# Patient Record
Sex: FEMALE | Race: BLACK OR AFRICAN AMERICAN | HISPANIC OR LATINO | Employment: FULL TIME | URBAN - NONMETROPOLITAN AREA
[De-identification: names, ages, dates, MRNs, and addresses within clinical notes are randomized per-mention and may not be internally consistent; named-entity substitution may affect disease eponyms.]

---

## 2017-03-27 ENCOUNTER — OFFICE VISIT (OUTPATIENT)
Dept: OBSTETRICS AND GYNECOLOGY | Facility: CLINIC | Age: 22
End: 2017-03-27

## 2017-03-27 VITALS
SYSTOLIC BLOOD PRESSURE: 130 MMHG | WEIGHT: 293 LBS | BODY MASS INDEX: 44.41 KG/M2 | DIASTOLIC BLOOD PRESSURE: 82 MMHG | HEIGHT: 68 IN

## 2017-03-27 DIAGNOSIS — N76.0 BV (BACTERIAL VAGINOSIS): ICD-10-CM

## 2017-03-27 DIAGNOSIS — Z01.419 VISIT FOR GYNECOLOGIC EXAMINATION: Primary | ICD-10-CM

## 2017-03-27 DIAGNOSIS — N92.0 MENORRHAGIA WITH REGULAR CYCLE: ICD-10-CM

## 2017-03-27 DIAGNOSIS — Z11.3 SCREEN FOR STD (SEXUALLY TRANSMITTED DISEASE): ICD-10-CM

## 2017-03-27 DIAGNOSIS — B96.89 BV (BACTERIAL VAGINOSIS): ICD-10-CM

## 2017-03-27 DIAGNOSIS — Z30.011 ENCOUNTER FOR INITIAL PRESCRIPTION OF CONTRACEPTIVE PILLS: ICD-10-CM

## 2017-03-27 LAB — WET PREP GENITAL: ABNORMAL

## 2017-03-27 PROCEDURE — 87491 CHLMYD TRACH DNA AMP PROBE: CPT | Performed by: NURSE PRACTITIONER

## 2017-03-27 PROCEDURE — 99202 OFFICE O/P NEW SF 15 MIN: CPT | Performed by: NURSE PRACTITIONER

## 2017-03-27 PROCEDURE — 99395 PREV VISIT EST AGE 18-39: CPT | Performed by: NURSE PRACTITIONER

## 2017-03-27 PROCEDURE — 87210 SMEAR WET MOUNT SALINE/INK: CPT | Performed by: NURSE PRACTITIONER

## 2017-03-27 PROCEDURE — 87798 DETECT AGENT NOS DNA AMP: CPT | Performed by: NURSE PRACTITIONER

## 2017-03-27 PROCEDURE — 87591 N.GONORRHOEAE DNA AMP PROB: CPT | Performed by: NURSE PRACTITIONER

## 2017-03-27 PROCEDURE — 87481 CANDIDA DNA AMP PROBE: CPT | Performed by: NURSE PRACTITIONER

## 2017-03-27 PROCEDURE — G0123 SCREEN CERV/VAG THIN LAYER: HCPCS | Performed by: NURSE PRACTITIONER

## 2017-03-27 PROCEDURE — 87512 GARDNER VAG DNA QUANT: CPT | Performed by: NURSE PRACTITIONER

## 2017-03-27 PROCEDURE — 87661 TRICHOMONAS VAGINALIS AMPLIF: CPT | Performed by: NURSE PRACTITIONER

## 2017-03-27 RX ORDER — LEVONORGESTREL AND ETHINYL ESTRADIOL 0.1-0.02MG
1 KIT ORAL DAILY
Qty: 28 TABLET | Refills: 11 | Status: SHIPPED | OUTPATIENT
Start: 2017-03-27 | End: 2018-03-27

## 2017-03-27 RX ORDER — LEVOTHYROXINE SODIUM 0.07 MG/1
75 TABLET ORAL DAILY
COMMUNITY

## 2017-03-27 RX ORDER — PANTOPRAZOLE SODIUM 40 MG/1
40 TABLET, DELAYED RELEASE ORAL DAILY
COMMUNITY

## 2017-03-27 RX ORDER — NORGESTIMATE AND ETHINYL ESTRADIOL 0.25-0.035
1 KIT ORAL DAILY
COMMUNITY

## 2017-03-27 RX ORDER — LEVOCETIRIZINE DIHYDROCHLORIDE 5 MG/1
5 TABLET, FILM COATED ORAL EVERY EVENING
COMMUNITY

## 2017-03-27 RX ORDER — METRONIDAZOLE 500 MG/1
500 TABLET ORAL 2 TIMES DAILY
Qty: 14 TABLET | Refills: 0 | Status: SHIPPED | OUTPATIENT
Start: 2017-03-27 | End: 2017-04-03

## 2017-03-27 NOTE — PATIENT INSTRUCTIONS
Insulin Resistance  Insulin is a hormone made by the pancreas that controls blood sugar (glucose) levels. It is needed for your body to function normally. Insulin resistance occurs when your body does not respond well to the insulin made by your pancreas. Insulin resistance results in high blood glucose levels and can lead to problems, including:  · Prediabetes.  · Type 2 diabetes.  · Heart disease.  · High blood pressure (hypertension).  · Stroke.  · Polycystic ovary syndrome.  · Fatty liver.  CAUSES   The exact causes is unknown.  RISK FACTORS  · Being overweight or obese, especially if your weight is centered in the waist area.  · Physical inactivity.  SIGNS AND SYMPTOMS   There are usually no symptoms.  DIAGNOSIS   There is no test to diagnose insulin resistance. However, your health care provider may suspect that you have insulin resistance if you have:  · High blood glucose levels.  · Abnormal cholesterol levels.  · A waist circumference of more than 35 inches for women and more than 40 inches for men.  · The risk factors for insulin resistance.  To make a diagnosis, your health care provider will perform a physical exam. During the exam, he or she will ask you questions about your health and medical history.  TREATMENT   The most important treatment is lifestyle changes. Your health care provider can help you make the changes that are appropriate. These may include:  · Eating less.  · Being physically active.  · Stopping the use of any tobacco products.  · Taking medicine to help improve your insulin sensitivity.  HOME CARE INSTRUCTIONS   · Eat a healthy diet.    Choose healthy portion sizes.    Eat more vegetables.    Drink more water.    Cut back on high-fat toppings.    Eat foods that are low in fat, such as fruits, vegetables, and lean meats.  · Be physically active to reach and maintain a healthy weight.    Exercise 4 days a week for 20-40 minutes at a time, or as directed by your health care provider.     Take the stairs instead of the elevator.    Walk around when you talk on the phone.    Walk your dog if you have one.  · Take medicines only as directed by your health care provider.  · Do not use any tobacco products, including cigarettes, chewing tobacco, or electronic cigarettes. If you need help quitting, ask your health care provider.  · Keep all follow-up visits as directed by your health care provider. This is important.  SEEK MEDICAL CARE IF:   · You have trouble losing weight or maintaining your goal weight.  · You gain weight.  · You have trouble following your prescribed meal plan.  · You cannot exercise or do your normal exercises.     This information is not intended to replace advice given to you by your health care provider. Make sure you discuss any questions you have with your health care provider.     Document Released: 02/06/2007 Document Revised: 01/08/2016 Document Reviewed: 05/29/2014  [employer] Interactive Patient Education ©2016 [employer]

## 2017-03-27 NOTE — PROGRESS NOTES
Subjective   Jimenez Eng is a 21 y.o. female is here today as a self referral.    HPI Comments: I'm here to have a physical exam and to see about my heavy painful periods.     Gynecologic Exam   The patient's pertinent negatives include no pelvic pain or vaginal discharge. Pertinent negatives include no abdominal pain, back pain, chills, constipation, diarrhea, flank pain, headaches, nausea, rash, sore throat or vomiting.       The following portions of the patient's history were reviewed and updated as appropriate: allergies, current medications, past family history, past social history, past surgical history and problem list.    Review of Systems   Constitutional: Negative for activity change, appetite change, chills and fatigue.   HENT: Negative for congestion, dental problem, ear pain, hearing loss, nosebleeds, rhinorrhea, sneezing, sore throat and voice change.    Eyes: Negative for discharge, redness, itching and visual disturbance.   Respiratory: Negative for apnea, cough, choking, shortness of breath and wheezing.    Cardiovascular: Negative for chest pain and palpitations.   Gastrointestinal: Negative for abdominal distention, abdominal pain, constipation, diarrhea, nausea and vomiting.   Endocrine: Negative for cold intolerance, heat intolerance and polyuria.   Genitourinary: Positive for menstrual problem (heavy painful periods for 6 months). Negative for difficulty urinating, dyspareunia, flank pain, genital sores, pelvic pain, vaginal bleeding and vaginal discharge.   Musculoskeletal: Negative for arthralgias, back pain, myalgias and neck pain.   Skin: Negative for pallor and rash.   Allergic/Immunologic: Negative for environmental allergies and food allergies.   Neurological: Negative for dizziness, tremors, seizures, numbness and headaches.   Hematological: Negative for adenopathy. Does not bruise/bleed easily.   Psychiatric/Behavioral: Negative for agitation, decreased concentration, sleep  disturbance and suicidal ideas. The patient is not nervous/anxious.        Objective   Physical Exam   Constitutional: She is oriented to person, place, and time. She appears well-developed and well-nourished. No distress.   HENT:   Head: Normocephalic and atraumatic.   Right Ear: External ear normal.   Left Ear: External ear normal.   Nose: Nose normal.   Mouth/Throat: Oropharynx is clear and moist.   Eyes: EOM are normal. Pupils are equal, round, and reactive to light.   Neck: Normal range of motion. No thyromegaly present.   Cardiovascular: Normal rate, regular rhythm and normal heart sounds.    No murmur heard.  Pulmonary/Chest: Effort normal and breath sounds normal. No respiratory distress. She has no wheezes. Right breast exhibits no inverted nipple and no mass. Left breast exhibits no inverted nipple and no mass.   Abdominal: Soft. Bowel sounds are normal. There is no tenderness.   Genitourinary: Vagina normal and uterus normal. No breast tenderness. Pelvic exam was performed with patient supine. There is no rash or tenderness on the right labia. There is no rash or tenderness on the left labia. Cervix exhibits no motion tenderness. Right adnexum displays no mass and no tenderness. Left adnexum displays no mass and no tenderness. No bleeding in the vagina. No vaginal discharge found.   Genitourinary Comments: Urethra and urethral meatus normal  Bladder normal no prolapse  Perineum and rectum examined and intact without lesions   Musculoskeletal: Normal range of motion.   Lymphadenopathy:        Right: No inguinal adenopathy present.        Left: No inguinal adenopathy present.   Neurological: She is alert and oriented to person, place, and time. She has normal reflexes.   Skin: Skin is warm and dry.   Psychiatric: She has a normal mood and affect. Her behavior is normal. Judgment and thought content normal.   Nursing note and vitals reviewed.        Assessment/Plan   Jimenez was seen today for gynecologic  exam.    Diagnoses and all orders for this visit:    Visit for gynecologic examination  Normal gyn exam. Pt has been diagnosed with PCOS/Insulin resistant several years ago. She has had a colonoscopy due to her father's cancer and she did  Have some atypical polyps. She is due to have another one in few years. Nestor Dutta is her PCP and does her labs.   -     Liquid-based Pap Smear, Screening    Encounter for initial prescription of contraceptive pills    Will change her oc's due to ? Ovarian cyst formation, and also for heavy periods.   -     levonorgestrel-ethinyl estradiol (AVIANE,ALESSE,LESSINA) 0.1-20 MG-MCG per tablet; Take 1 tablet by mouth Daily.    Menorrhagia with regular cycle    Pt states she has been having this for 6 months. This LMP in particular was heavy and very crampy.    BMI 45.0-49.9, adult    Encouraged low fat diet and exercise.    Screen for STD (sexually transmitted disease)    Patient has been with her CSP for 2 years. She just wanted to have GC and Chlamydia cultures and Bacterial Smear done.   -     Liquid-based Pap Smear, Screening    BV (bacterial vaginosis)       Discussed this is an overgrowth of normal bacteria in the vagina due to change in vaginal ph.     -     POC Wet Prep + BV  Bacterial cultures taken.  -     metroNIDAZOLE (FLAGYL) 500 MG tablet; Take 1 tablet by mouth 2 (Two) Times a Day for 7 days.

## 2017-03-29 ENCOUNTER — PROCEDURE VISIT (OUTPATIENT)
Dept: OBSTETRICS AND GYNECOLOGY | Facility: CLINIC | Age: 22
End: 2017-03-29

## 2017-03-29 DIAGNOSIS — N92.0 MENORRHAGIA WITH REGULAR CYCLE: Primary | ICD-10-CM

## 2017-03-29 PROCEDURE — 76830 TRANSVAGINAL US NON-OB: CPT | Performed by: OBSTETRICS & GYNECOLOGY

## 2017-03-30 LAB
GEN CATEG CVX/VAG CYTO-IMP: NORMAL
LAB AP CASE REPORT: NORMAL
LAB AP GYN ADDITIONAL INFORMATION: NORMAL
LAB AP GYN OTHER FINDINGS: NORMAL
Lab: NORMAL
PATH INTERP SPEC-IMP: NORMAL
STAT OF ADQ CVX/VAG CYTO-IMP: NORMAL

## 2017-09-21 ENCOUNTER — HOSPITAL ENCOUNTER (OUTPATIENT)
Dept: CT IMAGING | Age: 22
Discharge: HOME | End: 2017-09-21
Payer: COMMERCIAL

## 2017-09-21 DIAGNOSIS — R10.31 RIGHT LOWER QUADRANT PAIN: ICD-10-CM

## 2017-09-21 PROCEDURE — 6360000004 HC RX CONTRAST MEDICATION: Performed by: NURSE PRACTITIONER

## 2017-09-21 PROCEDURE — 74177 CT ABD & PELVIS W/CONTRAST: CPT

## 2017-09-21 RX ADMIN — IOVERSOL 75 ML: 741 INJECTION INTRA-ARTERIAL; INTRAVENOUS at 13:34

## 2018-10-17 ENCOUNTER — OFFICE VISIT (OUTPATIENT)
Dept: URGENT CARE | Age: 23
End: 2018-10-17
Payer: COMMERCIAL

## 2018-10-17 VITALS
DIASTOLIC BLOOD PRESSURE: 84 MMHG | OXYGEN SATURATION: 98 % | RESPIRATION RATE: 20 BRPM | BODY MASS INDEX: 43.4 KG/M2 | HEIGHT: 69 IN | HEART RATE: 85 BPM | SYSTOLIC BLOOD PRESSURE: 132 MMHG | TEMPERATURE: 98.1 F | WEIGHT: 293 LBS

## 2018-10-17 DIAGNOSIS — H66.91 RIGHT OTITIS MEDIA, UNSPECIFIED OTITIS MEDIA TYPE: Primary | ICD-10-CM

## 2018-10-17 DIAGNOSIS — R42 DIZZINESS: ICD-10-CM

## 2018-10-17 DIAGNOSIS — E03.9 HYPOTHYROIDISM, UNSPECIFIED TYPE: ICD-10-CM

## 2018-10-17 LAB — TSH SERPL DL<=0.05 MIU/L-ACNC: 1.58 UIU/ML (ref 0.27–4.2)

## 2018-10-17 PROCEDURE — 99203 OFFICE O/P NEW LOW 30 MIN: CPT | Performed by: SPECIALIST

## 2018-10-17 RX ORDER — MECLIZINE HYDROCHLORIDE 25 MG/1
25 TABLET ORAL 3 TIMES DAILY PRN
Qty: 30 TABLET | Refills: 0 | Status: SHIPPED | OUTPATIENT
Start: 2018-10-17 | End: 2018-10-27

## 2018-10-17 RX ORDER — FLUTICASONE PROPIONATE 50 MCG
1 SPRAY, SUSPENSION (ML) NASAL DAILY
Qty: 1 BOTTLE | Refills: 0 | Status: SHIPPED | OUTPATIENT
Start: 2018-10-17 | End: 2019-08-08

## 2018-10-17 RX ORDER — AMOXICILLIN 875 MG/1
875 TABLET, COATED ORAL 2 TIMES DAILY
Qty: 20 TABLET | Refills: 0 | Status: SHIPPED | OUTPATIENT
Start: 2018-10-17 | End: 2018-10-27

## 2018-10-17 ASSESSMENT — ENCOUNTER SYMPTOMS
SINUS PRESSURE: 1
RESPIRATORY NEGATIVE: 1

## 2018-10-17 NOTE — PATIENT INSTRUCTIONS
example, call if:    · You passed out (lost consciousness).     · You have dizziness along with symptoms of a heart attack. These may include:  ¨ Chest pain or pressure, or a strange feeling in the chest.  ¨ Sweating. ¨ Shortness of breath. ¨ Nausea or vomiting. ¨ Pain, pressure, or a strange feeling in the back, neck, jaw, or upper belly or in one or both shoulders or arms. ¨ Lightheadedness or sudden weakness. ¨ A fast or irregular heartbeat.     · You have symptoms of a stroke. These may include:  ¨ Sudden numbness, tingling, weakness, or loss of movement in your face, arm, or leg, especially on only one side of your body. ¨ Sudden vision changes. ¨ Sudden trouble speaking. ¨ Sudden confusion or trouble understanding simple statements. ¨ Sudden problems with walking or balance. ¨ A sudden, severe headache that is different from past headaches.    Call your doctor now or seek immediate medical care if:    · You feel dizzy and have a fever, headache, or ringing in your ears.     · You have new or increased nausea and vomiting.     · Your dizziness does not go away or comes back.    Watch closely for changes in your health, and be sure to contact your doctor if:    · You do not get better as expected. Where can you learn more? Go to https://[employer]. org and sign in to your [employer] account. Enter M863 in the [facility] box to learn more about \"Dizziness: Care Instructions. \"     If you do not have an account, please click on the \"Sign Up Now\" link. Current as of: November 20, 2017  Content Version: 11.7  © 6581-2110 [employer]. Care instructions adapted under license by [facility]"[employer] [facility] ([facility]). If you have questions about a medical condition or this instruction, always ask your healthcare professional. [place] [address] any warranty or liability for your use of this information.        Patient Education        Ear Infection (Otitis Media): Care Instructions  Your Care Instructions    An ear infection may start with a cold and affect the middle ear (otitis media). It can hurt a lot. Most ear infections clear up on their own in a couple of days. Most often you will not need antibiotics. This is because many ear infections are caused by a virus. Antibiotics don't work against a virus. Regular doses of pain medicines are the best way to reduce your fever and help you feel better. Follow-up care is a key part of your treatment and safety. Be sure to make and go to all appointments, and call your doctor if you are having problems. It's also a good idea to know your test results and keep a list of the medicines you take. How can you care for yourself at home? · Take pain medicines exactly as directed. ¨ If the doctor gave you a prescription medicine for pain, take it as prescribed. ¨ If you are not taking a prescription pain medicine, take an over-the-counter medicine, such as acetaminophen (Tylenol), ibuprofen (Advil, Motrin), or naproxen (Aleve). Read and follow all instructions on the label. ¨ Do not take two or more pain medicines at the same time unless the doctor told you to. Many pain medicines have acetaminophen, which is Tylenol. Too much acetaminophen (Tylenol) can be harmful. · Plan to take a full dose of pain reliever before bedtime. Getting enough sleep will help you get better. · Try a warm, moist washcloth on the ear. It may help relieve pain. · If your doctor prescribed antibiotics, take them as directed. Do not stop taking them just because you feel better. You need to take the full course of antibiotics. When should you call for help?   Call your doctor now or seek immediate medical care if:    · You have new or increasing ear pain.     · You have new or increasing pus or blood draining from your ear.     · You have a fever with a stiff neck or a severe headache.    Watch closely for changes in your health, and be sure to contact your are doing well. Follow-up care is a key part of your treatment and safety. Be sure to make and go to all appointments, and call your doctor if you are having problems. It's also a good idea to know your test results and keep a list of the medicines you take. How can you care for yourself at home? · Take your thyroid hormone medicine exactly as prescribed. Call your doctor if you think you are having a problem with your medicine. Most people do not have side effects if they take the right amount of medicine regularly. ¨ Take the medicine 30 minutes before breakfast, and do not take it with calcium, vitamins, or iron. ¨ Do not take extra doses of your thyroid medicine. It will not help you get better any faster, and it may cause side effects. ¨ If you forget to take a dose, do NOT take a double dose of medicine. Take your usual dose the next day. · Tell your doctor about all prescription, herbal, or over-the-counter products you take. · Take care of yourself. Eat a healthy diet, get enough sleep, and get regular exercise. When should you call for help? Call 911 anytime you think you may need emergency care. For example, call if:    · You passed out (lost consciousness).     · You have severe trouble breathing.     · You have a very slow heartbeat (less than 60 beats a minute).     · You have a low body temperature (95°F or below).    Call your doctor now or seek immediate medical care if:    · You feel tired, sluggish, or weak.     · You have trouble remembering things or concentrating.     · You do not begin to feel better 2 weeks after starting your medicine.    Watch closely for changes in your health, and be sure to contact your doctor if you have any problems. Where can you learn more? Go to https://abby.[employer]. org and sign in to your [employer] account. Enter Z124 in the [employer] box to learn more about \"Hypothyroidism: Care Instructions. \"     If you do not have an

## 2018-10-17 NOTE — PROGRESS NOTES
\"Sign Up Now\" link. Current as of: May 12, 2017  Content Version: 11.7  © 3427-3224 [employer]. Care instructions adapted under license by [facility] ([facility]). If you have questions about a medical condition or this instruction, always ask your healthcare professional. [address] any warranty or liability for your use of this information. Patient Education        Hypothyroidism: Care Instructions  Your Care Instructions    You have hypothyroidism, which means that your body is not making enough thyroid hormone. This hormone helps your body use energy. If your thyroid level is low, you may feel tired, be constipated, have an increase in your blood pressure, or have dry skin or memory problems. You may also get cold easily, even when it is warm. Women with low thyroid levels may have heavy menstrual periods. A blood test to find your thyroid-stimulating hormone (TSH) level is used to check for hypothyroidism. A high TSH level may mean that you have low thyroid. When your body is not making enough thyroid hormone, TSH levels rise in an effort to make the body produce more. The treatment for hypothyroidism is to take thyroid hormone pills. You should start to feel better in 1 to 2 weeks. But it can take several months to see changes in the TSH level. You will need regular visits with your doctor to make sure you have the right dose of medicine. Most people need treatment for the rest of their lives. You will need to see your doctor regularly to have blood tests and to make sure you are doing well. Follow-up care is a key part of your treatment and safety. Be sure to make and go to all appointments, and call your doctor if you are having problems. It's also a good idea to know your test results and keep a list of the medicines you take. How can you care for yourself at home? · Take your thyroid hormone medicine exactly as prescribed.  Call your doctor if you think you are having

## 2018-10-29 ENCOUNTER — HOSPITAL ENCOUNTER (OUTPATIENT)
Dept: MRI IMAGING | Age: 23
Discharge: HOME | End: 2018-10-29
Payer: COMMERCIAL

## 2018-10-29 DIAGNOSIS — R42 DIZZINESS AND GIDDINESS: ICD-10-CM

## 2018-10-29 DIAGNOSIS — H53.9 UNSPECIFIED VISUAL DISTURBANCE: ICD-10-CM

## 2018-10-29 DIAGNOSIS — R51.9 INTRACTABLE HEADACHE, UNSPECIFIED CHRONICITY PATTERN, UNSPECIFIED HEADACHE TYPE: ICD-10-CM

## 2018-10-29 PROCEDURE — 70551 MRI BRAIN STEM W/O DYE: CPT

## 2018-11-14 ENCOUNTER — PROCEDURE VISIT (OUTPATIENT)
Dept: OTOLARYNGOLOGY | Age: 23
End: 2018-11-14
Payer: COMMERCIAL

## 2018-11-14 ENCOUNTER — OFFICE VISIT (OUTPATIENT)
Dept: OTOLARYNGOLOGY | Age: 23
End: 2018-11-14
Payer: COMMERCIAL

## 2018-11-14 VITALS
WEIGHT: 293 LBS | OXYGEN SATURATION: 99 % | HEIGHT: 69 IN | RESPIRATION RATE: 18 BRPM | DIASTOLIC BLOOD PRESSURE: 84 MMHG | TEMPERATURE: 98 F | HEART RATE: 89 BPM | SYSTOLIC BLOOD PRESSURE: 118 MMHG | BODY MASS INDEX: 43.4 KG/M2

## 2018-11-14 DIAGNOSIS — R42 VERTIGO: ICD-10-CM

## 2018-11-14 DIAGNOSIS — H91.91 UNILATERAL HEARING LOSS, RIGHT: Primary | ICD-10-CM

## 2018-11-14 DIAGNOSIS — H90.3 HEARING LOSS, SENSORINEURAL, ASYMMETRICAL: Primary | ICD-10-CM

## 2018-11-14 DIAGNOSIS — H93.11 TINNITUS OF RIGHT EAR: ICD-10-CM

## 2018-11-14 PROCEDURE — 92567 TYMPANOMETRY: CPT | Performed by: AUDIOLOGIST

## 2018-11-14 PROCEDURE — 92557 COMPREHENSIVE HEARING TEST: CPT | Performed by: AUDIOLOGIST

## 2018-11-14 PROCEDURE — 99203 OFFICE O/P NEW LOW 30 MIN: CPT | Performed by: OTOLARYNGOLOGY

## 2018-11-14 NOTE — PROGRESS NOTES
Subjective:      Jeronimo Villela is a 21 y.o. female who presented to the clinic with complaints of dizziness, tinnitus and decreased hearing in the right ear. She reported onset approximately one month ago. She reported symptoms have improved but are not resolved. Objective:     External Ears:   normal pinnae shape and position   Ext. Aud. Canal:  Right:patent    Left: patent    Tympanic Mem:  Right: normal landmarks and mobility   Left: normal landmarks and mobility     Audiometry: Moderately severe rising SNHL right, hearing WNL left         Tympanometry:  Right: Type A                                 Left: Type A     Audiogram and Immitance:       SRT: 55 dBHL right, 10 dBHL left  WRS: 74% right @ 75 dBHL, 100% left @65 dBHL      Plan:   Results of today's testing was discussed with Ms. Cata Hou and the following recommendations were made:    1. Follow up with ENT as scheduled. 2. Recheck hearing following medical management.

## 2018-12-03 ENCOUNTER — PROCEDURE VISIT (OUTPATIENT)
Dept: OTOLARYNGOLOGY | Age: 23
End: 2018-12-03
Payer: COMMERCIAL

## 2018-12-03 VITALS
WEIGHT: 293 LBS | TEMPERATURE: 98.8 F | BODY MASS INDEX: 43.4 KG/M2 | RESPIRATION RATE: 18 BRPM | HEIGHT: 69 IN | SYSTOLIC BLOOD PRESSURE: 120 MMHG | OXYGEN SATURATION: 98 % | HEART RATE: 89 BPM | DIASTOLIC BLOOD PRESSURE: 88 MMHG

## 2018-12-03 DIAGNOSIS — H91.91 UNILATERAL HEARING LOSS, RIGHT: Primary | ICD-10-CM

## 2018-12-03 DIAGNOSIS — H90.3 HEARING LOSS, SENSORINEURAL, ASYMMETRICAL: ICD-10-CM

## 2018-12-03 PROCEDURE — 69801 INCISE INNER EAR: CPT | Performed by: OTOLARYNGOLOGY

## 2018-12-03 PROCEDURE — 92552 PURE TONE AUDIOMETRY AIR: CPT | Performed by: AUDIOLOGIST

## 2019-02-22 ENCOUNTER — OFFICE VISIT (OUTPATIENT)
Dept: URGENT CARE | Age: 24
End: 2019-02-22
Payer: COMMERCIAL

## 2019-02-22 VITALS
BODY MASS INDEX: 46.07 KG/M2 | DIASTOLIC BLOOD PRESSURE: 80 MMHG | OXYGEN SATURATION: 98 % | TEMPERATURE: 97.6 F | SYSTOLIC BLOOD PRESSURE: 122 MMHG | WEIGHT: 293 LBS | RESPIRATION RATE: 18 BRPM | HEART RATE: 86 BPM

## 2019-02-22 DIAGNOSIS — R05.9 COUGH: ICD-10-CM

## 2019-02-22 DIAGNOSIS — J40 BRONCHITIS: Primary | ICD-10-CM

## 2019-02-22 LAB
INFLUENZA A ANTIBODY: NEGATIVE
INFLUENZA B ANTIBODY: NEGATIVE
S PYO AG THROAT QL: NORMAL

## 2019-02-22 PROCEDURE — 87804 INFLUENZA ASSAY W/OPTIC: CPT | Performed by: NURSE PRACTITIONER

## 2019-02-22 PROCEDURE — 99213 OFFICE O/P EST LOW 20 MIN: CPT | Performed by: NURSE PRACTITIONER

## 2019-02-22 PROCEDURE — 87880 STREP A ASSAY W/OPTIC: CPT | Performed by: NURSE PRACTITIONER

## 2019-02-22 RX ORDER — BENZONATATE 200 MG/1
200 CAPSULE ORAL 3 TIMES DAILY PRN
Qty: 30 CAPSULE | Refills: 0 | Status: SHIPPED | OUTPATIENT
Start: 2019-02-22 | End: 2019-04-11 | Stop reason: ALTCHOICE

## 2019-02-22 RX ORDER — AZITHROMYCIN 250 MG/1
250 TABLET, FILM COATED ORAL SEE ADMIN INSTRUCTIONS
Qty: 6 TABLET | Refills: 0 | Status: SHIPPED | OUTPATIENT
Start: 2019-02-22 | End: 2019-02-27

## 2019-02-22 RX ORDER — METHYLPREDNISOLONE 4 MG/1
TABLET ORAL
Qty: 1 KIT | Refills: 0 | Status: SHIPPED | OUTPATIENT
Start: 2019-02-22 | End: 2019-02-28

## 2019-02-22 ASSESSMENT — ENCOUNTER SYMPTOMS
SHORTNESS OF BREATH: 0
RHINORRHEA: 0
NAUSEA: 0
COLOR CHANGE: 0
BACK PAIN: 0
VOICE CHANGE: 0
COUGH: 1
VOMITING: 0
SORE THROAT: 1
PHOTOPHOBIA: 0

## 2019-03-20 ENCOUNTER — APPOINTMENT (OUTPATIENT)
Dept: CT IMAGING | Age: 24
End: 2019-03-20
Payer: COMMERCIAL

## 2019-03-20 ENCOUNTER — HOSPITAL ENCOUNTER (EMERGENCY)
Age: 24
Discharge: HOME | End: 2019-03-20
Payer: COMMERCIAL

## 2019-03-20 VITALS
WEIGHT: 293 LBS | OXYGEN SATURATION: 98 % | HEART RATE: 82 BPM | RESPIRATION RATE: 17 BRPM | HEIGHT: 68 IN | TEMPERATURE: 97.2 F | BODY MASS INDEX: 44.41 KG/M2 | SYSTOLIC BLOOD PRESSURE: 120 MMHG | DIASTOLIC BLOOD PRESSURE: 80 MMHG

## 2019-03-20 DIAGNOSIS — N83.291 OTHER OVARIAN CYST, RIGHT SIDE: Primary | ICD-10-CM

## 2019-03-20 LAB
ALBUMIN SERPL-MCNC: 4.2 G/DL (ref 3.5–5.2)
ALP BLD-CCNC: 71 U/L (ref 35–104)
ALT SERPL-CCNC: 13 U/L (ref 5–33)
ANION GAP SERPL CALCULATED.3IONS-SCNC: 13 MMOL/L (ref 7–19)
AST SERPL-CCNC: 14 U/L (ref 5–32)
BASOPHILS ABSOLUTE: 0.1 K/UL (ref 0–0.2)
BASOPHILS RELATIVE PERCENT: 0.4 % (ref 0–1)
BILIRUB SERPL-MCNC: <0.2 MG/DL (ref 0.2–1.2)
BILIRUBIN URINE: NEGATIVE
BLOOD, URINE: NEGATIVE
BUN BLDV-MCNC: 11 MG/DL (ref 6–20)
C-REACTIVE PROTEIN: 1.67 MG/DL (ref 0–0.5)
CALCIUM SERPL-MCNC: 9.3 MG/DL (ref 8.6–10)
CHLORIDE BLD-SCNC: 103 MMOL/L (ref 98–111)
CLARITY: CLEAR
CO2: 23 MMOL/L (ref 22–29)
COLOR: YELLOW
CREAT SERPL-MCNC: 0.7 MG/DL (ref 0.5–0.9)
EOSINOPHILS ABSOLUTE: 0.3 K/UL (ref 0–0.6)
EOSINOPHILS RELATIVE PERCENT: 2.5 % (ref 0–5)
GFR NON-AFRICAN AMERICAN: >60
GLUCOSE BLD-MCNC: 104 MG/DL (ref 74–109)
GLUCOSE URINE: NEGATIVE MG/DL
HCG(URINE) PREGNANCY TEST: NEGATIVE
HCT VFR BLD CALC: 43.3 % (ref 37–47)
HEMOGLOBIN: 13.5 G/DL (ref 12–16)
KETONES, URINE: NEGATIVE MG/DL
LEUKOCYTE ESTERASE, URINE: NEGATIVE
LIPASE: 29 U/L (ref 13–60)
LYMPHOCYTES ABSOLUTE: 4.2 K/UL (ref 1.1–4.5)
LYMPHOCYTES RELATIVE PERCENT: 33 % (ref 20–40)
MCH RBC QN AUTO: 27.4 PG (ref 27–31)
MCHC RBC AUTO-ENTMCNC: 31.2 G/DL (ref 33–37)
MCV RBC AUTO: 88 FL (ref 81–99)
MONOCYTES ABSOLUTE: 1.1 K/UL (ref 0–0.9)
MONOCYTES RELATIVE PERCENT: 8.4 % (ref 0–10)
NEUTROPHILS ABSOLUTE: 7 K/UL (ref 1.5–7.5)
NEUTROPHILS RELATIVE PERCENT: 55.2 % (ref 50–65)
NITRITE, URINE: NEGATIVE
PDW BLD-RTO: 13.4 % (ref 11.5–14.5)
PH UA: 6 (ref 5–8)
PLATELET # BLD: 298 K/UL (ref 130–400)
PMV BLD AUTO: 10.6 FL (ref 9.4–12.3)
POTASSIUM REFLEX MAGNESIUM: 3.8 MMOL/L (ref 3.5–5)
PROTEIN UA: NEGATIVE MG/DL
RBC # BLD: 4.92 M/UL (ref 4.2–5.4)
SEDIMENTATION RATE, ERYTHROCYTE: 17 MM/HR (ref 0–20)
SODIUM BLD-SCNC: 139 MMOL/L (ref 136–145)
SPECIFIC GRAVITY UA: 1.01 (ref 1–1.03)
TOTAL PROTEIN: 7.7 G/DL (ref 6.6–8.7)
URINE REFLEX TO CULTURE: NORMAL
UROBILINOGEN, URINE: 0.2 E.U./DL
WBC # BLD: 12.8 K/UL (ref 4.8–10.8)

## 2019-03-20 PROCEDURE — 85025 COMPLETE CBC W/AUTO DIFF WBC: CPT

## 2019-03-20 PROCEDURE — 86140 C-REACTIVE PROTEIN: CPT

## 2019-03-20 PROCEDURE — 81003 URINALYSIS AUTO W/O SCOPE: CPT

## 2019-03-20 PROCEDURE — 85652 RBC SED RATE AUTOMATED: CPT

## 2019-03-20 PROCEDURE — 83690 ASSAY OF LIPASE: CPT

## 2019-03-20 PROCEDURE — 99284 EMERGENCY DEPT VISIT MOD MDM: CPT | Performed by: NURSE PRACTITIONER

## 2019-03-20 PROCEDURE — 99284 EMERGENCY DEPT VISIT MOD MDM: CPT

## 2019-03-20 PROCEDURE — 6360000002 HC RX W HCPCS: Performed by: NURSE PRACTITIONER

## 2019-03-20 PROCEDURE — 96374 THER/PROPH/DIAG INJ IV PUSH: CPT

## 2019-03-20 PROCEDURE — 36415 COLL VENOUS BLD VENIPUNCTURE: CPT

## 2019-03-20 PROCEDURE — 6360000004 HC RX CONTRAST MEDICATION: Performed by: NURSE PRACTITIONER

## 2019-03-20 PROCEDURE — 74177 CT ABD & PELVIS W/CONTRAST: CPT

## 2019-03-20 PROCEDURE — 2580000003 HC RX 258: Performed by: NURSE PRACTITIONER

## 2019-03-20 PROCEDURE — 80053 COMPREHEN METABOLIC PANEL: CPT

## 2019-03-20 PROCEDURE — 84703 CHORIONIC GONADOTROPIN ASSAY: CPT

## 2019-03-20 RX ORDER — 0.9 % SODIUM CHLORIDE 0.9 %
1000 INTRAVENOUS SOLUTION INTRAVENOUS ONCE
Status: COMPLETED | OUTPATIENT
Start: 2019-03-20 | End: 2019-03-20

## 2019-03-20 RX ORDER — ONDANSETRON 2 MG/ML
4 INJECTION INTRAMUSCULAR; INTRAVENOUS ONCE
Status: COMPLETED | OUTPATIENT
Start: 2019-03-20 | End: 2019-03-20

## 2019-03-20 RX ORDER — HYDROCODONE BITARTRATE AND ACETAMINOPHEN 5; 325 MG/1; MG/1
1 TABLET ORAL EVERY 6 HOURS PRN
Qty: 10 TABLET | Refills: 0 | Status: SHIPPED | OUTPATIENT
Start: 2019-03-20 | End: 2019-03-23

## 2019-03-20 RX ORDER — ONDANSETRON 4 MG/1
4 TABLET, FILM COATED ORAL EVERY 8 HOURS PRN
Qty: 15 TABLET | Refills: 0 | Status: SHIPPED | OUTPATIENT
Start: 2019-03-20 | End: 2019-09-18

## 2019-03-20 RX ADMIN — ONDANSETRON 4 MG: 2 INJECTION INTRAMUSCULAR; INTRAVENOUS at 19:00

## 2019-03-20 RX ADMIN — SODIUM CHLORIDE 1000 ML: 9 INJECTION, SOLUTION INTRAVENOUS at 18:25

## 2019-03-20 RX ADMIN — IOPAMIDOL 90 ML: 755 INJECTION, SOLUTION INTRAVENOUS at 18:34

## 2019-03-20 ASSESSMENT — PAIN SCALES - GENERAL: PAINLEVEL_OUTOF10: 5

## 2019-03-20 ASSESSMENT — ENCOUNTER SYMPTOMS
COLOR CHANGE: 0
VOMITING: 0
COUGH: 0
WHEEZING: 0
SORE THROAT: 0
ABDOMINAL PAIN: 1
DIARRHEA: 0
SHORTNESS OF BREATH: 0
NAUSEA: 1
EYE DISCHARGE: 0
BACK PAIN: 0

## 2019-03-27 ENCOUNTER — OFFICE VISIT (OUTPATIENT)
Dept: PRIMARY CARE CLINIC | Age: 24
End: 2019-03-27
Payer: COMMERCIAL

## 2019-03-27 VITALS
OXYGEN SATURATION: 99 % | DIASTOLIC BLOOD PRESSURE: 72 MMHG | HEIGHT: 68 IN | HEART RATE: 94 BPM | BODY MASS INDEX: 44.41 KG/M2 | WEIGHT: 293 LBS | SYSTOLIC BLOOD PRESSURE: 124 MMHG | TEMPERATURE: 98.3 F

## 2019-03-27 DIAGNOSIS — N83.201 CYST OF RIGHT OVARY: ICD-10-CM

## 2019-03-27 DIAGNOSIS — Z76.89 ENCOUNTER TO ESTABLISH CARE: Primary | ICD-10-CM

## 2019-03-27 DIAGNOSIS — E03.9 HYPOTHYROIDISM, UNSPECIFIED TYPE: ICD-10-CM

## 2019-03-27 DIAGNOSIS — E55.9 VITAMIN D DEFICIENCY: ICD-10-CM

## 2019-03-27 LAB
T4 FREE: 1.2 NG/DL (ref 0.9–1.7)
TSH SERPL DL<=0.05 MIU/L-ACNC: 1.8 UIU/ML (ref 0.27–4.2)
VITAMIN D 25-HYDROXY: 15.2 NG/ML

## 2019-03-27 PROCEDURE — 99214 OFFICE O/P EST MOD 30 MIN: CPT | Performed by: NURSE PRACTITIONER

## 2019-03-27 RX ORDER — ERGOCALCIFEROL (VITAMIN D2) 1250 MCG
50000 CAPSULE ORAL WEEKLY
Qty: 4 CAPSULE | Refills: 3 | Status: SHIPPED | OUTPATIENT
Start: 2019-03-27 | End: 2019-05-21 | Stop reason: SDUPTHER

## 2019-03-27 RX ORDER — IBUPROFEN 800 MG/1
800 TABLET ORAL EVERY 8 HOURS PRN
Qty: 60 TABLET | Refills: 1 | Status: SHIPPED | OUTPATIENT
Start: 2019-03-27 | End: 2020-01-03 | Stop reason: SDUPTHER

## 2019-03-27 ASSESSMENT — PATIENT HEALTH QUESTIONNAIRE - PHQ9
SUM OF ALL RESPONSES TO PHQ QUESTIONS 1-9: 2
2. FEELING DOWN, DEPRESSED OR HOPELESS: 1
SUM OF ALL RESPONSES TO PHQ QUESTIONS 1-9: 2
1. LITTLE INTEREST OR PLEASURE IN DOING THINGS: 1
SUM OF ALL RESPONSES TO PHQ9 QUESTIONS 1 & 2: 2

## 2019-03-27 ASSESSMENT — ENCOUNTER SYMPTOMS
RESPIRATORY NEGATIVE: 1
ABDOMINAL PAIN: 1
EYES NEGATIVE: 1

## 2019-04-03 ENCOUNTER — HOSPITAL ENCOUNTER (OUTPATIENT)
Dept: ULTRASOUND IMAGING | Age: 24
Discharge: HOME | End: 2019-04-03
Payer: COMMERCIAL

## 2019-04-03 DIAGNOSIS — N83.201 CYST OF RIGHT OVARY: ICD-10-CM

## 2019-04-03 PROCEDURE — 76830 TRANSVAGINAL US NON-OB: CPT

## 2019-04-05 ENCOUNTER — TELEPHONE (OUTPATIENT)
Dept: PRIMARY CARE CLINIC | Age: 24
End: 2019-04-05

## 2019-04-05 NOTE — TELEPHONE ENCOUNTER
Patient called for results of u/s-informed preliminary but Law[employer] will have to review  Also reports has small lump \"under arm pit\" Offered appointment,declined and if gets bigger will call to make appt

## 2019-04-10 ENCOUNTER — TELEPHONE (OUTPATIENT)
Dept: PRIMARY CARE CLINIC | Age: 24
End: 2019-04-10

## 2019-04-11 ENCOUNTER — HOSPITAL ENCOUNTER (OUTPATIENT)
Dept: GENERAL RADIOLOGY | Age: 24
Discharge: HOME | End: 2019-04-11
Payer: COMMERCIAL

## 2019-04-11 ENCOUNTER — OFFICE VISIT (OUTPATIENT)
Dept: PRIMARY CARE CLINIC | Age: 24
End: 2019-04-11
Payer: COMMERCIAL

## 2019-04-11 VITALS
TEMPERATURE: 98.2 F | HEART RATE: 95 BPM | DIASTOLIC BLOOD PRESSURE: 80 MMHG | WEIGHT: 293 LBS | BODY MASS INDEX: 44.41 KG/M2 | SYSTOLIC BLOOD PRESSURE: 130 MMHG | HEIGHT: 68 IN | OXYGEN SATURATION: 98 %

## 2019-04-11 DIAGNOSIS — L02.91 ABSCESS: ICD-10-CM

## 2019-04-11 DIAGNOSIS — R73.09 ABNORMAL GLUCOSE: ICD-10-CM

## 2019-04-11 DIAGNOSIS — Z13.220 LIPID SCREENING: ICD-10-CM

## 2019-04-11 DIAGNOSIS — M25.551 PELVIC JOINT PAIN, RIGHT: ICD-10-CM

## 2019-04-11 DIAGNOSIS — M25.551 PELVIC JOINT PAIN, RIGHT: Primary | ICD-10-CM

## 2019-04-11 PROCEDURE — 73502 X-RAY EXAM HIP UNI 2-3 VIEWS: CPT

## 2019-04-11 PROCEDURE — 99214 OFFICE O/P EST MOD 30 MIN: CPT | Performed by: NURSE PRACTITIONER

## 2019-04-11 RX ORDER — SULFAMETHOXAZOLE AND TRIMETHOPRIM 400; 80 MG/1; MG/1
1 TABLET ORAL 2 TIMES DAILY
Qty: 20 TABLET | Refills: 0 | Status: SHIPPED | OUTPATIENT
Start: 2019-04-11 | End: 2019-04-21

## 2019-04-11 ASSESSMENT — ENCOUNTER SYMPTOMS
GASTROINTESTINAL NEGATIVE: 1
RESPIRATORY NEGATIVE: 1
EYES NEGATIVE: 1

## 2019-04-11 NOTE — PROGRESS NOTES
[facility] PRIMARY CARE  [facility] [facility]  [address] [facility] [place] [address]  Dept: [phone]  Dept Fax: [phone]  Loc: [phone]    Trung Monreal is a 21 y.o. female who presents today for her medical conditions/complaints as noted below. Trung Monreal is c/o of Hip Pain (right-radiates-pain front of leg) and Arm Swelling (left axilla)      Chief Complaint   Patient presents with    Hip Pain     right-radiates-pain front of leg    Arm Swelling     left axilla       HPI:     HPI  Patient here with continues pain in right hip now. She was diagnosed with right ovarian cyst, but US showed that it was resolved. She reports that the pain has been radiating through her bone in her right leg. She reports that the pain is the worst on the iliac crest of her right side. Patient is also complaining of left lymph node under her left axillary. She has had this lymph node swollen before, but it was when she had mono. She reports that the lymph node has been swollen for at least 2 weeks.      Past Medical History:   Diagnosis Date    Concussion     approximately 10 years ago    Food allergy     Hypothyroidism     Metabolic disorder     PCOS (polycystic ovarian syndrome)         Past Surgical History:   Procedure Laterality Date    CHOLECYSTECTOMY      KNEE SURGERY      POLYPECTOMY      TONSILLECTOMY AND ADENOIDECTOMY         Social History     Tobacco Use    Smoking status: Never Smoker    Smokeless tobacco: Never Used   Substance Use Topics    Alcohol use: Yes        Current Outpatient Medications   Medication Sig Dispense Refill    sulfamethoxazole-trimethoprim (BACTRIM) 400-80 MG per tablet Take 1 tablet by mouth 2 times daily for 10 days 20 tablet 0    ibuprofen (IBU) 800 MG tablet Take 1 tablet by mouth every 8 hours as needed for Pain 60 tablet 1    ergocalciferol (ERGOCALCIFEROL) 48901 units capsule Take 1 capsule by mouth once a week 4 capsule 3    ondansetron (ZOFRAN) 4 MG tablet Take 1 tablet by mouth every 8 hours as needed for Nausea or Vomiting 15 tablet 0    fluticasone (FLONASE) 50 MCG/ACT nasal spray 1 spray by Nasal route daily 1 Bottle 0     No current facility-administered medications for this visit. Allergies   Allergen Reactions    Corn-Containing Products     Shrimp (Diagnostic)     Soybean-Containing Drug Products     Wheat Extract        Family History   Problem Relation Age of Onset    Polycystic Ovary Syndrome Mother     Colon Cancer Paternal Grandmother          Subjective:      Review of Systems   Constitutional: Negative. HENT: Negative. Eyes: Negative. Respiratory: Negative. Cardiovascular: Negative. Gastrointestinal: Negative. Endocrine: Negative. Genitourinary: Negative. Musculoskeletal: Positive for arthralgias (right hip/pelvis). Skin:        Left arm knot   Neurological: Negative. Hematological: Negative. Psychiatric/Behavioral: Negative. Objective:     Physical Exam   Constitutional: She is oriented to person, place, and time. Vital signs are normal. She appears well-developed and well-nourished. HENT:   Head: Normocephalic and atraumatic. Right Ear: Hearing, tympanic membrane, external ear and ear canal normal.   Left Ear: Hearing, tympanic membrane, external ear and ear canal normal.   Nose: Nose normal.   Mouth/Throat: Uvula is midline, oropharynx is clear and moist and mucous membranes are normal.   Eyes: Pupils are equal, round, and reactive to light. Conjunctivae, EOM and lids are normal.   Neck: Trachea normal and normal range of motion. Neck supple. No thyroid mass and no thyromegaly present. Cardiovascular: Normal rate, regular rhythm, normal heart sounds and intact distal pulses. Pulmonary/Chest: Effort normal and breath sounds normal. Right breast exhibits no mass and no nipple discharge. Left breast exhibits no mass and no nipple discharge.        Abdominal: Soft. Normal appearance and bowel sounds are normal.   Musculoskeletal:        Right hip: She exhibits decreased range of motion and decreased strength. Cervical back: Normal. She exhibits normal range of motion and no tenderness. Thoracic back: Normal. She exhibits normal range of motion and no tenderness. Lumbar back: Normal. She exhibits decreased range of motion. She exhibits no tenderness. Neurological: She is alert and oriented to person, place, and time. She has normal strength. Skin: Skin is warm, dry and intact. Psychiatric: She has a normal mood and affect. Her speech is normal and behavior is normal. Judgment and thought content normal. Cognition and memory are normal.   Nursing note and vitals reviewed. /80   Pulse 95   Temp 98.2 °F (36.8 °C) (Temporal)   Ht 5' 8\" (1.727 m)   Wt (!) 314 lb (142.4 kg)   SpO2 98%   BMI 47.74 kg/m²     Assessment:      Diagnosis Orders   1. Pelvic joint pain, right  XR HIP 2-3 VW W PELVIS RIGHT   2. Abnormal glucose  Hemoglobin A1C   3. BMI 45.0-49.9, adult (HCC)  Hemoglobin A1C   4. Lipid screening  Lipid Panel   5. Abscess  sulfamethoxazole-trimethoprim (BACTRIM) 400-80 MG per tablet       No results found for this visit on 04/11/19. Plan:     I am checking labs - we will call with these results. Xray of right hip due to continued pain. abx for likely abscess under left axillary. Return in about 1 month (around 5/9/2019).     Orders Placed This Encounter   Procedures    XR HIP 2-3 VW W PELVIS RIGHT     Standing Status:   Future     Number of Occurrences:   1     Standing Expiration Date:   4/11/2020    Hemoglobin A1C     Standing Status:   Future     Number of Occurrences:   1     Standing Expiration Date:   4/11/2020    Lipid Panel     Standing Status:   Future     Number of Occurrences:   1     Standing Expiration Date:   4/11/2020     Order Specific Question:   Is Patient Fasting?/# of Hours     Answer:   6 hours Orders Placed This Encounter   Medications    sulfamethoxazole-trimethoprim (BACTRIM) 400-80 MG per tablet     Sig: Take 1 tablet by mouth 2 times daily for 10 days     Dispense:  20 tablet     Refill:  0        Patient offered educational handouts and has had all questions answered. Patient voices understanding and agrees to plans along with risks and benefits of plan. Patient is instructed to continue prior meds, diet, and exercise plans as instructed. Patient agrees to follow up as instructed and sooner if needed. Patient agrees to go to ER if condition becomes emergent. EMR Dragon/transcription disclaimer: Some of this encounter note is an electronic transcription/translation of spoken language to printed text. The electronic translation of spoken language may permit erroneous, or at times, nonsensical words or phrases to be inadvertently transcribed.  Although I have reviewed the note for such errors, some may still exist.    Electronically signed by SARAY Jade on 4/15/2019 at 9:48 AM

## 2019-04-12 DIAGNOSIS — R73.09 ABNORMAL GLUCOSE: ICD-10-CM

## 2019-04-12 DIAGNOSIS — Z13.220 LIPID SCREENING: ICD-10-CM

## 2019-04-12 LAB
CHOLESTEROL, TOTAL: 196 MG/DL (ref 160–199)
HBA1C MFR BLD: 6.2 % (ref 4–6)
HDLC SERPL-MCNC: 66 MG/DL (ref 65–121)
LDL CHOLESTEROL CALCULATED: 109 MG/DL
TRIGL SERPL-MCNC: 105 MG/DL (ref 0–149)

## 2019-04-17 ENCOUNTER — TELEPHONE (OUTPATIENT)
Dept: PRIMARY CARE CLINIC | Age: 24
End: 2019-04-17

## 2019-04-17 NOTE — TELEPHONE ENCOUNTER
----- Message from SARAY Ramírez sent at 4/16/2019  4:35 PM CDT -----  Please let patient knowt a1c was a touch high. This does put her in prediabetic range. I recommend us starting Metformin 500 mg BID if she is williong to help improve that number and this will help with weight loss as well. If we can improve her total weight loss then her a1c might improve enough to no longer be in prediabetic range.

## 2019-05-06 ENCOUNTER — OFFICE VISIT (OUTPATIENT)
Dept: OBGYN | Age: 24
End: 2019-05-06
Payer: COMMERCIAL

## 2019-05-06 VITALS
HEART RATE: 87 BPM | DIASTOLIC BLOOD PRESSURE: 93 MMHG | BODY MASS INDEX: 48.05 KG/M2 | WEIGHT: 293 LBS | SYSTOLIC BLOOD PRESSURE: 132 MMHG

## 2019-05-06 DIAGNOSIS — R10.2 PELVIC PAIN: ICD-10-CM

## 2019-05-06 DIAGNOSIS — N92.6 IRREGULAR MENSES: ICD-10-CM

## 2019-05-06 DIAGNOSIS — I88.9 LYMPHADENITIS: ICD-10-CM

## 2019-05-06 DIAGNOSIS — E28.2 PCOS (POLYCYSTIC OVARIAN SYNDROME): ICD-10-CM

## 2019-05-06 DIAGNOSIS — E34.9 HORMONE DISTURBANCE: Primary | ICD-10-CM

## 2019-05-06 DIAGNOSIS — R30.0 DYSURIA: ICD-10-CM

## 2019-05-06 DIAGNOSIS — E34.9 HORMONE DISTURBANCE: ICD-10-CM

## 2019-05-06 DIAGNOSIS — Z80.0 FAMILY HISTORY OF COLON CANCER IN FATHER: ICD-10-CM

## 2019-05-06 LAB
BACTERIA URINE, POC: NORMAL
BASOPHILS ABSOLUTE: 0.1 K/UL (ref 0–0.2)
BASOPHILS RELATIVE PERCENT: 0.6 % (ref 0–1)
BILIRUBIN URINE: 0.2 MG/DL
BLOOD, URINE: POSITIVE
C-REACTIVE PROTEIN: 1.15 MG/DL (ref 0–0.5)
CASTS URINE, POC: NORMAL
CLARITY: CLEAR
COLOR: YELLOW
CORTISOL TOTAL: 4.2 UG/DL
CRYSTALS URINE, POC: NORMAL
EOSINOPHILS ABSOLUTE: 0.4 K/UL (ref 0–0.6)
EOSINOPHILS RELATIVE PERCENT: 3.6 % (ref 0–5)
EPI CELLS URINE, POC: NORMAL
FOLLICLE STIMULATING HORMONE: 4.7 MIU/ML
GLUCOSE URINE: NORMAL
HCT VFR BLD CALC: 39.3 % (ref 37–47)
HEMOGLOBIN: 12.4 G/DL (ref 12–16)
KETONES, URINE: NEGATIVE
LEUKOCYTE EST, POC: NORMAL
LUTEINIZING HORMONE: 4.3 MIU/ML
LYMPHOCYTES ABSOLUTE: 3.4 K/UL (ref 1.1–4.5)
LYMPHOCYTES RELATIVE PERCENT: 31.7 % (ref 20–40)
MCH RBC QN AUTO: 27 PG (ref 27–31)
MCHC RBC AUTO-ENTMCNC: 31.6 G/DL (ref 33–37)
MCV RBC AUTO: 85.6 FL (ref 81–99)
MONOCYTES ABSOLUTE: 0.8 K/UL (ref 0–0.9)
MONOCYTES RELATIVE PERCENT: 7.5 % (ref 0–10)
NEUTROPHILS ABSOLUTE: 6 K/UL (ref 1.5–7.5)
NEUTROPHILS RELATIVE PERCENT: 56.2 % (ref 50–65)
NITRITE, URINE: NEGATIVE
PDW BLD-RTO: 13.7 % (ref 11.5–14.5)
PH UA: 6 (ref 4.5–8)
PLATELET # BLD: 296 K/UL (ref 130–400)
PMV BLD AUTO: 10.5 FL (ref 9.4–12.3)
PROTEIN UA: NEGATIVE
RBC # BLD: 4.59 M/UL (ref 4.2–5.4)
RBC URINE, POC: NORMAL
SPECIFIC GRAVITY UA: 1.03 (ref 1–1.03)
TESTOSTERONE TOTAL: 36.6 NG/DL (ref 8.4–48.1)
UROBILINOGEN, URINE: NORMAL
WBC # BLD: 10.6 K/UL (ref 4.8–10.8)
WBC URINE, POC: NORMAL
YEAST URINE, POC: NORMAL

## 2019-05-06 PROCEDURE — 99203 OFFICE O/P NEW LOW 30 MIN: CPT | Performed by: NURSE PRACTITIONER

## 2019-05-06 PROCEDURE — 81000 URINALYSIS NONAUTO W/SCOPE: CPT | Performed by: NURSE PRACTITIONER

## 2019-05-06 ASSESSMENT — ENCOUNTER SYMPTOMS
RESPIRATORY NEGATIVE: 1
ALLERGIC/IMMUNOLOGIC NEGATIVE: 1
CONSTIPATION: 0
EYES NEGATIVE: 1
DIARRHEA: 0
ABDOMINAL PAIN: 1

## 2019-05-06 NOTE — PROGRESS NOTES
Yamel Cuevas is a 21 y.o. female who presents today for her medical conditions/ complaints as noted below. Yamel Cuevas is c/o of Establish Care and Menstrual Problem (very painful cycles)        HPI   New pt presents c/o long history of PCOS, irregular periods, intermittent cysts and feeling like something is \"off. \" Has struglled with PCOS, irregular cycles and wt gain basically since puberty. Recent labs show Hgba1c of 6.2, and pt states she has been prediabetic for years. Recently restarted Metformin. Had 6cm ovarian cyst on CT in March, had recheck u/s in April and showed resolution. Still has RLQ pain and pelvic pain through out her cycle. Feels like she may have endometriosis. Has taken birth control in the past and did not like how it made her maxwell. Declines OCP at this time. Wanting to try just Metformin for 3-6 months. Has small, tender lymph node in left axilla. Was treated with Bactrim and it is smaller but pt states she can still feel it. Nervous since her father had lymphoma. Had precancerous colon polyp removed 5 years ago when she lived in [place]. Pt states she is due for another scope this year. Has not established with a GI yet. Pt states her father had colon cancer       Mammo:NA  Pap smear:  Contraception:None    P:0  Ab:0  Bone density:NA  Colonoscopy:  Patient's last menstrual period was 2019.       Past Medical History:   Diagnosis Date    Concussion     approximately 10 years ago    Food allergy     Hypothyroidism     Metabolic disorder     PCOS (polycystic ovarian syndrome)      Past Surgical History:   Procedure Laterality Date    CHOLECYSTECTOMY      KNEE SURGERY      POLYPECTOMY      TONSILLECTOMY AND ADENOIDECTOMY       Family History   Problem Relation Age of Onset    Polycystic Ovary Syndrome Mother     Colon Cancer Paternal Grandmother      Social History     Tobacco Use    Smoking status: Never Smoker    Smokeless tobacco: Never Used   Substance Use Topics    Alcohol use: Yes       Current Outpatient Medications   Medication Sig Dispense Refill    metFORMIN (GLUCOPHAGE) 500 MG tablet Take 1 tablet by mouth 2 times daily (with meals) 180 tablet 1    ibuprofen (IBU) 800 MG tablet Take 1 tablet by mouth every 8 hours as needed for Pain 60 tablet 1    ergocalciferol (ERGOCALCIFEROL) 46406 units capsule Take 1 capsule by mouth once a week 4 capsule 3    ondansetron (ZOFRAN) 4 MG tablet Take 1 tablet by mouth every 8 hours as needed for Nausea or Vomiting 15 tablet 0    fluticasone (FLONASE) 50 MCG/ACT nasal spray 1 spray by Nasal route daily 1 Bottle 0     No current facility-administered medications for this visit. Allergies   Allergen Reactions    Corn-Containing Products     Shrimp (Diagnostic)     Soybean-Containing Drug Products     Wheat Extract      Vitals:    05/06/19 1051   BP: (!) 132/93   Pulse: 87     Body mass index is 48.05 kg/m². Review of Systems   Constitutional: Positive for diaphoresis (night sweats) and unexpected weight change. HENT: Negative. Eyes: Negative. Respiratory: Negative. Cardiovascular: Negative. Gastrointestinal: Positive for abdominal pain (RLQ). Negative for constipation and diarrhea. Endocrine: Negative. Genitourinary: Positive for menstrual problem and pelvic pain. Negative for frequency and urgency. Musculoskeletal: Negative. Skin: Negative. Allergic/Immunologic: Negative. Neurological: Negative. Hematological: Negative. Psychiatric/Behavioral: Negative. All other systems reviewed and are negative. Physical Exam   Constitutional: She is oriented to person, place, and time. She appears well-developed and well-nourished. Pulmonary/Chest:   Small, palpable, tender freely movable lymph node in left axilla  Approx 1cm as documented       Musculoskeletal: Normal range of motion. Neurological: She is alert and oriented to person, place, and time. Skin: Skin is warm and dry. Psychiatric: She has a normal mood and affect. Nursing note and vitals reviewed. Diagnosis Orders   1. Hormone disturbance  Follicle Stimulating Hormone    Luteinizing Hormone    Testosterone    C-Reactive Protein    CBC Auto Differential    DHEA    Cortisol Total   2. PCOS (polycystic ovarian syndrome)     3. Dysuria  POC Urine with Microscopic   4. Pelvic pain     5. Irregular menses     6. Family history of colon cancer in father  Wilber Cortes, [address], Gastroenterology, [place]   7. Lymphadenitis  US BREAST LIMITED LEFT       MEDICATIONS:  No orders of the defined types were placed in this encounter. ORDERS:  Orders Placed This Encounter   Procedures    US BREAST LIMITED LEFT    Follicle Stimulating Hormone    Luteinizing Hormone    Testosterone    C-Reactive Protein    CBC Auto Differential    DHEA    Cortisol Total    [facility], Dorthea Beard, APRN, Gastroenterology, [place]    POC Urine with Microscopic       PLAN:  Checking hormone labs today  Left axillary u/s to be scheduled  Discussed possible laparoscopy for pelvic pain and possible endometriosis  Wanting to hold on OCP for now and slowly increase Metfromin  Referral to GI for possible scope  Due for pap, but on period, will reschedule     Patient Instructions     We are committed to providing you with the best care possible. In order to help us achieve these goals please remember to bring all medications, herbal products, and over the counter supplements with you to each visit. If your provider has ordered testing for you, please be sure to follow up with our office if you have not received results within 7 days after testing took place. *If you receive a survey after visiting one of our offices, please take the time to share your experience concerning your physician office visit. These surveys are confidential and no health information about you is shared.  We are eager to improve for you the week. Walking is a good choice. Or you can run, swim, cycle, or play tennis or team sports. · For hair growth you don't want, try bleaching, plucking, electrolysis, or laser therapy. · Acne can be treated with over-the-counter medicines. Look for ones that have benzoyl peroxide or salicylic acid in them. When should you call for help? Call your doctor now or seek immediate medical care if:    · You have severe vaginal bleeding.     · You have new or worse belly or pelvic pain.    Watch closely for changes in your health, and be sure to contact your doctor if:    · You do not get better as expected.     · You have unusual vaginal bleeding. Where can you learn more? Go to https://[email or web address] [email or web address] Universityeb.[employer]. org and sign in to your Descomplica account. Enter G227 in the InforSense box to learn more about \"Polycystic Ovary Syndrome: Care Instructions. \"     If you do not have an account, please click on the \"Sign Up Now\" link. Current as of: May 14, 2018  Content Version: 11.9  © 6351-6830 [employer]. Care instructions adapted under license by [facility] ([facility]). If you have questions about a medical condition or this instruction, always ask your healthcare professional. [address] any warranty or liability for your use of this information.

## 2019-05-06 NOTE — PROGRESS NOTES
Pt presents today for pap smear and breast exam.  Pt complains of having PCOS, in March had 6 1/2 cm on right ovary but when had u/s this had resolved but still has discomfort in right low abdomen. Pt states her period is very painful this time and hurts to even walk. Pt states she used to be on b/c then stopped in  and has missed several periods. Pt states had swollen lymph node under left arm and had bactrim for this. Pt states she has had some hearing loss. Pt states she has been drinking more water and when she urinates it hurts some.      Mammo:NA  Pap [id number]  Contraception:None    P:0  Ab:0  Bone density:NA  Colonoscopy:

## 2019-05-06 NOTE — PATIENT INSTRUCTIONS
We are committed to providing you with the best care possible. In order to help us achieve these goals please remember to bring all medications, herbal products, and over the counter supplements with you to each visit. If your provider has ordered testing for you, please be sure to follow up with our office if you have not received results within 7 days after testing took place. *If you receive a survey after visiting one of our offices, please take the time to share your experience concerning your physician office visit. These surveys are confidential and no health information about you is shared. We are eager to improve for you and we are counting on your feedback to help make that happen. Patient Education        Polycystic Ovary Syndrome: Care Instructions  Your Care Instructions  Polycystic ovary syndrome, or PCOS, means a woman's hormones are out of balance. It can cause problems with your periods and make it hard to get pregnant. Doctors don't know for sure what causes PCOS, but it seems to run in families. It also seems to be linked to obesity and a risk for diabetes. If you have PCOS, your sisters and daughters have a higher chance of getting it too. You may have other symptoms. These include weight gain, acne, too much hair growth on the face or body, high blood pressure, and high blood sugar. Your ovaries may have cysts on them. These cysts are growths filled with fluid. Keep in mind that although you may not have regular periods, you can still get pregnant. Talk to your doctor about birth control if you do not want to get pregnant. Sometimes the hormone changes with PCOS can also make it hard for some women to get pregnant. If this is a concern, talk to your doctor about treatment for this problem. Women who have PCOS can go for months or longer with no period.  Your doctor may recommend medicines that can help get your cycles back to normal.  Follow-up care is a key part of your treatment and safety. Be sure to make and go to all appointments, and call your doctor if you are having problems. It's also a good idea to know your test results and keep a list of the medicines you take. How can you care for yourself at home? · Take your medicines exactly as prescribed. Call your doctor if you think you are having a problem with your medicine. · Eat a healthy diet. Include fruits, vegetables, beans, and whole grains in your diet each day. · If you are overweight, losing weight can help with many of the symptoms of PCOS. Talk to your doctor about safe ways to lose weight. · Get at least 30 minutes of exercise on most days of the week. Walking is a good choice. Or you can run, swim, cycle, or play tennis or team sports. · For hair growth you don't want, try bleaching, plucking, electrolysis, or laser therapy. · Acne can be treated with over-the-counter medicines. Look for ones that have benzoyl peroxide or salicylic acid in them. When should you call for help? Call your doctor now or seek immediate medical care if:    · You have severe vaginal bleeding.     · You have new or worse belly or pelvic pain.    Watch closely for changes in your health, and be sure to contact your doctor if:    · You do not get better as expected.     · You have unusual vaginal bleeding. Where can you learn more? Go to https://[email or web address].[employer]. org and sign in to your Beanup account. Enter H411 in the State [facility] box to learn more about \"Polycystic Ovary Syndrome: Care Instructions. \"     If you do not have an account, please click on the \"Sign Up Now\" link. Current as of: May 14, 2018  Content Version: 11.9  © 9123-1551 [employer] Incorporated. Care instructions adapted under license by [facility] ([facility]).  If you have questions about a medical condition or this instruction, always ask your healthcare professional. [address] any warranty or liability for your use of this information.

## 2019-05-09 LAB — DHEA: 3.13 NG/ML (ref 1.33–7.78)

## 2019-05-16 ENCOUNTER — OFFICE VISIT (OUTPATIENT)
Dept: PRIMARY CARE CLINIC | Age: 24
End: 2019-05-16
Payer: COMMERCIAL

## 2019-05-16 VITALS
HEIGHT: 68 IN | DIASTOLIC BLOOD PRESSURE: 88 MMHG | BODY MASS INDEX: 44.41 KG/M2 | WEIGHT: 293 LBS | HEART RATE: 77 BPM | SYSTOLIC BLOOD PRESSURE: 120 MMHG | OXYGEN SATURATION: 98 % | TEMPERATURE: 97.8 F

## 2019-05-16 DIAGNOSIS — M25.551 PELVIC JOINT PAIN, RIGHT: Primary | ICD-10-CM

## 2019-05-16 PROCEDURE — 99213 OFFICE O/P EST LOW 20 MIN: CPT | Performed by: NURSE PRACTITIONER

## 2019-05-16 NOTE — PROGRESS NOTES
[facility]  [address] [facility]  [address] [place] [id number]  Dept: [phone]  Dept Fax: [phone]  Loc: [phone]    Devon Corley is a 21 y.o. female who presents today for her medical conditions/complaints as noted below. Devon Corley is c/o of 1 Month Follow-Up (right lower abd pain ovarian- cyst resolved) and Chest Pain      Chief Complaint   Patient presents with    1 Month Follow-Up     right lower abd pain ovarian- cyst resolved    Chest Pain       HPI:     HPI  Patient here for follow up on right lower abd pain. Pain has remained the same. She has seen OB/GYN and they have been checking labs. Patient has not had PAP and is to have this scheduled later this month. She has been taking Metformin without any issues. She also has complaints of chest pain that is intermittent with palpation.      Past Medical History:   Diagnosis Date    Concussion     approximately 10 years ago    Food allergy     Hypothyroidism     Metabolic disorder     PCOS (polycystic ovarian syndrome)         Past Surgical History:   Procedure Laterality Date    CHOLECYSTECTOMY      KNEE SURGERY      POLYPECTOMY      TONSILLECTOMY AND ADENOIDECTOMY         Social History     Tobacco Use    Smoking status: Never Smoker    Smokeless tobacco: Never Used   Substance Use Topics    Alcohol use: Yes        Current Outpatient Medications   Medication Sig Dispense Refill    metFORMIN (GLUCOPHAGE) 500 MG tablet Take 1 tablet by mouth 2 times daily (with meals) 180 tablet 1    ibuprofen (IBU) 800 MG tablet Take 1 tablet by mouth every 8 hours as needed for Pain 60 tablet 1    ergocalciferol (ERGOCALCIFEROL) 40165 units capsule Take 1 capsule by mouth once a week 4 capsule 3    ondansetron (ZOFRAN) 4 MG tablet Take 1 tablet by mouth every 8 hours as needed for Nausea or Vomiting 15 tablet 0    fluticasone (FLONASE) 50 MCG/ACT nasal spray 1 spray by Nasal route daily 1 Bottle 0     No current facility-administered medications for this visit. Allergies   Allergen Reactions    Corn-Containing Products     Shrimp (Diagnostic)     Soybean-Containing Drug Products     Wheat Extract        Family History   Problem Relation Age of Onset    Polycystic Ovary Syndrome Mother     Colon Cancer Paternal Grandmother            Subjective:      Review of Systems   Constitutional: Negative. HENT: Negative. Eyes: Negative. Respiratory: Positive for chest tightness (intermittent). Cardiovascular: Negative. Gastrointestinal: Negative. Endocrine: Negative. Genitourinary: Positive for pelvic pain and vaginal pain. Musculoskeletal: Negative. Skin: Negative. Neurological: Negative. Hematological: Negative. Psychiatric/Behavioral: Negative. Objective:     Physical Exam   Constitutional: She is oriented to person, place, and time. Vital signs are normal. She appears well-developed and well-nourished. obese   HENT:   Head: Normocephalic and atraumatic. Right Ear: Hearing, tympanic membrane, external ear and ear canal normal.   Left Ear: Hearing, tympanic membrane, external ear and ear canal normal.   Nose: Nose normal.   Mouth/Throat: Uvula is midline, oropharynx is clear and moist and mucous membranes are normal.   Eyes: Pupils are equal, round, and reactive to light. Conjunctivae, EOM and lids are normal.   Neck: Trachea normal and normal range of motion. Neck supple. No thyroid mass and no thyromegaly present. Cardiovascular: Normal rate, regular rhythm, normal heart sounds and intact distal pulses. Pulmonary/Chest: Effort normal and breath sounds normal.   Abdominal: Soft. Normal appearance and bowel sounds are normal.   Musculoskeletal: Normal range of motion. Cervical back: Normal. She exhibits normal range of motion and no tenderness. Thoracic back: Normal. She exhibits normal range of motion and no tenderness.

## 2019-05-17 ASSESSMENT — ENCOUNTER SYMPTOMS
CHEST TIGHTNESS: 1
GASTROINTESTINAL NEGATIVE: 1
EYES NEGATIVE: 1

## 2019-05-21 RX ORDER — ERGOCALCIFEROL 1.25 MG/1
CAPSULE ORAL
Qty: 4 CAPSULE | Refills: 1 | Status: SHIPPED | OUTPATIENT
Start: 2019-05-21 | End: 2019-07-28 | Stop reason: SDUPTHER

## 2019-06-17 ENCOUNTER — HOSPITAL ENCOUNTER (OUTPATIENT)
Dept: NON INVASIVE DIAGNOSTICS | Age: 24
Discharge: HOME | End: 2019-06-17
Payer: COMMERCIAL

## 2019-06-17 ENCOUNTER — OFFICE VISIT (OUTPATIENT)
Dept: URGENT CARE | Age: 24
End: 2019-06-17
Payer: COMMERCIAL

## 2019-06-17 VITALS
RESPIRATION RATE: 18 BRPM | SYSTOLIC BLOOD PRESSURE: 121 MMHG | OXYGEN SATURATION: 98 % | BODY MASS INDEX: 44.41 KG/M2 | DIASTOLIC BLOOD PRESSURE: 80 MMHG | TEMPERATURE: 97.9 F | HEIGHT: 68 IN | HEART RATE: 98 BPM | WEIGHT: 293 LBS

## 2019-06-17 DIAGNOSIS — M79.605 LEG PAIN, POSTERIOR, LEFT: Primary | ICD-10-CM

## 2019-06-17 LAB
BASOPHILS ABSOLUTE: 0.1 K/UL (ref 0–0.2)
BASOPHILS RELATIVE PERCENT: 0.7 % (ref 0–1)
EOSINOPHILS ABSOLUTE: 0.3 K/UL (ref 0–0.6)
EOSINOPHILS RELATIVE PERCENT: 3.7 % (ref 0–5)
HCT VFR BLD CALC: 37.1 % (ref 37–47)
HEMOGLOBIN: 12.2 G/DL (ref 12–16)
LYMPHOCYTES ABSOLUTE: 3.1 K/UL (ref 1.1–4.5)
LYMPHOCYTES RELATIVE PERCENT: 34 % (ref 20–40)
MCH RBC QN AUTO: 27.5 PG (ref 27–31)
MCHC RBC AUTO-ENTMCNC: 32.9 G/DL (ref 33–37)
MCV RBC AUTO: 83.7 FL (ref 81–99)
MONOCYTES ABSOLUTE: 0.7 K/UL (ref 0–0.9)
MONOCYTES RELATIVE PERCENT: 8.2 % (ref 0–10)
NEUTROPHILS ABSOLUTE: 4.8 K/UL (ref 1.5–7.5)
NEUTROPHILS RELATIVE PERCENT: 53.1 % (ref 50–65)
PDW BLD-RTO: 13.2 % (ref 11.5–14.5)
PLATELET # BLD: 315 K/UL (ref 130–400)
PMV BLD AUTO: 10.6 FL (ref 9.4–12.3)
RBC # BLD: 4.43 M/UL (ref 4.2–5.4)
WBC # BLD: 9 K/UL (ref 4.8–10.8)

## 2019-06-17 PROCEDURE — 99213 OFFICE O/P EST LOW 20 MIN: CPT | Performed by: NURSE PRACTITIONER

## 2019-06-17 PROCEDURE — 93971 EXTREMITY STUDY: CPT

## 2019-06-17 ASSESSMENT — ENCOUNTER SYMPTOMS
SHORTNESS OF BREATH: 0
SINUS PRESSURE: 0
ABDOMINAL PAIN: 0
EYES NEGATIVE: 1
SORE THROAT: 0
COUGH: 0
BACK PAIN: 0
ALLERGIC/IMMUNOLOGIC NEGATIVE: 1

## 2019-06-17 NOTE — PROGRESS NOTES
[address]  [address] [place] [id number]  Dept: [phone]  Loc: [phone]    Zeenat Morel is a 21 y.o. female who presents today for her medical conditions/complaintsas noted below. Zeenat Morel is c/o of Leg Pain (left; calf pain )        HPI:     Leg Pain    The incident occurred 3 to 5 days ago (Pain in left calf for 2-3 days). There was no injury mechanism. The pain is present in the left leg (Left calf). The pain is at a severity of 4/10. The pain is mild. The pain has been constant since onset. Pertinent negatives include no inability to bear weight, loss of motion, loss of sensation, muscle weakness, numbness or tingling. It is unknown if a foreign body is present. The symptoms are aggravated by weight bearing. She has tried nothing for the symptoms. The treatment provided no relief. Pt today noted in mid left posterior calf area red warm area near top of calf that was not there yesterday. Yesterday she noted pain in her lower left abdomen that she relates to her PCOS and is due to see her ob-gyn today. She has no abd pain today.   Past Medical History:   Diagnosis Date    Concussion     approximately 10 years ago    Food allergy     Hypothyroidism     Metabolic disorder     PCOS (polycystic ovarian syndrome)      Past Surgical History:   Procedure Laterality Date    CHOLECYSTECTOMY      KNEE SURGERY      POLYPECTOMY      TONSILLECTOMY AND ADENOIDECTOMY         Family History   Problem Relation Age of Onset    Polycystic Ovary Syndrome Mother     Colon Cancer Paternal Grandmother        Social History     Tobacco Use    Smoking status: Never Smoker    Smokeless tobacco: Never Used   Substance Use Topics    Alcohol use: Yes      Current Outpatient Medications   Medication Sig Dispense Refill    vitamin D (ERGOCALCIFEROL) 66925 units CAPS capsule TAKE ONE CAPSULE BY MOUTH ONE TIME PER WEEK 4 capsule 1    note and vitals reviewed. /80   Pulse 98   Temp 97.9 °F (36.6 °C)   Resp 18   Ht 5' 8\" (1.727 m)   Wt (!) 315 lb (142.9 kg)   SpO2 98%   BMI 47.90 kg/m²     :Assessment       Diagnosis Orders   1. Leg pain, posterior, left  US DUP LOWER EXTREMITY LEFT PHILLIP    CBC Auto Differential       :Plan      Orders Placed This Encounter   Procedures    US DUP LOWER EXTREMITY LEFT PHILLIP     Order Specific Question:   Reason for exam:     Answer:   left leg pain, edema    CBC Auto Differential       No follow-ups on file. No orders of the defined types were placed in this encounter. There are no Patient Instructions on file for this visit. Patient given educational materials- see patient instructions. Discussed use, benefit, and side effects of prescribedmedications. All patient questions answered. Pt voiced understanding.        Electronically signed by SARAY Gonzalez CNP on 6/17/2019 at 9:55 AM

## 2019-06-17 NOTE — PATIENT INSTRUCTIONS
Will call with lab work and  [address] results and plan of care after results are obtained. Her US is at 1:30 today.   Keep right leg elevated as much as possible  Follow-up with PCP as needed

## 2019-06-27 ENCOUNTER — OFFICE VISIT (OUTPATIENT)
Dept: OBGYN | Age: 24
End: 2019-06-27
Payer: COMMERCIAL

## 2019-06-27 VITALS
WEIGHT: 293 LBS | DIASTOLIC BLOOD PRESSURE: 88 MMHG | HEART RATE: 91 BPM | SYSTOLIC BLOOD PRESSURE: 138 MMHG | BODY MASS INDEX: 44.41 KG/M2 | TEMPERATURE: 98 F | HEIGHT: 68 IN

## 2019-06-27 DIAGNOSIS — E28.2 PCOS (POLYCYSTIC OVARIAN SYNDROME): ICD-10-CM

## 2019-06-27 DIAGNOSIS — R30.9 PAINFUL URINATION: ICD-10-CM

## 2019-06-27 DIAGNOSIS — Z01.419 WOMEN'S ANNUAL ROUTINE GYNECOLOGICAL EXAMINATION: Primary | ICD-10-CM

## 2019-06-27 DIAGNOSIS — Z12.4 SCREENING FOR CERVICAL CANCER: ICD-10-CM

## 2019-06-27 DIAGNOSIS — R10.2 PELVIC PAIN: ICD-10-CM

## 2019-06-27 LAB
BACTERIA URINE, POC: NORMAL
BILIRUBIN URINE: 0.2 MG/DL
BLOOD, URINE: NEGATIVE
CASTS URINE, POC: NORMAL
CLARITY: CLEAR
COLOR: YELLOW
CRYSTALS URINE, POC: NORMAL
EPI CELLS URINE, POC: NORMAL
GLUCOSE URINE: NORMAL
KETONES, URINE: NEGATIVE
LEUKOCYTE EST, POC: NORMAL
NITRITE, URINE: NEGATIVE
PH UA: 5.5 (ref 4.5–8)
PROTEIN UA: NEGATIVE
RBC URINE, POC: NORMAL
SPECIFIC GRAVITY UA: 1.03 (ref 1–1.03)
UROBILINOGEN, URINE: NORMAL
WBC URINE, POC: NORMAL
YEAST URINE, POC: NORMAL

## 2019-06-27 PROCEDURE — 99395 PREV VISIT EST AGE 18-39: CPT | Performed by: NURSE PRACTITIONER

## 2019-06-27 PROCEDURE — 81000 URINALYSIS NONAUTO W/SCOPE: CPT | Performed by: NURSE PRACTITIONER

## 2019-06-27 ASSESSMENT — ENCOUNTER SYMPTOMS
ALLERGIC/IMMUNOLOGIC NEGATIVE: 1
RESPIRATORY NEGATIVE: 1
DIARRHEA: 0
EYES NEGATIVE: 1
CONSTIPATION: 1

## 2019-06-27 NOTE — PROGRESS NOTES
Paz Thomas is a 21 y.o. female who presents today for her medical conditions/ complaints as noted below. Paz Thomas is c/o of Gynecologic Exam        HPI   Pt presents for annual exam and pap smear. Long history of PCOS and irregular periods. Feels like she may have another cyst. Also c/o pelvic pain, more so with BM. Feels like she may have endometriosis. Taking Metformin with some relief of symptoms. C/o night sweats. Had recent labs- normal hormones, elevated CRP. Seeing PCP. Pt is also due for colonoscopy, has not done yet. Mammo:NA  Pap smear:  Contraception:none    P:0  Ab:0  Bone density:NA  Colonoscopy:NA  No LMP recorded.     Past Medical History:   Diagnosis Date    Concussion     approximately 10 years ago    Food allergy     Hypothyroidism     Metabolic disorder     PCOS (polycystic ovarian syndrome)      Past Surgical History:   Procedure Laterality Date    CHOLECYSTECTOMY      KNEE SURGERY      POLYPECTOMY      TONSILLECTOMY AND ADENOIDECTOMY       Family History   Problem Relation Age of Onset    Polycystic Ovary Syndrome Mother     Colon Cancer Paternal Grandmother      Social History     Tobacco Use    Smoking status: Never Smoker    Smokeless tobacco: Never Used   Substance Use Topics    Alcohol use: Yes       Current Outpatient Medications   Medication Sig Dispense Refill    metFORMIN (GLUCOPHAGE) 500 MG tablet Take 1 tablet by mouth 2 times daily (with meals) 180 tablet 1    vitamin D (ERGOCALCIFEROL) 07045 units CAPS capsule TAKE ONE CAPSULE BY MOUTH ONE TIME PER WEEK 4 capsule 1    ibuprofen (IBU) 800 MG tablet Take 1 tablet by mouth every 8 hours as needed for Pain 60 tablet 1    ondansetron (ZOFRAN) 4 MG tablet Take 1 tablet by mouth every 8 hours as needed for Nausea or Vomiting 15 tablet 0    fluticasone (FLONASE) 50 MCG/ACT nasal spray 1 spray by Nasal route daily 1 Bottle 0     No current facility-administered medications for this visit. Allergies   Allergen Reactions    Corn-Containing Products     Shrimp (Diagnostic)     Soybean-Containing Drug Products     Wheat Extract      Vitals:    06/27/19 1337   BP: 138/88   Pulse: 91   Temp: 98 °F (36.7 °C)     Body mass index is 47.59 kg/m². Review of Systems   Constitutional: Positive for fever (night sweats) and unexpected weight change (gains weight easily). HENT: Negative. Eyes: Negative. Respiratory: Negative. Cardiovascular: Negative. Gastrointestinal: Positive for constipation (no BM x7 days). Negative for diarrhea. Endocrine: Negative. Genitourinary: Positive for menstrual problem (irregular) and pelvic pain. Negative for frequency and urgency. Musculoskeletal: Negative. Skin: Negative. Allergic/Immunologic: Negative. Neurological: Negative. Hematological: Negative. Psychiatric/Behavioral: Negative. All other systems reviewed and are negative. Physical Exam   Constitutional: She is oriented to person, place, and time. She appears well-developed and well-nourished. Neck: Normal range of motion. Neck supple. No thyroid mass and no thyromegaly present. Cardiovascular: Normal rate and regular rhythm. Pulmonary/Chest: Effort normal and breath sounds normal. Right breast exhibits no inverted nipple, no mass, no nipple discharge and no skin change. Left breast exhibits no inverted nipple, no mass, no nipple discharge and no skin change. Abdominal: Soft. She exhibits no mass. There is no tenderness. Genitourinary: Vagina normal and uterus normal. Cervix exhibits no motion tenderness. Right adnexum displays tenderness. Right adnexum displays no mass. Left adnexum displays tenderness. Left adnexum displays no mass. Genitourinary Comments: Pap collected   Musculoskeletal: Normal range of motion. Neurological: She is alert and oriented to person, place, and time. Skin: Skin is warm and dry.    Psychiatric: She has a normal mood and affect. Nursing note and vitals reviewed. Diagnosis Orders   1. Women's annual routine gynecological examination     2. Screening for cervical cancer  PAP SMEAR   3. Painful urination  POC Urine with Microscopic   4. Pelvic pain  US Non OB Transvaginal   5. PCOS (polycystic ovarian syndrome)         MEDICATIONS:  Orders Placed This Encounter   Medications    metFORMIN (GLUCOPHAGE) 500 MG tablet     Sig: Take 1 tablet by mouth 2 times daily (with meals)     Dispense:  180 tablet     Refill:  1       ORDERS:  Orders Placed This Encounter   Procedures    US Non OB Transvaginal    PAP SMEAR    POC Urine with Microscopic       PLAN:  Pap collected  Neg UA  Ordered pelvic u/s  Will forward chart to MD for possible surgical eval    Patient Instructions   Patient Education        Breast Self-Exam: Care Instructions  Your Care Instructions    A breast self-exam is when you check your breasts for lumps or changes. This regular exam helps you learn how your breasts normally look and feel. Most breast problems or changes are not because of cancer. Breast self-exam is not a substitute for a mammogram. Having regular breast exams by your doctor and regular mammograms improve your chances of finding any problems with your breasts. Some women set a time each month to do a step-by-step breast self-exam. Other women like a less formal system. They might look at their breasts as they brush their teeth, or feel their breasts once in a while in the shower. If you notice a change in your breast, tell your doctor. Follow-up care is a key part of your treatment and safety. Be sure to make and go to all appointments, and call your doctor if you are having problems. It's also a good idea to know your test results and keep a list of the medicines you take. How do you do a breast self-exam?  · The best time to examine your breasts is usually one week after your menstrual period begins. Your breasts should not be tender then. If you do not have periods, you might do your exam on a day of the month that is easy to remember. · To examine your breasts:  ? Remove all your clothes above the waist and lie down. When you are lying down, your breast tissue spreads evenly over your chest wall, which makes it easier to feel all your breast tissue. ? Use the pads--not the fingertips--of the 3 middle fingers of your left hand to check your right breast. Move your fingers slowly in small coin-sized circles that overlap. ? Use three levels of pressure to feel of all your breast tissue. Use light pressure to feel the tissue close to the skin surface. Use medium pressure to feel a little deeper. Use firm pressure to feel your tissue close to your breastbone and ribs. Use each pressure level to feel your breast tissue before moving on to the next spot. ? Check your entire breast, moving up and down as if following a strip from the collarbone to the bra line, and from the armpit to the ribs. Repeat until you have covered the entire breast.  ? Repeat this procedure for your left breast, using the pads of the 3 middle fingers of your right hand. · To examine your breasts while in the shower:  ? Place one arm over your head and lightly soap your breast on that side. ? Using the pads of your fingers, gently move your hand over your breast (in the strip pattern described above), feeling carefully for any lumps or changes. ? Repeat for the other breast.  · Have your doctor inspect anything you notice to see if you need further testing. Where can you learn more? Go to https://[employer]. org and sign in to your Inquisitive Systems account. Enter P148 in the Mobstats box to learn more about \"Breast Self-Exam: Care Instructions. \"     If you do not have an account, please click on the \"Sign Up Now\" link. Current as of: December 19, 2018  Content Version: 12.0  © 0148-9380 Healthwise, Incorporated.  Care instructions adapted under

## 2019-06-27 NOTE — PATIENT INSTRUCTIONS
Patient Education        Breast Self-Exam: Care Instructions  Your Care Instructions    A breast self-exam is when you check your breasts for lumps or changes. This regular exam helps you learn how your breasts normally look and feel. Most breast problems or changes are not because of cancer. Breast self-exam is not a substitute for a mammogram. Having regular breast exams by your doctor and regular mammograms improve your chances of finding any problems with your breasts. Some women set a time each month to do a step-by-step breast self-exam. Other women like a less formal system. They might look at their breasts as they brush their teeth, or feel their breasts once in a while in the shower. If you notice a change in your breast, tell your doctor. Follow-up care is a key part of your treatment and safety. Be sure to make and go to all appointments, and call your doctor if you are having problems. It's also a good idea to know your test results and keep a list of the medicines you take. How do you do a breast self-exam?  · The best time to examine your breasts is usually one week after your menstrual period begins. Your breasts should not be tender then. If you do not have periods, you might do your exam on a day of the month that is easy to remember. · To examine your breasts:  ? Remove all your clothes above the waist and lie down. When you are lying down, your breast tissue spreads evenly over your chest wall, which makes it easier to feel all your breast tissue. ? Use the pads--not the fingertips--of the 3 middle fingers of your left hand to check your right breast. Move your fingers slowly in small coin-sized circles that overlap. ? Use three levels of pressure to feel of all your breast tissue. Use light pressure to feel the tissue close to the skin surface. Use medium pressure to feel a little deeper. Use firm pressure to feel your tissue close to your breastbone and ribs.  Use each pressure level to feel your breast tissue before moving on to the next spot. ? Check your entire breast, moving up and down as if following a strip from the collarbone to the bra line, and from the armpit to the ribs. Repeat until you have covered the entire breast.  ? Repeat this procedure for your left breast, using the pads of the 3 middle fingers of your right hand. · To examine your breasts while in the shower:  ? Place one arm over your head and lightly soap your breast on that side. ? Using the pads of your fingers, gently move your hand over your breast (in the strip pattern described above), feeling carefully for any lumps or changes. ? Repeat for the other breast.  · Have your doctor inspect anything you notice to see if you need further testing. Where can you learn more? Go to https://Barcheyachtpegideoneb.RedKLEVER. org and sign in to your Dashbell account. Enter P148 in the Blink.com box to learn more about \"Breast Self-Exam: Care Instructions. \"     If you do not have an account, please click on the \"Sign Up Now\" link. Current as of: December 19, 2018  Content Version: 12.0  © 6816-9889 Healthwise, Incorporated. Care instructions adapted under license by [facility] ([facility]). If you have questions about a medical condition or this instruction, always ask your healthcare professional. [address] any warranty or liability for your use of this information.

## 2019-06-27 NOTE — PROGRESS NOTES
Pt presents today for pap smear and breast exam. Pt is needing a refill on her glucophage. Pt states has right side pain and into her uterus and hurts when walks and when has a bowel movement. Pt states she has had cysts before and has had a fever. Pt states she has been having night sweats.     Mammo:NA  Pap smear:2016  Contraception:none    P:0  Ab:0  Bone density:NA  Colonoscopy:NA

## 2019-06-28 ENCOUNTER — TELEPHONE (OUTPATIENT)
Dept: OBGYN | Age: 24
End: 2019-06-28

## 2019-06-28 NOTE — TELEPHONE ENCOUNTER
Called to [facility] appt with Dr. Artis Interiano to discuss surgical options. appt made.  Burton LLANOS

## 2019-07-03 ENCOUNTER — HOSPITAL ENCOUNTER (OUTPATIENT)
Dept: ULTRASOUND IMAGING | Age: 24
Discharge: HOME | End: 2019-07-03
Payer: COMMERCIAL

## 2019-07-03 DIAGNOSIS — R10.2 PELVIC PAIN: ICD-10-CM

## 2019-07-03 PROCEDURE — 76830 TRANSVAGINAL US NON-OB: CPT

## 2019-08-08 ENCOUNTER — OFFICE VISIT (OUTPATIENT)
Dept: URGENT CARE | Age: 24
End: 2019-08-08
Payer: COMMERCIAL

## 2019-08-08 VITALS
BODY MASS INDEX: 44.41 KG/M2 | WEIGHT: 293 LBS | SYSTOLIC BLOOD PRESSURE: 144 MMHG | RESPIRATION RATE: 18 BRPM | TEMPERATURE: 97.6 F | OXYGEN SATURATION: 97 % | HEIGHT: 68 IN | HEART RATE: 98 BPM | DIASTOLIC BLOOD PRESSURE: 98 MMHG

## 2019-08-08 DIAGNOSIS — H65.193 ACUTE MEE (MIDDLE EAR EFFUSION), BILATERAL: Primary | ICD-10-CM

## 2019-08-08 PROCEDURE — 96372 THER/PROPH/DIAG INJ SC/IM: CPT | Performed by: SPECIALIST

## 2019-08-08 PROCEDURE — 99213 OFFICE O/P EST LOW 20 MIN: CPT | Performed by: SPECIALIST

## 2019-08-08 RX ORDER — DEXAMETHASONE SODIUM PHOSPHATE 10 MG/ML
5 INJECTION INTRAMUSCULAR; INTRAVENOUS ONCE
Status: COMPLETED | OUTPATIENT
Start: 2019-08-08 | End: 2019-08-08

## 2019-08-08 RX ORDER — FLUTICASONE PROPIONATE 50 MCG
2 SPRAY, SUSPENSION (ML) NASAL DAILY
Qty: 1 BOTTLE | Refills: 0 | Status: SHIPPED | OUTPATIENT
Start: 2019-08-08 | End: 2020-03-20 | Stop reason: SDUPTHER

## 2019-08-08 RX ORDER — AMOXICILLIN 875 MG/1
875 TABLET, COATED ORAL 2 TIMES DAILY
Qty: 20 TABLET | Refills: 0 | Status: SHIPPED | OUTPATIENT
Start: 2019-08-08 | End: 2019-08-18

## 2019-08-08 RX ADMIN — DEXAMETHASONE SODIUM PHOSPHATE 5 MG: 10 INJECTION INTRAMUSCULAR; INTRAVENOUS at 13:11

## 2019-08-08 ASSESSMENT — ENCOUNTER SYMPTOMS
SINUS PRESSURE: 1
SINUS COMPLAINT: 1
COUGH: 1

## 2019-08-08 NOTE — PATIENT INSTRUCTIONS
closely for changes in your health, and be sure to contact your doctor if:    · You have new or worse symptoms.     · You are not getting better after taking an antibiotic for 2 days. Where can you learn more? Go to https://RadPadpegideoneb.Asteel. org and sign in to your AIFOTEC account. Enter K715 in the [facility] box to learn more about \"Ear Infection (Otitis Media): Care Instructions. \"     If you do not have an account, please click on the \"Sign Up Now\" link. Current as of: October 21, 2018  Content Version: 12.1  © 0474-3827 Healthwise, Incorporated. Care instructions adapted under license by [facility] ([facility]). If you have questions about a medical condition or this instruction, always ask your healthcare professional. [address] any warranty or liability for your use of this information.        Follow up with ENT for evaluation/treatment of ears

## 2019-08-13 ENCOUNTER — OFFICE VISIT (OUTPATIENT)
Dept: OBGYN | Age: 24
End: 2019-08-13
Payer: COMMERCIAL

## 2019-08-13 VITALS
TEMPERATURE: 99 F | SYSTOLIC BLOOD PRESSURE: 146 MMHG | BODY MASS INDEX: 44.41 KG/M2 | DIASTOLIC BLOOD PRESSURE: 88 MMHG | HEIGHT: 68 IN | HEART RATE: 96 BPM | WEIGHT: 293 LBS

## 2019-08-13 DIAGNOSIS — R10.30 LOWER ABDOMINAL PAIN: ICD-10-CM

## 2019-08-13 DIAGNOSIS — E28.2 PCOS (POLYCYSTIC OVARIAN SYNDROME): ICD-10-CM

## 2019-08-13 DIAGNOSIS — R10.2 PELVIC PAIN: Primary | ICD-10-CM

## 2019-08-13 PROCEDURE — 99213 OFFICE O/P EST LOW 20 MIN: CPT | Performed by: OBSTETRICS & GYNECOLOGY

## 2019-08-13 ASSESSMENT — ENCOUNTER SYMPTOMS
EYES NEGATIVE: 1
RESPIRATORY NEGATIVE: 1
GASTROINTESTINAL NEGATIVE: 1

## 2019-08-13 NOTE — PATIENT INSTRUCTIONS
Watch closely for changes in your health, and be sure to contact your doctor if:    · You do not get better as expected. Where can you learn more? Go to https://chpepiceweb.[email or web address]. org and sign in to your Imagineer Systems account. Enter [phone] in the [facility] box to learn more about \"Pelvic Pain: Care Instructions. \"     If you do not have an account, please click on the \"Sign Up Now\" link. Current as of: February 19, 2019  Content Version: 12.1  © 9162-8838 Healthwise, Incorporated. Care instructions adapted under license by [facility] ([facility]). If you have questions about a medical condition or this instruction, always ask your healthcare professional. [address] any warranty or liability for your use of this information.

## 2019-08-13 NOTE — PROGRESS NOTES
Plan:      MEDICATIONS:  No orders of the defined types were placed in this encounter. ORDERS:  Orders Placed This Encounter   Procedures   Tiffani Graf MD, Gastroenterology, [facility] rigoberto     Will refer to GI for colonoscopy  If colonoscopy negative and pain persist then can consider Dx Lap  All questions answered. Talib Ram MD, personally performed services described in this document as scribed by Kaity Shore RN in my presence, and it is both accurate and complete.

## 2019-08-21 ENCOUNTER — OFFICE VISIT (OUTPATIENT)
Dept: PRIMARY CARE CLINIC | Age: 24
End: 2019-08-21
Payer: COMMERCIAL

## 2019-08-21 VITALS
BODY MASS INDEX: 44.41 KG/M2 | OXYGEN SATURATION: 99 % | TEMPERATURE: 98.6 F | HEART RATE: 95 BPM | DIASTOLIC BLOOD PRESSURE: 86 MMHG | RESPIRATION RATE: 20 BRPM | WEIGHT: 293 LBS | HEIGHT: 68 IN | SYSTOLIC BLOOD PRESSURE: 128 MMHG

## 2019-08-21 DIAGNOSIS — E28.2 PCOS (POLYCYSTIC OVARIAN SYNDROME): ICD-10-CM

## 2019-08-21 DIAGNOSIS — F41.9 ANXIETY AND DEPRESSION: Primary | ICD-10-CM

## 2019-08-21 DIAGNOSIS — H65.112 ACUTE MUCOID OTITIS MEDIA OF LEFT EAR: ICD-10-CM

## 2019-08-21 DIAGNOSIS — F32.A ANXIETY AND DEPRESSION: Primary | ICD-10-CM

## 2019-08-21 PROCEDURE — 99214 OFFICE O/P EST MOD 30 MIN: CPT | Performed by: NURSE PRACTITIONER

## 2019-08-21 PROCEDURE — G0444 DEPRESSION SCREEN ANNUAL: HCPCS | Performed by: NURSE PRACTITIONER

## 2019-08-21 RX ORDER — DESOGESTREL AND ETHINYL ESTRADIOL 0.15-0.03
1 KIT ORAL DAILY
Qty: 1 PACKET | Refills: 11 | Status: SHIPPED | OUTPATIENT
Start: 2019-08-21 | End: 2020-02-12 | Stop reason: ALTCHOICE

## 2019-08-21 RX ORDER — SERTRALINE HYDROCHLORIDE 25 MG/1
25 TABLET, FILM COATED ORAL DAILY
Qty: 30 TABLET | Refills: 3 | Status: SHIPPED | OUTPATIENT
Start: 2019-08-21 | End: 2019-09-18

## 2019-08-21 RX ORDER — CEPHALEXIN 500 MG/1
500 CAPSULE ORAL 2 TIMES DAILY
Qty: 20 CAPSULE | Refills: 0 | Status: SHIPPED | OUTPATIENT
Start: 2019-08-21 | End: 2019-08-31

## 2019-08-21 RX ORDER — BUSPIRONE HYDROCHLORIDE 5 MG/1
5 TABLET ORAL 2 TIMES DAILY PRN
Qty: 60 TABLET | Refills: 1 | Status: SHIPPED | OUTPATIENT
Start: 2019-08-21 | End: 2019-09-18

## 2019-08-21 ASSESSMENT — PATIENT HEALTH QUESTIONNAIRE - PHQ9
8. MOVING OR SPEAKING SO SLOWLY THAT OTHER PEOPLE COULD HAVE NOTICED. OR THE OPPOSITE, BEING SO FIGETY OR RESTLESS THAT YOU HAVE BEEN MOVING AROUND A LOT MORE THAN USUAL: 0
9. THOUGHTS THAT YOU WOULD BE BETTER OFF DEAD, OR OF HURTING YOURSELF: 1
4. FEELING TIRED OR HAVING LITTLE ENERGY: 3
1. LITTLE INTEREST OR PLEASURE IN DOING THINGS: 1
SUM OF ALL RESPONSES TO PHQ QUESTIONS 1-9: 15
10. IF YOU CHECKED OFF ANY PROBLEMS, HOW DIFFICULT HAVE THESE PROBLEMS MADE IT FOR YOU TO DO YOUR WORK, TAKE CARE OF THINGS AT HOME, OR GET ALONG WITH OTHER PEOPLE: 1
5. POOR APPETITE OR OVEREATING: 2
6. FEELING BAD ABOUT YOURSELF - OR THAT YOU ARE A FAILURE OR HAVE LET YOURSELF OR YOUR FAMILY DOWN: 2
DEPRESSION UNABLE TO ASSESS: FUNCTIONAL CAPACITY MOTIVATION LIMITS ACCURACY
SUM OF ALL RESPONSES TO PHQ QUESTIONS 1-9: 15
SUM OF ALL RESPONSES TO PHQ9 QUESTIONS 1 & 2: 4
7. TROUBLE CONCENTRATING ON THINGS, SUCH AS READING THE NEWSPAPER OR WATCHING TELEVISION: 2
3. TROUBLE FALLING OR STAYING ASLEEP: 1
2. FEELING DOWN, DEPRESSED OR HOPELESS: 3

## 2019-08-21 ASSESSMENT — ENCOUNTER SYMPTOMS
EYES NEGATIVE: 1
GASTROINTESTINAL NEGATIVE: 1
COUGH: 1

## 2019-08-22 ENCOUNTER — APPOINTMENT (OUTPATIENT)
Dept: CT IMAGING | Age: 24
End: 2019-08-22
Payer: OTHER MISCELLANEOUS

## 2019-08-22 ENCOUNTER — APPOINTMENT (OUTPATIENT)
Dept: GENERAL RADIOLOGY | Age: 24
End: 2019-08-22
Payer: OTHER MISCELLANEOUS

## 2019-08-22 ENCOUNTER — HOSPITAL ENCOUNTER (EMERGENCY)
Age: 24
Discharge: HOME | End: 2019-08-22
Payer: OTHER MISCELLANEOUS

## 2019-08-22 VITALS
HEIGHT: 68 IN | RESPIRATION RATE: 18 BRPM | DIASTOLIC BLOOD PRESSURE: 98 MMHG | SYSTOLIC BLOOD PRESSURE: 135 MMHG | TEMPERATURE: 97.5 F | OXYGEN SATURATION: 99 % | HEART RATE: 80 BPM | BODY MASS INDEX: 44.41 KG/M2 | WEIGHT: 293 LBS

## 2019-08-22 DIAGNOSIS — S16.1XXA ACUTE STRAIN OF NECK MUSCLE, INITIAL ENCOUNTER: ICD-10-CM
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
## 2019-08-22 PROCEDURE — 72125 CT NECK SPINE W/O DYE: CPT

## 2019-08-22 PROCEDURE — 70450 CT HEAD/BRAIN W/O DYE: CPT

## 2019-08-22 PROCEDURE — 96372 THER/PROPH/DIAG INJ SC/IM: CPT

## 2019-08-22 PROCEDURE — 99284 EMERGENCY DEPT VISIT MOD MDM: CPT

## 2019-08-22 PROCEDURE — 6360000002 HC RX W HCPCS: Performed by: PHYSICIAN ASSISTANT

## 2019-08-22 PROCEDURE — 71046 X-RAY EXAM CHEST 2 VIEWS: CPT

## 2019-08-22 RX ORDER — ORPHENADRINE CITRATE 30 MG/ML
60 INJECTION INTRAMUSCULAR; INTRAVENOUS ONCE
Status: COMPLETED | OUTPATIENT
Start: 2019-08-22 | End: 2019-08-22

## 2019-08-22 RX ORDER — CYCLOBENZAPRINE HCL 5 MG
5 TABLET ORAL 3 TIMES DAILY PRN
Qty: 30 TABLET | Refills: 0 | Status: SHIPPED | OUTPATIENT
Start: 2019-08-22 | End: 2019-09-01

## 2019-08-22 RX ORDER — METHYLPREDNISOLONE 4 MG/1
TABLET ORAL
Qty: 1 KIT | Refills: 0 | Status: SHIPPED | OUTPATIENT
Start: 2019-08-22 | End: 2019-08-28

## 2019-08-22 RX ADMIN — ORPHENADRINE CITRATE 60 MG: 30 INJECTION INTRAMUSCULAR; INTRAVENOUS at 10:21

## 2019-08-22 ASSESSMENT — ENCOUNTER SYMPTOMS
ABDOMINAL PAIN: 0
SHORTNESS OF BREATH: 0
COLOR CHANGE: 0
PHOTOPHOBIA: 0
BACK PAIN: 0
ABDOMINAL DISTENTION: 0
COUGH: 0
SORE THROAT: 0
NAUSEA: 0
RHINORRHEA: 0
EYE PAIN: 0
APNEA: 0
EYE DISCHARGE: 0

## 2019-08-22 ASSESSMENT — PAIN SCALES - GENERAL
PAINLEVEL_OUTOF10: 4
PAINLEVEL_OUTOF10: 4

## 2019-08-22 ASSESSMENT — PAIN DESCRIPTION - LOCATION: LOCATION: NECK;CHEST;LEG

## 2019-08-22 ASSESSMENT — PAIN DESCRIPTION - PAIN TYPE
TYPE: ACUTE PAIN
TYPE: ACUTE PAIN
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
## 2019-09-18 ENCOUNTER — OFFICE VISIT (OUTPATIENT)
Dept: PRIMARY CARE CLINIC | Age: 24
End: 2019-09-18
Payer: COMMERCIAL

## 2019-09-18 VITALS
SYSTOLIC BLOOD PRESSURE: 136 MMHG | HEART RATE: 107 BPM | BODY MASS INDEX: 44.41 KG/M2 | WEIGHT: 293 LBS | OXYGEN SATURATION: 98 % | DIASTOLIC BLOOD PRESSURE: 86 MMHG | HEIGHT: 68 IN | TEMPERATURE: 97.8 F | RESPIRATION RATE: 17 BRPM

## 2019-09-18 DIAGNOSIS — F41.9 ANXIETY AND DEPRESSION: ICD-10-CM

## 2019-09-18 DIAGNOSIS — F41.9 ANXIETY AND DEPRESSION: Primary | ICD-10-CM

## 2019-09-18 DIAGNOSIS — T43.225S: ICD-10-CM

## 2019-09-18 DIAGNOSIS — F32.A ANXIETY AND DEPRESSION: ICD-10-CM

## 2019-09-18 DIAGNOSIS — E55.9 VITAMIN D DEFICIENCY: ICD-10-CM

## 2019-09-18 DIAGNOSIS — F32.A ANXIETY AND DEPRESSION: Primary | ICD-10-CM

## 2019-09-18 LAB
ALBUMIN SERPL-MCNC: 4.3 G/DL (ref 3.5–5.2)
ALP BLD-CCNC: 53 U/L (ref 35–104)
ALT SERPL-CCNC: 11 U/L (ref 5–33)
ANION GAP SERPL CALCULATED.3IONS-SCNC: 13 MMOL/L (ref 7–19)
AST SERPL-CCNC: 12 U/L (ref 5–32)
BASOPHILS ABSOLUTE: 0 K/UL (ref 0–0.2)
BASOPHILS RELATIVE PERCENT: 0.4 % (ref 0–1)
BILIRUB SERPL-MCNC: <0.2 MG/DL (ref 0.2–1.2)
BUN BLDV-MCNC: 10 MG/DL (ref 6–20)
CALCIUM SERPL-MCNC: 9.5 MG/DL (ref 8.6–10)
CHLORIDE BLD-SCNC: 105 MMOL/L (ref 98–111)
CO2: 22 MMOL/L (ref 22–29)
CREAT SERPL-MCNC: 0.8 MG/DL (ref 0.5–0.9)
EOSINOPHILS ABSOLUTE: 0.3 K/UL (ref 0–0.6)
EOSINOPHILS RELATIVE PERCENT: 2.8 % (ref 0–5)
GFR NON-AFRICAN AMERICAN: >60
GLUCOSE BLD-MCNC: 117 MG/DL (ref 74–109)
HCT VFR BLD CALC: 37.4 % (ref 37–47)
HEMOGLOBIN: 12 G/DL (ref 12–16)
IMMATURE GRANULOCYTES #: 0 K/UL
LYMPHOCYTES ABSOLUTE: 3.2 K/UL (ref 1.1–4.5)
LYMPHOCYTES RELATIVE PERCENT: 32 % (ref 20–40)
MCH RBC QN AUTO: 27.4 PG (ref 27–31)
MCHC RBC AUTO-ENTMCNC: 32.1 G/DL (ref 33–37)
MCV RBC AUTO: 85.4 FL (ref 81–99)
MONOCYTES ABSOLUTE: 0.7 K/UL (ref 0–0.9)
MONOCYTES RELATIVE PERCENT: 7.3 % (ref 0–10)
NEUTROPHILS ABSOLUTE: 5.8 K/UL (ref 1.5–7.5)
NEUTROPHILS RELATIVE PERCENT: 57.1 % (ref 50–65)
PDW BLD-RTO: 13.2 % (ref 11.5–14.5)
PLATELET # BLD: 343 K/UL (ref 130–400)
PMV BLD AUTO: 10.7 FL (ref 9.4–12.3)
POTASSIUM SERPL-SCNC: 4.2 MMOL/L (ref 3.5–5)
RBC # BLD: 4.38 M/UL (ref 4.2–5.4)
SODIUM BLD-SCNC: 140 MMOL/L (ref 136–145)
TOTAL PROTEIN: 7.4 G/DL (ref 6.6–8.7)
VITAMIN D 25-HYDROXY: 28.7 NG/ML
WBC # BLD: 10.1 K/UL (ref 4.8–10.8)

## 2019-09-18 PROCEDURE — 99214 OFFICE O/P EST MOD 30 MIN: CPT | Performed by: NURSE PRACTITIONER

## 2019-09-18 RX ORDER — FLUOXETINE HYDROCHLORIDE 20 MG/1
20 CAPSULE ORAL DAILY
COMMUNITY
End: 2019-09-23 | Stop reason: SDUPTHER
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
## 2019-09-19 ASSESSMENT — ENCOUNTER SYMPTOMS
GASTROINTESTINAL NEGATIVE: 1
EYES NEGATIVE: 1
RESPIRATORY NEGATIVE: 1

## 2019-09-23 ENCOUNTER — PATIENT MESSAGE (OUTPATIENT)
Dept: PRIMARY CARE CLINIC | Age: 24
End: 2019-09-23

## 2019-09-23 DIAGNOSIS — F41.9 ANXIETY AND DEPRESSION: Primary | ICD-10-CM

## 2019-09-23 DIAGNOSIS — F32.A ANXIETY AND DEPRESSION: Primary | ICD-10-CM

## 2019-09-23 RX ORDER — FLUOXETINE HYDROCHLORIDE 20 MG/1
20 CAPSULE ORAL DAILY
Qty: 30 CAPSULE | Refills: 0 | Status: SHIPPED | OUTPATIENT
Start: 2019-09-23 | End: 2019-11-04 | Stop reason: SDUPTHER

## 2019-10-03 ENCOUNTER — OFFICE VISIT (OUTPATIENT)
Dept: GASTROENTEROLOGY | Age: 24
End: 2019-10-03
Payer: COMMERCIAL

## 2019-10-03 VITALS
WEIGHT: 293 LBS | HEART RATE: 106 BPM | DIASTOLIC BLOOD PRESSURE: 102 MMHG | SYSTOLIC BLOOD PRESSURE: 136 MMHG | BODY MASS INDEX: 44.41 KG/M2 | OXYGEN SATURATION: 98 % | HEIGHT: 68 IN

## 2019-10-03 DIAGNOSIS — K21.9 GASTROESOPHAGEAL REFLUX DISEASE, ESOPHAGITIS PRESENCE NOT SPECIFIED: Primary | ICD-10-CM

## 2019-10-03 DIAGNOSIS — R11.2 NON-INTRACTABLE VOMITING WITH NAUSEA, UNSPECIFIED VOMITING TYPE: ICD-10-CM

## 2019-10-03 DIAGNOSIS — R10.31 RIGHT LOWER QUADRANT ABDOMINAL PAIN: ICD-10-CM

## 2019-10-03 PROCEDURE — 99204 OFFICE O/P NEW MOD 45 MIN: CPT | Performed by: NURSE PRACTITIONER

## 2019-10-03 RX ORDER — PANTOPRAZOLE SODIUM 40 MG/1
40 TABLET, DELAYED RELEASE ORAL DAILY
Qty: 90 TABLET | Refills: 3 | Status: SHIPPED | OUTPATIENT
Start: 2019-10-03 | End: 2020-02-27 | Stop reason: SDUPTHER

## 2019-10-03 ASSESSMENT — ENCOUNTER SYMPTOMS
TROUBLE SWALLOWING: 0
SHORTNESS OF BREATH: 0
BLOOD IN STOOL: 0
RECTAL PAIN: 0
ABDOMINAL DISTENTION: 0
NAUSEA: 1
CHOKING: 0
CONSTIPATION: 1
DIARRHEA: 1
ABDOMINAL PAIN: 1
COUGH: 0
VOMITING: 1
ANAL BLEEDING: 0

## 2019-10-18 ENCOUNTER — OFFICE VISIT (OUTPATIENT)
Dept: PRIMARY CARE CLINIC | Age: 24
End: 2019-10-18
Payer: COMMERCIAL

## 2019-10-18 VITALS
OXYGEN SATURATION: 98 % | RESPIRATION RATE: 17 BRPM | HEART RATE: 102 BPM | HEIGHT: 68 IN | TEMPERATURE: 97.9 F | WEIGHT: 293 LBS | BODY MASS INDEX: 44.41 KG/M2 | SYSTOLIC BLOOD PRESSURE: 136 MMHG | DIASTOLIC BLOOD PRESSURE: 86 MMHG

## 2019-10-18 DIAGNOSIS — R59.1 LYMPHADENOPATHY: ICD-10-CM

## 2019-10-18 DIAGNOSIS — E53.8 VITAMIN B12 DEFICIENCY: ICD-10-CM

## 2019-10-18 DIAGNOSIS — R50.9 FEVER AND CHILLS: ICD-10-CM

## 2019-10-18 DIAGNOSIS — F32.A ANXIETY AND DEPRESSION: ICD-10-CM

## 2019-10-18 DIAGNOSIS — E55.9 VITAMIN D DEFICIENCY: Primary | ICD-10-CM

## 2019-10-18 DIAGNOSIS — F41.9 ANXIETY AND DEPRESSION: ICD-10-CM

## 2019-10-18 LAB
BASOPHILS ABSOLUTE: 0 K/UL (ref 0–0.2)
BASOPHILS RELATIVE PERCENT: 0.4 % (ref 0–1)
C-REACTIVE PROTEIN: 2.44 MG/DL (ref 0–0.5)
EOSINOPHILS ABSOLUTE: 0.3 K/UL (ref 0–0.6)
EOSINOPHILS RELATIVE PERCENT: 3.2 % (ref 0–5)
HCT VFR BLD CALC: 36.9 % (ref 37–47)
HEMOGLOBIN: 11.9 G/DL (ref 12–16)
IMMATURE GRANULOCYTES #: 0 K/UL
LYMPHOCYTES ABSOLUTE: 2.8 K/UL (ref 1.1–4.5)
LYMPHOCYTES RELATIVE PERCENT: 27.2 % (ref 20–40)
MCH RBC QN AUTO: 27.5 PG (ref 27–31)
MCHC RBC AUTO-ENTMCNC: 32.2 G/DL (ref 33–37)
MCV RBC AUTO: 85.2 FL (ref 81–99)
MONOCYTES ABSOLUTE: 0.7 K/UL (ref 0–0.9)
MONOCYTES RELATIVE PERCENT: 7.1 % (ref 0–10)
NEUTROPHILS ABSOLUTE: 6.4 K/UL (ref 1.5–7.5)
NEUTROPHILS RELATIVE PERCENT: 61.8 % (ref 50–65)
PDW BLD-RTO: 13.2 % (ref 11.5–14.5)
PLATELET # BLD: 299 K/UL (ref 130–400)
PMV BLD AUTO: 10.9 FL (ref 9.4–12.3)
RBC # BLD: 4.33 M/UL (ref 4.2–5.4)
TSH REFLEX FT4: 1.76 UIU/ML (ref 0.35–5.5)
VITAMIN B-12: 252 PG/ML (ref 211–946)
WBC # BLD: 10.3 K/UL (ref 4.8–10.8)

## 2019-10-18 PROCEDURE — 99214 OFFICE O/P EST MOD 30 MIN: CPT | Performed by: NURSE PRACTITIONER

## 2019-10-18 RX ORDER — ERGOCALCIFEROL 1.25 MG/1
CAPSULE ORAL
Qty: 4 CAPSULE | Refills: 5 | Status: SHIPPED | OUTPATIENT
Start: 2019-10-18 | End: 2020-01-20

## 2019-10-20 LAB — ANA IGG, ELISA: DETECTED

## 2019-10-20 ASSESSMENT — ENCOUNTER SYMPTOMS
RESPIRATORY NEGATIVE: 1
EYES NEGATIVE: 1
GASTROINTESTINAL NEGATIVE: 1

## 2019-10-21 ENCOUNTER — HOSPITAL ENCOUNTER (OUTPATIENT)
Age: 24
Setting detail: OUTPATIENT SURGERY
Discharge: HOME | End: 2019-10-21
Attending: INTERNAL MEDICINE | Admitting: INTERNAL MEDICINE
Payer: COMMERCIAL

## 2019-10-21 ENCOUNTER — ANESTHESIA EVENT (OUTPATIENT)
Dept: ENDOSCOPY | Age: 24
End: 2019-10-21
Payer: COMMERCIAL

## 2019-10-21 ENCOUNTER — ANESTHESIA (OUTPATIENT)
Dept: ENDOSCOPY | Age: 24
End: 2019-10-21
Payer: COMMERCIAL

## 2019-10-21 VITALS
DIASTOLIC BLOOD PRESSURE: 88 MMHG | HEART RATE: 82 BPM | BODY MASS INDEX: 44.41 KG/M2 | OXYGEN SATURATION: 100 % | RESPIRATION RATE: 18 BRPM | SYSTOLIC BLOOD PRESSURE: 131 MMHG | TEMPERATURE: 98.1 F | HEIGHT: 68 IN | WEIGHT: 293 LBS

## 2019-10-21 VITALS
DIASTOLIC BLOOD PRESSURE: 72 MMHG | OXYGEN SATURATION: 97 % | RESPIRATION RATE: 15 BRPM | SYSTOLIC BLOOD PRESSURE: 107 MMHG

## 2019-10-21 LAB — HCG(URINE) PREGNANCY TEST: NEGATIVE

## 2019-10-21 PROCEDURE — 84703 CHORIONIC GONADOTROPIN ASSAY: CPT

## 2019-10-21 PROCEDURE — 6360000002 HC RX W HCPCS

## 2019-10-21 PROCEDURE — 7100000011 HC PHASE II RECOVERY - ADDTL 15 MIN: Performed by: INTERNAL MEDICINE

## 2019-10-21 PROCEDURE — 2709999900 HC NON-CHARGEABLE SUPPLY: Performed by: INTERNAL MEDICINE

## 2019-10-21 PROCEDURE — 3609027000 HC COLONOSCOPY: Performed by: INTERNAL MEDICINE

## 2019-10-21 PROCEDURE — 43239 EGD BIOPSY SINGLE/MULTIPLE: CPT | Performed by: INTERNAL MEDICINE

## 2019-10-21 PROCEDURE — 45378 DIAGNOSTIC COLONOSCOPY: CPT | Performed by: INTERNAL MEDICINE

## 2019-10-21 PROCEDURE — 88305 TISSUE EXAM BY PATHOLOGIST: CPT

## 2019-10-21 PROCEDURE — 3700000000 HC ANESTHESIA ATTENDED CARE: Performed by: INTERNAL MEDICINE

## 2019-10-21 PROCEDURE — 2580000003 HC RX 258: Performed by: INTERNAL MEDICINE

## 2019-10-21 PROCEDURE — 3700000001 HC ADD 15 MINUTES (ANESTHESIA): Performed by: INTERNAL MEDICINE

## 2019-10-21 PROCEDURE — 7100000010 HC PHASE II RECOVERY - FIRST 15 MIN: Performed by: INTERNAL MEDICINE

## 2019-10-21 PROCEDURE — 2500000003 HC RX 250 WO HCPCS

## 2019-10-21 PROCEDURE — 3609012400 HC EGD TRANSORAL BIOPSY SINGLE/MULTIPLE: Performed by: INTERNAL MEDICINE

## 2019-10-21 RX ORDER — DIPHENHYDRAMINE HYDROCHLORIDE 50 MG/ML
12.5 INJECTION INTRAMUSCULAR; INTRAVENOUS
Status: DISCONTINUED | OUTPATIENT
Start: 2019-10-21 | End: 2019-10-21 | Stop reason: HOSPADM

## 2019-10-21 RX ORDER — LIDOCAINE HYDROCHLORIDE 10 MG/ML
INJECTION, SOLUTION INFILTRATION; PERINEURAL PRN
Status: DISCONTINUED | OUTPATIENT
Start: 2019-10-21 | End: 2019-10-21 | Stop reason: SDUPTHER

## 2019-10-21 RX ORDER — SODIUM CHLORIDE, SODIUM LACTATE, POTASSIUM CHLORIDE, CALCIUM CHLORIDE 600; 310; 30; 20 MG/100ML; MG/100ML; MG/100ML; MG/100ML
INJECTION, SOLUTION INTRAVENOUS CONTINUOUS
Status: DISCONTINUED | OUTPATIENT
Start: 2019-10-21 | End: 2019-10-21 | Stop reason: HOSPADM

## 2019-10-21 RX ORDER — PROPOFOL 10 MG/ML
INJECTION, EMULSION INTRAVENOUS PRN
Status: DISCONTINUED | OUTPATIENT
Start: 2019-10-21 | End: 2019-10-21 | Stop reason: SDUPTHER

## 2019-10-21 RX ORDER — ONDANSETRON 2 MG/ML
4 INJECTION INTRAMUSCULAR; INTRAVENOUS
Status: DISCONTINUED | OUTPATIENT
Start: 2019-10-21 | End: 2019-10-21 | Stop reason: HOSPADM

## 2019-10-21 RX ORDER — PROMETHAZINE HYDROCHLORIDE 25 MG/ML
6.25 INJECTION, SOLUTION INTRAMUSCULAR; INTRAVENOUS
Status: DISCONTINUED | OUTPATIENT
Start: 2019-10-21 | End: 2019-10-21 | Stop reason: HOSPADM

## 2019-10-21 RX ADMIN — PROPOFOL 50 MG: 10 INJECTION, EMULSION INTRAVENOUS at 14:53

## 2019-10-21 RX ADMIN — PROPOFOL 50 MG: 10 INJECTION, EMULSION INTRAVENOUS at 14:34

## 2019-10-21 RX ADMIN — LIDOCAINE HYDROCHLORIDE 50 MG: 10 INJECTION, SOLUTION INFILTRATION; PERINEURAL at 14:34

## 2019-10-21 RX ADMIN — PROPOFOL 50 MG: 10 INJECTION, EMULSION INTRAVENOUS at 14:44

## 2019-10-21 RX ADMIN — PROPOFOL 50 MG: 10 INJECTION, EMULSION INTRAVENOUS at 14:41

## 2019-10-21 RX ADMIN — PROPOFOL 150 MG: 10 INJECTION, EMULSION INTRAVENOUS at 14:36

## 2019-10-21 RX ADMIN — PROPOFOL 50 MG: 10 INJECTION, EMULSION INTRAVENOUS at 14:47

## 2019-10-21 RX ADMIN — PROPOFOL 50 MG: 10 INJECTION, EMULSION INTRAVENOUS at 14:50

## 2019-10-21 RX ADMIN — SODIUM CHLORIDE, POTASSIUM CHLORIDE, SODIUM LACTATE AND CALCIUM CHLORIDE: 600; 310; 30; 20 INJECTION, SOLUTION INTRAVENOUS at 12:56

## 2019-10-21 RX ADMIN — PROPOFOL 100 MG: 10 INJECTION, EMULSION INTRAVENOUS at 14:35

## 2019-10-21 RX ADMIN — PROPOFOL 100 MG: 10 INJECTION, EMULSION INTRAVENOUS at 14:38

## 2019-10-21 RX ADMIN — PROPOFOL 50 MG: 10 INJECTION, EMULSION INTRAVENOUS at 14:40

## 2019-10-21 ASSESSMENT — PAIN SCALES - GENERAL
PAINLEVEL_OUTOF10: 0
PAINLEVEL_OUTOF10: 0

## 2019-10-23 LAB — DOUBLE STRANDED DNA AB, IGG: NORMAL

## 2019-10-24 LAB
ENA TO RNP ANTIBODY: 2 AU/ML (ref 0–40)
ENA TO SMITH (SM) ANTIBODY: 0 AU/ML (ref 0–40)
ENA TO SSB (LA) ANTIBODY: 13 AU/ML (ref 0–40)
SCLERODERMA (SCL-70) AB: 1 AU/ML (ref 0–40)
SSA 52 (RO) (ENA) AB, IGG: 1 AU/ML (ref 0–40)
SSA 60 (RO) (ENA) AB, IGG: 27 AU/ML (ref 0–40)

## 2019-10-28 ENCOUNTER — TELEPHONE (OUTPATIENT)
Dept: PRIMARY CARE CLINIC | Age: 24
End: 2019-10-28

## 2019-10-28 DIAGNOSIS — R76.8 ELEVATED ANTINUCLEAR ANTIBODY (ANA) LEVEL: Primary | ICD-10-CM

## 2019-11-04 DIAGNOSIS — F41.9 ANXIETY AND DEPRESSION: ICD-10-CM

## 2019-11-04 DIAGNOSIS — F32.A ANXIETY AND DEPRESSION: ICD-10-CM

## 2019-11-04 RX ORDER — FLUOXETINE HYDROCHLORIDE 20 MG/1
20 CAPSULE ORAL DAILY
Qty: 30 CAPSULE | Refills: 0 | Status: SHIPPED | OUTPATIENT
Start: 2019-11-04 | End: 2019-12-02 | Stop reason: SDUPTHER

## 2019-12-02 DIAGNOSIS — F32.A ANXIETY AND DEPRESSION: ICD-10-CM

## 2019-12-02 DIAGNOSIS — F41.9 ANXIETY AND DEPRESSION: ICD-10-CM

## 2019-12-02 RX ORDER — FLUOXETINE HYDROCHLORIDE 20 MG/1
CAPSULE ORAL
Qty: 30 CAPSULE | Refills: 0 | Status: SHIPPED | OUTPATIENT
Start: 2019-12-02 | End: 2019-12-30

## 2019-12-03 DIAGNOSIS — F41.9 ANXIETY AND DEPRESSION: ICD-10-CM

## 2019-12-03 DIAGNOSIS — F32.A ANXIETY AND DEPRESSION: ICD-10-CM

## 2019-12-03 RX ORDER — FLUOXETINE HYDROCHLORIDE 20 MG/1
CAPSULE ORAL
Qty: 30 CAPSULE | Refills: 0 | OUTPATIENT
Start: 2019-12-03

## 2019-12-19 ENCOUNTER — OFFICE VISIT (OUTPATIENT)
Dept: URGENT CARE | Age: 24
End: 2019-12-19
Payer: COMMERCIAL

## 2019-12-19 VITALS
BODY MASS INDEX: 44.41 KG/M2 | TEMPERATURE: 97.9 F | DIASTOLIC BLOOD PRESSURE: 85 MMHG | RESPIRATION RATE: 18 BRPM | SYSTOLIC BLOOD PRESSURE: 137 MMHG | HEART RATE: 116 BPM | HEIGHT: 68 IN | OXYGEN SATURATION: 99 % | WEIGHT: 293 LBS

## 2019-12-19 DIAGNOSIS — R05.9 COUGH: Primary | ICD-10-CM

## 2019-12-19 DIAGNOSIS — J01.00 ACUTE NON-RECURRENT MAXILLARY SINUSITIS: ICD-10-CM

## 2019-12-19 DIAGNOSIS — J02.9 SORE THROAT: ICD-10-CM

## 2019-12-19 LAB
INFLUENZA A ANTIBODY: NEGATIVE
INFLUENZA B ANTIBODY: NEGATIVE
S PYO AG THROAT QL: NORMAL

## 2019-12-19 PROCEDURE — 87804 INFLUENZA ASSAY W/OPTIC: CPT | Performed by: NURSE PRACTITIONER

## 2019-12-19 PROCEDURE — 87880 STREP A ASSAY W/OPTIC: CPT | Performed by: NURSE PRACTITIONER

## 2019-12-19 PROCEDURE — 99213 OFFICE O/P EST LOW 20 MIN: CPT | Performed by: NURSE PRACTITIONER

## 2019-12-19 RX ORDER — AMOXICILLIN 500 MG/1
1 TABLET, FILM COATED ORAL 2 TIMES DAILY
Qty: 20 TABLET | Refills: 0 | Status: SHIPPED | OUTPATIENT
Start: 2019-12-19 | End: 2020-01-15

## 2019-12-19 RX ORDER — BENZONATATE 200 MG/1
200 CAPSULE ORAL 3 TIMES DAILY PRN
Qty: 21 CAPSULE | Refills: 0 | Status: SHIPPED | OUTPATIENT
Start: 2019-12-19 | End: 2019-12-26

## 2019-12-19 ASSESSMENT — ENCOUNTER SYMPTOMS
EYE REDNESS: 0
WHEEZING: 0
COUGH: 1
VOMITING: 0
SINUS PRESSURE: 1
SORE THROAT: 1
DIARRHEA: 0
SHORTNESS OF BREATH: 0
EYE DISCHARGE: 0

## 2019-12-29 DIAGNOSIS — F32.A ANXIETY AND DEPRESSION: ICD-10-CM

## 2019-12-29 DIAGNOSIS — F41.9 ANXIETY AND DEPRESSION: ICD-10-CM

## 2019-12-30 RX ORDER — FLUOXETINE HYDROCHLORIDE 20 MG/1
CAPSULE ORAL
Qty: 30 CAPSULE | Refills: 0 | Status: SHIPPED | OUTPATIENT
Start: 2019-12-30 | End: 2020-01-03 | Stop reason: SDUPTHER

## 2020-01-03 RX ORDER — FLUOXETINE HYDROCHLORIDE 20 MG/1
20 CAPSULE ORAL DAILY
Qty: 30 CAPSULE | Refills: 0 | Status: SHIPPED | OUTPATIENT
Start: 2020-01-03 | End: 2020-02-05 | Stop reason: SDUPTHER

## 2020-01-03 RX ORDER — IBUPROFEN 800 MG/1
800 TABLET ORAL EVERY 8 HOURS PRN
Qty: 60 TABLET | Refills: 1 | Status: SHIPPED | OUTPATIENT
Start: 2020-01-03 | End: 2020-08-31 | Stop reason: SDUPTHER

## 2020-01-15 ENCOUNTER — OFFICE VISIT (OUTPATIENT)
Dept: OBGYN | Age: 25
End: 2020-01-15
Payer: COMMERCIAL

## 2020-01-15 VITALS
WEIGHT: 293 LBS | TEMPERATURE: 98.6 F | HEART RATE: 78 BPM | HEIGHT: 68 IN | SYSTOLIC BLOOD PRESSURE: 125 MMHG | DIASTOLIC BLOOD PRESSURE: 81 MMHG | BODY MASS INDEX: 44.41 KG/M2

## 2020-01-15 PROCEDURE — 99213 OFFICE O/P EST LOW 20 MIN: CPT | Performed by: OBSTETRICS & GYNECOLOGY

## 2020-01-15 ASSESSMENT — ENCOUNTER SYMPTOMS
RESPIRATORY NEGATIVE: 1
EYES NEGATIVE: 1
GASTROINTESTINAL NEGATIVE: 1

## 2020-01-15 NOTE — PROGRESS NOTES
Subjective:      Patient ID: Serg Ferris is a 25 y.o. female. HPI  I, Savannah León RN, am scribing for and in the presence of Dr. Isai Kulkarni 1/15/2020/11:58 AM/sign      Patient presents today to discuss scheduling Dx Lap for pelvic pain. She has hx of PCOS and has been having intermittent pelvic pain for the past year. We referred her to GI in 8/2019 for a work up and it was negative. Last TVUS was negative along with lab testing. Pt is wanting to get pregnant within this year. She is in a lesbian relationship so they are trying to decide fertility option. She is having irregular periods, taking OCP. Serg Ferris is a 25 y.o. female with the following history as recorded in [facility]:  Patient Active Problem List    Diagnosis Date Noted    PCOS (polycystic ovarian syndrome) 05/06/2019    Pelvic pain 05/06/2019     Current Outpatient Medications   Medication Sig Dispense Refill    ibuprofen (IBU) 800 MG tablet Take 1 tablet by mouth every 8 hours as needed for Pain 60 tablet 1    FLUoxetine (PROZAC) 20 MG capsule Take 1 capsule by mouth daily 30 capsule 0    vitamin D (ERGOCALCIFEROL) 82553 units CAPS capsule TAKE ONE CAPSULE BY MOUTH ONE TIME PER WEEK 4 capsule 5    pantoprazole (PROTONIX) 40 MG tablet Take 1 tablet by mouth daily Take daily first thing in the morning on an empty stomach. 90 tablet 3    metFORMIN (GLUCOPHAGE) 1000 MG tablet Take 1 tablet by mouth 2 times daily (with meals) 30 tablet 3    desogestrel-ethinyl estradiol (APRI) 0.15-30 MG-MCG per tablet Take 1 tablet by mouth daily 1 packet 11    fluticasone (FLONASE) 50 MCG/ACT nasal spray 2 sprays by Nasal route daily 1 Bottle 0     No current facility-administered medications for this visit. Allergies: Corn-containing products; Sertraline; Shrimp (diagnostic);  Soybean-containing drug products; and Wheat extract  Past Medical History:   Diagnosis Date    Concussion     approximately 10 years ago    Depression     Major equal, round, and reactive to light. Neck:      Musculoskeletal: Normal range of motion. Pulmonary:      Effort: Pulmonary effort is normal.   Abdominal:      Tenderness: There is no guarding. Musculoskeletal: Normal range of motion. Comments: Normal ROM in all 4 extremities; normal gait   Skin:     General: Skin is warm and dry. Neurological:      Mental Status: She is alert and oriented to person, place, and time. Motor: No abnormal muscle tone. Coordination: Coordination normal.   Psychiatric:         Behavior: Behavior normal.         Assessment:       Diagnosis Orders   1. Pelvic pain     2. PCOS (polycystic ovarian syndrome)     3. Irregular periods             Plan:      Discussed Dx Lap along with chromotubation with pt and surgery sosa'd on 1/31. Pt will RTO for preop education visit. Will continue OCP until ready to conceive. Continue Metformin. All questions answered. Montell Fabry, MD, personally performed services described in this document as scribed by Stephanie Colin RN in my presence, and it is both accurate and complete.

## 2020-01-20 ENCOUNTER — HOSPITAL ENCOUNTER (OUTPATIENT)
Dept: PREADMISSION TESTING | Age: 25
Discharge: HOME | End: 2020-01-24
Payer: COMMERCIAL

## 2020-01-20 ENCOUNTER — OFFICE VISIT (OUTPATIENT)
Dept: OBGYN | Age: 25
End: 2020-01-20
Payer: COMMERCIAL

## 2020-01-20 VITALS
HEIGHT: 68 IN | DIASTOLIC BLOOD PRESSURE: 90 MMHG | SYSTOLIC BLOOD PRESSURE: 137 MMHG | WEIGHT: 293 LBS | HEART RATE: 99 BPM | BODY MASS INDEX: 44.41 KG/M2

## 2020-01-20 VITALS — BODY MASS INDEX: 44.41 KG/M2 | WEIGHT: 293 LBS | HEIGHT: 68 IN

## 2020-01-20 LAB
ANION GAP SERPL CALCULATED.3IONS-SCNC: 15 MMOL/L (ref 7–19)
BASOPHILS ABSOLUTE: 0.1 K/UL (ref 0–0.2)
BASOPHILS RELATIVE PERCENT: 0.7 % (ref 0–1)
BUN BLDV-MCNC: 10 MG/DL (ref 6–20)
CALCIUM SERPL-MCNC: 9.4 MG/DL (ref 8.6–10)
CHLORIDE BLD-SCNC: 103 MMOL/L (ref 98–111)
CO2: 21 MMOL/L (ref 22–29)
CREAT SERPL-MCNC: 0.7 MG/DL (ref 0.5–0.9)
EOSINOPHILS ABSOLUTE: 0.5 K/UL (ref 0–0.6)
EOSINOPHILS RELATIVE PERCENT: 5 % (ref 0–5)
GFR NON-AFRICAN AMERICAN: >60
GLUCOSE BLD-MCNC: 112 MG/DL (ref 74–109)
HCT VFR BLD CALC: 38.4 % (ref 37–47)
HEMOGLOBIN: 11.9 G/DL (ref 12–16)
IMMATURE GRANULOCYTES #: 0 K/UL
LYMPHOCYTES ABSOLUTE: 2.5 K/UL (ref 1.1–4.5)
LYMPHOCYTES RELATIVE PERCENT: 25.8 % (ref 20–40)
MCH RBC QN AUTO: 26.9 PG (ref 27–31)
MCHC RBC AUTO-ENTMCNC: 31 G/DL (ref 33–37)
MCV RBC AUTO: 86.9 FL (ref 81–99)
MONOCYTES ABSOLUTE: 0.7 K/UL (ref 0–0.9)
MONOCYTES RELATIVE PERCENT: 6.9 % (ref 0–10)
NEUTROPHILS ABSOLUTE: 6 K/UL (ref 1.5–7.5)
NEUTROPHILS RELATIVE PERCENT: 61.3 % (ref 50–65)
PDW BLD-RTO: 13.3 % (ref 11.5–14.5)
PLATELET # BLD: 292 K/UL (ref 130–400)
PMV BLD AUTO: 11.3 FL (ref 9.4–12.3)
POTASSIUM SERPL-SCNC: 4 MMOL/L (ref 3.5–5)
RBC # BLD: 4.42 M/UL (ref 4.2–5.4)
SODIUM BLD-SCNC: 139 MMOL/L (ref 136–145)
WBC # BLD: 9.8 K/UL (ref 4.8–10.8)

## 2020-01-20 PROCEDURE — 80048 BASIC METABOLIC PNL TOTAL CA: CPT

## 2020-01-20 PROCEDURE — 85025 COMPLETE CBC W/AUTO DIFF WBC: CPT

## 2020-01-20 PROCEDURE — 99211 OFF/OP EST MAY X REQ PHY/QHP: CPT | Performed by: NURSE PRACTITIONER

## 2020-01-20 RX ORDER — CHOLECALCIFEROL (VITAMIN D3) 1250 MCG
1 CAPSULE ORAL WEEKLY
COMMUNITY
End: 2020-03-20 | Stop reason: SDUPTHER

## 2020-01-20 NOTE — PROGRESS NOTES
depressive disorder     Food allergy     Hypothyroidism     Metabolic disorder     PCOS (polycystic ovarian syndrome)      Past Surgical History:   Procedure Laterality Date    CHOLECYSTECTOMY      COLONOSCOPY  11/03/2014    Dr Nikole Jansen,  Small non-bleeding internal hemorrhoids w/skin tags, serrated polyp     COLONOSCOPY N/A 10/21/2019    Dr Austin Villafuerte, 5 yr recall    KNEE SURGERY      POLYPECTOMY      TONSILLECTOMY AND ADENOIDECTOMY      UPPER GASTROINTESTINAL ENDOSCOPY  11/03/2014    Dr Nikole Jansen, Gastritis, h pylori neg    UPPER GASTROINTESTINAL ENDOSCOPY N/A 10/21/2019    Dr Austin Villafuerte     Family History   Problem Relation Age of Onset    Polycystic Ovary Syndrome Mother     Colon Cancer Paternal Grandmother      Social History     Tobacco Use    Smoking status: Never Smoker    Smokeless tobacco: Never Used   Substance Use Topics    Alcohol use: Yes       BP (!) 137/90 (Site: Right Upper Arm, Position: Sitting, Cuff Size: Medium Adult)   Pulse 99   Ht 5' 8\" (1.727 m)   Wt (!) 306 lb (138.8 kg)   LMP 12/29/2019   Breastfeeding No   BMI 46.53 kg/m²        Diagnosis Orders   1. Pelvic pain     2. PCOS (polycystic ovarian syndrome)     3. Irregular periods           Counseling was offered and accepted by patient. Dr. Keila Hensley discussed at length the risks, benefits and alternatives to surgery including medical management and no intervention at patient's last office visit. Discussed the surgical procedure in detail with patient. Some patients will need a full medical clearance. Preop testing ordered and will have obtained today. Consents for surgery signed and all questions proceeding. All questions answered.

## 2020-01-31 ENCOUNTER — ANESTHESIA (OUTPATIENT)
Dept: OPERATING ROOM | Age: 25
End: 2020-01-31
Payer: COMMERCIAL

## 2020-01-31 ENCOUNTER — HOSPITAL ENCOUNTER (OUTPATIENT)
Age: 25
Setting detail: OUTPATIENT SURGERY
Discharge: HOME | End: 2020-01-31
Attending: OBSTETRICS & GYNECOLOGY | Admitting: OBSTETRICS & GYNECOLOGY
Payer: COMMERCIAL

## 2020-01-31 ENCOUNTER — ANESTHESIA EVENT (OUTPATIENT)
Dept: OPERATING ROOM | Age: 25
End: 2020-01-31
Payer: COMMERCIAL

## 2020-01-31 VITALS
SYSTOLIC BLOOD PRESSURE: 119 MMHG | TEMPERATURE: 97.2 F | HEIGHT: 68 IN | DIASTOLIC BLOOD PRESSURE: 77 MMHG | OXYGEN SATURATION: 99 % | HEART RATE: 83 BPM | WEIGHT: 293 LBS | RESPIRATION RATE: 18 BRPM | BODY MASS INDEX: 44.41 KG/M2

## 2020-01-31 VITALS
TEMPERATURE: 98 F | DIASTOLIC BLOOD PRESSURE: 97 MMHG | SYSTOLIC BLOOD PRESSURE: 134 MMHG | RESPIRATION RATE: 3 BRPM | OXYGEN SATURATION: 100 %

## 2020-01-31 LAB
GLUCOSE BLD-MCNC: 104 MG/DL (ref 70–99)
HCG(URINE) PREGNANCY TEST: NEGATIVE
PERFORMED ON: ABNORMAL

## 2020-01-31 PROCEDURE — 6370000000 HC RX 637 (ALT 250 FOR IP): Performed by: ANESTHESIOLOGY

## 2020-01-31 PROCEDURE — 6360000002 HC RX W HCPCS: Performed by: OBSTETRICS & GYNECOLOGY

## 2020-01-31 PROCEDURE — 2580000003 HC RX 258: Performed by: ANESTHESIOLOGY

## 2020-01-31 PROCEDURE — 7100000000 HC PACU RECOVERY - FIRST 15 MIN: Performed by: OBSTETRICS & GYNECOLOGY

## 2020-01-31 PROCEDURE — 7100000001 HC PACU RECOVERY - ADDTL 15 MIN: Performed by: OBSTETRICS & GYNECOLOGY

## 2020-01-31 PROCEDURE — 3600000014 HC SURGERY LEVEL 4 ADDTL 15MIN: Performed by: OBSTETRICS & GYNECOLOGY

## 2020-01-31 PROCEDURE — 49320 DIAG LAPARO SEPARATE PROC: CPT | Performed by: OBSTETRICS & GYNECOLOGY

## 2020-01-31 PROCEDURE — 6370000000 HC RX 637 (ALT 250 FOR IP)

## 2020-01-31 PROCEDURE — 3600000004 HC SURGERY LEVEL 4 BASE: Performed by: OBSTETRICS & GYNECOLOGY

## 2020-01-31 PROCEDURE — 7100000011 HC PHASE II RECOVERY - ADDTL 15 MIN: Performed by: OBSTETRICS & GYNECOLOGY

## 2020-01-31 PROCEDURE — 2500000003 HC RX 250 WO HCPCS: Performed by: NURSE ANESTHETIST, CERTIFIED REGISTERED

## 2020-01-31 PROCEDURE — 6360000002 HC RX W HCPCS: Performed by: NURSE ANESTHETIST, CERTIFIED REGISTERED

## 2020-01-31 PROCEDURE — 2709999900 HC NON-CHARGEABLE SUPPLY: Performed by: OBSTETRICS & GYNECOLOGY

## 2020-01-31 PROCEDURE — 2580000003 HC RX 258: Performed by: OBSTETRICS & GYNECOLOGY

## 2020-01-31 PROCEDURE — 82948 REAGENT STRIP/BLOOD GLUCOSE: CPT

## 2020-01-31 PROCEDURE — 3700000001 HC ADD 15 MINUTES (ANESTHESIA): Performed by: OBSTETRICS & GYNECOLOGY

## 2020-01-31 PROCEDURE — 6360000002 HC RX W HCPCS: Performed by: ANESTHESIOLOGY

## 2020-01-31 PROCEDURE — 84703 CHORIONIC GONADOTROPIN ASSAY: CPT

## 2020-01-31 PROCEDURE — 7100000010 HC PHASE II RECOVERY - FIRST 15 MIN: Performed by: OBSTETRICS & GYNECOLOGY

## 2020-01-31 PROCEDURE — 3700000000 HC ANESTHESIA ATTENDED CARE: Performed by: OBSTETRICS & GYNECOLOGY

## 2020-01-31 RX ORDER — ONDANSETRON 2 MG/ML
INJECTION INTRAMUSCULAR; INTRAVENOUS PRN
Status: DISCONTINUED | OUTPATIENT
Start: 2020-01-31 | End: 2020-01-31 | Stop reason: SDUPTHER

## 2020-01-31 RX ORDER — MORPHINE SULFATE 4 MG/ML
2 INJECTION, SOLUTION INTRAMUSCULAR; INTRAVENOUS EVERY 5 MIN PRN
Status: DISCONTINUED | OUTPATIENT
Start: 2020-01-31 | End: 2020-01-31 | Stop reason: HOSPADM

## 2020-01-31 RX ORDER — APREPITANT 40 MG/1
40 CAPSULE ORAL ONCE
Status: COMPLETED | OUTPATIENT
Start: 2020-01-31 | End: 2020-01-31

## 2020-01-31 RX ORDER — DEXAMETHASONE SODIUM PHOSPHATE 10 MG/ML
INJECTION, SOLUTION INTRAMUSCULAR; INTRAVENOUS PRN
Status: DISCONTINUED | OUTPATIENT
Start: 2020-01-31 | End: 2020-01-31 | Stop reason: SDUPTHER

## 2020-01-31 RX ORDER — MEPERIDINE HYDROCHLORIDE 50 MG/ML
12.5 INJECTION INTRAMUSCULAR; INTRAVENOUS; SUBCUTANEOUS EVERY 5 MIN PRN
Status: DISCONTINUED | OUTPATIENT
Start: 2020-01-31 | End: 2020-01-31 | Stop reason: HOSPADM

## 2020-01-31 RX ORDER — MIDAZOLAM HYDROCHLORIDE 1 MG/ML
INJECTION INTRAMUSCULAR; INTRAVENOUS PRN
Status: DISCONTINUED | OUTPATIENT
Start: 2020-01-31 | End: 2020-01-31 | Stop reason: SDUPTHER

## 2020-01-31 RX ORDER — SODIUM CHLORIDE, SODIUM LACTATE, POTASSIUM CHLORIDE, CALCIUM CHLORIDE 600; 310; 30; 20 MG/100ML; MG/100ML; MG/100ML; MG/100ML
INJECTION, SOLUTION INTRAVENOUS CONTINUOUS
Status: DISCONTINUED | OUTPATIENT
Start: 2020-01-31 | End: 2020-01-31 | Stop reason: HOSPADM

## 2020-01-31 RX ORDER — MIDAZOLAM HYDROCHLORIDE 1 MG/ML
2 INJECTION INTRAMUSCULAR; INTRAVENOUS
Status: DISCONTINUED | OUTPATIENT
Start: 2020-01-31 | End: 2020-01-31 | Stop reason: HOSPADM

## 2020-01-31 RX ORDER — FAMOTIDINE 20 MG/1
20 TABLET, FILM COATED ORAL
Status: COMPLETED | OUTPATIENT
Start: 2020-01-31 | End: 2020-01-31

## 2020-01-31 RX ORDER — HYDRALAZINE HYDROCHLORIDE 20 MG/ML
5 INJECTION INTRAMUSCULAR; INTRAVENOUS EVERY 10 MIN PRN
Status: DISCONTINUED | OUTPATIENT
Start: 2020-01-31 | End: 2020-01-31 | Stop reason: HOSPADM

## 2020-01-31 RX ORDER — HYDROCODONE BITARTRATE AND ACETAMINOPHEN 7.5; 325 MG/1; MG/1
TABLET ORAL
Status: COMPLETED
Start: 2020-01-31 | End: 2020-01-31

## 2020-01-31 RX ORDER — MORPHINE SULFATE 4 MG/ML
4 INJECTION, SOLUTION INTRAMUSCULAR; INTRAVENOUS
Status: DISCONTINUED | OUTPATIENT
Start: 2020-01-31 | End: 2020-01-31 | Stop reason: HOSPADM

## 2020-01-31 RX ORDER — PROPOFOL 10 MG/ML
INJECTION, EMULSION INTRAVENOUS PRN
Status: DISCONTINUED | OUTPATIENT
Start: 2020-01-31 | End: 2020-01-31 | Stop reason: SDUPTHER

## 2020-01-31 RX ORDER — LIDOCAINE HYDROCHLORIDE 10 MG/ML
INJECTION, SOLUTION EPIDURAL; INFILTRATION; INTRACAUDAL; PERINEURAL PRN
Status: DISCONTINUED | OUTPATIENT
Start: 2020-01-31 | End: 2020-01-31 | Stop reason: SDUPTHER

## 2020-01-31 RX ORDER — SCOLOPAMINE TRANSDERMAL SYSTEM 1 MG/1
1 PATCH, EXTENDED RELEASE TRANSDERMAL ONCE
Status: DISCONTINUED | OUTPATIENT
Start: 2020-01-31 | End: 2020-01-31 | Stop reason: HOSPADM

## 2020-01-31 RX ORDER — MORPHINE SULFATE 4 MG/ML
4 INJECTION, SOLUTION INTRAMUSCULAR; INTRAVENOUS EVERY 5 MIN PRN
Status: DISCONTINUED | OUTPATIENT
Start: 2020-01-31 | End: 2020-01-31 | Stop reason: HOSPADM

## 2020-01-31 RX ORDER — ROCURONIUM BROMIDE 10 MG/ML
INJECTION, SOLUTION INTRAVENOUS PRN
Status: DISCONTINUED | OUTPATIENT
Start: 2020-01-31 | End: 2020-01-31 | Stop reason: SDUPTHER

## 2020-01-31 RX ORDER — LIDOCAINE HYDROCHLORIDE 10 MG/ML
1 INJECTION, SOLUTION EPIDURAL; INFILTRATION; INTRACAUDAL; PERINEURAL
Status: DISCONTINUED | OUTPATIENT
Start: 2020-01-31 | End: 2020-01-31 | Stop reason: HOSPADM

## 2020-01-31 RX ORDER — LABETALOL 20 MG/4 ML (5 MG/ML) INTRAVENOUS SYRINGE
5 EVERY 10 MIN PRN
Status: DISCONTINUED | OUTPATIENT
Start: 2020-01-31 | End: 2020-01-31 | Stop reason: HOSPADM

## 2020-01-31 RX ORDER — SODIUM CHLORIDE 0.9 % (FLUSH) 0.9 %
10 SYRINGE (ML) INJECTION PRN
Status: DISCONTINUED | OUTPATIENT
Start: 2020-01-31 | End: 2020-01-31 | Stop reason: HOSPADM

## 2020-01-31 RX ORDER — SODIUM CHLORIDE 0.9 % (FLUSH) 0.9 %
10 SYRINGE (ML) INJECTION EVERY 12 HOURS SCHEDULED
Status: DISCONTINUED | OUTPATIENT
Start: 2020-01-31 | End: 2020-01-31 | Stop reason: HOSPADM

## 2020-01-31 RX ORDER — METOCLOPRAMIDE HYDROCHLORIDE 5 MG/ML
10 INJECTION INTRAMUSCULAR; INTRAVENOUS
Status: DISCONTINUED | OUTPATIENT
Start: 2020-01-31 | End: 2020-01-31 | Stop reason: HOSPADM

## 2020-01-31 RX ORDER — METOCLOPRAMIDE 10 MG/1
10 TABLET ORAL ONCE
Status: COMPLETED | OUTPATIENT
Start: 2020-01-31 | End: 2020-01-31

## 2020-01-31 RX ORDER — HYDROCODONE BITARTRATE AND ACETAMINOPHEN 7.5; 325 MG/1; MG/1
1 TABLET ORAL EVERY 6 HOURS PRN
Qty: 20 TABLET | Refills: 0 | Status: SHIPPED | OUTPATIENT
Start: 2020-01-31 | End: 2020-02-05

## 2020-01-31 RX ORDER — DIPHENHYDRAMINE HYDROCHLORIDE 50 MG/ML
12.5 INJECTION INTRAMUSCULAR; INTRAVENOUS
Status: DISCONTINUED | OUTPATIENT
Start: 2020-01-31 | End: 2020-01-31 | Stop reason: HOSPADM

## 2020-01-31 RX ORDER — FENTANYL CITRATE 50 UG/ML
INJECTION, SOLUTION INTRAMUSCULAR; INTRAVENOUS PRN
Status: DISCONTINUED | OUTPATIENT
Start: 2020-01-31 | End: 2020-01-31 | Stop reason: SDUPTHER

## 2020-01-31 RX ORDER — FENTANYL CITRATE 50 UG/ML
50 INJECTION, SOLUTION INTRAMUSCULAR; INTRAVENOUS
Status: DISCONTINUED | OUTPATIENT
Start: 2020-01-31 | End: 2020-01-31 | Stop reason: HOSPADM

## 2020-01-31 RX ORDER — PROMETHAZINE HYDROCHLORIDE 25 MG/ML
6.25 INJECTION, SOLUTION INTRAMUSCULAR; INTRAVENOUS
Status: DISCONTINUED | OUTPATIENT
Start: 2020-01-31 | End: 2020-01-31 | Stop reason: HOSPADM

## 2020-01-31 RX ADMIN — WATER 2 G: 1 INJECTION INTRAMUSCULAR; INTRAVENOUS; SUBCUTANEOUS at 11:33

## 2020-01-31 RX ADMIN — ROCURONIUM BROMIDE 20 MG: 10 SOLUTION INTRAVENOUS at 12:09

## 2020-01-31 RX ADMIN — LIDOCAINE HYDROCHLORIDE 50 MG: 10 INJECTION, SOLUTION EPIDURAL; INFILTRATION; INTRACAUDAL; PERINEURAL at 11:21

## 2020-01-31 RX ADMIN — HYDROCODONE BITARTRATE AND ACETAMINOPHEN 1 TABLET: 7.5; 325 TABLET ORAL at 13:23

## 2020-01-31 RX ADMIN — ROCURONIUM BROMIDE 40 MG: 10 SOLUTION INTRAVENOUS at 11:21

## 2020-01-31 RX ADMIN — ROCURONIUM BROMIDE 10 MG: 10 SOLUTION INTRAVENOUS at 11:52

## 2020-01-31 RX ADMIN — DEXAMETHASONE SODIUM PHOSPHATE 10 MG: 10 INJECTION, SOLUTION INTRAMUSCULAR; INTRAVENOUS at 11:43

## 2020-01-31 RX ADMIN — SODIUM CHLORIDE, SODIUM LACTATE, POTASSIUM CHLORIDE, AND CALCIUM CHLORIDE: 600; 310; 30; 20 INJECTION, SOLUTION INTRAVENOUS at 10:05

## 2020-01-31 RX ADMIN — FAMOTIDINE 20 MG: 20 TABLET, FILM COATED ORAL at 10:05

## 2020-01-31 RX ADMIN — MIDAZOLAM 2 MG: 1 INJECTION INTRAMUSCULAR; INTRAVENOUS at 11:15

## 2020-01-31 RX ADMIN — SUGAMMADEX 276 MG: 100 INJECTION, SOLUTION INTRAVENOUS at 12:29

## 2020-01-31 RX ADMIN — ONDANSETRON HYDROCHLORIDE 4 MG: 2 INJECTION, SOLUTION INTRAMUSCULAR; INTRAVENOUS at 12:29

## 2020-01-31 RX ADMIN — METOCLOPRAMIDE 10 MG: 10 TABLET ORAL at 10:04

## 2020-01-31 RX ADMIN — FENTANYL CITRATE 100 MCG: 50 INJECTION INTRAMUSCULAR; INTRAVENOUS at 11:21

## 2020-01-31 RX ADMIN — PROPOFOL 150 MG: 10 INJECTION, EMULSION INTRAVENOUS at 11:21

## 2020-01-31 RX ADMIN — APREPITANT 40 MG: 40 CAPSULE ORAL at 10:04

## 2020-01-31 RX ADMIN — FENTANYL CITRATE 50 MCG: 50 INJECTION INTRAMUSCULAR; INTRAVENOUS at 11:37

## 2020-01-31 RX ADMIN — SODIUM CHLORIDE, SODIUM LACTATE, POTASSIUM CHLORIDE, AND CALCIUM CHLORIDE: 600; 310; 30; 20 INJECTION, SOLUTION INTRAVENOUS at 11:43

## 2020-01-31 ASSESSMENT — PAIN DESCRIPTION - LOCATION
LOCATION: ABDOMEN
LOCATION: ABDOMEN

## 2020-01-31 ASSESSMENT — PAIN SCALES - GENERAL
PAINLEVEL_OUTOF10: 2
PAINLEVEL_OUTOF10: 8
PAINLEVEL_OUTOF10: 8

## 2020-01-31 ASSESSMENT — PAIN DESCRIPTION - PAIN TYPE: TYPE: SURGICAL PAIN

## 2020-01-31 ASSESSMENT — LIFESTYLE VARIABLES: SMOKING_STATUS: 0

## 2020-01-31 ASSESSMENT — ENCOUNTER SYMPTOMS: SHORTNESS OF BREATH: 0

## 2020-01-31 ASSESSMENT — PAIN - FUNCTIONAL ASSESSMENT: PAIN_FUNCTIONAL_ASSESSMENT: 0-10

## 2020-01-31 NOTE — H&P
disorder      PCOS (polycystic ovarian syndrome)           Past Surgical History         Past Surgical History:   Procedure Laterality Date    CHOLECYSTECTOMY        COLONOSCOPY   11/03/2014     Dr Siri Mckeon,  Small non-bleeding internal hemorrhoids w/skin tags, serrated polyp     COLONOSCOPY N/A 10/21/2019     Dr Jerald Head, 5 yr recall    KNEE SURGERY        POLYPECTOMY        TONSILLECTOMY AND ADENOIDECTOMY        UPPER GASTROINTESTINAL ENDOSCOPY   11/03/2014     Dr Siri Mckeon, Gastritis, h pylori neg    UPPER GASTROINTESTINAL ENDOSCOPY N/A 10/21/2019     Dr Jerald Head         Family History         Family History   Problem Relation Age of Onset    Polycystic Ovary Syndrome Mother      Colon Cancer Paternal Grandmother           Social History           Tobacco Use    Smoking status: Never Smoker    Smokeless tobacco: Never Used   Substance Use Topics    Alcohol use: Yes         Review of Systems   Constitutional: Negative. HENT: Negative. Eyes: Negative. Respiratory: Negative. Cardiovascular: Negative. Gastrointestinal: Negative. Genitourinary: Positive for menstrual problem and pelvic pain. Negative for difficulty urinating, dyspareunia, dysuria, enuresis, frequency, hematuria, urgency and vaginal discharge. Musculoskeletal: Negative. Skin: Negative. Neurological: Negative. Psychiatric/Behavioral: Negative.          Objective:  /82   Pulse 102   Temp 97.1 °F (36.2 °C) (Tympanic)   Resp 16   Ht 5' 8\" (1.727 m)   Wt (!) 304 lb (137.9 kg)   LMP 12/29/2019   SpO2 98%   BMI 46.22 kg/m²      Physical Exam  Vitals signs and nursing note reviewed. Constitutional:       General: She is not in acute distress. Appearance: She is well-developed. She is not diaphoretic. HENT:      Head: Normocephalic and atraumatic.    Eyes:      Conjunctiva/sclera: Conjunctivae normal.      Pupils: Pupils are equal, round, and reactive to light. Neck:      Musculoskeletal: Normal range of motion. Pulmonary:      Effort: Pulmonary effort is normal.   Abdominal:      Tenderness: There is no guarding. Musculoskeletal: Normal range of motion. Comments: Normal ROM in all 4 extremities; normal gait   Skin:     General: Skin is warm and dry. Neurological:      Mental Status: She is alert and oriented to person, place, and time. Motor: No abnormal muscle tone. Coordination: Coordination normal.   Psychiatric:         Behavior: Behavior normal.            Assessment:     Diagnosis Orders   1. Pelvic pain      2. PCOS (polycystic ovarian syndrome)      3. Irregular periods                          Plan:   Dx Lap along with chromotubation   Counseling was offered and accepted by patient. Discussed at length the risks, benefits and alternatives to surgery including medical management and no intervention. Pt is in agreement with surgery. Consents for surgery signed. All questions answered and patient voiced understanding of above.

## 2020-01-31 NOTE — OP NOTE
OPERATIVE NOTE        Date of Service: 01/31/20     Pre-operative Diagnosis: PELVIC PAIN, PCOS, IRREGULAR PERIODS    Post-operative Diagnosis:  Same    Procedure: Procedure(s):  DIAGNOSTIC LAPAROSCOPY AND CHROMOPERTUBATION    Anesthesia: General    Surgeon: Cj Etienne MD    Assistants: Scrub Person First: Bridget Staton Word    Procedure Note:  After informed consents obtained, US findings reviewed with patient, she was taken to the operating room and general anesthesia administered. Patient placed in dorsal lithotomy position and prepped and draped in usual sterile fashion. Car catheter placed aseptically and manipulator placed in the uterus. An incision made above the umbulicus with the knife. Skin elevated up with penetrating towel clip and veress needle placed through the fascia. Placement confirmed with syringe and sterile water. Abdomen insuflated with CO2 and 8mm port placed using optivue obturator and 5mm 0 degree storz scope. Port placed, CO2 insufflated and patient placed in trendelenburg. 5mm ports placed in right lower quadrant. Pelvis surveyed and revealed uterus, tubes and ovaries wnl. Some pelvic congestion right adnexa. Blue dye pushed through manipulator and both tubes drained fast and and patent. Pelvis irrigated. Procedure terminated and all ports removed. Fascial tagged secured in midline. Skin closed with 4-0 vicryl and dermabond.         Estimated Blood Loss: 31VW    Complications: None    Specimens: None    Cj Etienne MD  01/31/20  12:53 PM

## 2020-01-31 NOTE — ADDENDUM NOTE
Addendum  created 01/31/20 1256 by Fam Cadena MD    Attestation recorded in [facility] filed, Intraprocedure Staff edited

## 2020-01-31 NOTE — ANESTHESIA PRE PROCEDURE
Date    Concussion     approximately 10 years ago    Depression     Major depressive disorder     Food allergy     Hypothyroidism     Metabolic disorder     PCOS (polycystic ovarian syndrome)        Past Surgical History:        Procedure Laterality Date    CHOLECYSTECTOMY      COLONOSCOPY  11/03/2014    Dr Paul Nino,  Small non-bleeding internal hemorrhoids w/skin tags, serrated polyp     COLONOSCOPY N/A 10/21/2019    Dr Dang Emery, 5 yr recall    KNEE SURGERY      POLYPECTOMY      TONSILLECTOMY AND ADENOIDECTOMY      UPPER GASTROINTESTINAL ENDOSCOPY  11/03/2014    Dr Paul Nino, Gastritis, h pylori neg    UPPER GASTROINTESTINAL ENDOSCOPY N/A 10/21/2019    Dr Short Saliva History:    Social History     Tobacco Use    Smoking status: Never Smoker    Smokeless tobacco: Never Used   Substance Use Topics    Alcohol use: Yes     Comment: occ                                Counseling given: Not Answered      Vital Signs (Current): There were no vitals filed for this visit.                                            BP Readings from Last 3 Encounters:   01/20/20 (!) 137/90   01/15/20 125/81   12/19/19 137/85       NPO Status:                                                                                 BMI:   Wt Readings from Last 3 Encounters:   01/20/20 (!) 306 lb (138.8 kg)   01/20/20 (!) 306 lb (138.8 kg)   01/15/20 (!) 309 lb (140.2 kg)     There is no height or weight on file to calculate BMI.    CBC:   Lab Results   Component Value Date    WBC 9.8 01/20/2020    RBC 4.42 01/20/2020    HGB 11.9 01/20/2020    HCT 38.4 01/20/2020    MCV 86.9 01/20/2020    RDW 13.3 01/20/2020     01/20/2020       CMP:   Lab Results   Component Value Date     01/20/2020    K 4.0 01/20/2020    K 3.8 03/20/2019     01/20/2020    CO2 21 01/20/2020    BUN 10 01/20/2020    CREATININE 0.7 01/20/2020    LABGLOM >60 01/20/2020    GLUCOSE 112 CRNA.    Attending anesthesiologist reviewed and agrees with Pre Eval content              Rachelle Cooper MD   1/31/2020

## 2020-01-31 NOTE — ANESTHESIA POSTPROCEDURE EVALUATION
Department of Anesthesiology  Postprocedure Note    Patient: Elsie Gomez  MRN: [id number]  [place]: 1995  Date of evaluation: 1/31/2020  Time:  12:51 PM     Procedure Summary     Date:  01/31/20 Room / Location:  [facility] OR [address] [place] [address]    Anesthesia Start:  1115 Anesthesia Stop:  4413    Procedure:  DIAGNOSTIC LAPAROSCOPY AND CHROMOPERTUBATION (N/A ) Diagnosis:  (PELVIC PAIN, PCOS, IRREGULAR PERIODS)    Surgeon:  Kallie Moran MD Responsible Provider:  Ilona Cooks, APRN - CRNA    Anesthesia Type:  general ASA Status:  2          Anesthesia Type: general    Leona Phase I: Leona Score: 10    Leona Phase II:      Last vitals: Reviewed and per EMR flowsheets. Anesthesia Post Evaluation    Patient location during evaluation: bedside  Level of consciousness: awake and alert  Airway patency: patent  Nausea & Vomiting: no nausea and no vomiting  Complications: no  Cardiovascular status: blood pressure returned to baseline  Respiratory status: acceptable and nasal cannula  Hydration status: stable  Comments: Patient transferred to Pacu, spontaneous respirations, patent airway, report given to nurse.

## 2020-02-04 ENCOUNTER — OFFICE VISIT (OUTPATIENT)
Dept: OBGYN | Age: 25
End: 2020-02-04

## 2020-02-04 ENCOUNTER — OFFICE VISIT (OUTPATIENT)
Dept: URGENT CARE | Age: 25
End: 2020-02-04
Payer: COMMERCIAL

## 2020-02-04 VITALS
DIASTOLIC BLOOD PRESSURE: 88 MMHG | SYSTOLIC BLOOD PRESSURE: 138 MMHG | BODY MASS INDEX: 44.41 KG/M2 | HEIGHT: 68 IN | TEMPERATURE: 98.7 F | RESPIRATION RATE: 18 BRPM | HEART RATE: 99 BPM | WEIGHT: 293 LBS | OXYGEN SATURATION: 98 %

## 2020-02-04 VITALS
HEIGHT: 68 IN | SYSTOLIC BLOOD PRESSURE: 134 MMHG | HEART RATE: 84 BPM | BODY MASS INDEX: 44.41 KG/M2 | TEMPERATURE: 96.9 F | WEIGHT: 293 LBS | DIASTOLIC BLOOD PRESSURE: 95 MMHG

## 2020-02-04 LAB
INFLUENZA A ANTIBODY: NEGATIVE
INFLUENZA B ANTIBODY: NEGATIVE
S PYO AG THROAT QL: NORMAL

## 2020-02-04 PROCEDURE — 87804 INFLUENZA ASSAY W/OPTIC: CPT | Performed by: SPECIALIST

## 2020-02-04 PROCEDURE — 99213 OFFICE O/P EST LOW 20 MIN: CPT | Performed by: SPECIALIST

## 2020-02-04 PROCEDURE — 87880 STREP A ASSAY W/OPTIC: CPT | Performed by: SPECIALIST

## 2020-02-04 PROCEDURE — 99024 POSTOP FOLLOW-UP VISIT: CPT | Performed by: NURSE PRACTITIONER

## 2020-02-04 RX ORDER — ONDANSETRON 4 MG/1
4 TABLET, ORALLY DISINTEGRATING ORAL EVERY 8 HOURS PRN
Qty: 30 TABLET | Refills: 0 | Status: SHIPPED | OUTPATIENT
Start: 2020-02-04 | End: 2020-09-30 | Stop reason: SDUPTHER

## 2020-02-04 RX ORDER — CEPHALEXIN 250 MG/1
250 CAPSULE ORAL 4 TIMES DAILY
Qty: 28 CAPSULE | Refills: 0 | Status: SHIPPED | OUTPATIENT
Start: 2020-02-04 | End: 2020-03-20

## 2020-02-04 ASSESSMENT — ENCOUNTER SYMPTOMS
EYES NEGATIVE: 1
NAUSEA: 1
COUGH: 1
RESPIRATORY NEGATIVE: 1
ABDOMINAL PAIN: 1

## 2020-02-04 NOTE — PROGRESS NOTES
[address] [facility]  [address]  [address] [place] [id number]  Dept: [phone]  Loc: [phone]    Quincy Osullivan is a 25 y.o. female who presents today for her medical conditions/complaintsas noted below. Quincy Osullivan is c/o of Head Congestion; Chest Congestion; Cough; and Fever        HPI:     Cough   This is a new problem. The current episode started in the past 7 days. The problem has been unchanged. The cough is non-productive. Associated symptoms include a fever and nasal congestion. Nothing aggravates the symptoms. She has tried nothing for the symptoms. Fever    This is a new problem. The current episode started yesterday. The problem has been waxing and waning. Maximum temperature: Low grade. Associated symptoms include coughing. She has tried acetaminophen for the symptoms. The treatment provided moderate relief. Risk factors comment:  Patient 5 days s/p lap. She has drainage noted from umbilicus.  She has not contacted Surgeon      Past Medical History:   Diagnosis Date    Concussion     approximately 10 years ago    Depression     Major depressive disorder     Food allergy     Hypothyroidism     Metabolic disorder     PCOS (polycystic ovarian syndrome)      Past Surgical History:   Procedure Laterality Date    CHOLECYSTECTOMY      COLONOSCOPY  11/03/2014    Dr Remedios Smart,  Small non-bleeding internal hemorrhoids w/skin tags, serrated polyp     COLONOSCOPY N/A 10/21/2019    Dr Zahra Tracey, 5 yr recall    KNEE SURGERY      LAPAROSCOPY N/A 1/31/2020    DIAGNOSTIC LAPAROSCOPY AND CHROMOPERTUBATION performed by Elizabeth Rachel MD at [address] [place] [address] ENDOSCOPY  11/03/2014    Dr Remedios Smart, Gastritis, h pylori neg    UPPER GASTROINTESTINAL ENDOSCOPY N/A 10/21/2019    Dr Zahra Tracey       Family History   Problem Relation Age of Onset    Polycystic Ovary Syndrome Mother     Colon Cancer Paternal Grandmother        Social History     Tobacco Use    Smoking status: Never Smoker    Smokeless tobacco: Never Used   Substance Use Topics    Alcohol use: Yes     Comment: occ      Current Outpatient Medications   Medication Sig Dispense Refill    HYDROcodone-acetaminophen (NORCO) 7.5-325 MG per tablet Take 1 tablet by mouth every 6 hours as needed for Pain for up to 5 days. Intended supply: 5 days. Take lowest dose possible to manage pain 20 tablet 0    Cholecalciferol (VITAMIN D3) 1.25 MG (58045 UT) CAPS Take 1 capsule by mouth once a week      ibuprofen (IBU) 800 MG tablet Take 1 tablet by mouth every 8 hours as needed for Pain 60 tablet 1    FLUoxetine (PROZAC) 20 MG capsule Take 1 capsule by mouth daily 30 capsule 0    pantoprazole (PROTONIX) 40 MG tablet Take 1 tablet by mouth daily Take daily first thing in the morning on an empty stomach. 90 tablet 3    metFORMIN (GLUCOPHAGE) 1000 MG tablet Take 1 tablet by mouth 2 times daily (with meals) 30 tablet 3    desogestrel-ethinyl estradiol (APRI) 0.15-30 MG-MCG per tablet Take 1 tablet by mouth daily 1 packet 11    fluticasone (FLONASE) 50 MCG/ACT nasal spray 2 sprays by Nasal route daily 1 Bottle 0     No current facility-administered medications for this visit. Allergies   Allergen Reactions    Corn-Containing Products     Sertraline      Pt. Developed seratonin syndrome and had to go impatient.      Shrimp (Diagnostic)     Soybean-Containing Drug Products     Wheat Extract        Health Maintenance   Topic Date Due    Varicella Vaccine (1 of 2 - 2-dose childhood series) 11/14/1996    HPV vaccine (1 - Female 2-dose series) 11/14/2006    DTaP/Tdap/Td vaccine (1 - Tdap) 11/14/2006    HIV screen  11/14/2010    Chlamydia screen  11/14/2011    Flu vaccine (1) 10/18/2020 (Originally 9/1/2019)    A1C test (Diabetic or Prediabetic)  04/12/2020    Cervical cancer screen  06/27/2022    Colon cancer screen colonoscopy  10/21/2024    Pneumococcal 0-64 years Vaccine  Aged Out       Subjective:     Review of Systems   Constitutional: Positive for fever. Respiratory: Positive for cough. OBJECTIVE:     Physical Exam  Vitals signs and nursing note reviewed. Constitutional:       Appearance: Normal appearance. She is well-developed and well-groomed. HENT:      Head: Normocephalic and atraumatic. Right Ear: Hearing, tympanic membrane, ear canal and external ear normal.      Left Ear: External ear normal. A middle ear effusion is present. Mouth/Throat:      Mouth: Mucous membranes are moist.      Pharynx: Oropharynx is clear. Uvula midline. Eyes:      Conjunctiva/sclera: Conjunctivae normal.   Neck:      Musculoskeletal: Normal range of motion. Cardiovascular:      Rate and Rhythm: Normal rate and regular rhythm. Heart sounds: Normal heart sounds, S1 normal and S2 normal.   Pulmonary:      Effort: Pulmonary effort is normal.      Breath sounds: Normal breath sounds and air entry. Lymphadenopathy:      Cervical: Cervical adenopathy present. Skin:     General: Skin is warm and dry. Neurological:      Mental Status: She is alert and oriented to person, place, and time. Psychiatric:         Attention and Perception: Attention and perception normal.         Mood and Affect: Mood and affect normal.         Speech: Speech normal.         Behavior: Behavior normal. Behavior is cooperative. /88   Pulse 99   Temp 98.7 °F (37.1 °C) (Oral)   Resp 18   Ht 5' 8\" (1.727 m)   Wt (!) 303 lb 12.8 oz (137.8 kg)   LMP 01/28/2020   SpO2 98%   BMI 46.19 kg/m²     ASSESSMENT:       Diagnosis Orders   1. Acute ALLISON (middle ear effusion), left     2.  Cough  POCT rapid strep A    POCT Influenza A/B     Results for orders placed or performed in visit on 02/04/20   POCT rapid strep A   Result Value Ref Range    Strep A Ag None Detected None Detected POCT Influenza A/B   Result Value Ref Range    Influenza A Ab negative     Influenza B Ab negative        PLAN:      Kellen Payne LPN contacted [phone] for me. They are able to see patient at 10:15 today for her drainage from her umbilicus and low grade fever. Patient voices understanding. Orders Placed This Encounter   Procedures    POCT rapid strep A    POCT Influenza A/B       Return if symptoms worsen or fail to improve. Orders Placed This Encounter   Procedures    POCT rapid strep A    POCT Influenza A/B     No orders of the defined types were placed in this encounter. Patient given educationalmaterials - see patient instructions. Discussed use, benefit, and side effects of prescribed medications. All patient questions answered. Pt voiced understanding. Patient agreed with treatment plan. Follow up as directed. Patient Instructions       Patient Education        Cough: Care Instructions  Your Care Instructions    A cough is your body's response to something that bothers your throat or airways. Many things can cause a cough. You might cough because of a cold or the flu, bronchitis, or asthma. Smoking, postnasal drip, allergies, and stomach acid that backs up into your throat also can cause coughs. A cough is a symptom, not a disease. Most coughs stop when the cause, such as a cold, goes away. You can take a few steps at home to cough less and feel better. Follow-up care is a key part of your treatment and safety. Be sure to make and go to all appointments, and call your doctor if you are having problems. It's also a good idea to know your test results and keep a list of the medicines you take. How can you care for yourself at home? · Drink lots of water and other fluids. This helps thin the mucus and soothes a dry or sore throat. Honey or lemon juice in hot water or tea may ease a dry cough. · Take cough medicine as directed by your doctor.   · Prop up your head on

## 2020-02-04 NOTE — PROGRESS NOTES
Kierra Stein is a 25 y.o. female who presents today for her medical conditions/ complaints as noted below. Kierra Stein is c/o of Post-Op Check        HPI  Patient presents today with complaints of fever and drainage from incision after diagnostic laparoscopy and chromopertubation done on 2020  She states temp has gotten up to 99.5 but her normal body temp is 97.9   Had bm yesterday and pain little better. Her SO cleaned umbilical incision yesterday and had noted yellowish drainage. Patient's last menstrual period was 2020.       Past Medical History:   Diagnosis Date    Concussion     approximately 10 years ago    Depression     Major depressive disorder     Food allergy     Hypothyroidism     Metabolic disorder     PCOS (polycystic ovarian syndrome)      Past Surgical History:   Procedure Laterality Date    CHOLECYSTECTOMY      COLONOSCOPY  2014    Dr Nikole Jansen,  Small non-bleeding internal hemorrhoids w/skin tags, serrated polyp     COLONOSCOPY N/A 10/21/2019    Dr Austin Villafuerte, 5 yr recall    KNEE SURGERY      LAPAROSCOPY N/A 2020    DIAGNOSTIC LAPAROSCOPY AND CHROMOPERTUBATION performed by Debbi Payne MD at [address] [place] [address] AND ADENOIDECTOMY      UPPER GASTROINTESTINAL ENDOSCOPY  2014    Dr Nikole Jansen, Gastritis, h pylori neg    UPPER GASTROINTESTINAL ENDOSCOPY N/A 10/21/2019    Dr Austin Villafuerte     Family History   Problem Relation Age of Onset    Polycystic Ovary Syndrome Mother     Colon Cancer Paternal Grandmother      Social History     Tobacco Use    Smoking status: Never Smoker    Smokeless tobacco: Never Used   Substance Use Topics    Alcohol use: Yes     Comment: occ       Current Outpatient Medications   Medication Sig Dispense Refill    cephALEXin (KEFLEX) 250 MG capsule Take 1 capsule by mouth 4 times daily 28 capsule 0    ondansetron (ZOFRAN ODT) 4 MG something for nausea. Discussed signs of abscess. Keep follow up 2/18. Patient Instructions   We are committed to providing you with the best care possible. In order to help us achieve these goals please remember to bring all medications, herbal products, and over the counter supplements with you to each visit. If your provider has ordered testing for you, please be sure to follow up with our office if you have not received results within 7 days after testing took place. *If you receive a survey after visiting one of our offices, please take the time to share your experience concerning your physician office visit. These surveys are confidential and no health information about you is shared. We are eager to improve for you and we are counting on your feedback to help make that happen.

## 2020-02-12 ENCOUNTER — OFFICE VISIT (OUTPATIENT)
Dept: OBGYN | Age: 25
End: 2020-02-12
Payer: COMMERCIAL

## 2020-02-12 ENCOUNTER — TELEPHONE (OUTPATIENT)
Dept: OBGYN | Age: 25
End: 2020-02-12

## 2020-02-12 VITALS
SYSTOLIC BLOOD PRESSURE: 145 MMHG | BODY MASS INDEX: 44.41 KG/M2 | DIASTOLIC BLOOD PRESSURE: 94 MMHG | HEIGHT: 68 IN | TEMPERATURE: 97.7 F | HEART RATE: 92 BPM | WEIGHT: 293 LBS

## 2020-02-12 PROCEDURE — 99214 OFFICE O/P EST MOD 30 MIN: CPT | Performed by: NURSE PRACTITIONER

## 2020-02-12 ASSESSMENT — ENCOUNTER SYMPTOMS
RESPIRATORY NEGATIVE: 1
EYES NEGATIVE: 1
ABDOMINAL PAIN: 1

## 2020-02-12 NOTE — TELEPHONE ENCOUNTER
Pt has abdominal pain, her abdomen is hard, and having fever off and on. Pt states bleeding for 2 months and since procedure the pain is worse. The pain feels deep. Woke up this morning sweating with fever. She has finished antibiotics. Bowel movements are either constipation or diarrhea.

## 2020-02-12 NOTE — PROGRESS NOTES
Jimbo Larios is a 25 y.o. female who presents today for her medical conditions/ complaints as noted below. Jimbo Larios is c/o of Postpartum Care; Vaginal Bleeding; and Pelvic Pain        HPI  Patient presents today with complaints of firm abdomen and pain with palpation,  Fever, heavy bleeding and vaginal pain since diagnostic laparoscopy and chromopertubation on 2020  Afebrile in office today. Is on ocp still and has been having bleeding for about last 2 months. Normal bm's now. Patient's last menstrual period was 2020 (exact date).       Past Medical History:   Diagnosis Date    Concussion     approximately 10 years ago    Depression     Major depressive disorder     Food allergy     Hypothyroidism     Metabolic disorder     PCOS (polycystic ovarian syndrome)      Past Surgical History:   Procedure Laterality Date    CHOLECYSTECTOMY      COLONOSCOPY  2014    Dr Tiny Lamb,  Small non-bleeding internal hemorrhoids w/skin tags, serrated polyp     COLONOSCOPY N/A 10/21/2019    Dr Alejandra Padilla, 5 yr recall    KNEE SURGERY      LAPAROSCOPY N/A 2020    DIAGNOSTIC LAPAROSCOPY AND CHROMOPERTUBATION performed by Lissette Dover MD at [address] [place] [address] AND ADENOIDECTOMY      UPPER GASTROINTESTINAL ENDOSCOPY  2014    Dr Tiny Lamb, Gastritis, h pylori neg    UPPER GASTROINTESTINAL ENDOSCOPY N/A 10/21/2019    Dr Alejandra Padilla     Family History   Problem Relation Age of Onset    Polycystic Ovary Syndrome Mother     Colon Cancer Paternal Grandmother      Social History     Tobacco Use    Smoking status: Never Smoker    Smokeless tobacco: Never Used   Substance Use Topics    Alcohol use: Yes     Comment: occ       Current Outpatient Medications   Medication Sig Dispense Refill    progesterone (PROMETRIUM) 100 MG capsule Take 1 capsule by mouth nightly Cycle day 16-25 30 capsule 3    FLUoxetine (PROZAC) 20 MG capsule Take 1 capsule by mouth daily 30 capsule 0    cephALEXin (KEFLEX) 250 MG capsule Take 1 capsule by mouth 4 times daily 28 capsule 0    ondansetron (ZOFRAN ODT) 4 MG disintegrating tablet Take 1 tablet by mouth every 8 hours as needed for Nausea or Vomiting 30 tablet 0    Cholecalciferol (VITAMIN D3) 1.25 MG (26610 UT) CAPS Take 1 capsule by mouth once a week      ibuprofen (IBU) 800 MG tablet Take 1 tablet by mouth every 8 hours as needed for Pain 60 tablet 1    pantoprazole (PROTONIX) 40 MG tablet Take 1 tablet by mouth daily Take daily first thing in the morning on an empty stomach. 90 tablet 3    metFORMIN (GLUCOPHAGE) 1000 MG tablet Take 1 tablet by mouth 2 times daily (with meals) 30 tablet 3    fluticasone (FLONASE) 50 MCG/ACT nasal spray 2 sprays by Nasal route daily 1 Bottle 0     No current facility-administered medications for this visit. Allergies   Allergen Reactions    Corn-Containing Products     Sertraline      Pt. Developed seratonin syndrome and had to go impatient.  Shrimp (Diagnostic)     Soybean-Containing Drug Products     Wheat Extract      Vitals:    02/12/20 1412   BP: (!) 145/94   Pulse: 92   Temp: 97.7 °F (36.5 °C)     Body mass index is 46.38 kg/m². Review of Systems   Constitutional: Positive for fever. HENT: Negative. Eyes: Negative. Respiratory: Negative. Cardiovascular: Negative. Gastrointestinal: Positive for abdominal pain. Genitourinary: Positive for menstrual problem and vaginal bleeding. Negative for difficulty urinating, dyspareunia, dysuria, enuresis, frequency, hematuria, pelvic pain, urgency and vaginal discharge. Musculoskeletal: Negative. Skin: Negative. Neurological: Negative. Psychiatric/Behavioral: Negative. Physical Exam  Vitals signs and nursing note reviewed. Constitutional:       General: She is not in acute distress. Appearance: She is well-developed. She is not diaphoretic. HENT:      Head: Normocephalic and atraumatic. Eyes:      Conjunctiva/sclera: Conjunctivae normal.      Pupils: Pupils are equal, round, and reactive to light. Neck:      Musculoskeletal: Normal range of motion. Pulmonary:      Effort: Pulmonary effort is normal.   Abdominal:      Tenderness: There is no guarding. Comments: Abdominal incisions healed, no firmness to abdomen or signs of infection   Genitourinary:     Comments: Speculum exam normal with blood tinged mucus discharge  Musculoskeletal: Normal range of motion. Comments: Normal ROM in all 4 extremities; normal gait   Skin:     General: Skin is warm and dry. Neurological:      Mental Status: She is alert and oriented to person, place, and time. Motor: No abnormal muscle tone. Coordination: Coordination normal.   Psychiatric:         Behavior: Behavior normal.          Diagnosis Orders   1. Irregular bleeding     2. Incisional pain     3. Postop check         MEDICATIONS:  Orders Placed This Encounter   Medications    progesterone (PROMETRIUM) 100 MG capsule     Sig: Take 1 capsule by mouth nightly Cycle day 16-25     Dispense:  30 capsule     Refill:  3       ORDERS:  No orders of the defined types were placed in this encounter. PLAN:  Reassurance given exam is normal. After this pack ocp, will start cyclic progesterone. To be arranging sperm donor asap. Patient Instructions   We are committed to providing you with the best care possible. In order to help us achieve these goals please remember to bring all medications, herbal products, and over the counter supplements with you to each visit. If your provider has ordered testing for you, please be sure to follow up with our office if you have not received results within 7 days after testing took place. *If you receive a survey after visiting one of our offices, please take the time to share your experience concerning your physician office visit.  These surveys are confidential and no health information about you is shared. We are eager to improve for you and we are counting on your feedback to help make that happen.

## 2020-02-20 ENCOUNTER — TELEPHONE (OUTPATIENT)
Dept: PRIMARY CARE CLINIC | Age: 25
End: 2020-02-20

## 2020-02-20 ENCOUNTER — OFFICE VISIT (OUTPATIENT)
Dept: PRIMARY CARE CLINIC | Age: 25
End: 2020-02-20
Payer: COMMERCIAL

## 2020-02-20 VITALS
HEART RATE: 110 BPM | HEIGHT: 68 IN | DIASTOLIC BLOOD PRESSURE: 86 MMHG | WEIGHT: 293 LBS | BODY MASS INDEX: 44.41 KG/M2 | TEMPERATURE: 98 F | SYSTOLIC BLOOD PRESSURE: 138 MMHG | RESPIRATION RATE: 18 BRPM | OXYGEN SATURATION: 97 %

## 2020-02-20 DIAGNOSIS — E28.2 PCOS (POLYCYSTIC OVARIAN SYNDROME): ICD-10-CM

## 2020-02-20 DIAGNOSIS — F41.9 ANXIETY AND DEPRESSION: ICD-10-CM

## 2020-02-20 DIAGNOSIS — D64.9 ANEMIA, UNSPECIFIED TYPE: ICD-10-CM

## 2020-02-20 DIAGNOSIS — F32.A ANXIETY AND DEPRESSION: ICD-10-CM

## 2020-02-20 LAB
ALBUMIN SERPL-MCNC: 4.4 G/DL (ref 3.5–5.2)
ALP BLD-CCNC: 49 U/L (ref 35–104)
ALT SERPL-CCNC: 11 U/L (ref 5–33)
ANION GAP SERPL CALCULATED.3IONS-SCNC: 15 MMOL/L (ref 7–19)
APPEARANCE FLUID: ABNORMAL
AST SERPL-CCNC: 14 U/L (ref 5–32)
BASOPHILS ABSOLUTE: 0 K/UL (ref 0–0.2)
BASOPHILS RELATIVE PERCENT: 0.4 % (ref 0–1)
BILIRUB SERPL-MCNC: <0.2 MG/DL (ref 0.2–1.2)
BILIRUBIN, POC: ABNORMAL
BLOOD URINE, POC: ABNORMAL
BUN BLDV-MCNC: 14 MG/DL (ref 6–20)
CALCIUM SERPL-MCNC: 9.8 MG/DL (ref 8.6–10)
CHLORIDE BLD-SCNC: 103 MMOL/L (ref 98–111)
CLARITY, POC: ABNORMAL
CO2: 25 MMOL/L (ref 22–29)
COLOR, POC: ABNORMAL
CREAT SERPL-MCNC: 0.7 MG/DL (ref 0.5–0.9)
EOSINOPHILS ABSOLUTE: 0.5 K/UL (ref 0–0.6)
EOSINOPHILS RELATIVE PERCENT: 4.6 % (ref 0–5)
GFR NON-AFRICAN AMERICAN: >60
GLUCOSE BLD-MCNC: 85 MG/DL (ref 74–109)
GLUCOSE URINE, POC: ABNORMAL
HCT VFR BLD CALC: 39.5 % (ref 37–47)
HEMOGLOBIN: 12.2 G/DL (ref 12–16)
IMMATURE GRANULOCYTES #: 0 K/UL
IRON SATURATION: 9 % (ref 14–50)
IRON: 37 UG/DL (ref 37–145)
KETONES, POC: 15
LEUKOCYTE EST, POC: ABNORMAL
LYMPHOCYTES ABSOLUTE: 3.7 K/UL (ref 1.1–4.5)
LYMPHOCYTES RELATIVE PERCENT: 37.9 % (ref 20–40)
MCH RBC QN AUTO: 26.6 PG (ref 27–31)
MCHC RBC AUTO-ENTMCNC: 30.9 G/DL (ref 33–37)
MCV RBC AUTO: 86.2 FL (ref 81–99)
MONOCYTES ABSOLUTE: 0.8 K/UL (ref 0–0.9)
MONOCYTES RELATIVE PERCENT: 7.9 % (ref 0–10)
NEUTROPHILS ABSOLUTE: 4.8 K/UL (ref 1.5–7.5)
NEUTROPHILS RELATIVE PERCENT: 48.9 % (ref 50–65)
NITRITE, POC: ABNORMAL
PDW BLD-RTO: 13.2 % (ref 11.5–14.5)
PH, POC: 6.5
PLATELET # BLD: 355 K/UL (ref 130–400)
PMV BLD AUTO: 10.8 FL (ref 9.4–12.3)
POTASSIUM SERPL-SCNC: 4.3 MMOL/L (ref 3.5–5)
PROTEIN, POC: 30
RBC # BLD: 4.58 M/UL (ref 4.2–5.4)
SODIUM BLD-SCNC: 143 MMOL/L (ref 136–145)
SPECIFIC GRAVITY, POC: 1.03
TOTAL IRON BINDING CAPACITY: 412 UG/DL (ref 250–400)
TOTAL PROTEIN: 7.6 G/DL (ref 6.6–8.7)
TSH REFLEX FT4: 1.35 UIU/ML (ref 0.35–5.5)
UROBILINOGEN, POC: 0.2
WBC # BLD: 9.9 K/UL (ref 4.8–10.8)

## 2020-02-20 PROCEDURE — 99214 OFFICE O/P EST MOD 30 MIN: CPT | Performed by: NURSE PRACTITIONER

## 2020-02-20 PROCEDURE — 81002 URINALYSIS NONAUTO W/O SCOPE: CPT | Performed by: NURSE PRACTITIONER

## 2020-02-20 RX ORDER — PROPRANOLOL HYDROCHLORIDE 20 MG/1
20 TABLET ORAL 2 TIMES DAILY
Qty: 60 TABLET | Refills: 1 | Status: SHIPPED | OUTPATIENT
Start: 2020-02-20 | End: 2020-04-21 | Stop reason: SDUPTHER

## 2020-02-20 RX ORDER — FLUOXETINE 10 MG/1
10 CAPSULE ORAL DAILY
Qty: 15 CAPSULE | Refills: 0 | Status: SHIPPED | OUTPATIENT
Start: 2020-02-20 | End: 2020-03-20

## 2020-02-20 ASSESSMENT — PATIENT HEALTH QUESTIONNAIRE - PHQ9
2. FEELING DOWN, DEPRESSED OR HOPELESS: 0
SUM OF ALL RESPONSES TO PHQ QUESTIONS 1-9: 0
SUM OF ALL RESPONSES TO PHQ9 QUESTIONS 1 & 2: 0
1. LITTLE INTEREST OR PLEASURE IN DOING THINGS: 0
SUM OF ALL RESPONSES TO PHQ QUESTIONS 1-9: 0

## 2020-02-20 ASSESSMENT — ENCOUNTER SYMPTOMS
EYES NEGATIVE: 1
GASTROINTESTINAL NEGATIVE: 1
RESPIRATORY NEGATIVE: 1

## 2020-02-20 NOTE — TELEPHONE ENCOUNTER
Pt states she never been seen by [facility]. We recevd form in nov. From [facility] asking for insurance card we sent that and got a confirmation so called their office and left vm telling them this and let them know the pt states she has never been called or seen if they could just give us a call and let us know what is going on . Jodie Rodriguez

## 2020-02-20 NOTE — PROGRESS NOTES
[facility]  [address] [id number] [address] [address]  Dept: [phone]  Dept Fax: [phone]  Loc: [phone]    Camelia Lebron is a 25 y.o. female who presents today for her medical conditions/complaints as noted below. Camelia Lebron is c/o of Anxiety (pt is here today to discuss weaning off prozac - wants to get her health in order so that she can get pregnant and have a baby. ); Depression; Fever (Pt states she has been running fever highest 99 but states she runs lower, overall not feeling well, states she feels like this is OBGYN issue but they just overlooked her concerns. She has had a constant period for 3 months states CRP has been high for the last year. She would like to have some lab work. She has also been clotting quite a bit. Using pads and changing them couple of hours. ); and Hypertension (BP has been elevated 150's/ low 100's states it has been that way off and on since 9/22/2019 Elevated in office today. Denies HA or chest pains when BP is elevated. )      Chief Complaint   Patient presents with    Anxiety     pt is here today to discuss weaning off prozac - wants to get her health in order so that she can get pregnant and have a baby.  Depression    Fever     Pt states she has been running fever highest 99 but states she runs lower, overall not feeling well, states she feels like this is OBGYN issue but they just overlooked her concerns. She has had a constant period for 3 months states CRP has been high for the last year. She would like to have some lab work. She has also been clotting quite a bit. Using pads and changing them couple of hours.  Hypertension     BP has been elevated 150's/ low 100's states it has been that way off and on since 9/22/2019 Elevated in office today. Denies HA or chest pains when BP is elevated. HPI:     HPI   Patient here for follow up on anxiety and depression.   She would like to stop cephALEXin (KEFLEX) 250 MG capsule Take 1 capsule by mouth 4 times daily 28 capsule 0    ondansetron (ZOFRAN ODT) 4 MG disintegrating tablet Take 1 tablet by mouth every 8 hours as needed for Nausea or Vomiting 30 tablet 0    Cholecalciferol (VITAMIN D3) 1.25 MG (12327 UT) CAPS Take 1 capsule by mouth once a week      ibuprofen (IBU) 800 MG tablet Take 1 tablet by mouth every 8 hours as needed for Pain 60 tablet 1    pantoprazole (PROTONIX) 40 MG tablet Take 1 tablet by mouth daily Take daily first thing in the morning on an empty stomach. 90 tablet 3    metFORMIN (GLUCOPHAGE) 1000 MG tablet Take 1 tablet by mouth 2 times daily (with meals) 30 tablet 3    fluticasone (FLONASE) 50 MCG/ACT nasal spray 2 sprays by Nasal route daily 1 Bottle 0     No current facility-administered medications for this visit. Allergies   Allergen Reactions    Corn-Containing Products     Sertraline      Pt. Developed seratonin syndrome and had to go impatient.  Shrimp (Diagnostic)     Soybean-Containing Drug Products     Wheat Extract        Family History   Problem Relation Age of Onset    Polycystic Ovary Syndrome Mother     Colon Cancer Paternal Grandmother          Subjective:      Review of Systems   Constitutional: Positive for fatigue and fever. HENT: Negative. Eyes: Negative. Respiratory: Negative. Cardiovascular: Negative. Gastrointestinal: Negative. Endocrine: Negative. Genitourinary: Positive for dysuria, pelvic pain, vaginal bleeding and vaginal pain. Musculoskeletal: Negative. Skin: Negative. Neurological: Positive for headaches. Hematological: Negative. Psychiatric/Behavioral: Negative. Objective:     Physical Exam  Vitals signs and nursing note reviewed. Constitutional:       Appearance: Normal appearance. She is well-developed. Comments: obese   HENT:      Head: Normocephalic and atraumatic.       Right Ear: Hearing, tympanic membrane, ear canal and external ear normal.      Left Ear: Hearing, tympanic membrane, ear canal and external ear normal.      Nose: Nose normal.      Mouth/Throat:      Pharynx: Uvula midline. Eyes:      General: Lids are normal.      Conjunctiva/sclera: Conjunctivae normal.      Pupils: Pupils are equal, round, and reactive to light. Neck:      Musculoskeletal: Normal range of motion and neck supple. Thyroid: No thyroid mass or thyromegaly. Trachea: Trachea normal.   Cardiovascular:      Rate and Rhythm: Normal rate and regular rhythm. Heart sounds: Normal heart sounds. Pulmonary:      Effort: Pulmonary effort is normal.      Breath sounds: Normal breath sounds. Abdominal:      General: Bowel sounds are normal.      Palpations: Abdomen is soft. Tenderness: There is generalized abdominal tenderness. Musculoskeletal: Normal range of motion. Cervical back: Normal. She exhibits normal range of motion and no tenderness. Thoracic back: Normal. She exhibits normal range of motion and no tenderness. Lumbar back: Normal. She exhibits normal range of motion and no tenderness. Skin:     General: Skin is warm and dry. Neurological:      Mental Status: She is alert and oriented to person, place, and time. Psychiatric:         Speech: Speech normal.         Behavior: Behavior normal.         Thought Content: Thought content normal.         Judgment: Judgment normal.         /86   Pulse 110   Temp 98 °F (36.7 °C) (Temporal)   Resp 18   Ht 5' 8\" (1.727 m)   Wt (!) 304 lb (137.9 kg)   LMP 02/03/2020 (Exact Date)   SpO2 97%   BMI 46.22 kg/m²     Assessment:      Diagnosis Orders   1. Anxiety and depression  TSH WITH REFLEX TO FT4   2. Dysuria  POCT Urinalysis no Micro   3. Elevated antinuclear antibody (SHELLEY) level     4. PCOS (polycystic ovarian syndrome)  CBC Auto Differential    Comprehensive Metabolic Panel    TSH WITH REFLEX TO FT4   5.  Anemia, unspecified type  CBC Auto Differential and has had all questions answered. Patient voices understanding and agrees to plans along with risks and benefits of plan. Patient is instructed to continue prior meds, diet, and exercise plans as instructed. Patient agrees to follow up as instructed and sooner if needed. Patient agrees to go to ER if condition becomes emergent. EMR Dragon/transcription disclaimer: Some of this encounter note is an electronic transcription/translation of spoken language to printed text. The electronic translation of spoken language may permit erroneous, or at times, nonsensical words or phrases to be inadvertently transcribed.  Although I have reviewed the note for such errors, some may still exist.    Electronically signed by SARAY Hensley on 2/20/2020 at 4:44 PM

## 2020-02-28 RX ORDER — PANTOPRAZOLE SODIUM 40 MG/1
40 TABLET, DELAYED RELEASE ORAL DAILY
Qty: 90 TABLET | Refills: 3 | Status: SHIPPED | OUTPATIENT
Start: 2020-02-28 | End: 2020-04-21 | Stop reason: SDUPTHER

## 2020-03-02 ENCOUNTER — HOSPITAL ENCOUNTER (OUTPATIENT)
Dept: ULTRASOUND IMAGING | Age: 25
Discharge: HOME | End: 2020-03-02
Payer: COMMERCIAL

## 2020-03-02 PROCEDURE — 76536 US EXAM OF HEAD AND NECK: CPT

## 2020-03-20 ENCOUNTER — OFFICE VISIT (OUTPATIENT)
Dept: PRIMARY CARE CLINIC | Age: 25
End: 2020-03-20
Payer: COMMERCIAL

## 2020-03-20 VITALS
TEMPERATURE: 97.4 F | OXYGEN SATURATION: 98 % | SYSTOLIC BLOOD PRESSURE: 134 MMHG | BODY MASS INDEX: 44.41 KG/M2 | HEART RATE: 90 BPM | HEIGHT: 68 IN | WEIGHT: 293 LBS | RESPIRATION RATE: 18 BRPM | DIASTOLIC BLOOD PRESSURE: 86 MMHG

## 2020-03-20 PROCEDURE — 99214 OFFICE O/P EST MOD 30 MIN: CPT | Performed by: NURSE PRACTITIONER

## 2020-03-20 RX ORDER — AZITHROMYCIN 250 MG/1
250 TABLET, FILM COATED ORAL SEE ADMIN INSTRUCTIONS
Qty: 6 TABLET | Refills: 0 | Status: SHIPPED | OUTPATIENT
Start: 2020-03-20 | End: 2020-03-25

## 2020-03-20 RX ORDER — CHOLECALCIFEROL (VITAMIN D3) 1250 MCG
1 CAPSULE ORAL WEEKLY
Qty: 4 CAPSULE | Refills: 2 | Status: SHIPPED | OUTPATIENT
Start: 2020-03-20 | End: 2020-04-21 | Stop reason: SDUPTHER

## 2020-03-20 RX ORDER — PREDNISONE 10 MG/1
10 TABLET ORAL DAILY
Qty: 7 TABLET | Refills: 0 | Status: SHIPPED | OUTPATIENT
Start: 2020-03-20 | End: 2020-03-27

## 2020-03-20 RX ORDER — FLUTICASONE PROPIONATE 50 MCG
2 SPRAY, SUSPENSION (ML) NASAL DAILY
Qty: 1 BOTTLE | Refills: 0 | Status: SHIPPED | OUTPATIENT
Start: 2020-03-20

## 2020-03-20 ASSESSMENT — ENCOUNTER SYMPTOMS
SINUS PRESSURE: 1
EYES NEGATIVE: 1
GASTROINTESTINAL NEGATIVE: 1
WHEEZING: 1

## 2020-03-20 NOTE — PROGRESS NOTES
this medication. Patient just started Progesterone as well. She is suppose to only be taking this during ovulation. Past Medical History:   Diagnosis Date    Concussion     approximately 10 years ago    Depression     Major depressive disorder     Food allergy     Hypothyroidism     Metabolic disorder     PCOS (polycystic ovarian syndrome)         Past Surgical History:   Procedure Laterality Date    CHOLECYSTECTOMY      COLONOSCOPY  11/03/2014    Dr Paul Nino,  Small non-bleeding internal hemorrhoids w/skin tags, serrated polyp     COLONOSCOPY N/A 10/21/2019    Dr Dang Emery, 5 yr recall    KNEE SURGERY      LAPAROSCOPY N/A 1/31/2020    DIAGNOSTIC LAPAROSCOPY AND CHROMOPERTUBATION performed by Kaitlin Goldsmith MD at [address] [place] [address] POLYPECTOMY      TONSILLECTOMY AND ADENOIDECTOMY      UPPER GASTROINTESTINAL ENDOSCOPY  11/03/2014    Dr Paul Nino, Gastritis, h pylori neg    UPPER GASTROINTESTINAL ENDOSCOPY N/A 10/21/2019    Dr Short Saliva History     Tobacco Use    Smoking status: Never Smoker    Smokeless tobacco: Never Used   Substance Use Topics    Alcohol use: Yes     Comment: occ        Current Outpatient Medications   Medication Sig Dispense Refill    fluticasone (FLONASE) 50 MCG/ACT nasal spray 2 sprays by Nasal route daily 1 Bottle 0    Cholecalciferol (VITAMIN D3) 1.25 MG (40522 UT) CAPS Take 1 capsule by mouth once a week 4 capsule 2    predniSONE (DELTASONE) 10 MG tablet Take 1 tablet by mouth daily for 7 days 7 tablet 0    azithromycin (ZITHROMAX) 250 MG tablet Take 1 tablet by mouth See Admin Instructions for 5 days 500mg on day 1 followed by 250mg on days 2 - 5 6 tablet 0    pantoprazole (PROTONIX) 40 MG tablet Take 1 tablet by mouth daily Take daily first thing in the morning on an empty stomach.  90 tablet 3    propranolol (INDERAL) 20 MG tablet Take 1 tablet by mouth 2 times daily (Patient taking differently: Take 20 mg by mouth once ) 60 tablet 1    progesterone (PROMETRIUM) 100 MG capsule Take 1 capsule by mouth nightly Cycle day 16-25 30 capsule 3    ondansetron (ZOFRAN ODT) 4 MG disintegrating tablet Take 1 tablet by mouth every 8 hours as needed for Nausea or Vomiting 30 tablet 0    ibuprofen (IBU) 800 MG tablet Take 1 tablet by mouth every 8 hours as needed for Pain 60 tablet 1    metFORMIN (GLUCOPHAGE) 1000 MG tablet Take 1 tablet by mouth 2 times daily (with meals) 30 tablet 3     No current facility-administered medications for this visit. Allergies   Allergen Reactions    Corn-Containing Products     Sertraline      Pt. Developed seratonin syndrome and had to go impatient.  Shrimp (Diagnostic)     Soybean-Containing Drug Products     Wheat Extract        Family History   Problem Relation Age of Onset    Polycystic Ovary Syndrome Mother     Colon Cancer Paternal Grandmother                Subjective:      Review of Systems   Constitutional: Positive for fatigue. HENT: Positive for congestion and sinus pressure. Eyes: Negative. Respiratory: Positive for wheezing. Cardiovascular: Negative. Gastrointestinal: Negative. Endocrine: Negative. Genitourinary: Negative. Musculoskeletal: Negative. Skin: Negative. Neurological: Negative. Hematological: Negative. Psychiatric/Behavioral: Negative. Objective:     Physical Exam  Vitals signs and nursing note reviewed. Constitutional:       Appearance: Normal appearance. She is well-developed. HENT:      Head: Normocephalic and atraumatic. Right Ear: Hearing, ear canal and external ear normal. A middle ear effusion is present. Left Ear: Hearing, ear canal and external ear normal. A middle ear effusion is present. Nose: Mucosal edema and rhinorrhea present. Right Sinus: Maxillary sinus tenderness and frontal sinus tenderness present.       Left Sinus: Maxillary sinus tenderness and frontal sinus tenderness present. Mouth/Throat:      Pharynx: Uvula midline. Posterior oropharyngeal erythema present. No oropharyngeal exudate. Eyes:      General: Lids are normal.      Conjunctiva/sclera: Conjunctivae normal.      Pupils: Pupils are equal, round, and reactive to light. Neck:      Musculoskeletal: Normal range of motion and neck supple. Thyroid: No thyroid mass or thyromegaly. Trachea: Trachea normal.   Cardiovascular:      Rate and Rhythm: Normal rate and regular rhythm. Heart sounds: Normal heart sounds. Pulmonary:      Effort: Pulmonary effort is normal.      Breath sounds: Normal breath sounds. Abdominal:      General: Bowel sounds are normal.      Palpations: Abdomen is soft. Musculoskeletal: Normal range of motion. Cervical back: Normal. She exhibits normal range of motion and no tenderness. Thoracic back: Normal. She exhibits normal range of motion and no tenderness. Lumbar back: Normal. She exhibits normal range of motion and no tenderness. Skin:     General: Skin is warm and dry. Neurological:      Mental Status: She is alert and oriented to person, place, and time. Psychiatric:         Speech: Speech normal.         Behavior: Behavior normal.         Thought Content: Thought content normal.         Judgment: Judgment normal.         /86   Pulse 90   Temp 97.4 °F (36.3 °C) (Temporal)   Resp 18   Ht 5' 8\" (1.727 m)   Wt (!) 310 lb (140.6 kg)   SpO2 98%   BMI 47.14 kg/m²     Assessment:      Diagnosis Orders   1. Essential hypertension     2. Acute bacterial sinusitis  fluticasone (FLONASE) 50 MCG/ACT nasal spray    predniSONE (DELTASONE) 10 MG tablet    azithromycin (ZITHROMAX) 250 MG tablet   3. Anxiety and depression         No results found for this visit on 03/20/20. Plan: We will continue current medication regimen in regards to hypertension and anxiety including propanolol. Dizziness is likely related to sinusitis.   There was fluid noted behind both TMs. I am treating with antibiotic along with oral steroids and topical steroids including Flonase. Patient was educated on use of these medications. Patient was also educated on use of progesterone in regards to her menses and cycles. She is to continue these medications as discussed. Return in about 3 months (around 2020) for Follow up chronic conditions. No orders of the defined types were placed in this encounter. Orders Placed This Encounter   Medications    fluticasone (FLONASE) 50 MCG/ACT nasal spray     Si sprays by Nasal route daily     Dispense:  1 Bottle     Refill:  0    Cholecalciferol (VITAMIN D3) 1.25 MG (02247 UT) CAPS     Sig: Take 1 capsule by mouth once a week     Dispense:  4 capsule     Refill:  2    predniSONE (DELTASONE) 10 MG tablet     Sig: Take 1 tablet by mouth daily for 7 days     Dispense:  7 tablet     Refill:  0    azithromycin (ZITHROMAX) 250 MG tablet     Sig: Take 1 tablet by mouth See Admin Instructions for 5 days 500mg on day 1 followed by 250mg on days 2 - 5     Dispense:  6 tablet     Refill:  0        Patient offered educational handouts and has had all questions answered. Patient voices understanding and agrees to plans along with risks and benefits of plan. Patient is instructed to continue prior meds, diet, and exercise plans as instructed. Patient agrees to follow up as instructed and sooner if needed. Patient agrees to go to ER if condition becomes emergent. EMR Dragon/transcription disclaimer: Some of this encounter note is an electronic transcription/translation of spoken language to printed text. The electronic translation of spoken language may permit erroneous, or at times, nonsensical words or phrases to be inadvertently transcribed.  Although I have reviewed the note for such errors, some may still exist.    Electronically signed by SARAY Copeland on 3/20/2020 at 10:16 AM

## 2020-05-12 ENCOUNTER — APPOINTMENT (OUTPATIENT)
Dept: GENERAL RADIOLOGY | Age: 25
End: 2020-05-12
Payer: COMMERCIAL

## 2020-05-12 ENCOUNTER — HOSPITAL ENCOUNTER (EMERGENCY)
Age: 25
Discharge: HOME | End: 2020-05-12
Payer: COMMERCIAL

## 2020-05-12 VITALS
OXYGEN SATURATION: 100 % | DIASTOLIC BLOOD PRESSURE: 71 MMHG | HEART RATE: 82 BPM | SYSTOLIC BLOOD PRESSURE: 112 MMHG | TEMPERATURE: 98.7 F | RESPIRATION RATE: 16 BRPM

## 2020-05-12 LAB
ALBUMIN SERPL-MCNC: 4.3 G/DL (ref 3.5–5.2)
ALP BLD-CCNC: 58 U/L (ref 35–104)
ALT SERPL-CCNC: 11 U/L (ref 5–33)
ANION GAP SERPL CALCULATED.3IONS-SCNC: 12 MMOL/L (ref 7–19)
AST SERPL-CCNC: 12 U/L (ref 5–32)
BASOPHILS ABSOLUTE: 0 K/UL (ref 0–0.2)
BASOPHILS RELATIVE PERCENT: 0.3 % (ref 0–1)
BILIRUB SERPL-MCNC: <0.2 MG/DL (ref 0.2–1.2)
BUN BLDV-MCNC: 8 MG/DL (ref 6–20)
CALCIUM SERPL-MCNC: 9.3 MG/DL (ref 8.6–10)
CHLORIDE BLD-SCNC: 101 MMOL/L (ref 98–111)
CO2: 25 MMOL/L (ref 22–29)
CREAT SERPL-MCNC: 0.6 MG/DL (ref 0.5–0.9)
D DIMER: 0.49 UG/ML FEU (ref 0–0.48)
EOSINOPHILS ABSOLUTE: 0.4 K/UL (ref 0–0.6)
EOSINOPHILS RELATIVE PERCENT: 3.8 % (ref 0–5)
GFR NON-AFRICAN AMERICAN: >60
GLUCOSE BLD-MCNC: 116 MG/DL (ref 74–109)
HCG QUALITATIVE: NEGATIVE
HCT VFR BLD CALC: 38 % (ref 37–47)
HEMOGLOBIN: 12.4 G/DL (ref 12–16)
IMMATURE GRANULOCYTES #: 0.1 K/UL
LYMPHOCYTES ABSOLUTE: 3.9 K/UL (ref 1.1–4.5)
LYMPHOCYTES RELATIVE PERCENT: 34.1 % (ref 20–40)
MCH RBC QN AUTO: 27.1 PG (ref 27–31)
MCHC RBC AUTO-ENTMCNC: 32.6 G/DL (ref 33–37)
MCV RBC AUTO: 83.2 FL (ref 81–99)
MONOCYTES ABSOLUTE: 1.1 K/UL (ref 0–0.9)
MONOCYTES RELATIVE PERCENT: 9.2 % (ref 0–10)
NEUTROPHILS ABSOLUTE: 6 K/UL (ref 1.5–7.5)
NEUTROPHILS RELATIVE PERCENT: 52.2 % (ref 50–65)
PDW BLD-RTO: 13.8 % (ref 11.5–14.5)
PLATELET # BLD: 290 K/UL (ref 130–400)
PMV BLD AUTO: 10.6 FL (ref 9.4–12.3)
POTASSIUM SERPL-SCNC: 4 MMOL/L (ref 3.5–5)
PRO-BNP: 14 PG/ML (ref 0–450)
RBC # BLD: 4.57 M/UL (ref 4.2–5.4)
SODIUM BLD-SCNC: 138 MMOL/L (ref 136–145)
TOTAL PROTEIN: 7.4 G/DL (ref 6.6–8.7)
TROPONIN: <0.01 NG/ML (ref 0–0.03)
TSH SERPL DL<=0.05 MIU/L-ACNC: 3.31 UIU/ML (ref 0.27–4.2)
WBC # BLD: 11.5 K/UL (ref 4.8–10.8)

## 2020-05-12 PROCEDURE — 36415 COLL VENOUS BLD VENIPUNCTURE: CPT

## 2020-05-12 PROCEDURE — 80053 COMPREHEN METABOLIC PANEL: CPT

## 2020-05-12 PROCEDURE — 93005 ELECTROCARDIOGRAM TRACING: CPT | Performed by: PHYSICIAN ASSISTANT

## 2020-05-12 PROCEDURE — 83880 ASSAY OF NATRIURETIC PEPTIDE: CPT

## 2020-05-12 PROCEDURE — 71045 X-RAY EXAM CHEST 1 VIEW: CPT

## 2020-05-12 PROCEDURE — 84443 ASSAY THYROID STIM HORMONE: CPT

## 2020-05-12 PROCEDURE — 99285 EMERGENCY DEPT VISIT HI MDM: CPT

## 2020-05-12 PROCEDURE — 84484 ASSAY OF TROPONIN QUANT: CPT

## 2020-05-12 PROCEDURE — 93229 REMOTE 30 DAY ECG TECH SUPP: CPT

## 2020-05-12 PROCEDURE — 84703 CHORIONIC GONADOTROPIN ASSAY: CPT

## 2020-05-12 PROCEDURE — 85025 COMPLETE CBC W/AUTO DIFF WBC: CPT

## 2020-05-12 PROCEDURE — 85379 FIBRIN DEGRADATION QUANT: CPT

## 2020-05-12 ASSESSMENT — ENCOUNTER SYMPTOMS
ABDOMINAL PAIN: 0
RHINORRHEA: 0
PHOTOPHOBIA: 0
EYE PAIN: 0
NAUSEA: 0
COUGH: 0
SHORTNESS OF BREATH: 0
EYE DISCHARGE: 0
APNEA: 0
BACK PAIN: 0
SORE THROAT: 0
COLOR CHANGE: 0
ABDOMINAL DISTENTION: 0

## 2020-05-12 ASSESSMENT — PAIN SCALES - GENERAL: PAINLEVEL_OUTOF10: 4

## 2020-05-12 ASSESSMENT — PAIN DESCRIPTION - PAIN TYPE: TYPE: ACUTE PAIN

## 2020-05-12 ASSESSMENT — PAIN DESCRIPTION - LOCATION: LOCATION: CHEST

## 2020-05-12 NOTE — ED PROVIDER NOTES
Hematological: Negative for adenopathy. Does not bruise/bleed easily. Psychiatric/Behavioral: Negative for agitation, behavioral problems and confusion. Except as noted above the remainder of the review of systems was reviewed and negative. PAST MEDICAL HISTORY     Past Medical History:   Diagnosis Date    Concussion     approximately 10 years ago    Depression     Major depressive disorder     Food allergy     Hypothyroidism     Metabolic disorder     PCOS (polycystic ovarian syndrome)          SURGICAL HISTORY       Past Surgical History:   Procedure Laterality Date    CHOLECYSTECTOMY      COLONOSCOPY  11/03/2014    Dr Blair Chester,  Small non-bleeding internal hemorrhoids w/skin tags, serrated polyp     COLONOSCOPY N/A 10/21/2019    Dr Nanette Mcfarland, 5 yr recall    KNEE SURGERY      LAPAROSCOPY N/A 1/31/2020    DIAGNOSTIC LAPAROSCOPY AND CHROMOPERTUBATION performed by Hector Dc MD at [address] [place] [address] ENDOSCOPY  11/03/2014    Dr Blair Chester, Gastritis, h pylori neg    UPPER GASTROINTESTINAL ENDOSCOPY N/A 10/21/2019    Dr Montserrat Morales       Previous Medications    CHOLECALCIFEROL (VITAMIN D3) 1.25 MG (18804 UT) CAPS    Take 1 capsule by mouth once a week    FLUTICASONE (FLONASE) 50 MCG/ACT NASAL SPRAY    2 sprays by Nasal route daily    IBUPROFEN (IBU) 800 MG TABLET    Take 1 tablet by mouth every 8 hours as needed for Pain    METFORMIN (GLUCOPHAGE) 1000 MG TABLET    Take 1 tablet by mouth 2 times daily (with meals)    ONDANSETRON (ZOFRAN ODT) 4 MG DISINTEGRATING TABLET    Take 1 tablet by mouth every 8 hours as needed for Nausea or Vomiting    PANTOPRAZOLE (PROTONIX) 40 MG TABLET    Take 1 tablet by mouth daily Take daily first thing in the morning on an empty stomach.     PROGESTERONE (PROMETRIUM) 100 MG CAPSULE    Take 1 capsule by mouth 87 18 99 % -- --       Physical Exam  Vitals signs and nursing note reviewed. Constitutional:       General: She is in acute distress. Appearance: Normal appearance. She is well-developed. She is obese. She is not ill-appearing or diaphoretic. HENT:      Head: Normocephalic and atraumatic. Right Ear: Tympanic membrane, ear canal and external ear normal.      Left Ear: Tympanic membrane, ear canal and external ear normal.      Nose: Nose normal.      Mouth/Throat:      Mouth: Mucous membranes are moist.      Pharynx: No oropharyngeal exudate. Eyes:      General:         Right eye: No discharge. Left eye: No discharge. Pupils: Pupils are equal, round, and reactive to light. Neck:      Musculoskeletal: Normal range of motion and neck supple. Thyroid: No thyromegaly. Cardiovascular:      Rate and Rhythm: Normal rate and regular rhythm. Pulses: Normal pulses. Heart sounds: Normal heart sounds. No murmur. No friction rub. Pulmonary:      Effort: Pulmonary effort is normal. No respiratory distress. Breath sounds: Normal breath sounds. No stridor. No wheezing. Abdominal:      General: Bowel sounds are normal. There is no distension. Palpations: Abdomen is soft. Tenderness: There is no abdominal tenderness. Musculoskeletal: Normal range of motion. Skin:     General: Skin is warm and dry. Capillary Refill: Capillary refill takes less than 2 seconds. Findings: No rash. Neurological:      General: No focal deficit present. Mental Status: She is alert and oriented to person, place, and time. Cranial Nerves: No cranial nerve deficit. Sensory: No sensory deficit. Coordination: Coordination normal.   Psychiatric:         Mood and Affect: Mood normal.         Behavior: Behavior normal.         Thought Content:  Thought content normal.         Judgment: Judgment normal.           DIAGNOSTIC RESULTS     RADIOLOGY:   Non-plain film

## 2020-05-13 LAB
EKG P AXIS: 18 DEGREES
EKG P-R INTERVAL: 158 MS
EKG Q-T INTERVAL: 358 MS
EKG QRS DURATION: 102 MS
EKG QTC CALCULATION (BAZETT): 410 MS
EKG T AXIS: 3 DEGREES

## 2020-05-13 PROCEDURE — 93010 ELECTROCARDIOGRAM REPORT: CPT | Performed by: INTERNAL MEDICINE

## 2020-06-30 ENCOUNTER — VIRTUAL VISIT (OUTPATIENT)
Dept: PRIMARY CARE CLINIC | Age: 25
End: 2020-06-30
Payer: COMMERCIAL

## 2020-06-30 ENCOUNTER — OFFICE VISIT (OUTPATIENT)
Dept: OBGYN | Age: 25
End: 2020-06-30
Payer: COMMERCIAL

## 2020-06-30 ENCOUNTER — TELEPHONE (OUTPATIENT)
Dept: OBGYN | Age: 25
End: 2020-06-30

## 2020-06-30 VITALS
DIASTOLIC BLOOD PRESSURE: 90 MMHG | HEART RATE: 101 BPM | BODY MASS INDEX: 44.41 KG/M2 | HEIGHT: 68 IN | WEIGHT: 293 LBS | SYSTOLIC BLOOD PRESSURE: 137 MMHG

## 2020-06-30 PROCEDURE — 99214 OFFICE O/P EST MOD 30 MIN: CPT | Performed by: NURSE PRACTITIONER

## 2020-06-30 PROCEDURE — 99395 PREV VISIT EST AGE 18-39: CPT | Performed by: NURSE PRACTITIONER

## 2020-06-30 ASSESSMENT — ENCOUNTER SYMPTOMS
DIARRHEA: 0
RESPIRATORY NEGATIVE: 1
GASTROINTESTINAL NEGATIVE: 1
CONSTIPATION: 0
ALLERGIC/IMMUNOLOGIC NEGATIVE: 1
EYES NEGATIVE: 1

## 2020-06-30 NOTE — PROGRESS NOTES
[facility] [place] [address]     Phone:  [phone]  Fax:  [phone]      2020    TELEHEALTH EVALUATION -- Audio/Visual (During QMYGS-92 public health emergency)    HPI:    Chief Complaint   Patient presents with    Hypertension    Anxiety    Gastroesophageal Reflux         Vergia Josef (:  1995) has requested an audio/video evaluation for the following concern(s): This is a video visit for follow up on chronic conditions including GERD, PCOS, HTN, and anxiety. She did go to ER in May and was told to have her BNP level monitored. She also had Zio patch and it was completely normal.  Has been off of Prozac for a few months now. She denies any issues with anxiety at this time. She denies any other episodes of chest pain since the ER. She has been monitoring her BP at home and it has been slightly elevated. She has noticed highest at 150/94. She has only been taking Propranolol once daily instead of the BID as ordered. Review of Systems   Constitutional: Negative. HENT: Negative. Eyes: Negative. Respiratory: Negative. Cardiovascular: Negative. Gastrointestinal: Negative. Endocrine: Negative. Genitourinary: Negative. Musculoskeletal: Negative. Skin: Negative. Neurological: Negative. Hematological: Negative. Psychiatric/Behavioral: The patient is nervous/anxious. Prior to Visit Medications    Medication Sig Taking? Authorizing Provider   progesterone (PROMETRIUM) 100 MG capsule Take 1 capsule by mouth nightly Cycle day 16-25  SARAY Washington CNP   metFORMIN (GLUCOPHAGE) 1000 MG tablet Take 1 tablet by mouth 2 times daily (with meals)  SARAY Washington CNP   pantoprazole (PROTONIX) 40 MG tablet Take 1 tablet by mouth daily Take daily first thing in the morning on an empty stomach.   SARAY Evans - NP   propranolol (INDERAL) 20 MG tablet Take 1 tablet by mouth 2 times daily  Regina Mireles INSTRUCTIONS:  \"[x]\" Indicates a positive item  \"[]\" Indicates a negative item  -- DELETE ALL ITEMS NOT EXAMINED]  Vital Signs: (As obtained by patient/caregiver at home)        Constitutional: [x] Appears well-developed and well-nourished [x] No apparent distress      [] Abnormal   Mental status  [x] Alert and awake  [x] Oriented to person/place/time [x]Able to follow commands        Eyes:  EOM    [x]  Normal  [] Abnormal-  Sclera  [x]  Normal  [] Abnormal -         Discharge []  None visible  [] Abnormal -    HENT:   [x] Normocephalic, atraumatic. [] Abnormal   [x] Mouth/Throat: Mucous membranes are moist.     External Ears [x] Normal  [] Abnormal-    Neck: [x] No visualized mass     Pulmonary/Chest: [x] Respiratory effort normal.  [x] No visualized signs of difficulty breathing or respiratory distress        [] Abnormal      Musculoskeletal:   [x] Normal gait with no signs of ataxia         [x] Normal range of motion of neck        [] Abnormal       Neurological:        [x] No Facial Asymmetry (Cranial nerve 7 motor function) (limited exam to video visit)          [] No gaze palsy        [] Abnormal         Skin:        [x] No significant exanthematous lesions or discoloration noted on facial skin         [] Abnormal            Psychiatric:       [x] Normal Affect [] Abnormal        [] No Hallucinations    Other pertinent observable physical exam findings:-    Due to this being a TeleHealth encounter, evaluation of the following organ systems is limited: Vitals/Constitutional/EENT/Resp/CV/GI//MS/Neuro/Skin/Heme-Lymph-Imm. ASSESSMENT/PLAN:  1. PCOS (polycystic ovarian syndrome)      2. Essential hypertension      3. Anxiety      4. Gastroesophageal reflux disease without esophagitis        Overall things have improved with patient. She is to continue to monitor for signs and symptoms of worsening chest discomfort or returning chest discomfort. She is also to monitor blood pressure at home.   If it remains elevated as discussed will need to increase propanolol up to twice daily as prescribed. I am having her return approximately 3 months to monitor her overall status. Return in about 3 months (around 9/30/2020) for Follow up chronic conditions, HTN, Office Visit. An  electronic signature was used to authenticate this note. --SARAY Luong on 7/5/2020 at 9:05 PM        Pursuant to the emergency declaration under the [facility], [facility] waiver authority and the [employer] Act, this Virtual  Visit was conducted, with patient's consent, to reduce the patient's risk of exposure to COVID-19 and provide continuity of care for an established patient. Services were provided through a video synchronous discussion virtually to substitute for in-person clinic visit.

## 2020-06-30 NOTE — PROGRESS NOTES
meals) 30 tablet 11    progesterone (PROMETRIUM) 100 MG capsule Take 1 capsule by mouth nightly Cycle day 16-25 30 capsule 11    pantoprazole (PROTONIX) 40 MG tablet Take 1 tablet by mouth daily Take daily first thing in the morning on an empty stomach. 90 tablet 3    propranolol (INDERAL) 20 MG tablet Take 1 tablet by mouth 2 times daily 60 tablet 3    Cholecalciferol (VITAMIN D3) 1.25 MG (58989 UT) CAPS Take 1 capsule by mouth once a week 4 capsule 3    fluticasone (FLONASE) 50 MCG/ACT nasal spray 2 sprays by Nasal route daily 1 Bottle 0    ondansetron (ZOFRAN ODT) 4 MG disintegrating tablet Take 1 tablet by mouth every 8 hours as needed for Nausea or Vomiting 30 tablet 0    ibuprofen (IBU) 800 MG tablet Take 1 tablet by mouth every 8 hours as needed for Pain 60 tablet 1     No current facility-administered medications for this visit. Allergies   Allergen Reactions    Corn-Containing Products     Sertraline      Pt. Developed seratonin syndrome and had to go impatient.  Shrimp (Diagnostic)     Soybean-Containing Drug Products     Wheat Extract      Vitals:    06/30/20 1311   BP: (!) 137/90   Pulse: 101     Body mass index is 48.81 kg/m². Review of Systems   Constitutional: Negative. HENT: Negative. Eyes: Negative. Respiratory: Negative. Cardiovascular: Negative. Gastrointestinal: Negative. Negative for constipation and diarrhea. Endocrine: Negative. Genitourinary: Positive for pelvic pain. Negative for frequency, menstrual problem and urgency. Musculoskeletal: Negative. Skin: Negative. Allergic/Immunologic: Negative. Neurological: Negative. Hematological: Negative. Psychiatric/Behavioral: Negative. All other systems reviewed and are negative. Physical Exam  Vitals signs and nursing note reviewed. Constitutional:       Appearance: She is well-developed. HENT:      Head: Normocephalic.    Neck:      Musculoskeletal: Normal range of motion and exercise, and other steps. Follow-up care is a key part of your treatment and safety. Be sure to make and go to all appointments, and call your doctor if you are having problems. It's also a good idea to know your test results and keep a list of the medicines you take. How can you care for yourself at home? · Reach and stay at a healthy weight. This will lower your risk for many problems, such as obesity, diabetes, heart disease, and high blood pressure. · Get at least 30 minutes of physical activity on most days of the week. Walking is a good choice. You also may want to do other activities, such as running, swimming, cycling, or playing tennis or team sports. Discuss any changes in your exercise program with your doctor. · Do not smoke or allow others to smoke around you. If you need help quitting, talk to your doctor about stop-smoking programs and medicines. These can increase your chances of quitting for good. · Talk to your doctor about whether you have any risk factors for sexually transmitted infections (STIs). Having one sex partner (who does not have STIs and does not have sex with anyone else) is a good way to avoid these infections. · Use birth control if you do not want to have children at this time. Talk with your doctor about the choices available and what might be best for you. · Protect your skin from too much sun. When you're outdoors from 10 a.m. to 4 p.m., stay in the shade or cover up with clothing and a hat with a wide brim. Wear sunglasses that block UV rays. Even when it's cloudy, put broad-spectrum sunscreen (SPF 30 or higher) on any exposed skin. · See a dentist one or two times a year for checkups and to have your teeth cleaned. · Wear a seat belt in the car. Follow your doctor's advice about when to have certain tests. These tests can spot problems early. For everyone  · Cholesterol. Have the fat (cholesterol) in your blood tested after age 21.  Your doctor will tell you how

## 2020-06-30 NOTE — PATIENT INSTRUCTIONS
-American or have a father or brother who got prostate cancer when he was younger than 72. When should you call for help? Watch closely for changes in your health, and be sure to contact your doctor if you have any problems or symptoms that concern you. Where can you learn more? Go to [email or web address]://[email or web address].[email or web address] org and sign in to your [employer] account. Enter P072 in the 8aweek box to learn more about \"Well Visit, Ages 25 to 48: Care Instructions. \"     If you do not have an account, please click on the \"Sign Up Now\" link. Current as of: August 22, 2019               Content Version: 12.5  © 5456-0784 [employer]. Care instructions adapted under license by [facility] ([facility]). If you have questions about a medical condition or this instruction, always ask your healthcare professional. [address] any warranty or liability for your use of this information.

## 2020-07-05 ASSESSMENT — ENCOUNTER SYMPTOMS
RESPIRATORY NEGATIVE: 1
EYES NEGATIVE: 1
GASTROINTESTINAL NEGATIVE: 1

## 2020-08-07 ENCOUNTER — TELEMEDICINE (OUTPATIENT)
Dept: PRIMARY CARE CLINIC | Age: 25
End: 2020-08-07
Payer: COMMERCIAL

## 2020-08-07 ENCOUNTER — OFFICE VISIT (OUTPATIENT)
Age: 25
End: 2020-08-07

## 2020-08-07 VITALS — TEMPERATURE: 97 F | OXYGEN SATURATION: 99 % | HEART RATE: 84 BPM

## 2020-08-07 PROCEDURE — 99214 OFFICE O/P EST MOD 30 MIN: CPT | Performed by: NURSE PRACTITIONER

## 2020-08-07 RX ORDER — AMOXICILLIN AND CLAVULANATE POTASSIUM 875; 125 MG/1; MG/1
1 TABLET, FILM COATED ORAL 2 TIMES DAILY
Qty: 28 TABLET | Refills: 0 | Status: SHIPPED | OUTPATIENT
Start: 2020-08-07 | End: 2020-08-21

## 2020-08-07 ASSESSMENT — ENCOUNTER SYMPTOMS
BACK PAIN: 1
RESPIRATORY NEGATIVE: 1
GASTROINTESTINAL NEGATIVE: 1
EYES NEGATIVE: 1

## 2020-08-07 NOTE — PROGRESS NOTES
[address] [place] [address]     Phone:  [phone]  Fax:  [phone]      2020    TELEHEALTH EVALUATION -- Audio/Visual (During YGKGX-44 public health emergency)    HPI:    Chief Complaint   Patient presents with    Otalgia    Back Pain         Darcie Loyola (:  1995) has requested an audio/video evaluation for the following concern(s): This is a video visit with patient having complaints of right ear pain. She has been at the beach recently. She was at [facility]. She is afraid she had swimmer's ear. She is also complaining of right sided chest pain with deep breathing. She did go to [facility] and had CT of the spine. She reports being told that her cervical spine was abnormal.  It was recommended that she have an MRI. Review of Systems   Constitutional: Negative. HENT: Positive for ear discharge and ear pain. Eyes: Negative. Respiratory: Negative. Cardiovascular: Negative. Gastrointestinal: Negative. Endocrine: Negative. Genitourinary: Negative. Musculoskeletal: Positive for arthralgias, back pain and neck pain. Skin: Negative. Neurological: Negative. Hematological: Negative. Psychiatric/Behavioral: Negative. Prior to Visit Medications    Medication Sig Taking? Authorizing Provider   ciprofloxacin-dexamethasone (CIPRODEX) 0.3-0.1 % otic suspension Place 4 drops into the right ear 2 times daily for 7 days Yes SARAY Luong   amoxicillin-clavulanate (AUGMENTIN) 875-125 MG per tablet Take 1 tablet by mouth 2 times daily for 14 days Yes SARAY Luong   progesterone (PROMETRIUM) 100 MG capsule Take 1 capsule by mouth nightly Cycle day 16-25  SARAY Carmona CNP   metFORMIN (GLUCOPHAGE) 1000 MG tablet Take 1 tablet by mouth 2 times daily (with meals)  SARAY Carmona CNP   pantoprazole (PROTONIX) 40 MG tablet Take 1 tablet by mouth daily Take daily first thing in the morning on an empty stomach. SARAY Lopez - NP   propranolol (INDERAL) 20 MG tablet Take 1 tablet by mouth 2 times daily  SARAY Olvera   Cholecalciferol (VITAMIN D3) 1.25 MG (24228 UT) CAPS Take 1 capsule by mouth once a week  SARAY Olvera   fluticasone (FLONASE) 50 MCG/ACT nasal spray 2 sprays by Nasal route daily  SARAY Olvera   ondansetron (ZOFRAN ODT) 4 MG disintegrating tablet Take 1 tablet by mouth every 8 hours as needed for Nausea or Vomiting  SARAY Link   ibuprofen (IBU) 800 MG tablet Take 1 tablet by mouth every 8 hours as needed for Pain  SARAY Olvera       Social History     Tobacco Use    Smoking status: Never Smoker    Smokeless tobacco: Never Used   Substance Use Topics    Alcohol use: Yes     Comment: occ    Drug use: No        Allergies   Allergen Reactions    Corn-Containing Products     Sertraline      Pt. Developed seratonin syndrome and had to go impatient.      Shrimp (Diagnostic)     Soybean-Containing Drug Products     Wheat Extract    ,   Past Medical History:   Diagnosis Date    Concussion     approximately 10 years ago    Depression     Major depressive disorder     Food allergy     Hypothyroidism     Metabolic disorder     PCOS (polycystic ovarian syndrome)    ,   Past Surgical History:   Procedure Laterality Date    CHOLECYSTECTOMY      COLONOSCOPY  11/03/2014    Dr Helio Jesus,  Small non-bleeding internal hemorrhoids w/skin tags, serrated polyp     COLONOSCOPY N/A 10/21/2019    Dr Kathy Krishnamurthy, 5 yr recall    KNEE SURGERY      LAPAROSCOPY N/A 1/31/2020    DIAGNOSTIC LAPAROSCOPY AND CHROMOPERTUBATION performed by Hector Gil MD at [address] [place] [address] AND ADENOIDECTOMY      UPPER GASTROINTESTINAL ENDOSCOPY  11/03/2014    Dr Helio Jesus, Gastritis, h pylori neg    UPPER GASTROINTESTINAL ENDOSCOPY N/A 10/21/2019    Dr Kathy Krishnamurthy   ,   Family History   Problem Relation Age of Onset times daily for 7 days  Dispense: 1 Bottle; Refill: 0  - amoxicillin-clavulanate (AUGMENTIN) 875-125 MG per tablet; Take 1 tablet by mouth 2 times daily for 14 days  Dispense: 28 tablet; Refill: 0    4. Cervical spine crepitus          I will obtain records from [facility] for CT of cervical spine. Checking MRI due to worsening pain and audible cracking sound per patient report. abx for otitis media. Patient has had recent travel and is required to have COVID testing for her job. Ok to have testing via drive thru testing today. Return for Keep follow up as scheduled. An  electronic signature was used to authenticate this note. --SARAY Rider on 8/12/2020 at 10:56 AM        Pursuant to the emergency declaration under the [facility], 1135 waiver authority and the [employer] Act, this Virtual  Visit was conducted, with patient's consent, to reduce the patient's risk of exposure to COVID-19 and provide continuity of care for an established patient. Services were provided through a video synchronous discussion virtually to substitute for in-person clinic visit.

## 2020-08-10 LAB — SARS-COV-2, NAA: NOT DETECTED

## 2020-08-20 ENCOUNTER — HOSPITAL ENCOUNTER (OUTPATIENT)
Dept: MRI IMAGING | Age: 25
Discharge: HOME | End: 2020-08-20
Payer: OTHER GOVERNMENT

## 2020-08-20 PROCEDURE — 72141 MRI NECK SPINE W/O DYE: CPT

## 2020-08-31 RX ORDER — IBUPROFEN 800 MG/1
800 TABLET ORAL EVERY 8 HOURS PRN
Qty: 60 TABLET | Refills: 1 | Status: SHIPPED | OUTPATIENT
Start: 2020-08-31 | End: 2020-12-15

## 2020-09-30 ENCOUNTER — OFFICE VISIT (OUTPATIENT)
Dept: PRIMARY CARE CLINIC | Age: 25
End: 2020-09-30
Payer: OTHER GOVERNMENT

## 2020-09-30 VITALS
OXYGEN SATURATION: 98 % | HEART RATE: 94 BPM | DIASTOLIC BLOOD PRESSURE: 84 MMHG | SYSTOLIC BLOOD PRESSURE: 130 MMHG | HEIGHT: 68 IN | TEMPERATURE: 98.9 F | BODY MASS INDEX: 44.41 KG/M2 | WEIGHT: 293 LBS | RESPIRATION RATE: 18 BRPM

## 2020-09-30 PROCEDURE — 99214 OFFICE O/P EST MOD 30 MIN: CPT | Performed by: NURSE PRACTITIONER

## 2020-09-30 RX ORDER — ONDANSETRON 4 MG/1
4 TABLET, ORALLY DISINTEGRATING ORAL EVERY 8 HOURS PRN
Qty: 30 TABLET | Refills: 0 | Status: SHIPPED | OUTPATIENT
Start: 2020-09-30 | End: 2020-12-15

## 2020-09-30 ASSESSMENT — ENCOUNTER SYMPTOMS
GASTROINTESTINAL NEGATIVE: 1
EYES NEGATIVE: 1
RESPIRATORY NEGATIVE: 1

## 2020-09-30 NOTE — PROGRESS NOTES
[address] [facility]  [address] [address] [address]  Dept: [phone]  Dept Fax: [phone]  Loc: [phone]    Cristian Patel is a 25 y.o. female who presents today for her medical conditions/complaints as noted below. Cristian Patel is c/o of Hypertension (Pt states BP has been doing well she has changed jobs and has seen improvement in readings ) and Menstrual Problem (Period was a little off this month however for the last 6 to 7 months they have been normal. )      Chief Complaint   Patient presents with    Hypertension     Pt states BP has been doing well she has changed jobs and has seen improvement in readings     Menstrual Problem     Period was a little off this month however for the last 6 to 7 months they have been normal.        HPI:     HPI   Patient here for follow up on HTN and menstrual issues. She reports over the last few months her menstrual cycle has been normal.  But this past month she was 1 week late. She is wanting to get pregnant with her wife using a surrogate. She did notice a rash to her right labia. She does shave and is concerned that she may have razor burn.       Past Medical History:   Diagnosis Date    Concussion     approximately 10 years ago    Depression     Major depressive disorder     Food allergy     Hypothyroidism     Metabolic disorder     PCOS (polycystic ovarian syndrome)         Past Surgical History:   Procedure Laterality Date    CHOLECYSTECTOMY      COLONOSCOPY  11/03/2014    Dr Logan Naqvi,  Small non-bleeding internal hemorrhoids w/skin tags, serrated polyp     COLONOSCOPY N/A 10/21/2019    Dr Serean Pablo, 5 yr recall    KNEE SURGERY      LAPAROSCOPY N/A 1/31/2020    DIAGNOSTIC LAPAROSCOPY AND CHROMOPERTUBATION performed by Yandy Little MD at [address] [place] [address] ENDOSCOPY  11/03/2014     Ruddy García, Gastritis, h pylori neg    UPPER GASTROINTESTINAL ENDOSCOPY N/A 10/21/2019    Dr Daisha Montez       Social History     Tobacco Use    Smoking status: Never Smoker    Smokeless tobacco: Never Used   Substance Use Topics    Alcohol use: Yes     Comment: occ        Current Outpatient Medications   Medication Sig Dispense Refill    metFORMIN (GLUCOPHAGE) 1000 MG tablet Take 1 tablet by mouth 2 times daily (with meals) 60 tablet 11    ondansetron (ZOFRAN ODT) 4 MG disintegrating tablet Take 1 tablet by mouth every 8 hours as needed for Nausea or Vomiting 30 tablet 0    ibuprofen (IBU) 800 MG tablet Take 1 tablet by mouth every 8 hours as needed for Pain 60 tablet 1    progesterone (PROMETRIUM) 100 MG capsule Take 1 capsule by mouth nightly Cycle day 16-25 30 capsule 11    pantoprazole (PROTONIX) 40 MG tablet Take 1 tablet by mouth daily Take daily first thing in the morning on an empty stomach. 90 tablet 3    propranolol (INDERAL) 20 MG tablet Take 1 tablet by mouth 2 times daily 60 tablet 3    Cholecalciferol (VITAMIN D3) 1.25 MG (35870 UT) CAPS Take 1 capsule by mouth once a week 4 capsule 3    fluticasone (FLONASE) 50 MCG/ACT nasal spray 2 sprays by Nasal route daily 1 Bottle 0     No current facility-administered medications for this visit. Allergies   Allergen Reactions    Corn-Containing Products     Sertraline      Pt. Developed seratonin syndrome and had to go impatient.  Shrimp (Diagnostic)     Soybean-Containing Drug Products     Wheat Extract        Family History   Problem Relation Age of Onset    Polycystic Ovary Syndrome Mother     Colon Cancer Paternal Grandmother          Subjective:      Review of Systems   Constitutional: Negative. HENT: Negative. Eyes: Negative. Respiratory: Negative. Cardiovascular: Negative. Gastrointestinal: Negative. Endocrine: Negative. Genitourinary: Positive for menstrual problem. 98%   BMI 48.96 kg/m²     Assessment:      Diagnosis Orders   1. Essential hypertension     2. Anxiety     3. PCOS (polycystic ovarian syndrome)  metFORMIN (GLUCOPHAGE) 1000 MG tablet   4. Abnormal menstruation     5. Nausea  ondansetron (ZOFRAN ODT) 4 MG disintegrating tablet       No results found for this visit on 09/30/20. Plan:     Overall she is doing well. We will continue current medications. Return in about 4 months (around 1/30/2021) for Office Visit, Follow up chronic conditions. No orders of the defined types were placed in this encounter. Orders Placed This Encounter   Medications    metFORMIN (GLUCOPHAGE) 1000 MG tablet     Sig: Take 1 tablet by mouth 2 times daily (with meals)     Dispense:  60 tablet     Refill:  11    ondansetron (ZOFRAN ODT) 4 MG disintegrating tablet     Sig: Take 1 tablet by mouth every 8 hours as needed for Nausea or Vomiting     Dispense:  30 tablet     Refill:  0        Patient offered educational handouts and has had all questions answered. Patient voices understanding and agrees to plans along with risks and benefits of plan. Patient is instructed to continue prior meds, diet, and exercise plans as instructed. Patient agrees to follow up as instructed and sooner if needed. Patient agrees to go to ER if condition becomes emergent. EMR Dragon/transcription disclaimer: Some of this encounter note is an electronic transcription/translation of spoken language to printed text. The electronic translation of spoken language may permit erroneous, or at times, nonsensical words or phrases to be inadvertently transcribed.  Although I have reviewed the note for such errors, some may still exist.    Electronically signed by SARAY Nava on 9/30/2020 at 12:38 PM

## 2020-10-06 RX ORDER — CHOLECALCIFEROL (VITAMIN D3) 1250 MCG
1 CAPSULE ORAL WEEKLY
Qty: 4 CAPSULE | Refills: 3 | Status: SHIPPED | OUTPATIENT
Start: 2020-10-06 | End: 2020-11-24 | Stop reason: SDUPTHER

## 2020-11-24 RX ORDER — CHOLECALCIFEROL (VITAMIN D3) 1250 MCG
1 CAPSULE ORAL WEEKLY
Qty: 4 CAPSULE | Refills: 3 | Status: SHIPPED | OUTPATIENT
Start: 2020-11-24 | End: 2021-01-07 | Stop reason: SDUPTHER

## 2020-12-15 ENCOUNTER — HOSPITAL ENCOUNTER (EMERGENCY)
Age: 25
Discharge: HOME | End: 2020-12-15
Attending: PEDIATRICS
Payer: OTHER GOVERNMENT

## 2020-12-15 VITALS
HEIGHT: 68 IN | HEART RATE: 90 BPM | WEIGHT: 293 LBS | RESPIRATION RATE: 20 BRPM | DIASTOLIC BLOOD PRESSURE: 66 MMHG | TEMPERATURE: 99.7 F | SYSTOLIC BLOOD PRESSURE: 123 MMHG | OXYGEN SATURATION: 95 % | BODY MASS INDEX: 44.41 KG/M2

## 2020-12-15 LAB
ALBUMIN SERPL-MCNC: 3.8 G/DL (ref 3.5–5.2)
ALP BLD-CCNC: 58 U/L (ref 35–104)
ALT SERPL-CCNC: 16 U/L (ref 5–33)
ANION GAP SERPL CALCULATED.3IONS-SCNC: 11 MMOL/L (ref 7–19)
AST SERPL-CCNC: 23 U/L (ref 5–32)
BACTERIA: ABNORMAL /HPF
BASOPHILS ABSOLUTE: 0 K/UL (ref 0–0.2)
BASOPHILS RELATIVE PERCENT: 0.4 % (ref 0–1)
BILIRUB SERPL-MCNC: <0.2 MG/DL (ref 0.2–1.2)
BILIRUBIN URINE: NEGATIVE
BLOOD, URINE: NEGATIVE
BUN BLDV-MCNC: 6 MG/DL (ref 6–20)
CALCIUM SERPL-MCNC: 8.8 MG/DL (ref 8.6–10)
CHLORIDE BLD-SCNC: 105 MMOL/L (ref 98–111)
CLARITY: ABNORMAL
CO2: 23 MMOL/L (ref 22–29)
COLOR: ABNORMAL
CREAT SERPL-MCNC: 0.7 MG/DL (ref 0.5–0.9)
EOSINOPHILS ABSOLUTE: 0 K/UL (ref 0–0.6)
EOSINOPHILS RELATIVE PERCENT: 0.2 % (ref 0–5)
EPITHELIAL CELLS, UA: 15 /HPF (ref 0–5)
GFR AFRICAN AMERICAN: >59
GFR NON-AFRICAN AMERICAN: >60
GLUCOSE BLD-MCNC: 95 MG/DL (ref 74–109)
GLUCOSE URINE: NEGATIVE MG/DL
HCG(URINE) PREGNANCY TEST: NEGATIVE
HCT VFR BLD CALC: 41.3 % (ref 37–47)
HEMOGLOBIN: 13 G/DL (ref 12–16)
HYALINE CASTS: 22 /HPF (ref 0–8)
IMMATURE GRANULOCYTES #: 0 K/UL
KETONES, URINE: 80 MG/DL
LEUKOCYTE ESTERASE, URINE: ABNORMAL
LIPASE: 15 U/L (ref 13–60)
LYMPHOCYTES ABSOLUTE: 1.6 K/UL (ref 1.1–4.5)
LYMPHOCYTES RELATIVE PERCENT: 30.4 % (ref 20–40)
MCH RBC QN AUTO: 26.7 PG (ref 27–31)
MCHC RBC AUTO-ENTMCNC: 31.5 G/DL (ref 33–37)
MCV RBC AUTO: 85 FL (ref 81–99)
MONOCYTES ABSOLUTE: 0.3 K/UL (ref 0–0.9)
MONOCYTES RELATIVE PERCENT: 5.1 % (ref 0–10)
NEUTROPHILS ABSOLUTE: 3.2 K/UL (ref 1.5–7.5)
NEUTROPHILS RELATIVE PERCENT: 63.3 % (ref 50–65)
NITRITE, URINE: NEGATIVE
PDW BLD-RTO: 13.7 % (ref 11.5–14.5)
PH UA: 6 (ref 5–8)
PLATELET # BLD: 208 K/UL (ref 130–400)
PMV BLD AUTO: 10.8 FL (ref 9.4–12.3)
POTASSIUM REFLEX MAGNESIUM: 3.9 MMOL/L (ref 3.5–5)
PROTEIN UA: 30 MG/DL
RBC # BLD: 4.86 M/UL (ref 4.2–5.4)
RBC UA: 14 /HPF (ref 0–4)
SARS-COV-2, NAAT: DETECTED
SODIUM BLD-SCNC: 139 MMOL/L (ref 136–145)
SPECIFIC GRAVITY UA: 1.03 (ref 1–1.03)
TOTAL PROTEIN: 7.2 G/DL (ref 6.6–8.7)
UROBILINOGEN, URINE: 1 E.U./DL
WBC # BLD: 5.1 K/UL (ref 4.8–10.8)
WBC UA: 12 /HPF (ref 0–5)

## 2020-12-15 PROCEDURE — U0002 COVID-19 LAB TEST NON-CDC: HCPCS

## 2020-12-15 PROCEDURE — 80053 COMPREHEN METABOLIC PANEL: CPT

## 2020-12-15 PROCEDURE — 99999 PR OFFICE/OUTPT VISIT,PROCEDURE ONLY: CPT | Performed by: PEDIATRICS

## 2020-12-15 PROCEDURE — 36415 COLL VENOUS BLD VENIPUNCTURE: CPT

## 2020-12-15 PROCEDURE — 99285 EMERGENCY DEPT VISIT HI MDM: CPT

## 2020-12-15 PROCEDURE — 85025 COMPLETE CBC W/AUTO DIFF WBC: CPT

## 2020-12-15 PROCEDURE — 83690 ASSAY OF LIPASE: CPT

## 2020-12-15 PROCEDURE — 2580000003 HC RX 258: Performed by: PEDIATRICS

## 2020-12-15 PROCEDURE — 81001 URINALYSIS AUTO W/SCOPE: CPT

## 2020-12-15 PROCEDURE — 6360000002 HC RX W HCPCS: Performed by: PEDIATRICS

## 2020-12-15 PROCEDURE — 96374 THER/PROPH/DIAG INJ IV PUSH: CPT

## 2020-12-15 PROCEDURE — 87086 URINE CULTURE/COLONY COUNT: CPT

## 2020-12-15 PROCEDURE — 84703 CHORIONIC GONADOTROPIN ASSAY: CPT

## 2020-12-15 RX ORDER — ONDANSETRON 4 MG/1
4 TABLET, ORALLY DISINTEGRATING ORAL EVERY 8 HOURS PRN
Qty: 20 TABLET | Refills: 0 | Status: SHIPPED | OUTPATIENT
Start: 2020-12-15

## 2020-12-15 RX ORDER — 0.9 % SODIUM CHLORIDE 0.9 %
1000 INTRAVENOUS SOLUTION INTRAVENOUS ONCE
Status: COMPLETED | OUTPATIENT
Start: 2020-12-15 | End: 2020-12-15

## 2020-12-15 RX ORDER — IBUPROFEN 600 MG/1
600 TABLET ORAL EVERY 6 HOURS PRN
Qty: 20 TABLET | Refills: 1 | Status: SHIPPED | OUTPATIENT
Start: 2020-12-15

## 2020-12-15 RX ORDER — ALBUTEROL SULFATE 90 UG/1
2 AEROSOL, METERED RESPIRATORY (INHALATION) 4 TIMES DAILY PRN
Qty: 1 INHALER | Refills: 0 | Status: SHIPPED | OUTPATIENT
Start: 2020-12-15 | End: 2021-04-12 | Stop reason: ALTCHOICE

## 2020-12-15 RX ORDER — BENZONATATE 100 MG/1
100 CAPSULE ORAL 3 TIMES DAILY PRN
Qty: 20 CAPSULE | Refills: 0 | Status: SHIPPED | OUTPATIENT
Start: 2020-12-15 | End: 2021-02-02

## 2020-12-15 RX ORDER — GUAIFENESIN AND CODEINE PHOSPHATE 100; 10 MG/5ML; MG/5ML
5 SOLUTION ORAL 3 TIMES DAILY PRN
Qty: 45 ML | Refills: 0 | Status: SHIPPED | OUTPATIENT
Start: 2020-12-15 | End: 2020-12-18

## 2020-12-15 RX ADMIN — CEFTRIAXONE 1 G: 1 INJECTION, POWDER, FOR SOLUTION INTRAMUSCULAR; INTRAVENOUS at 20:38

## 2020-12-15 RX ADMIN — SODIUM CHLORIDE 1000 ML: 9 INJECTION, SOLUTION INTRAVENOUS at 18:22

## 2020-12-15 ASSESSMENT — PAIN SCALES - GENERAL: PAINLEVEL_OUTOF10: 4

## 2020-12-16 ENCOUNTER — CARE COORDINATION (OUTPATIENT)
Dept: CARE COORDINATION | Age: 25
End: 2020-12-16

## 2020-12-17 ENCOUNTER — TELEMEDICINE (OUTPATIENT)
Dept: PRIMARY CARE CLINIC | Age: 25
End: 2020-12-17
Payer: OTHER GOVERNMENT

## 2020-12-17 ENCOUNTER — TELEPHONE (OUTPATIENT)
Dept: PRIMARY CARE CLINIC | Age: 25
End: 2020-12-17

## 2020-12-17 LAB — URINE CULTURE, ROUTINE: NORMAL

## 2020-12-17 PROCEDURE — 99213 OFFICE O/P EST LOW 20 MIN: CPT | Performed by: NURSE PRACTITIONER

## 2020-12-17 RX ORDER — NITROFURANTOIN 25; 75 MG/1; MG/1
100 CAPSULE ORAL 2 TIMES DAILY
Qty: 20 CAPSULE | Refills: 0 | Status: SHIPPED | OUTPATIENT
Start: 2020-12-17 | End: 2020-12-17

## 2020-12-17 RX ORDER — NITROFURANTOIN 25; 75 MG/1; MG/1
100 CAPSULE ORAL 2 TIMES DAILY
Qty: 20 CAPSULE | Refills: 0 | Status: SHIPPED | OUTPATIENT
Start: 2020-12-17 | End: 2020-12-27

## 2020-12-17 ASSESSMENT — ENCOUNTER SYMPTOMS
RESPIRATORY NEGATIVE: 1
GASTROINTESTINAL NEGATIVE: 1
EYES NEGATIVE: 1

## 2020-12-17 NOTE — TELEPHONE ENCOUNTER
Call received from patient. She was seen in the ER and is trying to schedule a VV f/u with Dick Caraballo. She was told by the discharge nurse that she needed antibiotics for a UTI but her culture was only positive for normal valeri. Please advise, can you see her today? .Linda Mcgee

## 2020-12-17 NOTE — PROGRESS NOTES
[address] [address]     Phone:  [phone]  Fax:  [phone]      2020    TELEHEALTH EVALUATION -- Audio/Visual (During JDIEX-36 public health emergency)    HPI:    Chief Complaint   Patient presents with    Positive For Covid-19    Urinary Tract Infection         Sophia Addison (:  1995) has requested an audio/video evaluation for the following concern(s): This is a VV for follow up on recent ER visit where she was diagnosed with COVID. She reports being sick for 11 days. She is starting to feel slightly better. She did receive a rocephin injection in the ER for UTI. She was not treated with oral abx. She denies any other urinary symptoms. She is still having some diarrhea as well. Fever has improved. She no longer has a taste or smell either. Review of Systems   Constitutional: Positive for chills and fatigue. HENT: Negative. Eyes: Negative. Respiratory: Negative. Cardiovascular: Negative. Gastrointestinal: Negative. Endocrine: Negative. Genitourinary: Negative. Musculoskeletal: Negative. Skin: Negative. Neurological: Positive for weakness. Hematological: Negative. Psychiatric/Behavioral: Negative. Prior to Visit Medications    Medication Sig Taking? Authorizing Provider   nitrofurantoin, macrocrystal-monohydrate, (MACROBID) 100 MG capsule Take 1 capsule by mouth 2 times daily for 10 days Yes Ole Castillo, APRN   benzonatate (TESSALON) 100 MG capsule Take 1 capsule by mouth 3 times daily as needed for Cough Do not chew. Linh Nuñez MD   guaiFENesin-codeine (TUSSI-ORGANIDIN NR) 100-10 MG/5ML syrup Take 5 mLs by mouth 3 times daily as needed for Cough for up to 3 days. May cause drowsiness. Do not take and drive.   Linh Nuñez MD   albuterol sulfate HFA (VENTOLIN HFA) 108 (90 Base) MCG/ACT inhaler Inhale 2 puffs into the lungs 4 times daily as needed for Wheezing or Shortness of Breath  Tony Khan MD   ibuprofen (ADVIL;MOTRIN) 600 MG tablet Take 1 tablet by mouth every 6 hours as needed for Pain or Fever Take with food. Tony Khan MD   ondansetron (ZOFRAN ODT) 4 MG disintegrating tablet Take 1 tablet by mouth every 8 hours as needed for Nausea or Vomiting  Tony Khan MD   metFORMIN (GLUCOPHAGE) 1000 MG tablet Take 1 tablet by mouth 2 times daily (with meals)  SARAY Bass   Cholecalciferol (VITAMIN D3) 1.25 MG (24100 UT) CAPS Take 1 capsule by mouth once a week  SARAY Bass   progesterone (PROMETRIUM) 100 MG capsule Take 1 capsule by mouth nightly Cycle day 16-25  SARAY Hernandez - CNP   pantoprazole (PROTONIX) 40 MG tablet Take 1 tablet by mouth daily Take daily first thing in the morning on an empty stomach. SARAY Calloway - NP   propranolol (INDERAL) 20 MG tablet Take 1 tablet by mouth 2 times daily  SARAY Bass   fluticasone (FLONASE) 50 MCG/ACT nasal spray 2 sprays by Nasal route daily  SARAY Bass       Social History     Tobacco Use    Smoking status: Never Smoker    Smokeless tobacco: Never Used   Substance Use Topics    Alcohol use: Yes     Comment: occ    Drug use: No        Allergies   Allergen Reactions    Corn-Containing Products     Sertraline      Pt. Developed seratonin syndrome and had to go impatient.      Shrimp (Diagnostic)     Soybean-Containing Drug Products     Wheat Extract    ,   Past Medical History:   Diagnosis Date    Concussion     approximately 10 years ago    Depression     Major depressive disorder     Food allergy     Hypothyroidism     Metabolic disorder     PCOS (polycystic ovarian syndrome)    ,   Past Surgical History:   Procedure Laterality Date    CHOLECYSTECTOMY      COLONOSCOPY  11/03/2014    Dr Zion Bañuelos,  Small non-bleeding internal hemorrhoids w/skin tags, serrated polyp     COLONOSCOPY N/A 10/21/2019    Dr Sulema Clement, 5 yr recall    KNEE SURGERY      LAPAROSCOPY N/A 1/31/2020    DIAGNOSTIC LAPAROSCOPY AND CHROMOPERTUBATION performed by Tyler Blanca MD at [address] [place] [address] AND ADENOIDECTOMY      UPPER GASTROINTESTINAL ENDOSCOPY  11/03/2014    Dr Leticia Syed, Gastritis, h pylori neg    UPPER GASTROINTESTINAL ENDOSCOPY N/A 10/21/2019    Dr Qing Medina   ,   Family History   Problem Relation Age of Onset    Polycystic Ovary Syndrome Mother     Colon Cancer Paternal Grandmother        PHYSICAL EXAMINATION:  [ INSTRUCTIONS:  \"[x]\" Indicates a positive item  \"[]\" Indicates a negative item  -- DELETE ALL ITEMS NOT EXAMINED]  Vital Signs: (As obtained by patient/caregiver at home)        Constitutional: [x] Appears well-developed and well-nourished [x] No apparent distress      [] Abnormal   Mental status  [x] Alert and awake  [x] Oriented to person/place/time [x]Able to follow commands        Eyes:  EOM    [x]  Normal  [] Abnormal-  Sclera  [x]  Normal  [] Abnormal -         Discharge []  None visible  [] Abnormal -    HENT:   [x] Normocephalic, atraumatic.   [] Abnormal   [x] Mouth/Throat: Mucous membranes are moist.     External Ears [x] Normal  [] Abnormal-    Neck: [x] No visualized mass     Pulmonary/Chest: [x] Respiratory effort normal.  [x] No visualized signs of difficulty breathing or respiratory distress        [] Abnormal      Musculoskeletal:   [x] Normal gait with no signs of ataxia         [x] Normal range of motion of neck        [] Abnormal       Neurological:        [x] No Facial Asymmetry (Cranial nerve 7 motor function) (limited exam to video visit)          [] No gaze palsy        [] Abnormal         Skin:        [x] No significant exanthematous lesions or discoloration noted on facial skin         [] Abnormal            Psychiatric:       [x] Normal Affect [] Abnormal        [] No Hallucinations    Other pertinent observable physical exam findings:-    Due to this being a TeleHealth encounter, evaluation of the following organ systems is limited: Vitals/Constitutional/EENT/Resp/CV/GI//MS/Neuro/Skin/Heme-Lymph-Imm. ASSESSMENT/PLAN:  1. COVID-19      2. Acute cystitis without hematuria    - nitrofurantoin, macrocrystal-monohydrate, (MACROBID) 100 MG capsule; Take 1 capsule by mouth 2 times daily for 10 days  Dispense: 20 capsule; Refill: 0      abx for UTI symptoms only if they return. Patient to increase fluids and stay hydrated. If nausea worsens ok to send in phenergan 25 mg daily PRN nausea. No follow-ups on file. An  electronic signature was used to authenticate this note. --SARAY Mccarthy on 12/17/2020 at 1:10 PM        Pursuant to the emergency declaration under the [facility], 1135 waiver authority and the [employer] Act, this Virtual  Visit was conducted, with patient's consent, to reduce the patient's risk of exposure to COVID-19 and provide continuity of care for an established patient. Services were provided through a video synchronous discussion virtually to substitute for in-person clinic visit.

## 2020-12-19 ASSESSMENT — ENCOUNTER SYMPTOMS
RHINORRHEA: 0
ABDOMINAL PAIN: 1
COUGH: 1
VOMITING: 0
SHORTNESS OF BREATH: 0
DIARRHEA: 0
NAUSEA: 1
EYE DISCHARGE: 0
BACK PAIN: 0

## 2020-12-23 ENCOUNTER — CARE COORDINATION (OUTPATIENT)
Dept: CARE COORDINATION | Age: 25
End: 2020-12-23

## 2021-01-07 DIAGNOSIS — E28.2 PCOS (POLYCYSTIC OVARIAN SYNDROME): ICD-10-CM

## 2021-01-07 RX ORDER — CHOLECALCIFEROL (VITAMIN D3) 1250 MCG
1 CAPSULE ORAL WEEKLY
Qty: 4 CAPSULE | Refills: 3 | Status: SHIPPED | OUTPATIENT
Start: 2021-01-07 | End: 2021-08-12 | Stop reason: SDUPTHER

## 2021-02-02 ENCOUNTER — OFFICE VISIT (OUTPATIENT)
Dept: PRIMARY CARE CLINIC | Age: 26
End: 2021-02-02
Payer: OTHER GOVERNMENT

## 2021-02-02 VITALS
BODY MASS INDEX: 44.41 KG/M2 | TEMPERATURE: 98 F | SYSTOLIC BLOOD PRESSURE: 132 MMHG | RESPIRATION RATE: 18 BRPM | WEIGHT: 293 LBS | DIASTOLIC BLOOD PRESSURE: 84 MMHG | HEART RATE: 102 BPM | OXYGEN SATURATION: 98 % | HEIGHT: 68 IN

## 2021-02-02 DIAGNOSIS — I10 ESSENTIAL HYPERTENSION: Primary | ICD-10-CM

## 2021-02-02 DIAGNOSIS — E55.9 VITAMIN D DEFICIENCY: ICD-10-CM

## 2021-02-02 DIAGNOSIS — I88.1 CHRONIC LYMPHADENITIS: ICD-10-CM

## 2021-02-02 DIAGNOSIS — R00.2 PALPITATION: ICD-10-CM

## 2021-02-02 LAB
ALBUMIN SERPL-MCNC: 4.5 G/DL (ref 3.5–5.2)
ALP BLD-CCNC: 61 U/L (ref 35–104)
ALT SERPL-CCNC: 20 U/L (ref 5–33)
ANION GAP SERPL CALCULATED.3IONS-SCNC: 14 MMOL/L (ref 7–19)
AST SERPL-CCNC: 18 U/L (ref 5–32)
BASOPHILS ABSOLUTE: 0.1 K/UL (ref 0–0.2)
BASOPHILS RELATIVE PERCENT: 0.6 % (ref 0–1)
BILIRUB SERPL-MCNC: 0.3 MG/DL (ref 0.2–1.2)
BUN BLDV-MCNC: 11 MG/DL (ref 6–20)
CALCIUM SERPL-MCNC: 9.6 MG/DL (ref 8.6–10)
CHLORIDE BLD-SCNC: 103 MMOL/L (ref 98–111)
CO2: 23 MMOL/L (ref 22–29)
CREAT SERPL-MCNC: 0.7 MG/DL (ref 0.5–0.9)
EOSINOPHILS ABSOLUTE: 0.4 K/UL (ref 0–0.6)
EOSINOPHILS RELATIVE PERCENT: 4.3 % (ref 0–5)
GFR AFRICAN AMERICAN: >59
GFR NON-AFRICAN AMERICAN: >60
GLUCOSE BLD-MCNC: 95 MG/DL (ref 74–109)
HBA1C MFR BLD: 5.9 % (ref 4–6)
HCT VFR BLD CALC: 38.5 % (ref 37–47)
HEMOGLOBIN: 12.6 G/DL (ref 12–16)
IMMATURE GRANULOCYTES #: 0 K/UL
LYMPHOCYTES ABSOLUTE: 3 K/UL (ref 1.1–4.5)
LYMPHOCYTES RELATIVE PERCENT: 30.9 % (ref 20–40)
MAGNESIUM: 1.5 MG/DL (ref 1.6–2.6)
MCH RBC QN AUTO: 27.5 PG (ref 27–31)
MCHC RBC AUTO-ENTMCNC: 32.7 G/DL (ref 33–37)
MCV RBC AUTO: 84.1 FL (ref 81–99)
MONOCYTES ABSOLUTE: 0.8 K/UL (ref 0–0.9)
MONOCYTES RELATIVE PERCENT: 7.9 % (ref 0–10)
NEUTROPHILS ABSOLUTE: 5.5 K/UL (ref 1.5–7.5)
NEUTROPHILS RELATIVE PERCENT: 55.9 % (ref 50–65)
PDW BLD-RTO: 14.2 % (ref 11.5–14.5)
PLATELET # BLD: 306 K/UL (ref 130–400)
PMV BLD AUTO: 10.7 FL (ref 9.4–12.3)
POTASSIUM SERPL-SCNC: 4.4 MMOL/L (ref 3.5–5)
RBC # BLD: 4.58 M/UL (ref 4.2–5.4)
SODIUM BLD-SCNC: 140 MMOL/L (ref 136–145)
TOTAL PROTEIN: 7.4 G/DL (ref 6.6–8.7)
TSH REFLEX FT4: 2.62 UIU/ML (ref 0.35–5.5)
VITAMIN B-12: 389 PG/ML (ref 211–946)
VITAMIN D 25-HYDROXY: 41.4 NG/ML
WBC # BLD: 9.8 K/UL (ref 4.8–10.8)

## 2021-02-02 PROCEDURE — 99214 OFFICE O/P EST MOD 30 MIN: CPT | Performed by: NURSE PRACTITIONER

## 2021-02-02 SDOH — ECONOMIC STABILITY: INCOME INSECURITY: HOW HARD IS IT FOR YOU TO PAY FOR THE VERY BASICS LIKE FOOD, HOUSING, MEDICAL CARE, AND HEATING?: NOT HARD AT ALL

## 2021-02-02 SDOH — ECONOMIC STABILITY: FOOD INSECURITY: WITHIN THE PAST 12 MONTHS, YOU WORRIED THAT YOUR FOOD WOULD RUN OUT BEFORE YOU GOT MONEY TO BUY MORE.: NEVER TRUE

## 2021-02-02 SDOH — ECONOMIC STABILITY: TRANSPORTATION INSECURITY
IN THE PAST 12 MONTHS, HAS THE LACK OF TRANSPORTATION KEPT YOU FROM MEDICAL APPOINTMENTS OR FROM GETTING MEDICATIONS?: NO

## 2021-02-02 SDOH — ECONOMIC STABILITY: TRANSPORTATION INSECURITY
IN THE PAST 12 MONTHS, HAS LACK OF TRANSPORTATION KEPT YOU FROM MEETINGS, WORK, OR FROM GETTING THINGS NEEDED FOR DAILY LIVING?: NO

## 2021-02-02 ASSESSMENT — PATIENT HEALTH QUESTIONNAIRE - PHQ9
SUM OF ALL RESPONSES TO PHQ QUESTIONS 1-9: 0
SUM OF ALL RESPONSES TO PHQ QUESTIONS 1-9: 0

## 2021-02-02 ASSESSMENT — ENCOUNTER SYMPTOMS
GASTROINTESTINAL NEGATIVE: 1
RESPIRATORY NEGATIVE: 1
EYES NEGATIVE: 1

## 2021-02-09 ENCOUNTER — HOSPITAL ENCOUNTER (OUTPATIENT)
Dept: CT IMAGING | Age: 26
Discharge: HOME | End: 2021-02-09
Payer: OTHER GOVERNMENT

## 2021-02-09 DIAGNOSIS — I88.1 CHRONIC LYMPHADENITIS: ICD-10-CM

## 2021-02-09 PROCEDURE — 6360000004 HC RX CONTRAST MEDICATION: Performed by: NURSE PRACTITIONER

## 2021-02-09 PROCEDURE — 70491 CT SOFT TISSUE NECK W/DYE: CPT

## 2021-02-09 RX ORDER — MAGNESIUM 200 MG
200 TABLET ORAL DAILY
Qty: 30 TABLET | Refills: 5 | Status: SHIPPED | OUTPATIENT
Start: 2021-02-09

## 2021-02-09 RX ADMIN — IOPAMIDOL 75 ML: 755 INJECTION, SOLUTION INTRAVENOUS at 11:39

## 2021-02-10 ENCOUNTER — TELEPHONE (OUTPATIENT)
Dept: PRIMARY CARE CLINIC | Age: 26
End: 2021-02-10

## 2021-02-10 DIAGNOSIS — R59.9 ENLARGED LYMPH NODE: Primary | ICD-10-CM

## 2021-02-10 DIAGNOSIS — R93.89 ABNORMAL CT SCAN, NECK: ICD-10-CM

## 2021-02-10 NOTE — TELEPHONE ENCOUNTER
Pt has been notified of results and VU. I have placed order for referral to [id number] [facility] pt aware they will call her to schedule appt.

## 2021-02-10 NOTE — TELEPHONE ENCOUNTER
----- Message from SARAY Quinteros sent at 2/9/2021  9:52 PM CST -----  Please let patient know that CT has returned. Everything appear benign, but the radiologist did report having some enlarged that are likely reactive. With the length of her having these lymphnodes lets refer to ENT to let them take a look at the CT and make sure they agree.

## 2021-03-02 ENCOUNTER — OFFICE VISIT (OUTPATIENT)
Dept: ENT CLINIC | Age: 26
End: 2021-03-02
Payer: OTHER GOVERNMENT

## 2021-03-02 VITALS
HEIGHT: 68 IN | DIASTOLIC BLOOD PRESSURE: 80 MMHG | WEIGHT: 293 LBS | SYSTOLIC BLOOD PRESSURE: 162 MMHG | BODY MASS INDEX: 44.41 KG/M2

## 2021-03-02 DIAGNOSIS — M54.2 NECK PAIN: Primary | ICD-10-CM

## 2021-03-02 PROCEDURE — 99214 OFFICE O/P EST MOD 30 MIN: CPT | Performed by: PHYSICIAN ASSISTANT

## 2021-03-02 ASSESSMENT — ENCOUNTER SYMPTOMS
RHINORRHEA: 0
VOICE CHANGE: 0
SINUS PAIN: 0
EYE PAIN: 0
EYE DISCHARGE: 0
TROUBLE SWALLOWING: 0
FACIAL SWELLING: 0
SORE THROAT: 0
SINUS PRESSURE: 0

## 2021-03-02 NOTE — ASSESSMENT & PLAN NOTE
Left anterior cervical neck pain with no evidence of lymphadenopathy  Plan: Due to the patient having a normal exam. I advised the patient to follow-up with us on an as needed basis. Due to the patient's extreme fatigue and a negative rheumatological work-up, I advised the patient that she may consider an endocrinology referral for further evaluation.

## 2021-03-02 NOTE — PROGRESS NOTES
[facility] OTOLARYNGOLOGY/ENT  Kemi Angelo is a pleasant 23 y/o, b/f that was referred by Love Magana due to problems with questionable lymphadenopathy to the left anterior cervical region. She underwent a CT of the soft tissue of the neck which demonstrated no remarkable lymphadenopathy. I reviewed the CT with the patient and agree with the report. Patient denies any issues with weight changes, night sweats, or any history of trauma to the upper extremity such as scratches. She reports that her father has a history of Hodgkin's lymphoma that was diagnosed in his 35s. Due to that history she is concerned. She reports that she has been seen by the rheumatologist due to elevations of C-reactive protein and SHELLEY but was noted to be nondiagnostic for anything specific. Allergies: Corn-containing products, Sertraline, Shrimp (diagnostic), Soybean-containing drug products, and Wheat extract      Current Outpatient Medications   Medication Sig Dispense Refill    metFORMIN (GLUCOPHAGE) 1000 MG tablet Take 1 tablet by mouth 2 times daily (with meals) 60 tablet 5    magnesium 200 MG TABS tablet Take 1 tablet by mouth daily 30 tablet 5    progesterone (PROMETRIUM) 100 MG capsule Take 1 capsule by mouth nightly Cycle day 16-25 30 capsule 5    Cholecalciferol (VITAMIN D3) 1.25 MG (09923 UT) CAPS Take 1 capsule by mouth once a week 4 capsule 3    ibuprofen (ADVIL;MOTRIN) 600 MG tablet Take 1 tablet by mouth every 6 hours as needed for Pain or Fever Take with food. 20 tablet 1    ondansetron (ZOFRAN ODT) 4 MG disintegrating tablet Take 1 tablet by mouth every 8 hours as needed for Nausea or Vomiting 20 tablet 0    pantoprazole (PROTONIX) 40 MG tablet Take 1 tablet by mouth daily Take daily first thing in the morning on an empty stomach.  90 tablet 3    fluticasone (FLONASE) 50 MCG/ACT nasal spray 2 sprays by Nasal route daily 1 Bottle 0    albuterol sulfate HFA (VENTOLIN HFA) 108 (90 Base) MCG/ACT inhaler Inhale 2 drip, rhinorrhea, sinus pressure, sinus pain, sneezing, sore throat, tinnitus, trouble swallowing and voice change. Eyes: Negative for pain and discharge. Comments:     PHYSICAL EXAM:    BP (!) 162/80   Ht 5' 8\" (1.727 m)   Wt (!) 313 lb (142 kg)   BMI 47.59 kg/m²   Body mass index is 47.59 kg/m². General Appearance: well developed  and well nourished  Head/ Face: normocephalic and atraumatic  Vocal Quality: good/ normal  Ears: Right Ear: External: external ears normal Otoscopy Ear Canal: canal clear Otoscopy TM: TM's normal and TM's mobile Left Ear: External: external ears normal Otoscopy Ear Canal: canal clear Otoscopy TM: TM's normal and TM's mobile  Hearing: grossly intact  Nose: nares normal and septum midline  Neck: supple, adenopathy none palpable and mass No  Thyroid: normal and nodules No   Cervical exam demonstrated no lymphadenopathy to the anterior and posterior aspects. She was noted to have evidence of a muscle spasm to the left lateral neck consistent with a spasm of the trapezius muscle. I feel this may be the source of her discomfort. Axillary exam was also performed and demonstrated no lymphadenopathy bilaterally. Patient requested evaluation of a palpable mass to the abdominal wall of her panniculus. This is noted be consistent with either fat necrosis or a lipoma but nothing suspicious. Assessment & Plan:    Problem List Items Addressed This Visit     Neck pain - Primary     Left anterior cervical neck pain with no evidence of lymphadenopathy  Plan: Due to the patient having a normal exam. I advised the patient to follow-up with us on an as needed basis. Due to the patient's extreme fatigue and a negative rheumatological work-up, I advised the patient that she may consider an endocrinology referral for further evaluation. No orders of the defined types were placed in this encounter.       No orders of the defined types were placed in this encounter. Electronically signed by Satnam Meek PA-C on 3/2/21 at 10:18 AM CST          Please note that this chart was generated using dragon dictation software. Although every effort was made to ensure the accuracy of this automated transcription, some errors in transcription may have occurred.

## 2021-03-19 ENCOUNTER — VIRTUAL VISIT (OUTPATIENT)
Dept: PRIMARY CARE CLINIC | Age: 26
End: 2021-03-19
Payer: OTHER GOVERNMENT

## 2021-03-19 ENCOUNTER — TELEPHONE (OUTPATIENT)
Dept: PRIMARY CARE CLINIC | Age: 26
End: 2021-03-19

## 2021-03-19 DIAGNOSIS — N92.6 MISSED MENSES: ICD-10-CM

## 2021-03-19 DIAGNOSIS — I88.1 CHRONIC LYMPHADENITIS: ICD-10-CM

## 2021-03-19 DIAGNOSIS — Z80.3 FAMILY HISTORY OF BREAST CANCER: ICD-10-CM

## 2021-03-19 DIAGNOSIS — Z80.0 FAMILY HISTORY OF COLON CANCER: ICD-10-CM

## 2021-03-19 DIAGNOSIS — Z80.49 FAMILY HISTORY OF UTERINE CANCER: ICD-10-CM

## 2021-03-19 DIAGNOSIS — Z80.7 FAMILY HISTORY OF HODGKIN'S DISEASE: ICD-10-CM

## 2021-03-19 DIAGNOSIS — R59.9 ENLARGED LYMPH NODE: Primary | ICD-10-CM

## 2021-03-19 LAB
BASOPHILS ABSOLUTE: 0 K/UL (ref 0–0.2)
BASOPHILS RELATIVE PERCENT: 0.4 % (ref 0–1)
EOSINOPHILS ABSOLUTE: 0.3 K/UL (ref 0–0.6)
EOSINOPHILS RELATIVE PERCENT: 2.7 % (ref 0–5)
GONADOTROPIN, CHORIONIC (HCG) QUANT: 0.1 MIU/ML (ref 0–5.3)
HCG QUALITATIVE: NEGATIVE
HCT VFR BLD CALC: 38.8 % (ref 37–47)
HEMOGLOBIN: 12.3 G/DL (ref 12–16)
IMMATURE GRANULOCYTES #: 0 K/UL
LYMPHOCYTES ABSOLUTE: 2.5 K/UL (ref 1.1–4.5)
LYMPHOCYTES RELATIVE PERCENT: 26.5 % (ref 20–40)
MCH RBC QN AUTO: 27.7 PG (ref 27–31)
MCHC RBC AUTO-ENTMCNC: 31.7 G/DL (ref 33–37)
MCV RBC AUTO: 87.4 FL (ref 81–99)
MONOCYTES ABSOLUTE: 0.7 K/UL (ref 0–0.9)
MONOCYTES RELATIVE PERCENT: 7.8 % (ref 0–10)
NEUTROPHILS ABSOLUTE: 5.9 K/UL (ref 1.5–7.5)
NEUTROPHILS RELATIVE PERCENT: 62.3 % (ref 50–65)
PDW BLD-RTO: 14.3 % (ref 11.5–14.5)
PLATELET # BLD: 272 K/UL (ref 130–400)
PMV BLD AUTO: 11.2 FL (ref 9.4–12.3)
RBC # BLD: 4.44 M/UL (ref 4.2–5.4)
WBC # BLD: 9.4 K/UL (ref 4.8–10.8)

## 2021-03-19 PROCEDURE — 99214 OFFICE O/P EST MOD 30 MIN: CPT | Performed by: NURSE PRACTITIONER

## 2021-03-19 ASSESSMENT — ENCOUNTER SYMPTOMS
GASTROINTESTINAL NEGATIVE: 1
RESPIRATORY NEGATIVE: 1
EYES NEGATIVE: 1

## 2021-03-19 NOTE — PROGRESS NOTES
[address] [place] [address]     Phone:  [phone]  Fax:  [phone]      3/19/2021    TELEHEALTH EVALUATION -- Audio/Visual ([facility])    HPI:    Chief Complaint   Patient presents with    Lymphadenopathy         Rupali Alvares (:  1995) has requested an audio/video evaluation for the following concern(s): This is a VV for follow up on enlarged lymph nodes. She did go to [facility]NT and she reports that she did not have a good experience. She would like to discuss getting a second opinion. She is also concerned that her hearing is decreasing as well. She has a family history of Hodgkin's and Non Hodgkin's lymphoma in her father. She also reports having a significant family history of breast cancer along with colon cancer. She did have precancerous polyps removed on her last colonoscopy. She also reports a family history of ovarian and uterine cancer. Review of Systems   Constitutional: Negative. HENT:        Lymph nodes enlarged   Eyes: Negative. Respiratory: Negative. Cardiovascular: Negative. Gastrointestinal: Negative. Endocrine: Negative. Genitourinary: Negative. Musculoskeletal: Negative. Skin: Negative. Neurological: Negative. Hematological: Negative. Psychiatric/Behavioral: Negative. Prior to Visit Medications    Medication Sig Taking?  Authorizing Provider   metFORMIN (GLUCOPHAGE) 1000 MG tablet Take 1 tablet by mouth 2 times daily (with meals)  SARAY Kaplan   magnesium 200 MG TABS tablet Take 1 tablet by mouth daily  SARAY Kaplan   progesterone (PROMETRIUM) 100 MG capsule Take 1 capsule by mouth nightly Cycle day 16-25  SARAY Patel - CNP   Cholecalciferol (VITAMIN D3) 1.25 MG (29804 UT) CAPS Take 1 capsule by mouth once a week  SARAY Kaplan   albuterol sulfate HFA (VENTOLIN HFA) 108 (90 Base) MCG/ACT inhaler Inhale 2 puffs into the lungs 4 times daily as needed for Wheezing or Shortness of Breath  Patient not taking: Reported on 3/2/2021  Yvette Pereira MD   ibuprofen (ADVIL;MOTRIN) 600 MG tablet Take 1 tablet by mouth every 6 hours as needed for Pain or Fever Take with food. Yvette Pereira MD   ondansetron (ZOFRAN ODT) 4 MG disintegrating tablet Take 1 tablet by mouth every 8 hours as needed for Nausea or Vomiting  Yvette Pereira MD   pantoprazole (PROTONIX) 40 MG tablet Take 1 tablet by mouth daily Take daily first thing in the morning on an empty stomach. SARAY Bain - NP   propranolol (INDERAL) 20 MG tablet Take 1 tablet by mouth 2 times daily  Patient not taking: Reported on 3/2/2021  SARAY Goodwin   fluticasone Mott Borne) 50 MCG/ACT nasal spray 2 sprays by Nasal route daily  SARAY Goodwin       Social History     Tobacco Use    Smoking status: Never Smoker    Smokeless tobacco: Never Used   Substance Use Topics    Alcohol use: Yes     Comment: occ    Drug use: No        Allergies   Allergen Reactions    Corn-Containing Products     Sertraline      Pt. Developed seratonin syndrome and had to go impatient.      Shrimp (Diagnostic)     Soybean-Containing Drug Products     Wheat Extract    ,   Past Medical History:   Diagnosis Date    Concussion     approximately 10 years ago    Depression     Major depressive disorder     Food allergy     Hypothyroidism     Metabolic disorder     PCOS (polycystic ovarian syndrome)    ,   Past Surgical History:   Procedure Laterality Date    CHOLECYSTECTOMY      COLONOSCOPY  11/03/2014    Dr Stanton Osborn,  Small non-bleeding internal hemorrhoids w/skin tags, serrated polyp     COLONOSCOPY N/A 10/21/2019    Dr Jordan Clarke, 5 yr recall    KNEE SURGERY      LAPAROSCOPY N/A 1/31/2020    DIAGNOSTIC LAPAROSCOPY AND CHROMOPERTUBATION performed by Horacio Martinez MD at [address] [place] [address] ENDOSCOPY  11/03/2014    Dr Deandre Goel, Gastritis, h pylori neg    UPPER GASTROINTESTINAL ENDOSCOPY N/A 10/21/2019    Dr Agustin Hernandez   ,   Family History   Problem Relation Age of Onset    Polycystic Ovary Syndrome Mother     Cancer Father     Colon Cancer Paternal Grandmother     Cancer Maternal Aunt     Thyroid Disease Maternal Grandmother     Cancer Maternal Grandmother     Cancer Paternal Grandfather        PHYSICAL EXAMINATION:  [ INSTRUCTIONS:  \"[x]\" Indicates a positive item  \"[]\" Indicates a negative item  -- DELETE ALL ITEMS NOT EXAMINED]  Vital Signs: (As obtained by patient/caregiver at home)        Constitutional: [x] Appears well-developed and well-nourished [x] No apparent distress      [] Abnormal   Mental status  [x] Alert and awake  [x] Oriented to person/place/time [x]Able to follow commands        Eyes:  EOM    [x]  Normal  [] Abnormal-  Sclera  [x]  Normal  [] Abnormal -         Discharge []  None visible  [] Abnormal -    HENT:   [x] Normocephalic, atraumatic.   [] Abnormal   [x] Mouth/Throat: Mucous membranes are moist.     External Ears [x] Normal  [] Abnormal-    Neck: [x] No visualized mass     Pulmonary/Chest: [x] Respiratory effort normal.  [x] No visualized signs of difficulty breathing or respiratory distress        [] Abnormal      Musculoskeletal:   [x] Normal gait with no signs of ataxia         [x] Normal range of motion of neck        [] Abnormal       Neurological:        [x] No Facial Asymmetry (Cranial nerve 7 motor function) (limited exam to video visit)          [] No gaze palsy        [] Abnormal         Skin:        [x] No significant exanthematous lesions or discoloration noted on facial skin         [] Abnormal            Psychiatric:       [x] Normal Affect [] Abnormal        [] No Hallucinations    Other pertinent observable physical exam findings:-    Due to this being a TeleHealth encounter, evaluation of the following organ systems is limited: Vitals/Constitutional/EENT/Resp/CV/GI//MS/Neuro/Skin/Heme-Lymph-Imm. ASSESSMENT/PLAN:  1. Enlarged lymph node      2. Chronic lymphadenitis    - External Referral To ENT  - CBC Auto Differential; Future    3. Missed menses    - HCG Qualitative, Serum; Future  - HCG, Quantitative, Pregnancy; Future    4. Family history of colon cancer      5. Family history of uterine cancer      6. Family history of breast cancer      7. Family history of Hodgkin's disease      Referral to genetics counseling for cancer markers. Referral to non-[facility] ENT per patient request.  I am checking labs including hCG due to patient missing her menses. Return in about 2 months (around 5/19/2021) for Office Visit, Follow up chronic conditions. An  electronic signature was used to authenticate this note. --SARAY Goodwin on 3/19/2021 at 8:36 AM        Pursuant to the emergency declaration under the [facility], [facility] waiver authority and the [employer] and [employer] Act, this Virtual  Visit was conducted, with patient's consent, to reduce the patient's risk of exposure to COVID-19 and provide continuity of care for an established patient. Services were provided through a video synchronous discussion virtually to substitute for in-person clinic visit.

## 2021-03-19 NOTE — TELEPHONE ENCOUNTER
----- Message from SARAY Lawson sent at 3/19/2021 11:41 AM CDT -----  Please let patient know that labs were negative. But she is still very early on.   If she continues to have amenorrhea in 2 weeks we can recheck levels then

## 2021-03-19 NOTE — TELEPHONE ENCOUNTER
Per SARAY Bocanegra she would like to get patient set up with genetics services. Please let me know if there is anything you need from our office to get her scheduled. Thank you!

## 2021-03-19 NOTE — Clinical Note
Kori Alberts!  I was hoping we could get this patient set up for genetics testing. She has a ton of family history of different cancers. Just let me know your thoughts!  Thanks!!

## 2021-03-24 ENCOUNTER — PATIENT MESSAGE (OUTPATIENT)
Dept: OBGYN CLINIC | Age: 26
End: 2021-03-24

## 2021-03-24 NOTE — TELEPHONE ENCOUNTER
From: Justin Hernandez  To: Almita Jaimes, APRN - CNP  Sent: 3/24/2021 8:06 AM CDT  Subject: Non-Urgent Medical Question    I have been trying to get pregnant, and tracking my cycles has been difficult. I have been tracking it for almost a year now, and have been taking monthly ovulation tests based on my period, rather than days since my cycle is irregular at times. I read that people with PCOS may have inaccurate readings on store bought ovulation tests, so I was wondering if there was a way that we could track my ovulation through testing for a more accurate timeframe of when we need to inseminate. Or if there is any specific things I should be doing to track it better. I just finished my cycle for this month (March 17-23).

## 2021-03-26 ENCOUNTER — TELEPHONE (OUTPATIENT)
Dept: PRIMARY CARE CLINIC | Age: 26
End: 2021-03-26

## 2021-03-26 NOTE — TELEPHONE ENCOUNTER
----- Message from SARAY Chacko sent at 3/19/2021 11:41 AM CDT -----  Please let patient know that labs were negative. But she is still very early on.   If she continues to have amenorrhea in 2 weeks we can recheck levels then

## 2021-04-12 ENCOUNTER — TELEMEDICINE (OUTPATIENT)
Dept: OBGYN CLINIC | Age: 26
End: 2021-04-12
Payer: OTHER GOVERNMENT

## 2021-04-12 DIAGNOSIS — N92.6 IRREGULAR MENSES: Primary | ICD-10-CM

## 2021-04-12 DIAGNOSIS — N92.6 IRREGULAR MENSES: ICD-10-CM

## 2021-04-12 LAB — PROGESTERONE LEVEL: 5.73 NG/ML

## 2021-04-12 PROCEDURE — 99214 OFFICE O/P EST MOD 30 MIN: CPT | Performed by: NURSE PRACTITIONER

## 2021-04-12 ASSESSMENT — ENCOUNTER SYMPTOMS
DIARRHEA: 0
GASTROINTESTINAL NEGATIVE: 1
RESPIRATORY NEGATIVE: 1
ALLERGIC/IMMUNOLOGIC NEGATIVE: 1
CONSTIPATION: 0
EYES NEGATIVE: 1

## 2021-04-12 NOTE — PATIENT INSTRUCTIONS
get pregnant if you have sex. Or you may prefer to use a home ovulation test.  For men  · Avoid hot tubs and saunas. · If you get sick and have a fever, try to control your fever. A high fever may reduce your sperm count for months. · If you exercise very hard most days of the week, reduce how much exercise you do. Hard, long exercise may lower your sperm count. · Eat a healthy diet and stay at a healthy weight. Limit alcohol to 2 drinks a day. For both men and women  · If the woman knows when she will ovulate, try to have sex once a day for the 4 days before ovulation and on the day of ovulation. If the man has a low sperm count, have sex every other day. · If the woman does not know when she will ovulate, have sex 2 or 3 times each week. · Don't use lubricants during sex. They may affect how well sperm can travel to meet the woman's egg. · Avoid smoking, marijuana, and illegal drugs. When should you call for help? Watch closely for changes in your health, and be sure to contact your doctor if you have any problems. Where can you learn more? Go to https://jaja.tvpeCellular Bioengineeringeb.healthCooking.compartners. org and sign in to your Intiza account. Enter Y105 in the PhoneFusion box to learn more about \"Infertility: Care Instructions. \"     If you do not have an account, please click on the \"Sign Up Now\" link. Current as of: October 8, 2020               Content Version: 12.8  © 2006-2021 [employer], [facility]. Care instructions adapted under license by [facility] ([facility]). If you have questions about a medical condition or this instruction, always ask your healthcare professional. [place] [address] any warranty or liability for your use of this information.

## 2021-04-12 NOTE — PROGRESS NOTES
2021    TELEHEALTH EVALUATION -- Audio/Visual (During [facility]-79 public health emergency)    HPI:    Brielle Villegas (:  1995) has requested an audio/video evaluation for the following concern(s):    Pt presents to discuss irregular cycles and fertility. Currently CD 25 and did AI this cycle with Bel Donor from [place]. Having some mild breast pains and pains in her uterus. Taking Prometrium at bedtime. This was 3rd attempt with AI, thinking they may want to go to [facility] next cycle if not pregnant this cycle. Was having regular periods prior to TTC, now are more irregular. Not always accurate OPK with PCOS, but has been taking Metformin. Questions if there is anything else they can be doing. Review of Systems   Constitutional: Negative. HENT: Negative. Eyes: Negative. Respiratory: Negative. Cardiovascular: Negative. Gastrointestinal: Negative. Negative for constipation and diarrhea. Endocrine: Negative. Genitourinary: Positive for menstrual problem. Negative for frequency and urgency. Musculoskeletal: Negative. Skin: Negative. Allergic/Immunologic: Negative. Neurological: Negative. Hematological: Negative. Psychiatric/Behavioral: Negative. All other systems reviewed and are negative. Prior to Visit Medications    Medication Sig Taking? Authorizing Provider   metFORMIN (GLUCOPHAGE) 1000 MG tablet Take 1 tablet by mouth 2 times daily (with meals)  SARAY De Leon   magnesium 200 MG TABS tablet Take 1 tablet by mouth daily  Micnino Eric APRNATALIE   progesterone (PROMETRIUM) 100 MG capsule Take 1 capsule by mouth nightly Cycle day 16-25  Victorina Shoulder, APRN - CNP   Cholecalciferol (VITAMIN D3) 1.25 MG (79228 UT) CAPS Take 1 capsule by mouth once a week  SARAY De Leon   ibuprofen (ADVIL;MOTRIN) 600 MG tablet Take 1 tablet by mouth every 6 hours as needed for Pain or Fever Take with food.   Gerald Sanabria MD   ondansetron (ZOFRAN ODT) 4 MG disintegrating tablet Take 1 tablet by mouth every 8 hours as needed for Nausea or Vomiting  Debbie Sams MD   pantoprazole (PROTONIX) 40 MG tablet Take 1 tablet by mouth daily Take daily first thing in the morning on an empty stomach. SARAY Maynard - NP   fluticasone Loral Aschoff) 50 MCG/ACT nasal spray 2 sprays by Nasal route daily  SARAY Hahn       Social History     Tobacco Use    Smoking status: Never Smoker    Smokeless tobacco: Never Used   Substance Use Topics    Alcohol use: Yes     Comment: occ    Drug use: No        Allergies   Allergen Reactions    Corn-Containing Products     Sertraline      Pt. Developed seratonin syndrome and had to go impatient.      Shrimp (Diagnostic)     Soybean-Containing Drug Products     Wheat Extract    ,   Past Medical History:   Diagnosis Date    Concussion     approximately 10 years ago    Depression     Major depressive disorder     Food allergy     Hypothyroidism     Metabolic disorder     PCOS (polycystic ovarian syndrome)    ,   Past Surgical History:   Procedure Laterality Date    CHOLECYSTECTOMY      COLONOSCOPY  11/03/2014    Dr Isaias Caldwell,  Small non-bleeding internal hemorrhoids w/skin tags, serrated polyp     COLONOSCOPY N/A 10/21/2019    Dr Cynthia Shipman, 5 yr recall    KNEE SURGERY      LAPAROSCOPY N/A 1/31/2020    DIAGNOSTIC LAPAROSCOPY AND CHROMOPERTUBATION performed by Paddy Singleton MD at [address] [place] [address] AND ADENOIDECTOMY      UPPER GASTROINTESTINAL ENDOSCOPY  11/03/2014    Dr Isaias Caldwell, Gastritis, h pylori neg    UPPER GASTROINTESTINAL ENDOSCOPY N/A 10/21/2019    Dr Cynthia Shipman   ,   Social History     Tobacco Use    Smoking status: Never Smoker    Smokeless tobacco: Never Used   Substance Use Topics    Alcohol use: Yes     Comment: occ    Drug use: No       PHYSICAL EXAMINATION:  [ INSTRUCTIONS:  \"[x]\" Indicates a positive item  \"[]\" Indicates a pursuant to the emergency declaration under the [facility], [facility] authority and the [employer] and [employer] General Act. Patient identification was verified, and a caregiver was present when appropriate. The patient was located in a state where the provider was credentialed to provide care. Total time spent on this encounter: Not billed by time    --SARAY Simeon CNP on 4/12/2021 at 11:58 AM    An electronic signature was used to authenticate this note.

## 2021-04-16 ENCOUNTER — TELEPHONE (OUTPATIENT)
Dept: PRIMARY CARE CLINIC | Age: 26
End: 2021-04-16

## 2021-04-23 ENCOUNTER — HOSPITAL ENCOUNTER (OUTPATIENT)
Dept: ULTRASOUND IMAGING | Age: 26
Discharge: HOME | End: 2021-04-23
Payer: OTHER GOVERNMENT

## 2021-04-23 DIAGNOSIS — N92.6 IRREGULAR MENSES: ICD-10-CM

## 2021-04-23 PROCEDURE — 76830 TRANSVAGINAL US NON-OB: CPT

## 2021-04-26 ENCOUNTER — PATIENT MESSAGE (OUTPATIENT)
Dept: OBGYN CLINIC | Age: 26
End: 2021-04-26

## 2021-04-26 NOTE — TELEPHONE ENCOUNTER
From: Earline Meza  To: SARAY Bob - CNP  Sent: 4/26/2021 2:09 PM CDT  Subject: Test Results Question    Should I prepare to take Clomid this month or next? If so, what is the timeframe for that?

## 2021-05-04 ENCOUNTER — OFFICE VISIT (OUTPATIENT)
Dept: OTOLARYNGOLOGY | Facility: CLINIC | Age: 26
End: 2021-05-04

## 2021-05-04 VITALS
HEIGHT: 68 IN | SYSTOLIC BLOOD PRESSURE: 144 MMHG | HEART RATE: 104 BPM | BODY MASS INDEX: 44.41 KG/M2 | DIASTOLIC BLOOD PRESSURE: 91 MMHG | WEIGHT: 293 LBS

## 2021-05-04 DIAGNOSIS — H91.21 SUDDEN RIGHT HEARING LOSS: ICD-10-CM

## 2021-05-04 DIAGNOSIS — H92.13 OTORRHEA OF BOTH EARS: ICD-10-CM

## 2021-05-04 DIAGNOSIS — H60.8X3 CHRONIC REACTIVE OTITIS EXTERNA OF BOTH EARS: ICD-10-CM

## 2021-05-04 DIAGNOSIS — R59.0 CERVICAL ADENOPATHY: Primary | ICD-10-CM

## 2021-05-04 PROBLEM — E84.8: Status: ACTIVE | Noted: 2021-05-04

## 2021-05-04 PROCEDURE — 99203 OFFICE O/P NEW LOW 30 MIN: CPT | Performed by: OTOLARYNGOLOGY

## 2021-05-04 NOTE — PATIENT INSTRUCTIONS
EAR CARE: :using oil  Use a hair dryer on low heat and blow into the ear canals 2 times daily to keep ears dry.  DO Not use Q tips or Wendie pins, ever!!  Do not use alcohol in the ear canal (this will cause dryness and itching)  NO peroxide or alcohol in ears  Oil: Use Sweet oil, Olive oil, or Mineral oil, purchased over the counter, 2 to 3 times a week, Do not use Q tips, You may use a hair dryer on low heat. Blow in ears for 10-15 seconds 2 times daily to dry ear canals or if ear canals are itching.    No Q tips        CONTACT INFORMATION:  The main office phone number is [phone]. For emergencies after hours and on weekends, this number will convert over to our answering service and the on call provider will answer. Please try to keep non emergent phone calls/ questions to office hours 9am-5pm Monday through Friday.     IceBreaker  As an alternative, you can sign up and use the Epic MyChart system for more direct and quicker access for non emergent questions/ problems.  [facility] IceBreaker allows you to send messages to your doctor, view your test results, renew your prescriptions, schedule appointments, and more. To sign up, go to SuperDimension and click on the Sign Up Now link in the New User? box. Enter your IceBreaker Activation Code exactly as it appears below along with the last four digits of your Social Security Number and your Date of Birth () to complete the sign-up process. If you do not sign up before the expiration date, you must request a new code.    IceBreaker Activation Code: [email or web address]  Expires: 6/3/2021  2:29 PM    If you have questions, you can email [email or web address] or call [phone] to talk to our IceBreaker staff. Remember, IceBreaker is NOT to be used for urgent needs. For medical emergencies, dial 911.        
no

## 2021-05-04 NOTE — PROGRESS NOTES
[facility] [place] [facility] [place]    Chief Complaint   Patient presents with   • Swollen Glands          HPI   New Patient  Accompanied by:  No one  Jimenez Alston is a  25 y.o. female with swollen lymph nodes.  Lymph nodes present for years, at least 2 years and points to L posterior neck. L submax nodes present, not sure how long.  She says tender at times.  She has seen ENT and he did not answer questions.  Father with Non- hodgkins and Hodgkins.  Smoke- none  Drink- rarely  Growing some. She is concerned.  She mentions hearing loss.  Hearing- new complaint.  Patient is a history of's right sudden sensorineural hearing loss approximately 1/2 to 2 years ago.  She has had transtympanic steroids.  She has made mild improvement in her right hearing.  She now wishes advice as to what she can do next.  She was seen by Dr. Kuhn for this.                 Vital Signs:   Heart Rate:  [104] 104  BP: (144)/(91) 144/91    Physical Exam  Vitals reviewed.   Constitutional:       Appearance: Normal appearance. She is well-developed. She is obese.   HENT:      Head: Normocephalic and atraumatic.      Right Ear: Hearing, tympanic membrane, ear canal and external ear normal.      Left Ear: Hearing, tympanic membrane, ear canal and external ear normal.      Nose: Septal deviation (L to R ant inf to posterior mid severe) present. No mucosal edema or rhinorrhea.      Right Turbinates: Not enlarged.      Left Turbinates: Not enlarged.      Mouth/Throat:      Lips: Pink.      Mouth: Mucous membranes are moist.      Dentition: Normal dentition. No dental tenderness.      Tongue: No lesions. Tongue does not deviate from midline.      Palate: Lesions present. No mass.      Pharynx: Oropharynx is clear. Uvula midline.      Tonsils: 0 on the right. 0 on the left.   Eyes:      General: Lids are normal. Gaze aligned appropriately.      Extraocular Movements: Extraocular movements intact.      Conjunctiva/sclera:  Conjunctivae normal.   Neck:      Thyroid: No thyroid mass or thyromegaly.   Pulmonary:      Effort: Pulmonary effort is normal. No respiratory distress.      Breath sounds: No stridor.   Musculoskeletal:         General: Normal range of motion.      Cervical back: Normal range of motion.   Lymphadenopathy:      Cervical: Cervical adenopathy present.      Right cervical: No superficial, deep or posterior cervical adenopathy.     Left cervical: Superficial cervical adenopathy (Shotty L posterior and inferior neck, Submax small node behind Submax gland) present.   Skin:     Findings: No rash.   Neurological:      General: No focal deficit present.      Mental Status: She is alert.      Cranial Nerves: No cranial nerve deficit.   Psychiatric:         Behavior: Behavior normal. Behavior is cooperative.         Thought Content: Thought content normal.         Judgment: Judgment normal.             SnapShot   Notes   Encounters  Labs   Imaging   [place]   Rslt Rev   :23}    Result Review    RESULTS REVIEW:    I have reviewed the patients old records in the chart.  The following results/records were reviewed:  I reviewed the patient's new imaging results and agree with the interpretation.    REFERRAL/PRE-AUTH MRN - SCAN - ENT Referral (03/23/2021)            Assessment:        Diagnosis Plan   1. Cervical adenopathy      Subcentimeter   2. Sudden right hearing loss  Comprehensive Hearing Test    By history, not acute for this visit   3. Chronic reactive otitis externa of both ears      From Q-tip use, traumatic   4. Otorrhea of both ears      From Q-tip use              Plan:        Conservative management.  Patient has subcentimeter nodes.  I do not feel that these are pathologic nor do they require biopsy at this point time I have discussed my thoughts with the patient.  I have offered her the option of following these serially or doing a selective neck dissection to remove these very small nodes.  She elects at  this time to follow the nodes.  She appears more interested in having her hearing assessed.  I have taken a brief history regarding her hearing.  She was not set up for this in the appointment.  I will get an audiogram and then discussed with her her hearing options at this point in time.  She then notes ear drainage.  She apparently uses Q-tips and notes yellow drainage periodically from her ears.  I will have her stop using Q-tips and begin using ear care with oil.  Audio  Ear care with oil  No Q tips        Orders Placed This Encounter   Procedures   • Comprehensive Hearing Test       MY CHART:  Patient is Encouraged to enroll in My Chart  Encouraged to review data and findings in My Chart    Patient understand(s) and agree(s) with the treatment plan as described.    Return After audio, for Recheck neck and hearing.            Skip Bolaños Jr, MD  05/04/21  15:40 CDT

## 2021-05-10 ENCOUNTER — OFFICE VISIT (OUTPATIENT)
Dept: GASTROENTEROLOGY | Age: 26
End: 2021-05-10
Payer: OTHER GOVERNMENT

## 2021-05-10 VITALS
WEIGHT: 293 LBS | HEART RATE: 86 BPM | BODY MASS INDEX: 44.41 KG/M2 | SYSTOLIC BLOOD PRESSURE: 120 MMHG | DIASTOLIC BLOOD PRESSURE: 80 MMHG | HEIGHT: 68 IN | OXYGEN SATURATION: 98 %

## 2021-05-10 DIAGNOSIS — R19.7 DIARRHEA, UNSPECIFIED TYPE: ICD-10-CM

## 2021-05-10 DIAGNOSIS — R10.10 UPPER ABDOMINAL PAIN: ICD-10-CM

## 2021-05-10 DIAGNOSIS — R19.5 STOOL COLOR ABNORMAL: ICD-10-CM

## 2021-05-10 DIAGNOSIS — R10.10 UPPER ABDOMINAL PAIN: Primary | ICD-10-CM

## 2021-05-10 LAB
ALBUMIN SERPL-MCNC: 4.4 G/DL (ref 3.5–5.2)
ALP BLD-CCNC: 66 U/L (ref 35–104)
ALT SERPL-CCNC: 21 U/L (ref 5–33)
ANION GAP SERPL CALCULATED.3IONS-SCNC: 11 MMOL/L (ref 7–19)
AST SERPL-CCNC: 21 U/L (ref 5–32)
BASOPHILS ABSOLUTE: 0.1 K/UL (ref 0–0.2)
BASOPHILS RELATIVE PERCENT: 0.4 % (ref 0–1)
BILIRUB SERPL-MCNC: 0.3 MG/DL (ref 0.2–1.2)
BUN BLDV-MCNC: 9 MG/DL (ref 6–20)
CALCIUM SERPL-MCNC: 9.4 MG/DL (ref 8.6–10)
CHLORIDE BLD-SCNC: 105 MMOL/L (ref 98–111)
CO2: 26 MMOL/L (ref 22–29)
CREAT SERPL-MCNC: 0.7 MG/DL (ref 0.5–0.9)
EOSINOPHILS ABSOLUTE: 1.1 K/UL (ref 0–0.6)
EOSINOPHILS RELATIVE PERCENT: 10.1 % (ref 0–5)
GFR AFRICAN AMERICAN: >59
GFR NON-AFRICAN AMERICAN: >60
GLUCOSE BLD-MCNC: 110 MG/DL (ref 74–109)
HCT VFR BLD CALC: 40.7 % (ref 37–47)
HEMOGLOBIN: 12.7 G/DL (ref 12–16)
IMMATURE GRANULOCYTES #: 0 K/UL
LIPASE: 20 U/L (ref 13–60)
LYMPHOCYTES ABSOLUTE: 3.5 K/UL (ref 1.1–4.5)
LYMPHOCYTES RELATIVE PERCENT: 31.1 % (ref 20–40)
MCH RBC QN AUTO: 27.2 PG (ref 27–31)
MCHC RBC AUTO-ENTMCNC: 31.2 G/DL (ref 33–37)
MCV RBC AUTO: 87.2 FL (ref 81–99)
MONOCYTES ABSOLUTE: 0.9 K/UL (ref 0–0.9)
MONOCYTES RELATIVE PERCENT: 7.7 % (ref 0–10)
NEUTROPHILS ABSOLUTE: 5.7 K/UL (ref 1.5–7.5)
NEUTROPHILS RELATIVE PERCENT: 50.3 % (ref 50–65)
PDW BLD-RTO: 13.5 % (ref 11.5–14.5)
PLATELET # BLD: 302 K/UL (ref 130–400)
PMV BLD AUTO: 11 FL (ref 9.4–12.3)
POTASSIUM SERPL-SCNC: 4.4 MMOL/L (ref 3.5–5)
RBC # BLD: 4.67 M/UL (ref 4.2–5.4)
SODIUM BLD-SCNC: 142 MMOL/L (ref 136–145)
TOTAL PROTEIN: 7.7 G/DL (ref 6.6–8.7)
WBC # BLD: 11.2 K/UL (ref 4.8–10.8)

## 2021-05-10 PROCEDURE — 99214 OFFICE O/P EST MOD 30 MIN: CPT | Performed by: NURSE PRACTITIONER

## 2021-05-10 RX ORDER — SUCRALFATE 1 G/1
1 TABLET ORAL 4 TIMES DAILY
Qty: 120 TABLET | Refills: 3 | Status: SHIPPED | OUTPATIENT
Start: 2021-05-10 | End: 2022-04-05 | Stop reason: ALTCHOICE

## 2021-05-10 RX ORDER — PANTOPRAZOLE SODIUM 40 MG/1
40 TABLET, DELAYED RELEASE ORAL DAILY
Qty: 90 TABLET | Refills: 3 | Status: SHIPPED | OUTPATIENT
Start: 2021-05-10

## 2021-05-10 ASSESSMENT — ENCOUNTER SYMPTOMS
RECTAL PAIN: 0
COLOR CHANGE: 1
BLOOD IN STOOL: 0
VOMITING: 0
SHORTNESS OF BREATH: 0
COUGH: 0
ABDOMINAL DISTENTION: 0
CONSTIPATION: 0
DIARRHEA: 1
TROUBLE SWALLOWING: 0
ANAL BLEEDING: 0
ABDOMINAL PAIN: 1
NAUSEA: 1
CHOKING: 0

## 2021-05-10 NOTE — PATIENT INSTRUCTIONS
Patient Education        sucralfate (oral)  Pronunciation:  Yue oneil  Brand:  Carafate  What is the most important information I should know about sucralfate? The liquid form of sucralfate should never be injected through a needle into the body, or death may occur. What is sucralfate? Sucralfate is used short-term (up to 8 weeks) to treat an active duodenal ulcer. Sucralfate works mainly in the lining of the stomach and is not highly absorbed into the body. This medicine adheres to ulcer sites and protects them from acids, enzymes, and bile salts. Sucralfate can heal an active ulcer, but it will not prevent future ulcers from occurring. Sucralfate may also be used for purposes not listed in this medication guide. What should I discuss with my healthcare provider before taking sucralfate? You should not use sucralfate if you are allergic to it. Tell your doctor if you have ever had:  · diabetes;  · kidney disease (or if you are on dialysis); or  · trouble swallowing tablets. Tell your doctor if you are pregnant or breastfeeding. Sucralfate is not approved for use by anyone younger than 25years old. How should I take sucralfate? Follow all directions on your prescription label and read all medication guides or instruction sheets. Use the medicine exactly as directed. Take sucralfate on an empty stomach. Shake the oral suspension (liquid) before you measure a dose. Use the dosing syringe provided, or use a medicine dose-measuring device (not a kitchen spoon). The liquid from of this medicine should never be injected through a needle into the body, or death may occur. Sucralfate oral suspension is to be taken only by mouth. If you are diabetic, check your blood sugar regularly. Your doctor may adjust your dose based on your blood sugar levels. It may take 2 to 8 weeks before you receive the full benefit of taking sucralfate.  Sucralfate should not be taken for longer than 8 weeks at a time.  Use this medicine for the full prescribed length of time, even if your symptoms quickly improve. Store at room temperature away from moisture and heat. Do not allow the liquid medicine to freeze. What happens if I miss a dose? Take the medicine as soon as you can, but skip the missed dose if it is almost time for your next dose. Do not take two doses at one time. What happens if I overdose? Seek emergency medical attention or call the Poison Help line at 1-935.945.8529. What should I avoid while taking sucralfate? Avoid taking any other medications within 2 hours before or after you take sucralfate. Sucralfate can make it harder for your body to absorb other medications you take by mouth. Ask your doctor before using an antacid, and use only the type your doctor recommends. Some antacids can make it harder for sucralfate to work in your stomach. Avoid taking an antacid within 30 minutes before or after taking sucralfate. What are the possible side effects of sucralfate? Get emergency medical help if you have signs of an allergic reaction: hives; difficult breathing; swelling of your face, lips, tongue, or throat. Common side effects may include:  · constipation, diarrhea;  · nausea, vomiting, upset stomach;  · itching, rash;  · dizziness, drowsiness;  · sleep problems (insomnia);  · headache; or  · back pain. This is not a complete list of side effects and others may occur. Call your doctor for medical advice about side effects. You may report side effects to FDA at 2-363-JCF-5963. What other drugs will affect sucralfate? Other drugs may affect sucralfate, including prescription and over-the-counter medicines, vitamins, and herbal products. Tell your doctor about all your current medicines and any medicine you start or stop using. Where can I get more information? Your pharmacist can provide more information about sucralfate.   Remember, keep this and all other medicines out of the reach of children, never share your medicines with others, and use this medication only for the indication prescribed. Every effort has been made to ensure that the information provided by [address]aila [address] is accurate, up-to-date, and complete, but no guarantee is made to that effect. Drug information contained herein may be time sensitive. [facility] information has been compiled for use by healthcare practitioners and consumers in the [place] and therefore [facility] does not warrant that uses outside of the [place] are appropriate, unless specifically indicated otherwise. [facility]'s drug information does not endorse drugs, diagnose patients or recommend therapy. [facility]'s drug information is an informational resource designed to assist licensed healthcare practitioners in caring for their patients and/or to serve consumers viewing this service as a supplement to, and not a substitute for, the expertise, skill, knowledge and judgment of healthcare practitioners. The absence of a warning for a given drug or drug combination in no way should be construed to indicate that the drug or drug combination is safe, effective or appropriate for any given patient. [facility] does not assume any responsibility for any aspect of healthcare administered with the aid of information [facility] provides. The information contained herein is not intended to cover all possible uses, directions, precautions, warnings, drug interactions, allergic reactions, or adverse effects. If you have questions about the drugs you are taking, check with your doctor, nurse or pharmacist.  Copyright 8102-4379 [address]. Version: 9.01. Revision date: 3/26/2019. Care instructions adapted under license by [facility] ([facility]). If you have questions about a medical condition or this instruction, always ask your healthcare professional. [place] [address] any warranty or liability for your use of this information.

## 2021-05-10 NOTE — PROGRESS NOTES
Subjective:     Patient ID: Otis Hernandes is a 22 y.o. female  PCP: SARAY Spain  Referring Provider: No ref. provider found    HPI  Patient presents to the office today with the following complaints: Abdominal Pain      Abdominal Pain  Patient complains of abdominal pain. The pain is located in the upper abdomen. The pain is described as tender to touch. Onset was one week ago. Aggravating factors include food. Alleviating factors include none. Associated symptoms include diarrhea and nausea. She reports 6-8 BM a day. The patient denies melena. Stools are described as light gray. Solid stool yesterday described as white. She has previously been on Protonix in the past.  She is requesting refill on this today. She will use NSAIDs \"maybe once a month. \"     She also reports toes will turn white and numb, noticed first February. This is painful, comes and goes, about once a month. She also mentions tingling in tips of fingers with change in color. Last EGD 10/2019 - normal   Last Colonoscopy 10/2019 - normal  Positive family history of colon cancer - Paternal Grandmother    Assessment:     1. Upper abdominal pain    2. Stool color abnormal    3.  Diarrhea, unspecified type            Plan:   - Labs today (CBC, CMP, Lipase)  - Begin Protonix  - Trial of Carafate  - Follow up in 4-6 weeks   - Call with any questions or concerns       Orders  Orders Placed This Encounter   Procedures    CBC Auto Differential     Standing Status:   Future     Number of Occurrences:   1     Standing Expiration Date:   5/10/2022    Comprehensive Metabolic Panel     Standing Status:   Future     Number of Occurrences:   1     Standing Expiration Date:   5/10/2022    Lipase     Standing Status:   Future     Number of Occurrences:   1     Standing Expiration Date:   5/10/2022     Medications  Orders Placed This Encounter   Medications    pantoprazole (PROTONIX) 40 MG tablet     Sig: Take 1 tablet by mouth daily Take daily first thing in the morning on an empty stomach.      Dispense:  90 tablet     Refill:  3    sucralfate (CARAFATE) 1 GM tablet     Sig: Take 1 tablet by mouth 4 times daily     Dispense:  120 tablet     Refill:  3         Patient History:     Past Medical History:   Diagnosis Date    Concussion     approximately 10 years ago    Depression     Major depressive disorder     Food allergy     Hypothyroidism     Metabolic disorder     PCOS (polycystic ovarian syndrome)        Past Surgical History:   Procedure Laterality Date    CHOLECYSTECTOMY      COLONOSCOPY  11/03/2014    Dr Logan Naqvi,  Small non-bleeding internal hemorrhoids w/skin tags, serrated polyp     COLONOSCOPY N/A 10/21/2019    Dr Serena Pablo, 5 yr recall    KNEE SURGERY      LAPAROSCOPY N/A 1/31/2020    DIAGNOSTIC LAPAROSCOPY AND CHROMOPERTUBATION performed by Yandy Little MD at [address] [place] [address] AND ADENOIDECTOMY      UPPER GASTROINTESTINAL ENDOSCOPY  11/03/2014    Dr Logan Naqvi, Gastritis, h pylori neg    UPPER GASTROINTESTINAL ENDOSCOPY N/A 10/21/2019    Dr Serena Pablo       Family History   Problem Relation Age of Onset    Polycystic Ovary Syndrome Mother     Cancer Father     Colon Cancer Paternal Grandmother     Cancer Maternal Aunt     Thyroid Disease Maternal Grandmother     Cancer Maternal Grandmother     Cancer Paternal Grandfather     Colon Polyps Neg Hx     Esophageal Cancer Neg Hx        Social History     Socioeconomic History    Marital status:      Spouse name: None    Number of children: None    Years of education: None    Highest education level: None   Occupational History    None   Social Needs    Financial resource strain: Not hard at all   [place] insecurity     Worry: Never true     Inability: None    Transportation needs     Medical: No     Non-medical: No   Tobacco Use    Smoking status: Never Smoker    Smokeless tobacco: Never Used   Substance and Sexual Activity    Alcohol use: Yes     Comment: occ    Drug use: No    Sexual activity: Yes     Partners: Female   Lifestyle    Physical activity     Days per week: None     Minutes per session: None    Stress: None   Relationships    Social connections     Talks on phone: None     Gets together: None     Attends Yarsani service: None     Active member of club or organization: None     Attends meetings of clubs or organizations: None     Relationship status: None    Intimate partner violence     Fear of current or ex partner: None     Emotionally abused: None     Physically abused: None     Forced sexual activity: None   Other Topics Concern    None   Social History Narrative    None       Current Outpatient Medications   Medication Sig Dispense Refill    pantoprazole (PROTONIX) 40 MG tablet Take 1 tablet by mouth daily Take daily first thing in the morning on an empty stomach. 90 tablet 3    sucralfate (CARAFATE) 1 GM tablet Take 1 tablet by mouth 4 times daily 120 tablet 3    clomiPHENE (CLOMID) 50 MG tablet Take 1 tablet by mouth daily 5 tablet 0    metFORMIN (GLUCOPHAGE) 1000 MG tablet Take 1 tablet by mouth 2 times daily (with meals) 60 tablet 5    magnesium 200 MG TABS tablet Take 1 tablet by mouth daily 30 tablet 5    progesterone (PROMETRIUM) 100 MG capsule Take 1 capsule by mouth nightly Cycle day 16-25 30 capsule 5    Cholecalciferol (VITAMIN D3) 1.25 MG (41085 UT) CAPS Take 1 capsule by mouth once a week 4 capsule 3    ibuprofen (ADVIL;MOTRIN) 600 MG tablet Take 1 tablet by mouth every 6 hours as needed for Pain or Fever Take with food. 20 tablet 1    ondansetron (ZOFRAN ODT) 4 MG disintegrating tablet Take 1 tablet by mouth every 8 hours as needed for Nausea or Vomiting 20 tablet 0    fluticasone (FLONASE) 50 MCG/ACT nasal spray 2 sprays by Nasal route daily 1 Bottle 0     No current facility-administered medications for this visit.         Allergies Allergen Reactions    Corn-Containing Products     Sertraline      Pt. Developed seratonin syndrome and had to go impatient.  Shrimp (Diagnostic)     Soybean-Containing Drug Products     Wheat Extract        Review of Systems   Constitutional: Negative for activity change, appetite change, fatigue, fever and unexpected weight change. HENT: Negative for trouble swallowing. Respiratory: Negative for cough, choking and shortness of breath. Cardiovascular: Negative for chest pain. Gastrointestinal: Positive for abdominal pain, diarrhea and nausea. Negative for abdominal distention, anal bleeding, blood in stool, constipation, rectal pain and vomiting. Skin: Positive for color change. Allergic/Immunologic: Negative for food allergies. Neurological: Positive for numbness. All other systems reviewed and are negative. Objective:     /80   Pulse 86   Ht 5' 8\" (1.727 m)   Wt (!) 315 lb (142.9 kg)   SpO2 98%   BMI 47.90 kg/m²     Physical Exam  Vitals signs reviewed. Constitutional:       General: She is not in acute distress. Appearance: She is well-developed. HENT:      Head: Normocephalic and atraumatic. Right Ear: External ear normal.      Left Ear: External ear normal.      Nose: Nose normal.      Comments: Mask on     Mouth/Throat:      Comments: Mask on  Eyes:      General: No scleral icterus. Right eye: No discharge. Left eye: No discharge. Conjunctiva/sclera: Conjunctivae normal.      Pupils: Pupils are equal, round, and reactive to light. Neck:      Musculoskeletal: Normal range of motion and neck supple. Cardiovascular:      Rate and Rhythm: Normal rate and regular rhythm. Heart sounds: Normal heart sounds. No murmur. Pulmonary:      Effort: Pulmonary effort is normal. No respiratory distress. Breath sounds: Normal breath sounds. No wheezing or rales.    Abdominal:      General: Bowel sounds are normal. There is no distension. Palpations: Abdomen is soft. There is no mass. Tenderness: There is no abdominal tenderness. There is no guarding or rebound. Musculoskeletal: Normal range of motion. Skin:     General: Skin is warm and dry. Coloration: Skin is not pale. Neurological:      Mental Status: She is alert and oriented to person, place, and time.    Psychiatric:         Behavior: Behavior normal.

## 2021-05-21 ENCOUNTER — VIRTUAL VISIT (OUTPATIENT)
Dept: GASTROENTEROLOGY | Age: 26
End: 2021-05-21
Payer: OTHER GOVERNMENT

## 2021-05-21 DIAGNOSIS — R19.7 DIARRHEA, UNSPECIFIED TYPE: ICD-10-CM

## 2021-05-21 DIAGNOSIS — K21.9 GASTROESOPHAGEAL REFLUX DISEASE, UNSPECIFIED WHETHER ESOPHAGITIS PRESENT: ICD-10-CM

## 2021-05-21 DIAGNOSIS — R11.0 NAUSEA: ICD-10-CM

## 2021-05-21 DIAGNOSIS — R10.10 UPPER ABDOMINAL PAIN: Primary | ICD-10-CM

## 2021-05-21 PROCEDURE — 99214 OFFICE O/P EST MOD 30 MIN: CPT | Performed by: NURSE PRACTITIONER

## 2021-05-21 ASSESSMENT — ENCOUNTER SYMPTOMS
RECTAL PAIN: 0
ABDOMINAL DISTENTION: 0
ABDOMINAL PAIN: 1
COUGH: 0
TROUBLE SWALLOWING: 0
CHOKING: 0
VOMITING: 0
BLOOD IN STOOL: 0
NAUSEA: 1
ANAL BLEEDING: 0
CONSTIPATION: 0
SHORTNESS OF BREATH: 0
DIARRHEA: 1

## 2021-05-21 NOTE — PATIENT INSTRUCTIONS
Schedule colonoscopy and endoscopy. Do not eat or drink after midnight the day of the procedure. Allowed medications can be taken with a small sip of water. Please review your prep instructions for allowed medications. You will not be able to drive for 24 hours after the procedure due to sedation. Must have a responsible adult, 18 years or older, accompany you to drive you home the day of your procedure. If you are on blood thinners, clearance from the prescribing physician will be obtained before your procedure is scheduled. If it is determined it is not safe to hold these medications for a short time an alternative procedure for evaluation may be recommended. No aspirin, ibuprofen, naproxen, fish oil or vitamin E for 5 days before procedure. Risks of colonoscopy and endoscopy include, but are not limited to, perforation, bleeding, and infection, Risk of perforation and bleeding are increased if there is a polyp removed or dilation completed. Anesthesia risks will be reviewed with you before the procedure by a member of the anesthesia department. Your physician may also schedule a follow up appointment with the nurse practitioner to discuss pathology, symptoms or to check if you have had any problems related to your procedure. If you prefer not to return to the office after your procedure please discuss this with your physician on the day of your colonoscopy. The physician will talk with you and/or your family after the procedure is completed. Final recommendations are based on the pathologist report if biopsies or specimens are taken. If polyps are removed during the procedure they will be sent to a pathologist for analysis. Unless you have a follow up appointment scheduled, you will be notified by mail of the pathology results within 4 weeks. If you have not received results after 4 weeks you may call the office to obtain this information. For Colonoscopy:   You will be given specific directions regarding restrictions to diet and bowel prep instructions including laxatives. Please read these instructions one week prior to your scheduled procedure to ensure that you are prepared. If you have any questions regarding these instructions please call our office Mon through Fri from 8:00 am to 4:00 pm.     Follow prep instructions provided for bowel prep. Take all of the bowel prep as directed. If you are having problems with nausea, stop your prep for 30-45 min to allow the nausea to subside before resuming your prep. It is important to drink plenty of fluids throughout the day before taking your laxatives. This will help to protect your kidneys, prevent dehydration and maximize the effect of the bowel prep. Your diet before a colonoscopy bowel preparation is very important to ensure a successful colon exam. It is recommended to consider certain changes to your diet three to four days prior to the procedure. Remember that your bowels need to be completely empty for the exam.    What foods are good to eat? Cut down on heavy solid foods three to four days before the procedure and start introducing lighter meals to your diet. The following food suggestions are a good part of your diet before a colonoscopy bowel preparation.  Light meat that is easily digestible such as chicken (without the skin)    Potatoes without skin    Cheese    Eggs    A light meal of steamed white fish    Light clear soups    Foods and drinks to avoid  Avoid foods that contain too much fiber. Stay clear of dark colored beverages. They can stick to the walls of the digestive tract and make it difficult to differentiate from blood.  Some of these foods are:   Red meat, rice, nuts and vegetables    Milk, other milk based fluids and cream    Most fruit and puddings    Whole grain pasta    Cereals, bran and seeds    Colored beverages, especially those that are red or purple in color    Red colored Jell-O   On

## 2021-05-21 NOTE — PROGRESS NOTES
2021    TELEHEALTH EVALUATION -- Audio/Visual (During WCTXV-72 public health emergency)    HPI:    Naun Calloway (:  1995) has requested an audio/video evaluation for the following concern(s):  Chief Complaint   Patient presents with    Follow-up       Pt seen today for follow up on upper abdominal pain. PPI and Carafate were prescribed at last OV. Today, she continues to have c/o abdominal pain with diarrhea. She will go up to 7 times after a meal.  Stools are watery, mucous laden. She denies any blood in stool. She reports nausea and continued bile reflux that will wake her in the night. Symptoms have been present for one month and worsening. Pt has  family history of colon cancer and personal history of colon polyps. LAST HPI 5/10/2021  Abdominal Pain  Patient complains of abdominal pain. The pain is located in the upper abdomen. The pain is described as tender to touch. Onset was one week ago. Aggravating factors include food. Alleviating factors include none. Associated symptoms include diarrhea and nausea. She reports 6-8 BM a day. The patient denies melena. Stools are described as light gray. Solid stool yesterday described as white. She has previously been on Protonix in the past.  She is requesting refill on this today. She will use NSAIDs \"maybe once a month. \"   She also reports toes will turn white and numb, noticed first February. This is painful, comes and goes, about once a month. She also mentions tingling in tips of fingers with change in color. Last EGD 10/2019 - normal   Last Colonoscopy 10/2019 - normal  Positive family history of colon cancer - Paternal Grandmother    Review of Systems   Constitutional: Negative for activity change, appetite change, fatigue, fever and unexpected weight change. HENT: Negative for trouble swallowing. Respiratory: Negative for cough, choking and shortness of breath. Cardiovascular: Negative for chest pain. Gastrointestinal: Positive for abdominal pain, diarrhea and nausea. Negative for abdominal distention, anal bleeding, blood in stool, constipation, rectal pain and vomiting. Allergic/Immunologic: Negative for food allergies. All other systems reviewed and are negative. Prior to Visit Medications    Medication Sig Taking? Authorizing Provider   pantoprazole (PROTONIX) 40 MG tablet Take 1 tablet by mouth daily Take daily first thing in the morning on an empty stomach. SARAY Yoo NP   sucralfate (CARAFATE) 1 GM tablet Take 1 tablet by mouth 4 times daily  SARAY Yoo NP   clomiPHENE (CLOMID) 50 MG tablet Take 1 tablet by mouth daily  SARAY Rosenbaum CNP   metFORMIN (GLUCOPHAGE) 1000 MG tablet Take 1 tablet by mouth 2 times daily (with meals)  SARAY Carranza   magnesium 200 MG TABS tablet Take 1 tablet by mouth daily  SARAY Carranza   progesterone (PROMETRIUM) 100 MG capsule Take 1 capsule by mouth nightly Cycle day 16-25  SARAY Rosenbaum CNP   Cholecalciferol (VITAMIN D3) 1.25 MG (40051 UT) CAPS Take 1 capsule by mouth once a week  SARAY Carranza   ibuprofen (ADVIL;MOTRIN) 600 MG tablet Take 1 tablet by mouth every 6 hours as needed for Pain or Fever Take with food. Re Smith MD   ondansetron (ZOFRAN ODT) 4 MG disintegrating tablet Take 1 tablet by mouth every 8 hours as needed for Nausea or Vomiting  Re Smith MD   fluticasone (FLONASE) 50 MCG/ACT nasal spray 2 sprays by Nasal route daily  SARAY Carranza       Social History     Tobacco Use    Smoking status: Never Smoker    Smokeless tobacco: Never Used   Vaping Use    Vaping Use: Never assessed   Substance Use Topics    Alcohol use: Yes     Comment: occ    Drug use: No        Allergies   Allergen Reactions    Corn-Containing Products     Sertraline      Pt. Developed seratonin syndrome and had to go impatient.      Shrimp (Diagnostic)     Soybean-Containing Drug Products     Wheat Extract    ,   Past Medical History:   Diagnosis Date    Concussion     approximately 10 years ago    Depression     Major depressive disorder     Food allergy     Hypothyroidism     Metabolic disorder     PCOS (polycystic ovarian syndrome)    ,   Past Surgical History:   Procedure Laterality Date    CHOLECYSTECTOMY      COLONOSCOPY  11/03/2014    Dr Izabella Kan,  Small non-bleeding internal hemorrhoids w/skin tags, serrated polyp     COLONOSCOPY N/A 10/21/2019    Dr Alia Tena, 5 yr recall    KNEE SURGERY      LAPAROSCOPY N/A 1/31/2020    DIAGNOSTIC LAPAROSCOPY AND CHROMOPERTUBATION performed by Vilma Miguel MD at [address] [place] [address] POLYPECTOMY      TONSILLECTOMY AND ADENOIDECTOMY      UPPER GASTROINTESTINAL ENDOSCOPY  11/03/2014    Dr Izabella Kan, Gastritis, h pylori neg    UPPER GASTROINTESTINAL ENDOSCOPY N/A 10/21/2019    Dr Alia Tena   ,   Social History     Tobacco Use    Smoking status: Never Smoker    Smokeless tobacco: Never Used   Vaping Use    Vaping Use: Never assessed   Substance Use Topics    Alcohol use: Yes     Comment: occ    Drug use: No   ,   Family History   Problem Relation Age of Onset    Polycystic Ovary Syndrome Mother     Cancer Father     Colon Cancer Paternal Grandmother     Cancer Maternal Aunt     Thyroid Disease Maternal Grandmother     Cancer Maternal Grandmother     Cancer Paternal Grandfather     Colon Polyps Neg Hx     Esophageal Cancer Neg Hx        PHYSICAL EXAMINATION:  [ INSTRUCTIONS:  \"[x]\" Indicates a positive item  \"[]\" Indicates a negative item  -- DELETE ALL ITEMS NOT EXAMINED]  Vital Signs: (As obtained by patient/caregiver or practitioner observation)    Blood pressure-  Heart rate-    Respiratory rate-    Temperature-  Pulse oximetry-     Constitutional: [x] Appears well-developed and well-nourished [x] No apparent distress      [] Abnormal-   Mental status  [x] Alert and awake  [x] Oriented to person/place/time [x]Able to follow commands      Eyes:  EOM    [x]  Normal  [] Abnormal-  Sclera  [x]  Normal  [] Abnormal -         Discharge [x]  None visible  [] Abnormal -    HENT:   [x] Normocephalic, atraumatic. [] Abnormal   [x] Mouth/Throat: Mucous membranes are moist.     External Ears [x] Normal  [] Abnormal-     Neck: [x] No visualized mass     Pulmonary/Chest: [x] Respiratory effort normal.  [x] No visualized signs of difficulty breathing or respiratory distress        [] Abnormal-      Musculoskeletal:   [x] Normal gait with no signs of ataxia         [x] Normal range of motion of neck        [] Abnormal-       Neurological:        [x] No Facial Asymmetry (Cranial nerve 7 motor function) (limited exam to video visit)          [x] No gaze palsy        [] Abnormal-         Skin:        [x] No significant exanthematous lesions or discoloration noted on facial skin         [] Abnormal-            Psychiatric:       [x] Normal Affect [x] No Hallucinations        [] Abnormal-     Other pertinent observable physical exam findings-     ASSESSMENT/PLAN:  1. Upper abdominal pain  2. Diarrhea, unspecified type  3. Gastroesophageal reflux disease, unspecified whether esophagitis present  4. Nausea    - Continue PPI and Carafate  - Anti-reflux precautions  - Schedule colonoscopy and endoscopy  Instruct on bowel prep. Nothing to eat or drink after midnight the day of the exam.  Unable to drive for 24 hours after the procedure. No aspirin or nonsteroidal anti-inflammatories for 5 days before procedure. I have discussed the benefits, alternatives, and risks (including bleeding, perforation and death)  for pursuing Endoscopy (EGD/Colonscopy/EUS/ERCP) with the patient and they are willing to continue. We also discussed the need for anesthesia, IV access, proper dietary changes, medication changes if necessary, and need for bowel prep (if ordered) prior to their Endoscopic procedure.   They are aware they must have someone accompany them to their scheduled procedure to drive them home - they agree to the above and are willing to continue. Return for procedure follow up or sooner if needed. Rodriguez Cordova, was evaluated through a synchronous (real-time) audio-video encounter. The patient (or guardian if applicable) is aware that this is a billable service. Verbal consent to proceed has been obtained within the past 12 months. The visit was conducted pursuant to the emergency declaration under the [address] [place] [address] authority and the [employer] General Act. Patient identification was verified, and a caregiver was present when appropriate. The patient was located in a state where the provider was credentialed to provide care. Total time spent on this encounter: Not billed by time    --SARAY Yoo NP on 5/21/2021 at 10:39 AM    An electronic signature was used to authenticate this note.

## 2021-05-31 ENCOUNTER — OFFICE VISIT (OUTPATIENT)
Age: 26
End: 2021-05-31

## 2021-05-31 VITALS — HEART RATE: 86 BPM | TEMPERATURE: 98.8 F | OXYGEN SATURATION: 97 %

## 2021-05-31 DIAGNOSIS — Z11.59 SCREENING FOR VIRAL DISEASE: Primary | ICD-10-CM

## 2021-05-31 LAB — SARS-COV-2, PCR: NOT DETECTED

## 2021-05-31 PROCEDURE — 99999 PR OFFICE/OUTPT VISIT,PROCEDURE ONLY: CPT | Performed by: NURSE PRACTITIONER

## 2021-06-01 ENCOUNTER — OFFICE VISIT (OUTPATIENT)
Dept: PRIMARY CARE CLINIC | Age: 26
End: 2021-06-01
Payer: OTHER GOVERNMENT

## 2021-06-01 VITALS
TEMPERATURE: 97.6 F | SYSTOLIC BLOOD PRESSURE: 130 MMHG | WEIGHT: 293 LBS | DIASTOLIC BLOOD PRESSURE: 84 MMHG | BODY MASS INDEX: 44.41 KG/M2 | OXYGEN SATURATION: 98 % | HEIGHT: 68 IN | HEART RATE: 89 BPM | RESPIRATION RATE: 18 BRPM

## 2021-06-01 DIAGNOSIS — I73.00 RAYNAUD'S PHENOMENON WITHOUT GANGRENE: ICD-10-CM

## 2021-06-01 DIAGNOSIS — E28.2 PCOS (POLYCYSTIC OVARIAN SYNDROME): ICD-10-CM

## 2021-06-01 DIAGNOSIS — R10.13 EPIGASTRIC PAIN: ICD-10-CM

## 2021-06-01 DIAGNOSIS — I10 ESSENTIAL HYPERTENSION: Primary | ICD-10-CM

## 2021-06-01 PROCEDURE — 99214 OFFICE O/P EST MOD 30 MIN: CPT | Performed by: NURSE PRACTITIONER

## 2021-06-01 ASSESSMENT — ENCOUNTER SYMPTOMS
ABDOMINAL PAIN: 1
DIARRHEA: 1
EYES NEGATIVE: 1
RESPIRATORY NEGATIVE: 1

## 2021-06-01 NOTE — PROGRESS NOTES
GASTROINTESTINAL ENDOSCOPY N/A 10/21/2019    Dr Vince Youngblood       Social History     Tobacco Use    Smoking status: Never Smoker    Smokeless tobacco: Never Used   Substance Use Topics    Alcohol use: Yes     Comment: occ        Current Outpatient Medications   Medication Sig Dispense Refill    pantoprazole (PROTONIX) 40 MG tablet Take 1 tablet by mouth daily Take daily first thing in the morning on an empty stomach. 90 tablet 3    sucralfate (CARAFATE) 1 GM tablet Take 1 tablet by mouth 4 times daily 120 tablet 3    clomiPHENE (CLOMID) 50 MG tablet Take 1 tablet by mouth daily 5 tablet 0    metFORMIN (GLUCOPHAGE) 1000 MG tablet Take 1 tablet by mouth 2 times daily (with meals) 60 tablet 5    magnesium 200 MG TABS tablet Take 1 tablet by mouth daily 30 tablet 5    progesterone (PROMETRIUM) 100 MG capsule Take 1 capsule by mouth nightly Cycle day 16-25 30 capsule 5    Cholecalciferol (VITAMIN D3) 1.25 MG (18521 UT) CAPS Take 1 capsule by mouth once a week 4 capsule 3    ibuprofen (ADVIL;MOTRIN) 600 MG tablet Take 1 tablet by mouth every 6 hours as needed for Pain or Fever Take with food. 20 tablet 1    ondansetron (ZOFRAN ODT) 4 MG disintegrating tablet Take 1 tablet by mouth every 8 hours as needed for Nausea or Vomiting 20 tablet 0    fluticasone (FLONASE) 50 MCG/ACT nasal spray 2 sprays by Nasal route daily 1 Bottle 0     No current facility-administered medications for this visit. Allergies   Allergen Reactions    Corn-Containing Products     Sertraline      Pt. Developed seratonin syndrome and had to go impatient.      Shrimp (Diagnostic)     Soybean-Containing Drug Products     Wheat Extract        Family History   Problem Relation Age of Onset    Polycystic Ovary Syndrome Mother     Cancer Father     Colon Cancer Paternal Grandmother     Cancer Maternal Aunt     Thyroid Disease Maternal Grandmother     Cancer Maternal Grandmother     Cancer Paternal Grandfather     Colon Polyps Neg Hx     Esophageal Cancer Neg Hx                Subjective:      Review of Systems   Constitutional: Negative. HENT: Negative. Eyes: Negative. Respiratory: Negative. Cardiovascular: Negative. Gastrointestinal: Positive for abdominal pain (epigastric) and diarrhea. Endocrine: Negative. Genitourinary: Negative. Musculoskeletal: Negative. Skin: Negative. Neurological:        Tips of toes and finger intermittently change colors to pale white   Hematological: Negative. Psychiatric/Behavioral: Negative. Objective:     Physical Exam  Vitals and nursing note reviewed. Constitutional:       Appearance: Normal appearance. She is well-developed. Comments: obese   HENT:      Head: Normocephalic and atraumatic. Right Ear: Hearing, tympanic membrane, ear canal and external ear normal.      Left Ear: Hearing, tympanic membrane, ear canal and external ear normal.      Nose: Nose normal.      Mouth/Throat:      Pharynx: Uvula midline. Eyes:      General: Lids are normal.      Conjunctiva/sclera: Conjunctivae normal.      Pupils: Pupils are equal, round, and reactive to light. Neck:      Thyroid: No thyroid mass or thyromegaly. Trachea: Trachea normal.   Cardiovascular:      Rate and Rhythm: Normal rate and regular rhythm. Heart sounds: Normal heart sounds. Pulmonary:      Effort: Pulmonary effort is normal.      Breath sounds: Normal breath sounds. Abdominal:      General: Bowel sounds are normal.      Palpations: Abdomen is soft. Musculoskeletal:         General: Normal range of motion. Cervical back: Normal range of motion and neck supple. No tenderness. Thoracic back: Normal. No tenderness. Normal range of motion. Lumbar back: Normal. No tenderness. Normal range of motion. Skin:     General: Skin is warm and dry. Neurological:      Mental Status: She is alert and oriented to person, place, and time.    Psychiatric:         Speech: Speech normal.         Behavior: Behavior normal.         Thought Content: Thought content normal.         Judgment: Judgment normal.         /84   Pulse 89   Temp 97.6 °F (36.4 °C) (Temporal)   Resp 18   Ht 5' 8\" (1.727 m)   Wt (!) 315 lb (142.9 kg)   SpO2 98%   BMI 47.90 kg/m²     Assessment:      Diagnosis Orders   1. Essential hypertension     2. PCOS (polycystic ovarian syndrome)     3. Epigastric pain     4. Raynaud's phenomenon without gangrene         No results found for this visit on 06/01/21. Plan:     Discussed Raynaud's phenomenon. Not changes or new orders for this at this time. If it worsens may discuss starting low dose medication. She is to follow up with OB/GYN for possible AI. Keep follow up as scheduled for colonoscopy and endoscopy later this week. More than 50% of the time was spent counseling and coordinating care for a total time of 34 mins face to face. Return in about 3 months (around 9/1/2021) for Office Visit, Follow up chronic conditions. No orders of the defined types were placed in this encounter. No orders of the defined types were placed in this encounter. Patient offered educational handouts and has had all questions answered. Patient voices understanding and agrees to plans along with risks and benefits of plan. Patient is instructed to continue prior meds, diet, and exercise plans as instructed. Patient agrees to follow up as instructed and sooner if needed. Patient agrees to go to ER if condition becomes emergent. EMR Dragon/transcription disclaimer: Some of this encounter note is an electronic transcription/translation of spoken language to printed text. The electronic translation of spoken language may permit erroneous, or at times, nonsensical words or phrases to be inadvertently transcribed.  Although I have reviewed the note for such errors, some may still exist.    Electronically signed by SARAY Mullins on 6/1/2021 at 12:00 PM

## 2021-06-02 ENCOUNTER — ANESTHESIA EVENT (OUTPATIENT)
Dept: ENDOSCOPY | Age: 26
End: 2021-06-02
Payer: OTHER GOVERNMENT

## 2021-06-03 ENCOUNTER — HOSPITAL ENCOUNTER (OUTPATIENT)
Age: 26
Setting detail: OUTPATIENT SURGERY
Discharge: HOME | End: 2021-06-03
Attending: INTERNAL MEDICINE | Admitting: INTERNAL MEDICINE
Payer: OTHER GOVERNMENT

## 2021-06-03 ENCOUNTER — ANESTHESIA (OUTPATIENT)
Dept: ENDOSCOPY | Age: 26
End: 2021-06-03
Payer: OTHER GOVERNMENT

## 2021-06-03 VITALS
TEMPERATURE: 98.2 F | BODY MASS INDEX: 44.41 KG/M2 | SYSTOLIC BLOOD PRESSURE: 135 MMHG | HEART RATE: 83 BPM | HEIGHT: 68 IN | WEIGHT: 293 LBS | DIASTOLIC BLOOD PRESSURE: 94 MMHG | RESPIRATION RATE: 16 BRPM | OXYGEN SATURATION: 96 %

## 2021-06-03 VITALS — OXYGEN SATURATION: 100 % | SYSTOLIC BLOOD PRESSURE: 149 MMHG | DIASTOLIC BLOOD PRESSURE: 91 MMHG

## 2021-06-03 LAB — HCG(URINE) PREGNANCY TEST: NEGATIVE

## 2021-06-03 PROCEDURE — 7100000011 HC PHASE II RECOVERY - ADDTL 15 MIN: Performed by: INTERNAL MEDICINE

## 2021-06-03 PROCEDURE — 43239 EGD BIOPSY SINGLE/MULTIPLE: CPT | Performed by: INTERNAL MEDICINE

## 2021-06-03 PROCEDURE — 3609027000 HC COLONOSCOPY: Performed by: INTERNAL MEDICINE

## 2021-06-03 PROCEDURE — 3700000001 HC ADD 15 MINUTES (ANESTHESIA): Performed by: INTERNAL MEDICINE

## 2021-06-03 PROCEDURE — 45380 COLONOSCOPY AND BIOPSY: CPT | Performed by: INTERNAL MEDICINE

## 2021-06-03 PROCEDURE — 3609012400 HC EGD TRANSORAL BIOPSY SINGLE/MULTIPLE: Performed by: INTERNAL MEDICINE

## 2021-06-03 PROCEDURE — 2500000003 HC RX 250 WO HCPCS: Performed by: NURSE ANESTHETIST, CERTIFIED REGISTERED

## 2021-06-03 PROCEDURE — 2709999900 HC NON-CHARGEABLE SUPPLY: Performed by: INTERNAL MEDICINE

## 2021-06-03 PROCEDURE — 7100000010 HC PHASE II RECOVERY - FIRST 15 MIN: Performed by: INTERNAL MEDICINE

## 2021-06-03 PROCEDURE — 88305 TISSUE EXAM BY PATHOLOGIST: CPT

## 2021-06-03 PROCEDURE — 6360000002 HC RX W HCPCS: Performed by: NURSE ANESTHETIST, CERTIFIED REGISTERED

## 2021-06-03 PROCEDURE — 2580000003 HC RX 258: Performed by: INTERNAL MEDICINE

## 2021-06-03 PROCEDURE — 84703 CHORIONIC GONADOTROPIN ASSAY: CPT

## 2021-06-03 PROCEDURE — 3700000000 HC ANESTHESIA ATTENDED CARE: Performed by: INTERNAL MEDICINE

## 2021-06-03 RX ORDER — PROPOFOL 10 MG/ML
INJECTION, EMULSION INTRAVENOUS PRN
Status: DISCONTINUED | OUTPATIENT
Start: 2021-06-03 | End: 2021-06-03 | Stop reason: SDUPTHER

## 2021-06-03 RX ORDER — SODIUM CHLORIDE, SODIUM LACTATE, POTASSIUM CHLORIDE, CALCIUM CHLORIDE 600; 310; 30; 20 MG/100ML; MG/100ML; MG/100ML; MG/100ML
INJECTION, SOLUTION INTRAVENOUS CONTINUOUS
Status: DISCONTINUED | OUTPATIENT
Start: 2021-06-03 | End: 2021-06-03 | Stop reason: HOSPADM

## 2021-06-03 RX ORDER — FENTANYL CITRATE 50 UG/ML
INJECTION, SOLUTION INTRAMUSCULAR; INTRAVENOUS PRN
Status: DISCONTINUED | OUTPATIENT
Start: 2021-06-03 | End: 2021-06-03 | Stop reason: SDUPTHER

## 2021-06-03 RX ORDER — LIDOCAINE HYDROCHLORIDE 10 MG/ML
INJECTION, SOLUTION INFILTRATION; PERINEURAL PRN
Status: DISCONTINUED | OUTPATIENT
Start: 2021-06-03 | End: 2021-06-03 | Stop reason: SDUPTHER

## 2021-06-03 RX ORDER — MIDAZOLAM HYDROCHLORIDE 1 MG/ML
INJECTION INTRAMUSCULAR; INTRAVENOUS PRN
Status: DISCONTINUED | OUTPATIENT
Start: 2021-06-03 | End: 2021-06-03 | Stop reason: SDUPTHER

## 2021-06-03 RX ADMIN — PROPOFOL 200 MCG/KG/MIN: 10 INJECTION, EMULSION INTRAVENOUS at 13:25

## 2021-06-03 RX ADMIN — LIDOCAINE HYDROCHLORIDE 50 MG: 10 INJECTION, SOLUTION INFILTRATION; PERINEURAL at 13:23

## 2021-06-03 RX ADMIN — FENTANYL CITRATE 100 MCG: 50 INJECTION INTRAMUSCULAR; INTRAVENOUS at 13:22

## 2021-06-03 RX ADMIN — SODIUM CHLORIDE, POTASSIUM CHLORIDE, SODIUM LACTATE AND CALCIUM CHLORIDE: 600; 310; 30; 20 INJECTION, SOLUTION INTRAVENOUS at 12:45

## 2021-06-03 RX ADMIN — PROPOFOL 50 MG: 10 INJECTION, EMULSION INTRAVENOUS at 13:23

## 2021-06-03 RX ADMIN — MIDAZOLAM 2 MG: 1 INJECTION INTRAMUSCULAR; INTRAVENOUS at 13:22

## 2021-06-03 ASSESSMENT — PAIN - FUNCTIONAL ASSESSMENT: PAIN_FUNCTIONAL_ASSESSMENT: 0-10

## 2021-06-03 NOTE — OP NOTE
Endoscopic Procedure Note    Patient: Reese Fishman : 1995  Med Rec#: [id number] Acc#: [id number]     Primary Care Provider SARAY Guzman    Endoscopist: Laquita Herndon MD, MD    Date of Procedure:  6/3/2021    Procedure:   1. EGD with cold biopsies    Indications: For both EGD and colonoscopy exams today:  1. Upper abdominal pain  2. Diarrhea, unspecified type  3. Gastroesophageal reflux disease, unspecified whether esophagitis present  4. Nausea    Anesthesia:  Sedation was administered by anesthesia who monitored the patient during the procedure. Estimated Blood Loss: minimal    Procedure:   After reviewing the patient's chart and obtaining informed consent, the patient was placed in the left lateral decubitus position. A forward-viewing Olympus endoscope was lubricated and inserted through the mouth into the oropharynx. Under direct visualization, the upper esophagus was intubated. The scope was advanced to the level of the third portion of duodenum. Scope was slowly withdrawn with careful inspection of the mucosal surfaces. The scope was retroflexed for inspection of the gastric fundus and incisura. Findings and maneuvers are listed in impression below. The patient tolerated the procedure well. The scope was removed. There were no immediate complications. Findings/IMPRESSION:  Esophagus: normal and EG junction at 38 cm also was normal.    There is no obvious hiatal hernia present. Stomach:  Normal.    NO ulcers or masses or gastric outlet obstruction or retained food or fluid. Rugae were normal and lumen distended well with insufflation. Retroflexed views otherwise revealed a normal GE junction, fundus and cardia as well. Duodenum: Normal rooting the major duodenal papilla. Random biopsies were taken to check for Celiac disease and other causes of villous atrophy. No obvious lesions to explain the patient's symptoms were discovered.   She may have nonulcer dyspepsia or nonerosive reflux disease or irritable bowel syndrome. Non-GI etiology of some of her symptoms also should be in the differential diagnosis    RECOMMENDATIONS:    1. Await path results, the patient will be contacted in 7-10 days with biopsy results. 2.  Continue antireflux measures and use of PPI    The results were discussed with the patient and family. A copy of the images obtained were given to the patient.      Radha Liang MD, MD  6/3/2021  12:44 PM

## 2021-06-03 NOTE — OP NOTE
Patient: Daniel Villa : 1995  Med Rec#: [id number] Acc#: [id number]   Primary Care Provider SARAY Sanders    Date of Procedure:  6/3/2021    Endoscopist: Destiny Villa MD, MD     Referring Provider: SARAY Sanders,     Operation Performed: Colonoscopy up to the terminal ileum with random colon biopsies. Indications: For both EGD and colonoscopy exams today:  1. Upper abdominal pain  2. Diarrhea, unspecified type  3. Gastroesophageal reflux disease, unspecified whether esophagitis present  4. Nausea    Anesthesia:  Sedation was administered by anesthesia who monitored the patient during the procedure. I met with Daniel Villa prior to procedure. We discussed the procedure itself, and I have discussed the risks of endoscopy (including-- but not limited to-- pain, discomfort, bleeding potentially requiring second endoscopic procedure and/or blood transfusion, organ perforation requiring operative repair, damage to organs near the colon, infection, aspiration, cardiopulmonary/allergic reaction), benefits, indications to endoscopy. Additionally, we discussed options other than colonoscopy. The patient expressed understanding. All questions answered. The patient decided to proceed with the procedure. Signed informed consent was placed on the chart. Blood Loss: minimal    Withdrawal time: More than 6 minutes  Bowel Prep: adequate     Complications: no immediate complications    DESCRIPTION OF PROCEDURE:     A time out was performed. After written informed consent was obtained, the patient was placed in the left lateral position. The perianal area was inspected, and a digital rectal exam was performed. A rectal exam was performed: normal tone, no palpable lesions. At this point, a forward viewing Olympus colonoscope was inserted into the anus and carefully advanced to the terminal ileum. The cecum was identified by the ileocecal valve and the appendiceal orifice.  The colonoscope was then slowly withdrawn with careful inspection of the mucosa in a linear and circumferential fashion. The scope was retroflexed in the rectum. Suction was utilized during the procedure to remove as much air as possible from the bowel. The colonoscope was removed from the patient, and the procedure was terminated. Findings are listed below. Findings: The mucosa appeared normal throughout the entire examined colon and terminal ileum; random colon biopsies were taken to check for microscopic colitis. NO large polyps or masses or strictures or colitis or overt IBD or detected. Internal hemorrhoids-Grade 1  Where it was clearly visible, the mucosa appeared normal throughout the entire examined colon  Retroflexion in the rectum was otherwise normal and revealed no further abnormalities     She 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. Recommendations:  1. Repeat colonoscopy: pending pathology -if biopsies from today are essentially normal, for colorectal cancer screening at age 39; sooner if her personal history or family history as pertaining to her colorectal cancers risk changes in the future requiring an earlier exam     2. Await biopsy results-you will receive a letter with your results within 7-10 days    - Resume previous meds and diet  - GI clinic f/u 4-6 weeks   - Keep scheduled f/u appts with other MDs     - NO ASA/NSAIDs x 2 weeks    Findings and recommendations were discussed w/ the patient. A copy of the images was provided.     Nimco Dash MD, MD  6/3/2021  12:44 PM

## 2021-06-03 NOTE — ANESTHESIA PRE PROCEDURE
syndrome and had to go impatient.      Shrimp (Diagnostic)     Soybean-Containing Drug Products     Wheat Extract        Problem List:    Patient Active Problem List   Diagnosis Code    PCOS (polycystic ovarian syndrome) E28.2    Pelvic pain R10.2    Neck pain M54.2       Past Medical History:        Diagnosis Date    Concussion     approximately 10 years ago    Depression     Major depressive disorder     Food allergy     Hypothyroidism     Metabolic disorder     PCOS (polycystic ovarian syndrome)        Past Surgical History:        Procedure Laterality Date    CHOLECYSTECTOMY      COLONOSCOPY  11/03/2014    Dr Carolin Chin,  Small non-bleeding internal hemorrhoids w/skin tags, serrated polyp     COLONOSCOPY N/A 10/21/2019    Dr Eliz Barroso, 5 yr recall    KNEE SURGERY      LAPAROSCOPY N/A 1/31/2020    DIAGNOSTIC LAPAROSCOPY AND CHROMOPERTUBATION performed by Neela Cuellar MD at [address] [place] [address] AND ADENOIDECTOMY      UPPER GASTROINTESTINAL ENDOSCOPY  11/03/2014    Dr Carolin Chin, Gastritis, h pylori neg    UPPER GASTROINTESTINAL ENDOSCOPY N/A 10/21/2019    Dr Dave eBrnstein History:    Social History     Tobacco Use    Smoking status: Never Smoker    Smokeless tobacco: Never Used   Substance Use Topics    Alcohol use: Yes     Comment: occ                                Counseling given: Not Answered      Vital Signs (Current):   Vitals:    06/03/21 1230   BP: 128/81   Pulse: 78   Resp: 18   Temp: 98.2 °F (36.8 °C)   TempSrc: Temporal   SpO2: 100%   Weight: (!) 310 lb (140.6 kg)   Height: 5' 8\" (1.727 m)                                              BP Readings from Last 3 Encounters:   06/03/21 128/81   06/01/21 130/84   05/10/21 120/80       NPO Status: Time of last liquid consumption: 0900                        Time of last solid consumption: 2000                        Date of last liquid consumption: 06/03/21                        Date of last solid food consumption: 06/01/21    BMI:   Wt Readings from Last 3 Encounters:   06/03/21 (!) 310 lb (140.6 kg)   06/01/21 (!) 315 lb (142.9 kg)   05/10/21 (!) 315 lb (142.9 kg)     Body mass index is 47.14 kg/m². CBC:   Lab Results   Component Value Date    WBC 11.2 05/10/2021    RBC 4.67 05/10/2021    HGB 12.7 05/10/2021    HCT 40.7 05/10/2021    MCV 87.2 05/10/2021    RDW 13.5 05/10/2021     05/10/2021       CMP:   Lab Results   Component Value Date     05/10/2021    K 4.4 05/10/2021    K 3.9 12/15/2020     05/10/2021    CO2 26 05/10/2021    BUN 9 05/10/2021    CREATININE 0.7 05/10/2021    GFRAA >59 05/10/2021    LABGLOM >60 05/10/2021    GLUCOSE 110 05/10/2021    PROT 7.7 05/10/2021    CALCIUM 9.4 05/10/2021    BILITOT 0.3 05/10/2021    ALKPHOS 66 05/10/2021    AST 21 05/10/2021    ALT 21 05/10/2021       POC Tests: No results for input(s): POCGLU, POCNA, POCK, POCCL, POCBUN, POCHEMO, POCHCT in the last 72 hours. Coags: No results found for: PROTIME, INR, APTT    HCG (If Applicable):   Lab Results   Component Value Date    PREGTESTUR Negative 06/03/2021        ABGs: No results found for: PHART, PO2ART, HNE2WOZ, NGH1TIV, BEART, C5HHRNWL     Type & Screen (If Applicable):  No results found for: LABABO, LABRH    Drug/Infectious Status (If Applicable):  No results found for: HIV, HEPCAB    COVID-19 Screening (If Applicable):   Lab Results   Component Value Date    COVID19 Not Detected 05/31/2021           Anesthesia Evaluation    Airway: Mallampati: III  TM distance: >3 FB   Neck ROM: full  Mouth opening: > = 3 FB Dental:          Pulmonary: breath sounds clear to auscultation                             Cardiovascular:            Rhythm: regular  Rate: normal                    Neuro/Psych:               GI/Hepatic/Renal:             Endo/Other:                     Abdominal:   (+) obese,     Abdomen: soft.     Vascular:

## 2021-06-03 NOTE — H&P
Patient Name: Rodriguez Cordova  : 1995  MRN: [id number]  DATE: 21    Allergies: Allergies   Allergen Reactions    Corn-Containing Products     Sertraline      Pt. Developed seratonin syndrome and had to go impatient.  Shrimp (Diagnostic)     Soybean-Containing Drug Products     Wheat Extract         ENDOSCOPY  History and Physical    Procedure:    [x] Diagnostic Colonoscopy       [] Screening Colonoscopy  [x] EGD      [] ERCP      [] EUS       [] Other    [x] Previous office notes/History and Physical reviewed from the patients chart. Please see EMR for further details of HPI. I have examined the patient's status immediately prior to the procedure and:      Indications/HPI:   For both EGD and colonoscopy exams today:  1. Upper abdominal pain  2. Diarrhea, unspecified type  3. Gastroesophageal reflux disease, unspecified whether esophagitis present  4. Nausea    []Abdominal Pain   []Cancer- GI/Lung     []Fhx of colon CA/polyps  []History of Polyps  []Barretts            []Melena  []Abnormal Imaging              []Dysphagia              []Persistent Pneumonia   []Anemia                            []Food Impaction        []History of Polyps  [] GI Bleed             []Pulmonary nodule/Mass   []Change in bowel habits []Heartburn/Reflux  []Rectal Bleed (BRBPR)  []Chest Pain - Non Cardiac []Heme (+) Stool []Ulcers  []Constipation  []Hemoptysis  []Varices  []Diarrhea  []Hypoxemia    []Nausea/Vomiting   []Screening   []Crohns/Colitis  []Other:     Anesthesia:   [x] MAC [] Moderate Sedation   [] General   [] None     ROS: 12 pt Review of Symptoms was negative unless mentioned above    Medications:   Prior to Admission medications    Medication Sig Start Date End Date Taking?  Authorizing Provider   ondansetron (ZOFRAN ODT) 4 MG disintegrating tablet Take 1 tablet by mouth every 8 hours as needed for Nausea or Vomiting 12/15/20  Yes Re Smith MD   fluticasone (FLONASE) 50 MCG/ACT nasal spray 2 sprays by Nasal route daily 3/20/20  Yes SARAY Guzman   pantoprazole (PROTONIX) 40 MG tablet Take 1 tablet by mouth daily Take daily first thing in the morning on an empty stomach. 5/10/21   SARAY Up - NP   sucralfate (CARAFATE) 1 GM tablet Take 1 tablet by mouth 4 times daily  Patient taking differently: Take 1 g by mouth 4 times daily Indications: uses prn  5/10/21   Monseypili Tucker APRN - NP   metFORMIN (GLUCOPHAGE) 1000 MG tablet Take 1 tablet by mouth 2 times daily (with meals) 2/12/21   SARAY Guzman   magnesium 200 MG TABS tablet Take 1 tablet by mouth daily 2/9/21   SARAY Guzman   Cholecalciferol (VITAMIN D3) 1.25 MG (50288 UT) CAPS Take 1 capsule by mouth once a week 1/7/21   SARAY Guzman   ibuprofen (ADVIL;MOTRIN) 600 MG tablet Take 1 tablet by mouth every 6 hours as needed for Pain or Fever Take with food.  12/15/20   Duc Plascencia MD       Past Medical History:  Past Medical History:   Diagnosis Date    Concussion     approximately 10 years ago    Depression     Major depressive disorder     Food allergy     Hypothyroidism     Metabolic disorder     PCOS (polycystic ovarian syndrome)        Past Surgical History:  Past Surgical History:   Procedure Laterality Date    CHOLECYSTECTOMY      COLONOSCOPY  11/03/2014    Dr Reuben Crawford,  Small non-bleeding internal hemorrhoids w/skin tags, serrated polyp     COLONOSCOPY N/A 10/21/2019    Dr Chad Flores, 5 yr recall    KNEE SURGERY      LAPAROSCOPY N/A 1/31/2020    DIAGNOSTIC LAPAROSCOPY AND CHROMOPERTUBATION performed by Renea Velasquez MD at [address] [place] [address] AND ADENOIDECTOMY      UPPER GASTROINTESTINAL ENDOSCOPY  11/03/2014    Dr Reuben Crawford, Gastritis, h pylori neg    UPPER GASTROINTESTINAL ENDOSCOPY N/A 10/21/2019    Dr Jorge Gtz History:  Social History     Tobacco Use    Smoking status: Never Smoker    Smokeless

## 2021-08-12 DIAGNOSIS — E28.2 PCOS (POLYCYSTIC OVARIAN SYNDROME): ICD-10-CM

## 2021-08-13 RX ORDER — CHOLECALCIFEROL (VITAMIN D3) 1250 MCG
1 CAPSULE ORAL WEEKLY
Qty: 4 CAPSULE | Refills: 3 | Status: SHIPPED | OUTPATIENT
Start: 2021-08-13 | End: 2021-11-12 | Stop reason: SDUPTHER

## 2021-08-24 ENCOUNTER — TELEMEDICINE (OUTPATIENT)
Dept: OBGYN CLINIC | Age: 26
End: 2021-08-24
Payer: OTHER GOVERNMENT

## 2021-08-24 DIAGNOSIS — E28.2 PCOS (POLYCYSTIC OVARIAN SYNDROME): ICD-10-CM

## 2021-08-24 DIAGNOSIS — N92.6 IRREGULAR MENSES: Primary | ICD-10-CM

## 2021-08-24 PROCEDURE — 99213 OFFICE O/P EST LOW 20 MIN: CPT | Performed by: NURSE PRACTITIONER

## 2021-08-24 RX ORDER — PROGESTERONE 200 MG/1
200 CAPSULE ORAL NIGHTLY
Qty: 30 CAPSULE | Refills: 2 | Status: SHIPPED | OUTPATIENT
Start: 2021-08-24 | End: 2021-11-12 | Stop reason: SDUPTHER

## 2021-08-24 ASSESSMENT — ENCOUNTER SYMPTOMS
ALLERGIC/IMMUNOLOGIC NEGATIVE: 1
DIARRHEA: 0
RESPIRATORY NEGATIVE: 1
EYES NEGATIVE: 1
CONSTIPATION: 0
GASTROINTESTINAL NEGATIVE: 1

## 2021-08-24 NOTE — PATIENT INSTRUCTIONS
Patient Education        clomiphene  Pronunciation:  Juliette Stapleton [address] [place]  What is the most important information I should know about clomiphene? Do not use clomiphene if you are already pregnant. You should not use clomiphene if you have: liver disease, abnormal vaginal bleeding, an uncontrolled adrenal gland or thyroid disorder, an ovarian cyst (unrelated to polycystic ovary syndrome), or if you are pregnant. What is clomiphene? Clomiphene is used to treat infertility in women who cannot ovulate. Clomiphene may also be used for purposes not listed in this medication guide. What should I discuss with my healthcare provider before taking clomiphene? Your doctor will perform medical tests to make sure you do not have conditions that would prevent you from safely using clomiphene. You should not use clomiphene if you are allergic to it, or if you have:  · abnormal vaginal bleeding;  · an ovarian cyst that is not related to polycystic ovary syndrome;  · past or present liver disease;  · a tumor of your pituitary gland;  · an untreated or uncontrolled problem with your thyroid or adrenal gland; or  · if you are pregnant. Do not use clomiphene if you are already pregnant. Talk to your doctor if you have concerns about the possible effects of clomiphene on a new pregnancy. Tell your doctor if you have ever had:  · endometriosis or uterine fibroids;  · high triglycerides; or  · a pancreas disorder. Fertility treatment may increase your chance of having multiple births (twins, triplets, etc). These are high-risk pregnancies both for the mother and the babies. Talk to your doctor if you have concerns about this risk. It may not be safe to breast-feed while using this medicine. Ask your doctor about any risk. How should I take clomiphene? Follow all directions on your prescription label and read all medication guides or instruction sheets. Use the medicine exactly as directed.   Clomiphene is usually taken for 5 days at a time, and your doctor may occasionally change your dose. You will most likely ovulate within 5 to 10 days after you take clomiphene. To improve your chance of becoming pregnant, you should have sexual intercourse while you are ovulating. Your doctor may have you take your temperature each morning and record your daily readings on a chart. This will help you determine when you can expect ovulation to occur. The timing of sex and ovulation is important for clomiphene to work. You will need frequent pelvic examinations. You must remain under the care of a doctor while you are using clomiphene. In most cases, clomiphene should not be used for more than 3 treatment cycles. Using clomiphene for longer than 3 treatment cycles may increase your risk of developing an ovarian tumor. Ask your doctor about your specific risk. If you do not get pregnant after 3 treatment cycles, your doctor may stop treatment and evaluate your infertility further. Store at room temperature away from moisture, heat, and light. What happens if I miss a dose? Call your doctor for instructions if you miss a dose of clomiphene. What happens if I overdose? Seek emergency medical attention or call the Poison Help line at 1-701.156.7176. What should I avoid while taking clomiphene? This medicine may cause blurred vision and may impair your reactions. Avoid driving or hazardous activity until you know how this medicine will affect you. What are the possible side effects of clomiphene? Get emergency medical help if you have signs of an allergic reaction: hives; difficult breathing; swelling of your face, lips, tongue, or throat. Some women using clomiphene develop ovarian hyperstimulation syndrome (OHSS), a potentially life-threatening condition.  Call your doctor right away if you have symptoms of OHSS:  · stomach pain, bloating, nausea, vomiting, diarrhea;  · rapid weight gain, especially in your face and midsection;  · little or no urination; or  · pain when you breathe, rapid heart rate, feeling short of breath (especially when lying down). Common side effects may include:  · flushing (warmth, redness, or tingly feeling);  · nausea, vomiting, bloating;  · breast pain or tenderness;  · headache; or  · breakthrough bleeding or spotting. This is not a complete list of side effects and others may occur. Call your doctor for medical advice about side effects. You may report side effects to FDA at 4-071-DWD-4275. What other drugs will affect clomiphene? Other drugs may affect clomiphene, including prescription and over-the-counter medicines, vitamins, and herbal products. Tell your doctor about all your current medicines and any medicine you start or stop using. Where can I get more information? Your doctor or pharmacist can provide more information about clomiphene. Remember, keep this and all other medicines out of the reach of children, never share your medicines with others, and use this medication only for the indication prescribed. Every effort has been made to ensure that the information provided by [address] is accurate, up-to-date, and complete, but no guarantee is made to that effect. Drug information contained herein may be time sensitive. [employer] information has been compiled for use by healthcare practitioners and consumers in the [place] and therefore [employer] does not warrant that uses outside of the [place] are appropriate, unless specifically indicated otherwise. [facility] drug information does not endorse drugs, diagnose patients or recommend therapy. [facility] drug information is an informational resource designed to assist licensed healthcare practitioners in caring for their patients and/or to serve consumers viewing this service as a supplement to, and not a substitute for, the expertise, skill, knowledge and judgment of healthcare practitioners.  The absence of a warning for a given drug or drug combination in no way should be construed to indicate that the drug or drug combination is safe, effective or appropriate for any given patient. [facility] does not assume any responsibility for any aspect of healthcare administered with the aid of information [facility] provides. The information contained herein is not intended to cover all possible uses, directions, precautions, warnings, drug interactions, allergic reactions, or adverse effects. If you have questions about the drugs you are taking, check with your doctor, nurse or pharmacist.  Copyright 1783-3356 [address]: 4.01. Revision date: 2/11/2019. Care instructions adapted under license by [facility] ([facility]). If you have questions about a medical condition or this instruction, always ask your healthcare professional. [place] [address] any warranty or liability for your use of this information.

## 2021-08-24 NOTE — PROGRESS NOTES
uses lauran   SARAY Jain - NP   magnesium 200 MG TABS tablet Take 1 tablet by mouth daily  SARAY Sim   ibuprofen (ADVIL;MOTRIN) 600 MG tablet Take 1 tablet by mouth every 6 hours as needed for Pain or Fever Take with food. Vilma Catalan MD   ondansetron (ZOFRAN ODT) 4 MG disintegrating tablet Take 1 tablet by mouth every 8 hours as needed for Nausea or Vomiting  Vilma Catalan MD   fluticasone (FLONASE) 50 MCG/ACT nasal spray 2 sprays by Nasal route daily  SARAY Sim       Social History     Tobacco Use    Smoking status: Never Smoker    Smokeless tobacco: Never Used   Vaping Use    Vaping Use: Never used   Substance Use Topics    Alcohol use: Yes     Comment: occ    Drug use: No        Allergies   Allergen Reactions    Corn-Containing Products     Sertraline      Pt. Developed seratonin syndrome and had to go impatient.      Shrimp (Diagnostic)     Soybean-Containing Drug Products     Wheat Extract    ,   Past Medical History:   Diagnosis Date    Concussion     approximately 10 years ago    Depression     Major depressive disorder     Food allergy     Hypothyroidism     Metabolic disorder     PCOS (polycystic ovarian syndrome)    ,   Past Surgical History:   Procedure Laterality Date    CHOLECYSTECTOMY      COLONOSCOPY  11/03/2014    Dr Anju Carrillo,  Small non-bleeding internal hemorrhoids w/skin tags, serrated polyp     COLONOSCOPY N/A 10/21/2019    Dr Connor Ivy, 5 yr recall    COLONOSCOPY N/A 06/03/2021    Dr Baldemar Hoover, [facility], (-)Micro Colitis,  Int hemorrhoids-Grade 1, 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, 20 year recall    KNEE SURGERY      LAPAROSCOPY N/A 01/31/2020    DIAGNOSTIC LAPAROSCOPY AND CHROMOPERTUBATION performed by Alexandra Jones MD at [address] [place] [address] ENDOSCOPY  11/03/2014    Dr Anju Carrillo, Gastritis, h pylori neg    UPPER GASTROINTESTINAL ENDOSCOPY N/A 10/21/2019    Dr Junie Jon N/A 06/03/2021    Dr Laly Burk, (-) [place]   ,   Social History     Tobacco Use    Smoking status: Never Smoker    Smokeless tobacco: Never Used   Vaping Use    Vaping Use: Never used   Substance Use Topics    Alcohol use: Yes     Comment: occ    Drug use: No       PHYSICAL EXAMINATION:  [ INSTRUCTIONS:  \"[x]\" Indicates a positive item  \"[]\" Indicates a negative item  -- DELETE ALL ITEMS NOT EXAMINED]  Vital Signs: (As obtained by patient/caregiver or practitioner observation)    Blood pressure-  Heart rate-    Respiratory rate-    Temperature-  Pulse oximetry-     Constitutional: [x] Appears well-developed and well-nourished [x] No apparent distress      [] Abnormal-   Mental status  [x] Alert and awake  [x] Oriented to person/place/time [x]Able to follow commands      Eyes:  EOM    [x]  Normal  [] Abnormal-  Sclera  [x]  Normal  [] Abnormal -         Discharge [x]  None visible  [] Abnormal -    HENT:   [x] Normocephalic, atraumatic. [] Abnormal   [x] Mouth/Throat: Mucous membranes are moist.     External Ears [x] Normal  [] Abnormal-     Neck: [x] No visualized mass     Pulmonary/Chest: [x] Respiratory effort normal.  [x] No visualized signs of difficulty breathing or respiratory distress        [] Abnormal-      Musculoskeletal:   [x] Normal gait with no signs of ataxia         [x] Normal range of motion of neck        [] Abnormal-       Neurological:        [x] No Facial Asymmetry (Cranial nerve 7 motor function) (limited exam to video visit)          [x] No gaze palsy        [] Abnormal-         Skin:        [x] No significant exanthematous lesions or discoloration noted on facial skin         [] Abnormal-            Psychiatric:       [x] Normal Affect [x] No Hallucinations        [] Abnormal-     Other pertinent observable physical exam findings-     ASSESSMENT/PLAN:  1. Irregular menses    2. PCOS (polycystic ovarian syndrome)    Continue Metformin. Resume cyclic Prometrium. Discussed restarting Clomid when donor available CD 12-14 for AI. Pt states understanding. Return in about 3 months (around 11/24/2021). Salbador Marcelino was evaluated through a synchronous (real-time) audio-video encounter. The patient (or guardian if applicable) is aware that this is a billable service. Verbal consent to proceed has been obtained within the past 12 months. The visit was conducted pursuant to the emergency declaration under the [address] [place] [address], [address] [place] [address] authority and the [employer] General Act. Patient identification was verified, and a caregiver was present when appropriate. The patient was located in a state where the provider was credentialed to provide care. Total time spent on this encounter: Not billed by time    --SARAY Phan CNP on 8/24/2021 at 10:07 AM    An electronic signature was used to authenticate this note.

## 2021-09-03 ENCOUNTER — OFFICE VISIT (OUTPATIENT)
Dept: PRIMARY CARE CLINIC | Age: 26
End: 2021-09-03
Payer: OTHER GOVERNMENT

## 2021-09-03 VITALS
HEIGHT: 68 IN | SYSTOLIC BLOOD PRESSURE: 124 MMHG | TEMPERATURE: 97.8 F | OXYGEN SATURATION: 99 % | DIASTOLIC BLOOD PRESSURE: 82 MMHG | BODY MASS INDEX: 44.41 KG/M2 | WEIGHT: 293 LBS | RESPIRATION RATE: 18 BRPM | HEART RATE: 81 BPM

## 2021-09-03 DIAGNOSIS — I10 ESSENTIAL HYPERTENSION: ICD-10-CM

## 2021-09-03 DIAGNOSIS — Z00.00 ANNUAL PHYSICAL EXAM: Primary | ICD-10-CM

## 2021-09-03 PROCEDURE — 99395 PREV VISIT EST AGE 18-39: CPT | Performed by: NURSE PRACTITIONER

## 2021-09-03 ASSESSMENT — ENCOUNTER SYMPTOMS
RESPIRATORY NEGATIVE: 1
EYES NEGATIVE: 1
GASTROINTESTINAL NEGATIVE: 1

## 2021-09-03 NOTE — PROGRESS NOTES
[address] [facility]  [address] [address] [address]  Dept: [phone]  Dept Fax: [phone]  Loc: [phone]    Gisel Alejo is a 22 y.o. female who presents today for her medical conditions/complaints as noted below. Gisel Alejo is c/o of Hypertension (follow up)      Chief Complaint   Patient presents with    Hypertension     follow up       HPI:     HPI  Pt is here for a follow up for annual physical exam and HTN. Pt reports she has not had time to check her BP regularly, but has noticed that her heart rate has gone down. Patient reports of no concerns to discuss today.        Past Medical History:   Diagnosis Date    Concussion     approximately 10 years ago    Depression     Major depressive disorder     Food allergy     Hypothyroidism     Metabolic disorder     PCOS (polycystic ovarian syndrome)         Past Surgical History:   Procedure Laterality Date    CHOLECYSTECTOMY      COLONOSCOPY  11/03/2014    Dr Solomon Irving,  Small non-bleeding internal hemorrhoids w/skin tags, serrated polyp     COLONOSCOPY N/A 10/21/2019    Dr Yola Conway, 5 yr recall    COLONOSCOPY N/A 06/03/2021    Dr Lizandro Hoffman, [facility], (-)Micro Colitis,  Int hemorrhoids-Grade 1, 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, 20 year recall    KNEE SURGERY      LAPAROSCOPY N/A 01/31/2020    DIAGNOSTIC LAPAROSCOPY AND CHROMOPERTUBATION performed by Vera Herrera MD at [address] [place] [address] AND ADENOIDECTOMY      UPPER GASTROINTESTINAL ENDOSCOPY  11/03/2014    Dr Solomon Irving, Gastritis, h pylori neg    UPPER GASTROINTESTINAL ENDOSCOPY N/A 10/21/2019    Dr Sabino Sousa 06/03/2021    Dr Lizandro Hoffman, (-) [place]       Social History     Tobacco Use    Smoking status: Never Smoker    Smokeless tobacco: Never Used   Substance Use Topics    Alcohol use: Yes     Comment: occ Current Outpatient Medications   Medication Sig Dispense Refill    progesterone (PROMETRIUM) 200 MG CAPS capsule Take 1 capsule by mouth nightly CD 16-25 30 capsule 2    clomiPHENE (CLOMID) 50 MG tablet Take 1 tablet by mouth daily CD 3-7 5 tablet 2    Cholecalciferol (VITAMIN D3) 1.25 MG (48731 UT) CAPS Take 1 capsule by mouth once a week 4 capsule 3    metFORMIN (GLUCOPHAGE) 1000 MG tablet Take 1 tablet by mouth 2 times daily (with meals) 60 tablet 5    pantoprazole (PROTONIX) 40 MG tablet Take 1 tablet by mouth daily Take daily first thing in the morning on an empty stomach. 90 tablet 3    sucralfate (CARAFATE) 1 GM tablet Take 1 tablet by mouth 4 times daily (Patient taking differently: Take 1 g by mouth 4 times daily Indications: uses prn ) 120 tablet 3    magnesium 200 MG TABS tablet Take 1 tablet by mouth daily 30 tablet 5    ibuprofen (ADVIL;MOTRIN) 600 MG tablet Take 1 tablet by mouth every 6 hours as needed for Pain or Fever Take with food. 20 tablet 1    ondansetron (ZOFRAN ODT) 4 MG disintegrating tablet Take 1 tablet by mouth every 8 hours as needed for Nausea or Vomiting 20 tablet 0    fluticasone (FLONASE) 50 MCG/ACT nasal spray 2 sprays by Nasal route daily 1 Bottle 0     No current facility-administered medications for this visit. Allergies   Allergen Reactions    Corn-Containing Products     Sertraline      Pt. Developed seratonin syndrome and had to go impatient.  Shrimp (Diagnostic)     Soybean-Containing Drug Products     Wheat Extract        Family History   Problem Relation Age of Onset    Polycystic Ovary Syndrome Mother     Cancer Father     Colon Cancer Paternal Grandmother     Cancer Maternal Aunt     Thyroid Disease Maternal Grandmother     Cancer Maternal Grandmother     Cancer Paternal Grandfather     Colon Polyps Neg Hx     Esophageal Cancer Neg Hx                Subjective:      Review of Systems   Constitutional: Negative. HENT: Negative. Eyes: Negative. Respiratory: Negative. Cardiovascular: Negative. Gastrointestinal: Negative. Endocrine: Negative. Genitourinary: Negative. Musculoskeletal: Negative. Skin: Negative. Neurological: Negative. Hematological: Negative. Psychiatric/Behavioral: Negative. Objective:     Physical Exam  Vitals and nursing note reviewed. Constitutional:       Appearance: Normal appearance. HENT:      Head: Normocephalic and atraumatic. Jaw: There is normal jaw occlusion. Right Ear: Hearing, tympanic membrane, ear canal and external ear normal.      Left Ear: Hearing, tympanic membrane, ear canal and external ear normal.      Nose: Nose normal.      Mouth/Throat:      Lips: Pink. Mouth: Mucous membranes are moist.      Pharynx: Oropharynx is clear. Eyes:      General: Lids are normal.      Extraocular Movements: Extraocular movements intact. Conjunctiva/sclera: Conjunctivae normal.      Pupils: Pupils are equal, round, and reactive to light. Neck:      Thyroid: No thyromegaly. Trachea: Trachea normal.   Cardiovascular:      Rate and Rhythm: Normal rate and regular rhythm. Pulses: Normal pulses. Dorsalis pedis pulses are 2+ on the right side and 2+ on the left side. Posterior tibial pulses are 2+ on the right side and 2+ on the left side. Heart sounds: Normal heart sounds. No murmur heard. Pulmonary:      Effort: Pulmonary effort is normal.      Breath sounds: Normal breath sounds and air entry. Abdominal:      General: Bowel sounds are normal.      Palpations: Abdomen is soft. Musculoskeletal:      Cervical back: Full passive range of motion without pain, normal range of motion and neck supple. Thoracic back: Normal range of motion. Lumbar back: Normal range of motion. Right lower leg: No edema. Left lower leg: No edema. Lymphadenopathy:      Cervical: No cervical adenopathy.    Skin:     General: Skin is warm and dry. Capillary Refill: Capillary refill takes less than 2 seconds. Neurological:      General: No focal deficit present. Mental Status: She is alert and oriented to person, place, and time. Mental status is at baseline. Psychiatric:         Attention and Perception: Attention normal.         Mood and Affect: Mood normal.         Speech: Speech normal.         Behavior: Behavior normal.         Thought Content: Thought content normal.         Cognition and Memory: Cognition normal.         Judgment: Judgment normal.         /82   Pulse 81   Temp 97.8 °F (36.6 °C) (Temporal)   Resp 18   Ht 5' 8\" (1.727 m)   Wt (!) 315 lb 12.8 oz (143.2 kg)   SpO2 99%   BMI 48.02 kg/m²     Assessment:      Diagnosis Orders   1. Annual physical exam     2. Essential hypertension         No results found for this visit on 09/03/21. Plan: We will continue current regimen. Form filled out for school. Return in about 4 months (around 1/3/2022) for Follow up chronic conditions. No orders of the defined types were placed in this encounter. No orders of the defined types were placed in this encounter. Patient offered educational handouts and has had all questions answered. Patient voices understanding and agrees to plans along with risks and benefits of plan. Patient is instructed to continue prior meds, diet, and exercise plans as instructed. Patient agrees to follow up as instructed and sooner if needed. Patient agrees to go to ER if condition becomes emergent. EMR Dragon/transcription disclaimer: Some of this encounter note is an electronic transcription/translation of spoken language to printed text. The electronic translation of spoken language may permit erroneous, or at times, nonsensical words or phrases to be inadvertently transcribed.  Although I have reviewed the note for such errors, some may still exist.    Electronically signed by SARAY Miller on 9/3/2021 at 1:19 PM

## 2021-09-27 ENCOUNTER — OFFICE VISIT (OUTPATIENT)
Dept: URGENT CARE | Age: 26
End: 2021-09-27
Payer: OTHER GOVERNMENT

## 2021-09-27 VITALS
RESPIRATION RATE: 22 BRPM | WEIGHT: 293 LBS | OXYGEN SATURATION: 100 % | BODY MASS INDEX: 44.41 KG/M2 | DIASTOLIC BLOOD PRESSURE: 86 MMHG | TEMPERATURE: 98 F | HEIGHT: 68 IN | HEART RATE: 102 BPM | SYSTOLIC BLOOD PRESSURE: 142 MMHG

## 2021-09-27 DIAGNOSIS — J02.9 PHARYNGITIS, UNSPECIFIED ETIOLOGY: Primary | ICD-10-CM

## 2021-09-27 DIAGNOSIS — J02.9 SORE THROAT: ICD-10-CM

## 2021-09-27 LAB — S PYO AG THROAT QL: NORMAL

## 2021-09-27 PROCEDURE — 99213 OFFICE O/P EST LOW 20 MIN: CPT | Performed by: NURSE PRACTITIONER

## 2021-09-27 PROCEDURE — 87880 STREP A ASSAY W/OPTIC: CPT | Performed by: NURSE PRACTITIONER

## 2021-09-27 RX ORDER — AMOXICILLIN 500 MG/1
500 CAPSULE ORAL 2 TIMES DAILY
Qty: 20 CAPSULE | Refills: 0 | Status: SHIPPED | OUTPATIENT
Start: 2021-09-27 | End: 2021-10-07

## 2021-09-27 ASSESSMENT — ENCOUNTER SYMPTOMS
SORE THROAT: 1
COUGH: 0

## 2021-09-27 NOTE — PATIENT INSTRUCTIONS
Patient Education        Sore Throat: Care Instructions  Your Care Instructions     Infection by bacteria or a virus causes most sore throats. Cigarette smoke, dry air, air pollution, allergies, and yelling can also cause a sore throat. Sore throats can be painful and annoying. Fortunately, most sore throats go away on their own. If you have a bacterial infection, your doctor may prescribe antibiotics. Follow-up care is a key part of your treatment and safety. Be sure to make and go to all appointments, and call your doctor if you are having problems. It's also a good idea to know your test results and keep a list of the medicines you take. How can you care for yourself at home? · If your doctor prescribed antibiotics, take them as directed. Do not stop taking them just because you feel better. You need to take the full course of antibiotics. · Gargle with warm salt water once an hour to help reduce swelling and relieve discomfort. Use 1 teaspoon of salt mixed in 1 cup of warm water. · Take an over-the-counter pain medicine, such as acetaminophen (Tylenol), ibuprofen (Advil, Motrin), or naproxen (Aleve). Read and follow all instructions on the label. · Be careful when taking over-the-counter cold or flu medicines and Tylenol at the same time. Many of these medicines have acetaminophen, which is Tylenol. Read the labels to make sure that you are not taking more than the recommended dose. Too much acetaminophen (Tylenol) can be harmful. · Drink plenty of fluids. Fluids may help soothe an irritated throat. Hot fluids, such as tea or soup, may help decrease throat pain. · Use over-the-counter throat lozenges to soothe pain. Regular cough drops or hard candy may also help. These should not be given to young children because of the risk of choking. · Do not smoke or allow others to smoke around you. If you need help quitting, talk to your doctor about stop-smoking programs and medicines.  These can increase your children 6 years and older of age and adolescents, suggest gargling with warm salt water. Most recipes call for ¼ to ½ teaspoon of salt per 8 ounces (approximately 240 mL) of warm water. Children <6 years generally cannot gargle properly.   6. Tylenol or Ibuprofen for pain

## 2021-09-27 NOTE — PROGRESS NOTES
[facility]  [address] [place] [address] [address] [id number]  Dept: [phone]  Dept Fax: [phone]  Loc: [phone]    Rufina Zurita is a 22 y.o. female who presents today for her medical conditions/complaintsas noted below. Rufina Zurita is c/o of Pharyngitis        HPI:     Pharyngitis  This is a new problem. The current episode started yesterday. The problem has been gradually worsening. Associated symptoms include a sore throat. Pertinent negatives include no congestion, coughing, fatigue or fever. Nothing aggravates the symptoms. She has tried nothing for the symptoms.        Past Medical History:   Diagnosis Date    Concussion     approximately 10 years ago    Depression     Major depressive disorder     Food allergy     Hypothyroidism     Metabolic disorder     PCOS (polycystic ovarian syndrome)      Past Surgical History:   Procedure Laterality Date    CHOLECYSTECTOMY      COLONOSCOPY  11/03/2014    Dr Elly Arana,  Small non-bleeding internal hemorrhoids w/skin tags, serrated polyp     COLONOSCOPY N/A 10/21/2019    Dr Marya Guerra, 5 yr recall    COLONOSCOPY N/A 06/03/2021    Dr Fito Manrique, [facility], (-)Micro Colitis,  Int hemorrhoids-Grade 1, 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, 20 year recall    KNEE SURGERY      LAPAROSCOPY N/A 01/31/2020    DIAGNOSTIC LAPAROSCOPY AND CHROMOPERTUBATION performed by Rita Gallardo MD at [address] [place] [address] AND ADENOIDECTOMY      UPPER GASTROINTESTINAL ENDOSCOPY  11/03/2014    Dr Elly Arana, Gastritis, h pylori neg    UPPER GASTROINTESTINAL ENDOSCOPY N/A 10/21/2019    Dr Renetta Cacerse 06/03/2021    Dr Fito Manrique, (-) [place]       Family History   Problem Relation Age of Onset    Polycystic Ovary Syndrome Mother     Cancer Father     Colon Cancer Paternal Grandmother     Cancer Maternal Aunt     Thyroid POCT rapid strep A   Result Value Ref Range    Strep A Ag None Detected None Detected         No follow-ups on file. Orders Placed This Encounter   Medications    amoxicillin (AMOXIL) 500 MG capsule     Sig: Take 1 capsule by mouth 2 times daily for 10 days     Dispense:  20 capsule     Refill:  0       Patient given educational materials- see patient instructions. Discussed use, benefit, and side effects of prescribedmedications. All patient questions answered. Pt voiced understanding. Patient Instructions       Patient Education        Sore Throat: Care Instructions  Your Care Instructions     Infection by bacteria or a virus causes most sore throats. Cigarette smoke, dry air, air pollution, allergies, and yelling can also cause a sore throat. Sore throats can be painful and annoying. Fortunately, most sore throats go away on their own. If you have a bacterial infection, your doctor may prescribe antibiotics. Follow-up care is a key part of your treatment and safety. Be sure to make and go to all appointments, and call your doctor if you are having problems. It's also a good idea to know your test results and keep a list of the medicines you take. How can you care for yourself at home? · If your doctor prescribed antibiotics, take them as directed. Do not stop taking them just because you feel better. You need to take the full course of antibiotics. · Gargle with warm salt water once an hour to help reduce swelling and relieve discomfort. Use 1 teaspoon of salt mixed in 1 cup of warm water. · Take an over-the-counter pain medicine, such as acetaminophen (Tylenol), ibuprofen (Advil, Motrin), or naproxen (Aleve). Read and follow all instructions on the label. · Be careful when taking over-the-counter cold or flu medicines and Tylenol at the same time. Many of these medicines have acetaminophen, which is Tylenol. Read the labels to make sure that you are not taking more than the recommended dose. with PCP     Symptomatic Treatments for Sore Throat   1. Sipping cold or warm beverages   2. Eating cold or frozen desserts (eg, ice cream, popsicles)  3. Sucking on ice  4. Sucking on hard candy  For children 5 years and older and adolescents, suggest sucking on hard candy rather than medicated throat lozenges (eg, cough drops, troches, or pastilles) or medicated sprays. Hard candy and lozenges should not be used in children  4 years and younger of age because they are a choking hazard. 5. Gargling with warm salt water  For children 6 years and older of age and adolescents, suggest gargling with warm salt water. Most recipes call for ¼ to ½ teaspoon of salt per 8 ounces (approximately 240 mL) of warm water. Children <6 years generally cannot gargle properly.   6. Tylenol or Ibuprofen for pain                     Electronically signed by SARAY Dickson on 9/27/2021 at 4:02 PM

## 2021-11-12 DIAGNOSIS — E28.2 PCOS (POLYCYSTIC OVARIAN SYNDROME): ICD-10-CM

## 2021-11-12 RX ORDER — PROGESTERONE 200 MG/1
200 CAPSULE ORAL NIGHTLY
Qty: 30 CAPSULE | Refills: 2 | Status: SHIPPED | OUTPATIENT
Start: 2021-11-12

## 2021-11-12 RX ORDER — CHOLECALCIFEROL (VITAMIN D3) 1250 MCG
1 CAPSULE ORAL WEEKLY
Qty: 4 CAPSULE | Refills: 3 | Status: SHIPPED | OUTPATIENT
Start: 2021-11-12

## 2021-11-22 ENCOUNTER — E-VISIT (OUTPATIENT)
Dept: INTERNAL MEDICINE CLINIC | Age: 26
End: 2021-11-22
Payer: OTHER GOVERNMENT

## 2021-11-22 DIAGNOSIS — J06.9 URI WITH COUGH AND CONGESTION: Primary | ICD-10-CM

## 2021-11-22 PROCEDURE — 99422 OL DIG E/M SVC 11-20 MIN: CPT | Performed by: INTERNAL MEDICINE

## 2021-11-22 RX ORDER — AZITHROMYCIN 250 MG/1
250 TABLET, FILM COATED ORAL SEE ADMIN INSTRUCTIONS
Qty: 6 TABLET | Refills: 0 | Status: SHIPPED | OUTPATIENT
Start: 2021-11-22 | End: 2021-11-27

## 2021-11-22 ASSESSMENT — LIFESTYLE VARIABLES: SMOKING_STATUS: NO, I'VE NEVER SMOKED

## 2021-11-22 NOTE — PROGRESS NOTES
11-20 minutes were spent on the digital evaluation and management of this patient. Diagnoses and associated orders for this visit:     URI with cough and congestion   azithromycin (ZITHROMAX) 250 MG tablet; Take 1 tablet by mouth See Admin Instructions for 5 days 500mg on day 1 followed by 250mg on days 2 - 5    I have sent your medication to the pharmacy on file. If you're still having congestion, buy some over the counter Mucinex 1200 mg or Mucinex DM 1200 mg (if coughing) 1 pill twice a day  to thin up your secretions so it can drain to relieve pressure in your face/ears. If not better, please schedule for an in person or a virtual video visit instead of an Evisit so you can be examined and interact with your provider to better diagnose and treat your symptoms.

## 2021-12-16 ENCOUNTER — E-VISIT (OUTPATIENT)
Dept: PRIMARY CARE CLINIC | Age: 26
End: 2021-12-16
Payer: OTHER GOVERNMENT

## 2021-12-16 DIAGNOSIS — J01.90 ACUTE NON-RECURRENT SINUSITIS, UNSPECIFIED LOCATION: Primary | ICD-10-CM

## 2021-12-16 DIAGNOSIS — H92.09 OTALGIA, UNSPECIFIED LATERALITY: ICD-10-CM

## 2021-12-16 PROCEDURE — 99422 OL DIG E/M SVC 11-20 MIN: CPT | Performed by: NURSE PRACTITIONER

## 2021-12-16 RX ORDER — AMOXICILLIN AND CLAVULANATE POTASSIUM 875; 125 MG/1; MG/1
1 TABLET, FILM COATED ORAL 2 TIMES DAILY
Qty: 20 TABLET | Refills: 0 | Status: SHIPPED | OUTPATIENT
Start: 2021-12-16 | End: 2021-12-26

## 2021-12-16 ASSESSMENT — LIFESTYLE VARIABLES: SMOKING_STATUS: NO, I'VE NEVER SMOKED

## 2021-12-16 NOTE — PROGRESS NOTES
Reviewed questionnaire  Reviewed previous encounters (last e visit on 11/22 for sinus rx for z pack), medications, allergies and history     Dx sinusitis  Otalgia     Plan   rx for augmentin  rx for capmist as needed for cough/congestion     1. Water: Drink 1/2 of body weight (lb) in ounces,  Up to 90 Ounces of water per day. This will loosen mucus in the head and chest & improve the weak feeling of dehydration, allow the body to get germ fighting resources to the infection. Half can be juice or sugar free powerade or garorate. Don't count drinks with caffeine or carbonation. Infants can have Pedialyte liquid or freezer pops. Avoid salt if you have high Blood Pressure, swelling in the feet or ankles or have heart problems. 2. Humidity: Humidify the air to 35-50% ( or until the windows fog over slightly). Summer use of an air conditioner turned down too far and can result in dry air. Can use a humidifier, vaporizer, boil water on the stove or put a coffee can full of water on the heater vents. This will loosen mucus from infections and allergies. 3. Sleep: Get 8-10 hours a night and rest during the evening after work or school. If you have trouble sleeping, adults can take Melatonin 5mg up to 2 tabs at bedtime ( not for children or pregnant women). If Mono is suspected then sleep during 9PM to 9AM time span (if possible.)   4. Cough: Take cough medicines with Guaifenesin ( to loosen chest or head congestion) and Dextromethorphan ( to decrease excess cough). Robitussin D.M. Syrup every 4-6 hrs or Mucinex D. M. pills twice a day. Use the pediatric formulations for children over 6 months making sure they are alcohol & sugar free for children, pregnant women, and diabetics. 5. Pain And Fevers: Take Acetaminophen ( Tylenol) for fevers, aches, and headaches. 2-500 mg every 8 hours for adults. Appropriate doses at bedtime for children may help them sleep better.  Ibuprofen may be used if not pregnant, but should be given with food to avoid nausea. Avoid Ibuprofen if you have high blood pressure, CHF, or kidney problems. 6.Gargle: (DAY ONE OF SYMPTOMS) Gargle in the back of the throat with the head tilted back and to the sides with a strong mouthwash  ( Listerine or Scope) after meals and at bedtime at least 4 -5 times a day. This helps kill bacteria and viruses in the back of the throat and will shorten the duration and decrease the severity of your symptoms: sore throat, cough, ear popping,/ear pain, and possibly dizziness. 7. Smoking: Avoid smoking or exposure to second hand smoke. 8. Zinc: (DAY ONE OF SYMPTOMS)  Zinc lozenges such as Cold [place] (available most stores), or Basic ([employer] brand) will help shorten the duration and lessen symptoms such as sore throat, cough, nasal congestion, runny nose, and post nasal drip. Use 1 lozenge every 2-4 hours ( after meals if stomach is sensitive). Children can use 10-15 mg or less 3-4 times a day or Zinc lollypops. In pregnancy limit to 50-60 mg a day for 7 days as prenatals have Zinc also. With diarrhea use zinc pills 50 mg 1/2 to 1 pill 2x/day. 9. Vitamins: Vitamin C 500 mg with breakfast and dinner. Children and pregnant women should drink citrus juices. This speeds healing and strengthens immune system. 10. Chest Symptoms: Vicks Vapor rub to the chest at bedtime. 11. Decongestants: Avoid all decongestants if you have high blood pressure. Safe to take if you do not have high blood pressure. Try all of the above starting with day 1 of symptoms. If Strep throat symptoms appear call to be seen in the office as soon as possible and don't gargle on that day. Newborns, infants, or anyone with earaches or influenza may need to be seen quickly. Adults with fevers over 103 degrees or shortness of breath should call the office immediately. 12. Nasal saline spray or rinse. There are many different ways to do this OTC. I hope that you feel better.  If you do not feel better after treatment, please f/u with pcp.       Time spent 11-20 minutes

## 2021-12-16 NOTE — PATIENT INSTRUCTIONS
Patient Education        Saline Nasal Washes: Care Instructions  Your Care Instructions     Saline nasal washes help keep the nasal passages open by washing out thick or dried mucus. This simple remedy can help relieve symptoms of allergies, sinusitis, and colds. It also can make the nose feel more comfortable by keeping the mucous membranes moist. You may notice a little burning sensation in your nose the first few times you use the solution, but this usually gets better in a few days. Follow-up care is a key part of your treatment and safety. Be sure to make and go to all appointments, and call your doctor if you are having problems. It's also a good idea to know your test results and keep a list of the medicines you take. How can you care for yourself at home? · You can buy premixed saline solution in a squeeze bottle or other sinus rinse products at a drugstore. Read and follow the instructions on the label. · You also can make your own saline solution by adding 1 teaspoon of salt and 1 teaspoon of baking soda to 2 cups of distilled water. · If you use a homemade solution, pour a small amount into a clean bowl. Using a rubber bulb syringe, squeeze the syringe and place the tip in the salt water. Pull a small amount of the salt water into the syringe by relaxing your hand. · Sit down with your head tilted slightly back. Do not lie down. Put the tip of the bulb syringe or the squeeze bottle a little way into one of your nostrils. Gently drip or squirt a few drops into the nostril. Repeat with the other nostril. Some sneezing and gagging are normal at first.  · Gently blow your nose. · Wipe the syringe or bottle tip clean after each use. · Repeat this 2 or 3 times a day. · Use nasal washes gently if you have nosebleeds often. When should you call for help?   Watch closely for changes in your health, and be sure to contact your doctor if:    · You often get nosebleeds.     · You have problems doing the nasal washes. Where can you learn more? Go to https://chpepiceweb.Storage [employer] Box. org and sign in to your Med[employer] account. Enter [phone] in the [facility] box to learn more about \"Saline Nasal Washes: Care Instructions. \"     If you do not have an account, please click on the \"Sign Up Now\" link. Current as of: December 2, 2020               Content Version: 13.0  © 2006-2021 [employer]. Care instructions adapted under license by [facility] ([facility]). If you have questions about a medical condition or this instruction, always ask your healthcare professional. [place] [address] any warranty or liability for your use of this information. Patient Education        Earache: Care Instructions  Your Care Instructions     Even though infection is a common cause of ear pain, not all ear pain means an infection. If you have ear pain and don't have an infection, it could be because of a jaw problem, such as temporomandibular joint (TMJ) pain. Or it could be because of a neck problem. When ear discomfort or pain is mild or comes and goes without other symptoms, home treatment may be all you need. Follow-up care is a key part of your treatment and safety. Be sure to make and go to all appointments, and call your doctor if you are having problems. It's also a good idea to know your test results and keep a list of the medicines you take. How can you care for yourself at home? · Apply heat on the ear to ease pain. To apply heat, put a warm water bottle, a heating pad set on low, or a warm cloth on your ear. Do not go to sleep with a heating pad on your skin. · Take an over-the-counter pain medicine, such as acetaminophen (Tylenol), ibuprofen (Advil, Motrin), or naproxen (Aleve). Be safe with medicines. Read and follow all instructions on the label. · Do not take two or more pain medicines at the same time unless the doctor told you to.  Many pain medicines have acetaminophen, which is Tylenol. Too much acetaminophen (Tylenol) can be harmful. · Never insert anything, such as a cotton swab or a malcolm pin, into the ear. When should you call for help? Call your doctor now or seek immediate medical care if:    · You have new or worse symptoms of infection, such as:  ? Increased pain, swelling, warmth, or redness. ? Red streaks leading from the area. ? Pus draining from the area. ? A fever. Watch closely for changes in your health, and be sure to contact your doctor if:    · You have new or worse discharge coming from the ear.     · You do not get better as expected. Where can you learn more? Go to https://E-TEK Dynamics.TenKod. org and sign in to your [facility] account. Enter M424 in the CarePayment box to learn more about \"Earache: Care Instructions. \"     If you do not have an account, please click on the \"Sign Up Now\" link. Current as of: December 2, 2020               Content Version: 13.0  © 2006-2021 Healthwise, [facility]. Care instructions adapted under license by [facility] ([facility]). If you have questions about a medical condition or this instruction, always ask your healthcare professional. [place] [address] any warranty or liability for your use of this information.

## 2022-01-03 DIAGNOSIS — N91.2 AMENORRHEA: ICD-10-CM

## 2022-01-03 DIAGNOSIS — N91.2 AMENORRHEA: Primary | ICD-10-CM

## 2022-01-03 LAB — GONADOTROPIN, CHORIONIC (HCG) QUANT: 0.1 MIU/ML (ref 0–5.3)

## 2022-01-06 ENCOUNTER — OFFICE VISIT (OUTPATIENT)
Dept: PRIMARY CARE CLINIC | Age: 27
End: 2022-01-06
Payer: OTHER GOVERNMENT

## 2022-01-06 VITALS
OXYGEN SATURATION: 98 % | HEART RATE: 82 BPM | SYSTOLIC BLOOD PRESSURE: 140 MMHG | BODY MASS INDEX: 44.41 KG/M2 | TEMPERATURE: 98.2 F | DIASTOLIC BLOOD PRESSURE: 82 MMHG | WEIGHT: 293 LBS | HEIGHT: 68 IN

## 2022-01-06 DIAGNOSIS — E28.2 PCOS (POLYCYSTIC OVARIAN SYNDROME): ICD-10-CM

## 2022-01-06 DIAGNOSIS — F41.9 ANXIETY: ICD-10-CM

## 2022-01-06 DIAGNOSIS — I10 ESSENTIAL HYPERTENSION: Primary | ICD-10-CM

## 2022-01-06 PROCEDURE — 99214 OFFICE O/P EST MOD 30 MIN: CPT | Performed by: NURSE PRACTITIONER

## 2022-01-06 ASSESSMENT — ENCOUNTER SYMPTOMS
EYES NEGATIVE: 1
RESPIRATORY NEGATIVE: 1
GASTROINTESTINAL NEGATIVE: 1

## 2022-01-06 NOTE — PROGRESS NOTES
[facility]  [address] [place] [address]  Dept: [phone]  Dept Fax: [phone]  Loc: [phone]    Santosh Mary is a 32 y.o. female who presents today for her medical conditions/complaints as noted below. Santosh Mary is c/o of Hypertension      Chief Complaint   Patient presents with    Hypertension       HPI:     HPI   Patient is here for follow up on hypertension. She has not been taking any medication for hypertension as it has been well controlled over the past year or so. She has noticed a few higher readings recently. It is slightly elevated today. She does report a few stressful events making her blood pressure slightly higher. She does currently have a new job and feels as though this has some new stress causing the increased readings.       Past Medical History:   Diagnosis Date    Concussion     approximately 10 years ago    Depression     Major depressive disorder     Food allergy     Hypothyroidism     Metabolic disorder     PCOS (polycystic ovarian syndrome)         Past Surgical History:   Procedure Laterality Date    CHOLECYSTECTOMY      COLONOSCOPY  11/03/2014    Dr Jaspreet Aragon,  Small non-bleeding internal hemorrhoids w/skin tags, serrated polyp     COLONOSCOPY N/A 10/21/2019    Dr Ramsey Pearce, 5 yr recall    COLONOSCOPY N/A 06/03/2021    Dr Ruiz [facility], [facility], (-)Micro Colitis,  Int hemorrhoids-Grade 1, 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, 20 year recall    KNEE SURGERY      LAPAROSCOPY N/A 01/31/2020    DIAGNOSTIC LAPAROSCOPY AND CHROMOPERTUBATION performed by Bashir Caceres MD at [address] [place] [address] ENDOSCOPY  11/03/2014    Dr Jaspreet Aragon, Gastritis, h pylori neg    UPPER GASTROINTESTINAL ENDOSCOPY N/A 10/21/2019    Dr Jimmy Ruiz N/A 06/03/2021     [place] (-) [place]       Social History     Tobacco Use    Smoking status: Never Smoker    Smokeless tobacco: Never Used   Substance Use Topics    Alcohol use: Yes     Comment: occ        Current Outpatient Medications   Medication Sig Dispense Refill    Pseudoephedrine-DM-GG 60- MG TABS Take 1 tablet by mouth every 6 hours as needed (congestion/cough) 56 tablet 0    progesterone (PROMETRIUM) 200 MG CAPS capsule Take 1 capsule by mouth nightly CD 16-25 30 capsule 2    clomiPHENE (CLOMID) 50 MG tablet Take 1 tablet by mouth daily CD 3-7 5 tablet 2    Cholecalciferol (VITAMIN D3) 1.25 MG (93048 UT) CAPS Take 1 capsule by mouth once a week 4 capsule 3    metFORMIN (GLUCOPHAGE) 1000 MG tablet Take 1 tablet by mouth 2 times daily (with meals) 60 tablet 5    pantoprazole (PROTONIX) 40 MG tablet Take 1 tablet by mouth daily Take daily first thing in the morning on an empty stomach. 90 tablet 3    sucralfate (CARAFATE) 1 GM tablet Take 1 tablet by mouth 4 times daily (Patient taking differently: Take 1 g by mouth 4 times daily Indications: uses prn ) 120 tablet 3    magnesium 200 MG TABS tablet Take 1 tablet by mouth daily 30 tablet 5    ibuprofen (ADVIL;MOTRIN) 600 MG tablet Take 1 tablet by mouth every 6 hours as needed for Pain or Fever Take with food. 20 tablet 1    ondansetron (ZOFRAN ODT) 4 MG disintegrating tablet Take 1 tablet by mouth every 8 hours as needed for Nausea or Vomiting 20 tablet 0    fluticasone (FLONASE) 50 MCG/ACT nasal spray 2 sprays by Nasal route daily 1 Bottle 0     No current facility-administered medications for this visit. Allergies   Allergen Reactions    Corn-Containing Products     Sertraline      Pt. Developed seratonin syndrome and had to go impatient.      Shrimp (Diagnostic)     Soybean-Containing Drug Products     Wheat Extract        Family History   Problem Relation Age of Onset    Polycystic Ovary Syndrome Mother     Cancer Father     Colon oriented to person, place, and time. Psychiatric:         Speech: Speech normal.         Behavior: Behavior normal.         Thought Content: Thought content normal.         Judgment: Judgment normal.         BP (!) 140/82 (Site: Right Upper Arm, Position: Sitting)   Pulse 82   Temp 98.2 °F (36.8 °C)   Ht 5' 8\" (1.727 m)   Wt (!) 310 lb (140.6 kg)   SpO2 98%   BMI 47.14 kg/m²     Assessment:      Diagnosis Orders   1. Essential hypertension     2. Anxiety     3. PCOS (polycystic ovarian syndrome)         No results found for this visit on 01/06/22. Plan:     Patient is to monitor blood pressure at home and if readings continue to remain elevated at 140/80 or higher she is to call our office and we will restart propanolol as discussed. She will need a follow-up appointment in 1 month if this medication is needed to be restarted. She is to continue follow-up with infertility docs. More than 50% of the time was spent counseling and coordinating care for a total time of 32 mins face to face. Return in about 4 months (around 5/6/2022) for HTN. No orders of the defined types were placed in this encounter. No orders of the defined types were placed in this encounter. Patient offered educational handouts and has had all questions answered. Patient voices understanding and agrees to plans along with risks and benefits of plan. Patient is instructed to continue prior meds, diet, and exercise plans as instructed. Patient agrees to follow up as instructed and sooner if needed. Patient agrees to go to ER if condition becomes emergent. EMR Dragon/transcription disclaimer: Some of this encounter note is an electronic transcription/translation of spoken language to printed text. The electronic translation of spoken language may permit erroneous, or at times, nonsensical words or phrases to be inadvertently transcribed.  Although I have reviewed the note for such errors, some may still exist.    Electronically signed by SARAY Acosta on 1/6/2022 at 7:54 AM

## 2022-01-24 ENCOUNTER — E-VISIT (OUTPATIENT)
Dept: PRIMARY CARE CLINIC | Age: 27
End: 2022-01-24
Payer: OTHER GOVERNMENT

## 2022-01-24 DIAGNOSIS — J01.90 ACUTE NON-RECURRENT SINUSITIS, UNSPECIFIED LOCATION: Primary | ICD-10-CM

## 2022-01-24 PROCEDURE — 99422 OL DIG E/M SVC 11-20 MIN: CPT | Performed by: NURSE PRACTITIONER

## 2022-01-24 RX ORDER — PROPRANOLOL HYDROCHLORIDE 20 MG/1
20 TABLET ORAL 2 TIMES DAILY
Qty: 60 TABLET | Refills: 3 | Status: SHIPPED | OUTPATIENT
Start: 2022-01-24

## 2022-01-24 NOTE — TELEPHONE ENCOUNTER
Prudencio Virginia called to request a refill on her medication.       Last office visit : 1/6/2022   Next office visit : Visit date not found     Requested Prescriptions     Signed Prescriptions Disp Refills    propranolol (INDERAL) 20 MG tablet 60 tablet 3     Sig: Take 1 tablet by mouth 2 times daily     Authorizing Provider: Shaka Lal     Ordering User: Anaya English MA

## 2022-01-27 RX ORDER — AMOXICILLIN AND CLAVULANATE POTASSIUM 875; 125 MG/1; MG/1
1 TABLET, FILM COATED ORAL 2 TIMES DAILY
Qty: 20 TABLET | Refills: 0 | Status: SHIPPED | OUTPATIENT
Start: 2022-01-27 | End: 2022-02-06

## 2022-01-27 ASSESSMENT — LIFESTYLE VARIABLES: SMOKING_STATUS: NO, I'VE NEVER SMOKED

## 2022-01-27 NOTE — PROGRESS NOTES
Reviewed questionnaire  Reviewed previous encounters, medications, allergies and history     Dx sinusitis     Plan  rx for augmentin     1. Water: Drink 1/2 of body weight (lb) in ounces,  Up to 90 Ounces of water per day. This will loosen mucus in the head and chest & improve the weak feeling of dehydration, allow the body to get germ fighting resources to the infection. Half can be juice or sugar free powerade or garorate. Don't count drinks with caffeine or carbonation. Infants can have Pedialyte liquid or freezer pops. Avoid salt if you have high Blood Pressure, swelling in the feet or ankles or have heart problems. 2. Humidity: Humidify the air to 35-50% ( or until the windows fog over slightly).  Summer use of an air conditioner turned down too far and can result in dry air. Can use a humidifier, vaporizer, boil water on the stove or put a coffee can full of water on the heater vents. This will loosen mucus from infections and allergies. 3. Sleep: Get 8-10 hours a night and rest during the evening after work or school. If you have trouble sleeping, adults can take Melatonin 5mg up to 2 tabs at bedtime ( not for children or pregnant women). If Mono is suspected then sleep during 9PM to 9AM time span (if possible.)  4. Cough: Take cough medicines with Guaifenesin ( to loosen chest or head congestion) and Dextromethorphan ( to decrease excess cough). Robitussin D.M. Syrup every 4-6 hrs or Mucinex D. M. pills twice a day. Use the pediatric formulations for children over 6 months making sure they are alcohol & sugar free for children, pregnant women, and diabetics. 5. Pain And Fevers: Take Acetaminophen ( Tylenol) for fevers, aches, and headaches. 2-500 mg every 8 hours for adults. Appropriate doses at bedtime for children may help them sleep better. Ibuprofen may be used if not pregnant, but should be given with food to avoid nausea.  Avoid Ibuprofen if you have high blood pressure, CHF, or kidney problems. 6.Gargle: (DAY ONE OF SYMPTOMS) Gargle in the back of the throat with the head tilted back and to the sides with a strong mouthwash  ( Listerine or Scope) after meals and at bedtime at least 4 -5 times a day. This helps kill bacteria and viruses in the back of the throat and will shorten the duration and decrease the severity of your symptoms: sore throat, cough, ear popping,/ear pain, and possibly dizziness. 7. Smoking: Avoid smoking or exposure to second hand smoke. 8. Zinc: (DAY ONE OF SYMPTOMS)  Zinc lozenges such as Cold [place] (available most stores), or Basic ([employer] brand) will help shorten the duration and lessen symptoms such as sore throat, cough, nasal congestion, runny nose, and post nasal drip. Use 1 lozenge every 2-4 hours ( after meals if stomach is sensitive). Children can use 10-15 mg or less 3-4 times a day or Zinc lollypops. In pregnancy limit to 50-60 mg a day for 7 days as prenatals have Zinc also.   With diarrhea use zinc pills 50 mg 1/2 to 1 pill 2x/day. 9. Vitamins: Vitamin C 500 mg with breakfast and dinner. Children and pregnant women should drink citrus juices. This speeds healing and strengthens immune system. 10. Chest Symptoms: Vicks Vapor rub to the chest at bedtime. 11. Decongestants: Avoid all decongestants if you have high blood pressure. Safe to take if you do not have high blood pressure. Try all of the above starting with day 1 of symptoms. If Strep throat symptoms appear call to be seen in the office as soon as possible and don't gargle on that day. Newborns, infants, or anyone with earaches or influenza may need to be seen quickly. Adults with fevers over 103 degrees or shortness of breath should call the office immediately.   12. Nasal saline spray or rinse. There are many different ways to do this OTC.     I hope that you feel better.  If you do not feel better after treatment, please f/u with pcp.     Time spent 11-20 minutes

## 2022-01-28 NOTE — PATIENT INSTRUCTIONS
Patient Education        Saline Nasal Washes: Care Instructions  Your Care Instructions     Saline nasal washes help keep the nasal passages open by washing out thick or dried mucus. This simple remedy can help relieve symptoms of allergies, sinusitis, and colds. It also can make the nose feel more comfortable by keeping the mucous membranes moist. You may notice a little burning sensation in your nose the first few times you use the solution, but this usually gets better in a few days. Follow-up care is a key part of your treatment and safety. Be sure to make and go to all appointments, and call your doctor if you are having problems. It's also a good idea to know your test results and keep a list of the medicines you take. How can you care for yourself at home? · You can buy premixed saline solution in a squeeze bottle or other sinus rinse products at a drugstore. Read and follow the instructions on the label. · You also can make your own saline solution by adding 1 teaspoon of salt and 1 teaspoon of baking soda to 2 cups of distilled water. · If you use a homemade solution, pour a small amount into a clean bowl. Using a rubber bulb syringe, squeeze the syringe and place the tip in the salt water. Pull a small amount of the salt water into the syringe by relaxing your hand. · Sit down with your head tilted slightly back. Do not lie down. Put the tip of the bulb syringe or the squeeze bottle a little way into one of your nostrils. Gently drip or squirt a few drops into the nostril. Repeat with the other nostril. Some sneezing and gagging are normal at first.  · Gently blow your nose. · Wipe the syringe or bottle tip clean after each use. · Repeat this 2 or 3 times a day. · Use nasal washes gently if you have nosebleeds often. When should you call for help?   Watch closely for changes in your health, and be sure to contact your doctor if:    · You often get nosebleeds.     · You have problems doing the nasal washes. Where can you learn more? Go to https://chpepiceweb.[employer]. org and sign in to your [employer] account. Enter [id number] in the [facility] box to learn more about \"Saline Nasal Washes: Care Instructions. \"     If you do not have an account, please click on the \"Sign Up Now\" link. Current as of: September 8, 2021               Content Version: 13.1  © 2006-2021 Healthwise, [facility]. Care instructions adapted under license by [facility] ([facility]). If you have questions about a medical condition or this instruction, always ask your healthcare professional. [address] any warranty or liability for your use of this information.

## 2022-04-03 DIAGNOSIS — E28.2 PCOS (POLYCYSTIC OVARIAN SYNDROME): ICD-10-CM

## 2022-04-05 ENCOUNTER — OFFICE VISIT (OUTPATIENT)
Dept: PRIMARY CARE CLINIC | Age: 27
End: 2022-04-05
Payer: OTHER GOVERNMENT

## 2022-04-05 VITALS
WEIGHT: 293 LBS | SYSTOLIC BLOOD PRESSURE: 122 MMHG | HEIGHT: 68 IN | DIASTOLIC BLOOD PRESSURE: 80 MMHG | HEART RATE: 95 BPM | BODY MASS INDEX: 44.41 KG/M2 | OXYGEN SATURATION: 98 % | TEMPERATURE: 98.2 F

## 2022-04-05 DIAGNOSIS — R10.30 LOWER ABDOMINAL PAIN: Primary | ICD-10-CM

## 2022-04-05 DIAGNOSIS — Z87.42 HISTORY OF OVARIAN CYST: ICD-10-CM

## 2022-04-05 LAB
APPEARANCE FLUID: CLEAR
BILIRUBIN, POC: NORMAL
BLOOD URINE, POC: NORMAL
CLARITY, POC: CLEAR
COLOR, POC: YELLOW
CONTROL: NORMAL
GLUCOSE URINE, POC: NORMAL
KETONES, POC: NORMAL
LEUKOCYTE EST, POC: NORMAL
NITRITE, POC: NORMAL
PH, POC: 5.5
PREGNANCY TEST URINE, POC: NORMAL
PROTEIN, POC: NORMAL
SPECIFIC GRAVITY, POC: 1.02
UROBILINOGEN, POC: 0.2

## 2022-04-05 PROCEDURE — 81025 URINE PREGNANCY TEST: CPT | Performed by: NURSE PRACTITIONER

## 2022-04-05 PROCEDURE — 81002 URINALYSIS NONAUTO W/O SCOPE: CPT | Performed by: NURSE PRACTITIONER

## 2022-04-05 PROCEDURE — 99213 OFFICE O/P EST LOW 20 MIN: CPT | Performed by: NURSE PRACTITIONER

## 2022-04-05 SDOH — ECONOMIC STABILITY: FOOD INSECURITY: WITHIN THE PAST 12 MONTHS, YOU WORRIED THAT YOUR FOOD WOULD RUN OUT BEFORE YOU GOT MONEY TO BUY MORE.: NEVER TRUE

## 2022-04-05 SDOH — ECONOMIC STABILITY: FOOD INSECURITY: WITHIN THE PAST 12 MONTHS, THE FOOD YOU BOUGHT JUST DIDN'T LAST AND YOU DIDN'T HAVE MONEY TO GET MORE.: NEVER TRUE

## 2022-04-05 ASSESSMENT — PATIENT HEALTH QUESTIONNAIRE - PHQ9
SUM OF ALL RESPONSES TO PHQ QUESTIONS 1-9: 0
2. FEELING DOWN, DEPRESSED OR HOPELESS: 0
SUM OF ALL RESPONSES TO PHQ QUESTIONS 1-9: 0
SUM OF ALL RESPONSES TO PHQ9 QUESTIONS 1 & 2: 0
1. LITTLE INTEREST OR PLEASURE IN DOING THINGS: 0

## 2022-04-05 ASSESSMENT — SOCIAL DETERMINANTS OF HEALTH (SDOH): HOW HARD IS IT FOR YOU TO PAY FOR THE VERY BASICS LIKE FOOD, HOUSING, MEDICAL CARE, AND HEATING?: NOT HARD AT ALL

## 2022-04-05 NOTE — PROGRESS NOTES
[address] [facility] CARE  [address] [address] [address]  Dept: [phone]  Dept Fax: [phone]  Loc: [phone]    Marixa Reno is a 32 y.o. female who presents today for her medical conditions/complaints as noted below. Marixa Reno is c/o of Pelvic Pain (sx started 2 days ago) and Abdominal Pain      Chief Complaint   Patient presents with    Pelvic Pain     sx started 2 days ago    Abdominal Pain       HPI:     HPI  Patient is here for pelvic and stomach pain that started 2 days ago. The pain is dull and aching but sometimes it is a stabbing pain. She tried to go to [facility], but could not get in until the 26th. They tried with a new sperm donor in February. She reports having a period in March. The sperm donor is not through an actual sperm center it is someone she has found online and reports that he did have testing prior to donating. She then used sterile syringes to insert the sperm at home.         Past Medical History:   Diagnosis Date    Concussion     approximately 10 years ago    Depression     Major depressive disorder     Food allergy     Hypothyroidism     Metabolic disorder     PCOS (polycystic ovarian syndrome)         Past Surgical History:   Procedure Laterality Date    CHOLECYSTECTOMY      COLONOSCOPY  11/03/2014    Dr Franny Hunter,  Small non-bleeding internal hemorrhoids w/skin tags, serrated polyp     COLONOSCOPY N/A 10/21/2019    Dr Ailyn Gusman, 5 yr recall    COLONOSCOPY N/A 06/03/2021    Dr Ricarda Mayes, [facility], (-)Micro Colitis,  Int hemorrhoids-Grade 1, 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 HENT: Negative. Eyes: Negative. Respiratory: Negative. Cardiovascular: Negative. Gastrointestinal: Positive for abdominal pain (lower abd). Endocrine: Negative. Genitourinary: Positive for pelvic pain. Musculoskeletal: Negative. Skin: Negative. Neurological: Negative. Hematological: Negative. Psychiatric/Behavioral: Negative. Objective:     Physical Exam  Vitals and nursing note reviewed. Constitutional:       Appearance: Normal appearance. She is well-developed. HENT:      Head: Normocephalic and atraumatic. Right Ear: Hearing, tympanic membrane, ear canal and external ear normal.      Left Ear: Hearing, tympanic membrane, ear canal and external ear normal.      Nose: Nose normal.      Mouth/Throat:      Pharynx: Uvula midline. Eyes:      General: Lids are normal.      Conjunctiva/sclera: Conjunctivae normal.      Pupils: Pupils are equal, round, and reactive to light. Neck:      Thyroid: No thyroid mass or thyromegaly. Trachea: Trachea normal.   Cardiovascular:      Rate and Rhythm: Normal rate and regular rhythm. Pulses: Normal pulses. Heart sounds: Normal heart sounds. Pulmonary:      Effort: Pulmonary effort is normal.      Breath sounds: Normal breath sounds. Abdominal:      General: Bowel sounds are normal.      Palpations: Abdomen is soft. Tenderness: There is abdominal tenderness in the suprapubic area. Musculoskeletal:         General: Normal range of motion. Cervical back: Normal range of motion and neck supple. No tenderness. Thoracic back: Normal. No tenderness.  Normal range Urinalysis no Micro    POCT urine pregnancy       No orders of the defined types were placed in this encounter. Patient offered educational handouts and has had all questions answered. Patient voices understanding and agrees to plans along with risks and benefits of plan. Patient is instructed to continue prior meds, diet, and exercise plans as instructed. Patient agrees to follow up as instructed and sooner if needed. Patient agrees to go to ER if condition becomes emergent. EMR Dragon/transcription disclaimer: Some of this encounter note is an electronic transcription/translation of spoken language to printed text. The electronic translation of spoken language may permit erroneous, or at times, nonsensical words or phrases to be inadvertently transcribed.  Although I have reviewed the note for such errors, some may still exist.    Electronically signed by SARAY Silveira on 4/9/2022 at 7:39 PM

## 2022-04-08 ENCOUNTER — HOSPITAL ENCOUNTER (OUTPATIENT)
Dept: ULTRASOUND IMAGING | Age: 27
Discharge: HOME | End: 2022-04-08
Payer: OTHER GOVERNMENT

## 2022-04-08 DIAGNOSIS — Z87.42 HISTORY OF OVARIAN CYST: ICD-10-CM

## 2022-04-08 DIAGNOSIS — R10.30 LOWER ABDOMINAL PAIN: ICD-10-CM

## 2022-04-08 PROCEDURE — 76830 TRANSVAGINAL US NON-OB: CPT

## 2022-04-09 ASSESSMENT — ENCOUNTER SYMPTOMS
RESPIRATORY NEGATIVE: 1
ABDOMINAL PAIN: 1
EYES NEGATIVE: 1

## 2022-04-11 ENCOUNTER — TELEPHONE (OUTPATIENT)
Dept: OBGYN CLINIC | Age: 27
End: 2022-04-11

## 2022-04-11 NOTE — TELEPHONE ENCOUNTER
Yusuf Rojas called requesting a sooner appointment. Scheduled on 4/26. PCP ordered US and results reported abnormal findings. Please return her call to discuss @ [phone]      Thank you.

## 2022-04-18 ENCOUNTER — OFFICE VISIT (OUTPATIENT)
Dept: OBGYN CLINIC | Age: 27
End: 2022-04-18
Payer: OTHER GOVERNMENT

## 2022-04-18 VITALS
SYSTOLIC BLOOD PRESSURE: 134 MMHG | HEIGHT: 68 IN | HEART RATE: 88 BPM | WEIGHT: 293 LBS | DIASTOLIC BLOOD PRESSURE: 80 MMHG | BODY MASS INDEX: 44.41 KG/M2

## 2022-04-18 DIAGNOSIS — R53.83 OTHER FATIGUE: ICD-10-CM

## 2022-04-18 DIAGNOSIS — N92.6 IRREGULAR MENSES: ICD-10-CM

## 2022-04-18 DIAGNOSIS — N83.209 CYST OF OVARY, UNSPECIFIED LATERALITY: ICD-10-CM

## 2022-04-18 DIAGNOSIS — R10.2 PELVIC PAIN: ICD-10-CM

## 2022-04-18 DIAGNOSIS — N92.6 IRREGULAR MENSES: Primary | ICD-10-CM

## 2022-04-18 LAB
FOLLICLE STIMULATING HORMONE: 3.4 MIU/ML
HCT VFR BLD CALC: 39.5 % (ref 37–47)
HEMOGLOBIN: 12.4 G/DL (ref 12–16)
LUTEINIZING HORMONE: 4.3 MIU/ML
MCH RBC QN AUTO: 27.4 PG (ref 27–31)
MCHC RBC AUTO-ENTMCNC: 31.4 G/DL (ref 33–37)
MCV RBC AUTO: 87.4 FL (ref 81–99)
PDW BLD-RTO: 13.1 % (ref 11.5–14.5)
PLATELET # BLD: 286 K/UL (ref 130–400)
PMV BLD AUTO: 10.6 FL (ref 9.4–12.3)
PROLACTIN: 22.25 NG/ML (ref 4.79–23.3)
RBC # BLD: 4.52 M/UL (ref 4.2–5.4)
T4 FREE: 1.12 NG/DL (ref 0.93–1.7)
TESTOSTERONE TOTAL: 46.2 NG/DL (ref 8.4–48.1)
TSH SERPL DL<=0.05 MIU/L-ACNC: 1.88 UIU/ML (ref 0.27–4.2)
VITAMIN D 25-HYDROXY: 21.8 NG/ML
WBC # BLD: 9.6 K/UL (ref 4.8–10.8)

## 2022-04-18 PROCEDURE — 99213 OFFICE O/P EST LOW 20 MIN: CPT | Performed by: NURSE PRACTITIONER

## 2022-04-18 RX ORDER — METFORMIN HYDROCHLORIDE 500 MG/1
TABLET, EXTENDED RELEASE ORAL
Qty: 120 TABLET | Refills: 5 | Status: SHIPPED | OUTPATIENT
Start: 2022-04-18

## 2022-04-18 ASSESSMENT — ENCOUNTER SYMPTOMS
GASTROINTESTINAL NEGATIVE: 1
EYES NEGATIVE: 1
DIARRHEA: 0
RESPIRATORY NEGATIVE: 1
ALLERGIC/IMMUNOLOGIC NEGATIVE: 1
CONSTIPATION: 0

## 2022-04-18 NOTE — PATIENT INSTRUCTIONS
Patient Education        Hemorrhagic Ovarian Cyst: Care Instructions  Overview     An ovarian cyst is a sac that forms on the ovary and swells up with fluid. If the cyst bleeds, it is called a hemorrhagic (say \"Marquita\") ovarian cyst. If a hemorrhagic cyst breaks open, it can release blood and fluid intothe lower belly and pelvis. You may not have symptoms from the cyst. But if it is large, or if it twists or breaks open, you may have pain or other problems. You may feel pain from thecyst or have symptoms from losing blood. Your doctor may use a pelvic ultrasound to see if you have a cyst. Blood tests can help your doctor tell if the cyst is bleeding or you have lost a lot ofblood. Treatment depends on your symptoms. If they are mild, your doctor may suggest carefully watching your symptoms and doing blood tests again. But if you have a cyst that is very large, bleeds a lot, or causes other problems, your doctor may suggest surgery to remove it. If the bleeding is heavy, you may also needtreatment to replace the blood. Follow-up care is a key part of your treatment and safety. Be sure to make and go to all appointments, and call your doctor if you are having problems. It's also a good idea to know your test results and keep alist of the medicines you take. How can you care for yourself at home?  Use heat, such as a warm water bottle, a heating pad set on low, or a warm bath, to relax tense muscles and relieve cramping.  Be safe with medicines. Read and follow all instructions on the label. ? If the doctor gave you a prescription medicine for pain, take it as prescribed. ? If you are not taking a prescription pain medicine, ask your doctor if you can take an over-the-counter medicine. When should you call for help? Call 911 anytime you think you may need emergency care. For example, call if:     You passed out (lost consciousness).    Call your doctor now or seek immediate medical care if:     You have severe vaginal bleeding.      You are dizzy or lightheaded, or you feel like you may faint.      You have new or worse pain in your belly or pelvis. Watch closely for changes in your health, and be sure to contact your doctor if:     You think you may be pregnant.      You do not get better as expected. Where can you learn more? Go to https://chpepiceweb.[employer]. org and sign in to your [employer] account. Enter D256 in the [employer] box to learn more about \"Hemorrhagic Ovarian Cyst: Care Instructions. \"     If you do not have an account, please click on the \"Sign Up Now\" link. Current as of: November 22, 2021               Content Version: 13.2  © 2553-7074 Healthwise, [employer]. Care instructions adapted under license by [facility] ([facility]). If you have questions about a medical condition or this instruction, always ask your healthcare professional. [address] any warranty or liability for your use of this information.

## 2022-04-18 NOTE — PROGRESS NOTES
Kimberly Ge is a 32 y.o. female who presents today for her medical conditions/ complaints as noted below. Kimberly Ge is c/o of Results (Patient presents today to discuss her ultrasound results.)        HPI  Pt presents for f/u on irregular periods, pelvic pain. Had TVUS with PCP showing thickened endometrium and ovarian cyst. Has beenTTC- total about 6 cycles with donor. Periods have been more heavy and painful, but regular, about 32-34 days. Will spot for 3-4 days and then have a 7 day period. Had some breast tenderness prior to this period, which is new. US NON OB TRANSVAGINAL 4/8/2022 8:58 AM   HISTORY: R10.30   COMPARISON: 4/23/2021    TECHNIQUE: Multiple longitudinal and transverse real-time sonographic   images of the pelvis are obtained using a transvaginal transducer. FINDINGS:     The uterus is anteflexed in position and measures 7.8 x 4.7 x 4.5 cm. The uterine contour is smooth, and the myometrium is homogeneous in   echogenicity. Incidental small nabothian cysts. The endometrial stripe   measures 2.0 cm in thickness.     The right ovary measures 3.1 x 4.5 x 2.7 cm, and the left ovary   measures 2.4 x 2.1 x 2.0 cm. 2.3 x 2.2 x 1.9 cm cystic right adnexal   lesion with internal complexity, including thick septations, fine   specular echoes as well as a questionable vascular nodular component   on image #30. There are a few additional tiny anechoic follicles. No   free fluid in the pelvis.       Impression   1. Endometrial thickness is prominent even in the premenopausal   setting. No discrete mass identified. Differential includes   endometrial hyperplasia. 2. There is a 2.3 cm complex cystic right adnexal lesion with   questionable vascular nodular component. The vascular component is   questionable and perhaps artifactual and the differential would   include a hemorrhagic cyst. I would recommend 3 month follow-up   ultrasound to ensure resolution.    Signed by Dr Lola Acosta Patient's last menstrual period was 2022.       Past Medical History:   Diagnosis Date    Concussion     approximately 10 years ago    Depression     Major depressive disorder     Food allergy     Hypothyroidism     Metabolic disorder     PCOS (polycystic ovarian syndrome)      Past Surgical History:   Procedure Laterality Date    CHOLECYSTECTOMY      COLONOSCOPY  2014    Dr Jessica Sanchez,  Small non-bleeding internal hemorrhoids w/skin tags, serrated polyp     COLONOSCOPY N/A 10/21/2019    Dr Altaf Tomlinson, 5 yr recall    COLONOSCOPY N/A 2021    Dr Gilford Comment, [facility], (-)Micro Colitis,  Int hemorrhoids-Grade 1, 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, 20 year recall    KNEE SURGERY      LAPAROSCOPY N/A 2020    DIAGNOSTIC LAPAROSCOPY AND CHROMOPERTUBATION performed by Rod Mendieta MD at [address] [place] [address] AND ADENOIDECTOMY      UPPER GASTROINTESTINAL ENDOSCOPY  2014    Dr Jessica Sanchez, Gastritis, h pylori neg    UPPER GASTROINTESTINAL ENDOSCOPY N/A 10/21/2019    Dr Ray Echols 2021    Dr Gilford Comment, (-) [place]     Family History   Problem Relation Age of Onset    Polycystic Ovary Syndrome Mother     Cancer Father     Colon Cancer Paternal Grandmother     Cancer Maternal Aunt     Thyroid Disease Maternal Grandmother     Cancer Maternal Grandmother     Cancer Paternal Grandfather     Colon Polyps Neg Hx     Esophageal Cancer Neg Hx      Social History     Tobacco Use    Smoking status: Never Smoker    Smokeless tobacco: Never Used   Substance Use Topics    Alcohol use: Yes     Comment: occ       Current Outpatient Medications   Medication Sig Dispense Refill    metFORMIN (GLUCOPHAGE-XR) 500 MG extended release tablet Take 2 pills in the morning and 2 pills at night 120 tablet 5    propranolol (INDERAL) 20 MG tablet Take 1 tablet by mouth 2 times daily 60 tablet 3    Pseudoephedrine-DM-GG 60- MG TABS Take 1 tablet by mouth every 6 hours as needed (congestion/cough) 56 tablet 0    progesterone (PROMETRIUM) 200 MG CAPS capsule Take 1 capsule by mouth nightly CD 16-25 30 capsule 2    Cholecalciferol (VITAMIN D3) 1.25 MG (23975 UT) CAPS Take 1 capsule by mouth once a week 4 capsule 3    pantoprazole (PROTONIX) 40 MG tablet Take 1 tablet by mouth daily Take daily first thing in the morning on an empty stomach. 90 tablet 3    magnesium 200 MG TABS tablet Take 1 tablet by mouth daily 30 tablet 5    ibuprofen (ADVIL;MOTRIN) 600 MG tablet Take 1 tablet by mouth every 6 hours as needed for Pain or Fever Take with food. 20 tablet 1    ondansetron (ZOFRAN ODT) 4 MG disintegrating tablet Take 1 tablet by mouth every 8 hours as needed for Nausea or Vomiting 20 tablet 0    fluticasone (FLONASE) 50 MCG/ACT nasal spray 2 sprays by Nasal route daily 1 Bottle 0     No current facility-administered medications for this visit. Allergies   Allergen Reactions    Corn-Containing Products     Sertraline      Pt. Developed seratonin syndrome and had to go impatient.  Shrimp (Diagnostic)     Soybean-Containing Drug Products     Wheat Extract      Vitals:    04/18/22 0842   BP: 134/80   Pulse: 88     Body mass index is 47.9 kg/m². Review of Systems   Constitutional: Negative. HENT: Negative. Eyes: Negative. Respiratory: Negative. Cardiovascular: Negative. Gastrointestinal: Negative. Negative for constipation and diarrhea. Endocrine: Negative. Genitourinary: Positive for menstrual problem and pelvic pain. Negative for frequency and urgency. Musculoskeletal: Negative. Skin: Negative. Allergic/Immunologic: Negative. Neurological: Negative. Hematological: Negative. Psychiatric/Behavioral: Negative. All other systems reviewed and are negative. Physical Exam  Vitals and nursing note reviewed.    Constitutional: Appearance: She is well-developed. HENT:      Head: Normocephalic. Right Ear: External ear normal.      Left Ear: External ear normal.      Nose: Nose normal.   Musculoskeletal:         General: Normal range of motion. Cervical back: Normal range of motion. Skin:     General: Skin is warm and dry. Neurological:      Mental Status: She is alert and oriented to person, place, and time. Psychiatric:         Attention and Perception: Attention normal.         Mood and Affect: Mood normal.         Speech: Speech normal.         Behavior: Behavior normal.         Thought Content: Thought content normal.         Cognition and Memory: Cognition normal.         Judgment: Judgment normal.          Diagnosis Orders   1. Irregular menses  Follicle Stimulating Hormone    Luteinizing Hormone    TSH    T4, Free    Testosterone    Prolactin    CBC    US NON OB TRANSVAGINAL   2. Cyst of ovary, unspecified laterality  US NON OB TRANSVAGINAL   3. Pelvic pain  US NON OB TRANSVAGINAL   4. Other fatigue  Vitamin D 25 Hydroxy       MEDICATIONS:  Orders Placed This Encounter   Medications    metFORMIN (GLUCOPHAGE-XR) 500 MG extended release tablet     Sig: Take 2 pills in the morning and 2 pills at night     Dispense:  120 tablet     Refill:  5       ORDERS:  Orders Placed This Encounter   Procedures    US NON OB TRANSVAGINAL    Follicle Stimulating Hormone    Luteinizing Hormone    TSH    T4, Free    Testosterone    Vitamin D 25 Hydroxy    Prolactin    CBC       PLAN:  Discussed u/s results and potential differentials  Plan repeat u/s- may need endo bx, but likely d/t luteal phase cycle and corpus luteal cyst  CD 2 labs today to recheck hormones    Patient Instructions     Patient Education        Hemorrhagic Ovarian Cyst: Care Instructions  Overview     An ovarian cyst is a sac that forms on the ovary and swells up with fluid.  If the cyst bleeds, it is called a hemorrhagic (say \"ets-dpl-GU-monster\") ovarian cyst. If a hemorrhagic cyst breaks open, it can release blood and fluid intothe lower belly and pelvis. You may not have symptoms from the cyst. But if it is large, or if it twists or breaks open, you may have pain or other problems. You may feel pain from thecyst or have symptoms from losing blood. Your doctor may use a pelvic ultrasound to see if you have a cyst. Blood tests can help your doctor tell if the cyst is bleeding or you have lost a lot ofblood. Treatment depends on your symptoms. If they are mild, your doctor may suggest carefully watching your symptoms and doing blood tests again. But if you have a cyst that is very large, bleeds a lot, or causes other problems, your doctor may suggest surgery to remove it. If the bleeding is heavy, you may also needtreatment to replace the blood. Follow-up care is a key part of your treatment and safety. Be sure to make and go to all appointments, and call your doctor if you are having problems. It's also a good idea to know your test results and keep alist of the medicines you take. How can you care for yourself at home?  Use heat, such as a warm water bottle, a heating pad set on low, or a warm bath, to relax tense muscles and relieve cramping.  Be safe with medicines. Read and follow all instructions on the label. ? If the doctor gave you a prescription medicine for pain, take it as prescribed. ? If you are not taking a prescription pain medicine, ask your doctor if you can take an over-the-counter medicine. When should you call for help? Call 911 anytime you think you may need emergency care. For example, call if:     You passed out (lost consciousness). Call your doctor now or seek immediate medical care if:     You have severe vaginal bleeding.      You are dizzy or lightheaded, or you feel like you may faint.      You have new or worse pain in your belly or pelvis.    Watch closely for changes in your health, and be sure to contact your doctor if:     You think you may be pregnant.      You do not get better as expected. Where can you learn more? Go to [email or web address] and sign in to your [employer] account. Enter D256 in the [employer] box to learn more about \"Hemorrhagic Ovarian Cyst: Care Instructions. \"     If you do not have an account, please click on the \"Sign Up Now\" link. Current as of: November 22, 2021               Content Version: 13.2  © 1804-4254 Healthwise, Incorporated. Care instructions adapted under license by [facility] ([facility]). If you have questions about a medical condition or this instruction, always ask your healthcare professional. [address] any warranty or liability for your use of this information.

## 2022-04-20 ENCOUNTER — E-VISIT (OUTPATIENT)
Dept: PRIMARY CARE CLINIC | Age: 27
End: 2022-04-20
Payer: OTHER GOVERNMENT

## 2022-04-20 DIAGNOSIS — J01.10 ACUTE FRONTAL SINUSITIS, RECURRENCE NOT SPECIFIED: Primary | ICD-10-CM

## 2022-04-20 PROCEDURE — 99422 OL DIG E/M SVC 11-20 MIN: CPT | Performed by: INTERNAL MEDICINE

## 2022-04-20 RX ORDER — AZITHROMYCIN 250 MG/1
250 TABLET, FILM COATED ORAL SEE ADMIN INSTRUCTIONS
Qty: 6 TABLET | Refills: 0 | Status: SHIPPED | OUTPATIENT
Start: 2022-04-20 | End: 2022-04-25

## 2022-04-20 ASSESSMENT — LIFESTYLE VARIABLES: SMOKING_STATUS: NO, I'VE NEVER SMOKED

## 2022-04-20 NOTE — PROGRESS NOTES
HPI:    Coral Frias presents today for evaluation of sinusitis. She has nasal congestion with thick green mucus. She has facial and dental pain. She does have otalgia with drainage. Her symptoms have been present for the past 3 days. In the past she has been treated unsuccessfully with Augmentin    Medication allergies and Chronic medical issues were reviewed: Allergies   Allergen Reactions    Corn-Containing Products     Sertraline      Pt. Developed seratonin syndrome and had to go impatient.  Shrimp (Diagnostic)     Soybean-Containing Drug Products     Wheat Extract        Patient Active Problem List    Diagnosis Date Noted    Neck pain 03/02/2021    PCOS (polycystic ovarian syndrome) 05/06/2019    Pelvic pain 05/06/2019         Assessment    1. Sinusitis    Plan    1. Start azithromycin  2. Noted medication and food allergies, she states she previously tolerated azithromycin  3. Start Flonase, spray 1 spray into each nostril twice a day    They were advised to contact their primary care provider or go to the nearest ER/ Urgent care if their symptoms fail to improve    Electronically signed by Martin Walters DO on 4/20/2022 at 9:52 AM    Please note that the above documentation was prepared using voice recognition software. Every attempt was made to ensure accuracy but there may be spelling, grammatical, and contextual errors. 11-20 minutes were spent on the digital evaluation and management of this patient.

## 2022-04-25 ENCOUNTER — TELEPHONE (OUTPATIENT)
Dept: OBGYN CLINIC | Age: 27
End: 2022-04-25

## 2022-04-25 ENCOUNTER — HOSPITAL ENCOUNTER (OUTPATIENT)
Dept: ULTRASOUND IMAGING | Age: 27
Discharge: HOME | End: 2022-04-25
Payer: OTHER GOVERNMENT

## 2022-04-25 DIAGNOSIS — N92.6 IRREGULAR MENSES: ICD-10-CM

## 2022-04-25 DIAGNOSIS — E28.2 PCOS (POLYCYSTIC OVARIAN SYNDROME): ICD-10-CM

## 2022-04-25 DIAGNOSIS — R53.83 OTHER FATIGUE: ICD-10-CM

## 2022-04-25 DIAGNOSIS — N83.209 CYST OF OVARY, UNSPECIFIED LATERALITY: ICD-10-CM

## 2022-04-25 DIAGNOSIS — E55.9 VITAMIN D DEFICIENCY: ICD-10-CM

## 2022-04-25 DIAGNOSIS — R10.2 PELVIC PAIN: ICD-10-CM

## 2022-04-25 DIAGNOSIS — N92.6 IRREGULAR MENSES: Primary | ICD-10-CM

## 2022-04-25 PROCEDURE — 76830 TRANSVAGINAL US NON-OB: CPT

## 2022-04-25 NOTE — TELEPHONE ENCOUNTER
Informed pt of my chart result note since pt did not view it yet. Pt will start vitamin D RX and wants to recheck labs in 8 wks. Labs ordered.

## 2022-04-26 ENCOUNTER — OFFICE VISIT (OUTPATIENT)
Dept: OBGYN CLINIC | Age: 27
End: 2022-04-26
Payer: OTHER GOVERNMENT

## 2022-04-26 ENCOUNTER — HOSPITAL ENCOUNTER (OUTPATIENT)
Age: 27
Setting detail: SPECIMEN
Discharge: HOME | End: 2022-04-26
Payer: OTHER GOVERNMENT

## 2022-04-26 VITALS
DIASTOLIC BLOOD PRESSURE: 95 MMHG | SYSTOLIC BLOOD PRESSURE: 140 MMHG | BODY MASS INDEX: 44.41 KG/M2 | WEIGHT: 293 LBS | HEART RATE: 81 BPM | HEIGHT: 68 IN

## 2022-04-26 DIAGNOSIS — Z12.39 ENCOUNTER FOR SCREENING BREAST EXAMINATION: ICD-10-CM

## 2022-04-26 DIAGNOSIS — N92.6 IRREGULAR MENSES: ICD-10-CM

## 2022-04-26 DIAGNOSIS — Z01.419 WOMEN'S ANNUAL ROUTINE GYNECOLOGICAL EXAMINATION: Primary | ICD-10-CM

## 2022-04-26 DIAGNOSIS — Z12.4 SCREENING FOR CERVICAL CANCER: ICD-10-CM

## 2022-04-26 DIAGNOSIS — N92.1 MENORRHAGIA WITH IRREGULAR CYCLE: ICD-10-CM

## 2022-04-26 PROCEDURE — 88305 TISSUE EXAM BY PATHOLOGIST: CPT

## 2022-04-26 PROCEDURE — 58100 BIOPSY OF UTERUS LINING: CPT | Performed by: NURSE PRACTITIONER

## 2022-04-26 PROCEDURE — 99395 PREV VISIT EST AGE 18-39: CPT | Performed by: NURSE PRACTITIONER

## 2022-04-26 ASSESSMENT — ENCOUNTER SYMPTOMS
ALLERGIC/IMMUNOLOGIC NEGATIVE: 1
DIARRHEA: 0
CONSTIPATION: 0
GASTROINTESTINAL NEGATIVE: 1
RESPIRATORY NEGATIVE: 1
EYES NEGATIVE: 1

## 2022-04-26 NOTE — PROGRESS NOTES
Past Surgical History:   Procedure Laterality Date    CHOLECYSTECTOMY      COLONOSCOPY  11/03/2014    Dr Kierra Alba,  Small non-bleeding internal hemorrhoids w/skin tags, serrated polyp     COLONOSCOPY N/A 10/21/2019    Dr Rogelio Colbert, 5 yr recall    COLONOSCOPY N/A 06/03/2021    Dr Sanya Holbrook, [facility], (-)Micro Colitis,  Int hemorrhoids-Grade 1, 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, 20 year recall    KNEE SURGERY      LAPAROSCOPY N/A 01/31/2020    DIAGNOSTIC LAPAROSCOPY AND CHROMOPERTUBATION performed by Lurlean Habermann, MD at [address] [place] [address] POLYPECTOMY      TONSILLECTOMY AND ADENOIDECTOMY      UPPER GASTROINTESTINAL ENDOSCOPY  11/03/2014    Dr Kierra Alba, Gastritis, h pylori neg    UPPER GASTROINTESTINAL ENDOSCOPY N/A 10/21/2019    Dr Davis Roberts 06/03/2021    Dr Sanya Holbrook, (-) [place]     Family History   Problem Relation Age of Onset    Polycystic Ovary Syndrome Mother     Cancer Father     Colon Cancer Paternal Grandmother     Cancer Maternal Aunt     Thyroid Disease Maternal Grandmother     Cancer Maternal Grandmother     Cancer Paternal Grandfather     Colon Polyps Neg Hx     Esophageal Cancer Neg Hx      Social History     Tobacco Use    Smoking status: Never Smoker    Smokeless tobacco: Never Used   Substance Use Topics    Alcohol use: Yes     Comment: occ       Current Outpatient Medications   Medication Sig Dispense Refill    metFORMIN (GLUCOPHAGE-XR) 500 MG extended release tablet Take 2 pills in the morning and 2 pills at night 120 tablet 5    propranolol (INDERAL) 20 MG tablet Take 1 tablet by mouth 2 times daily 60 tablet 3    Pseudoephedrine-DM-GG 60- MG TABS Take 1 tablet by mouth every 6 hours as needed (congestion/cough) 56 tablet 0    progesterone (PROMETRIUM) 200 MG CAPS capsule Take 1 capsule by mouth nightly CD 16-25 30 capsule 2    Cholecalciferol (VITAMIN D3) 1.25 MG (11287 UT) CAPS Take 1 capsule by mouth once a week 4 capsule 3    pantoprazole (PROTONIX) 40 MG tablet Take 1 tablet by mouth daily Take daily first thing in the morning on an empty stomach. 90 tablet 3    magnesium 200 MG TABS tablet Take 1 tablet by mouth daily 30 tablet 5    ibuprofen (ADVIL;MOTRIN) 600 MG tablet Take 1 tablet by mouth every 6 hours as needed for Pain or Fever Take with food. 20 tablet 1    ondansetron (ZOFRAN ODT) 4 MG disintegrating tablet Take 1 tablet by mouth every 8 hours as needed for Nausea or Vomiting 20 tablet 0    fluticasone (FLONASE) 50 MCG/ACT nasal spray 2 sprays by Nasal route daily 1 Bottle 0     No current facility-administered medications for this visit. Allergies   Allergen Reactions    Corn-Containing Products     Sertraline      Pt. Developed seratonin syndrome and had to go impatient.  Shrimp (Diagnostic)     Soybean-Containing Drug Products     Wheat Extract      Vitals:    04/26/22 1557   BP: (!) 140/95   Pulse: 81     Body mass index is 47.59 kg/m². Review of Systems   Constitutional: Negative. HENT: Negative. Eyes: Negative. Respiratory: Negative. Cardiovascular: Negative. Gastrointestinal: Negative. Negative for constipation and diarrhea. Endocrine: Negative. Genitourinary: Positive for menstrual problem and pelvic pain. Negative for frequency and urgency. Musculoskeletal: Negative. Skin: Negative. Allergic/Immunologic: Negative. Neurological: Negative. Hematological: Negative. Psychiatric/Behavioral: Negative. All other systems reviewed and are negative. Physical Exam  Vitals and nursing note reviewed. Constitutional:       Appearance: She is well-developed. HENT:      Head: Normocephalic. Neck:      Thyroid: No thyroid mass or thyromegaly. Cardiovascular:      Rate and Rhythm: Normal rate and regular rhythm.    Pulmonary:      Effort: Pulmonary effort is normal.      Breath sounds: Normal breath sounds. Chest:   Breasts:      Right: No inverted nipple, mass, nipple discharge or skin change. Left: No inverted nipple, mass, nipple discharge or skin change. Abdominal:      Palpations: Abdomen is soft. There is no mass. Tenderness: There is no abdominal tenderness. Genitourinary:     General: Normal vulva. Vagina: Normal.      Cervix: No cervical motion tenderness. Uterus: Normal. Not enlarged. Adnexa:         Right: No mass or tenderness. Left: No mass or tenderness. Comments: Pap collected    Marisabel Malik is a 32 y.o.  with history of menorrhagia who presents today for endometrial biopsy. BP (!) 140/95 (Site: Left Upper Arm, Position: Sitting, Cuff Size: Medium Adult)   Pulse 81   Ht 5' 8\" (1.727 m)   Wt (!) 313 lb (142 kg)   LMP 04/17/2022   Breastfeeding No   BMI 47.59 kg/m²     Endometrial Biopsy Procedure Note    Pre-operative Diagnosis: heavy periods    Post-operative Diagnosis: same    Indications: abnormal uterine bleeding    Procedure Details    Urine pregnancy test was not done. The risks (including infection, bleeding, pain, and uterine perforation) and benefits of the procedure were explained to the patient and verbal informed consent was obtained. The patient was placed in the dorsal lithotomy position. Speculum inserted in the vagina, and the cervix prepped with povidone iodine Uterus was sounded to 7cm. Pipelle endometrial aspirator was inserted and gentle pressure applied. Adequate tissue sample obtained. Sent for pathology. Pt tolerated well. Condition:  Stable    Complications:  None    Plan:    The patient was advised to call for any fever or for prolonged or severe pain or bleeding. She was advised to use OTC ibuprofen as needed for mild to moderate pain. She was advised to avoid vaginal intercourse for 48 hours or until the bleeding has completely stopped.       Musculoskeletal:         General: Normal range of motion. Cervical back: Normal range of motion and neck supple. Skin:     General: Skin is warm and dry. Neurological:      Mental Status: She is alert and oriented to person, place, and time. Psychiatric:         Attention and Perception: Attention normal.         Mood and Affect: Mood normal.         Speech: Speech normal.         Behavior: Behavior normal.         Thought Content: Thought content normal.         Cognition and Memory: Cognition normal.         Judgment: Judgment normal.          Diagnosis Orders   1. Women's annual routine gynecological examination     2. Screening for cervical cancer  PAP SMEAR   3. Encounter for screening breast examination     4. Irregular menses  US NON OB TRANSVAGINAL       MEDICATIONS:  No orders of the defined types were placed in this encounter. ORDERS:  Orders Placed This Encounter   Procedures    US NON OB TRANSVAGINAL    PAP SMEAR       PLAN:  Pap collected  Endo bx pending  Will plan Clomid, trigger shot next cycle  Discussed also MEENAKSHI in [facility] if no successful pregnancies achieved    Patient Instructions   Patient Education        Breast Self-Exam: Care Instructions  Your Care Instructions     A breast self-exam is when you check your breasts for lumps or changes. This regular exam helps you learn how your breasts normally look and feel. Mostbreast problems or changes are not because of cancer. Breast self-exam is not a substitute for a mammogram. Having regular breast exams by your doctor and regular mammograms improve your chances of finding anyproblems with your breasts. Some women set a time each month to do a step-by-step breast self-exam. Other women like a less formal system. They might look at their breasts as they brushtheir teeth, or feel their breasts once in a while in the shower. If you notice a change in your breast, tell your doctor. Follow-up care is a key part of your treatment and safety.  Be sure to make and go to all appointments, Enter P148 in the [facility] box to learn more about \"Breast Self-Exam: Care Instructions. \"     If you do not have an account, please click on the \"Sign Up Now\" link. Current as of: September 8, 2021               Content Version: 13.2  © 8747-3989 Healthwise, Incorporated. Care instructions adapted under license by [facility] ([facility]). If you have questions about a medical condition or this instruction, always ask your healthcare professional. [address] any warranty or liability for your use of this information.

## 2022-04-26 NOTE — PATIENT INSTRUCTIONS
Patient Education        Breast Self-Exam: Care Instructions  Your Care Instructions     A breast self-exam is when you check your breasts for lumps or changes. This regular exam helps you learn how your breasts normally look and feel. Mostbreast problems or changes are not because of cancer. Breast self-exam is not a substitute for a mammogram. Having regular breast exams by your doctor and regular mammograms improve your chances of finding anyproblems with your breasts. Some women set a time each month to do a step-by-step breast self-exam. Other women like a less formal system. They might look at their breasts as they brushtheir teeth, or feel their breasts once in a while in the shower. If you notice a change in your breast, tell your doctor. Follow-up care is a key part of your treatment and safety. Be sure to make and go to all appointments, and call your doctor if you are having problems. It's also a good idea to know your test results and keep alist of the medicines you take. How do you do a breast self-exam?   The best time to examine your breasts is usually one week after your menstrual period begins. Your breasts should not be tender then. If you do not have periods, you might do your exam on a day of the month that is easy to remember.  To examine your breasts:  ? Remove all your clothes above the waist and lie down. When you are lying down, your breast tissue spreads evenly over your chest wall, which makes it easier to feel all your breast tissue. ? Use the pads--not the fingertips--of the 3 middle fingers of your left hand to check your right breast. Move your fingers slowly in small coin-sized circles that overlap. ? Use three levels of pressure to feel of all your breast tissue. Use light pressure to feel the tissue close to the skin surface. Use medium pressure to feel a little deeper. Use firm pressure to feel your tissue close to your breastbone and ribs.  Use each pressure level to feel your breast tissue before moving on to the next spot. ? Check your entire breast, moving up and down as if following a strip from the collarbone to the bra line, and from the armpit to the ribs. Repeat until you have covered the entire breast.  ? Repeat this procedure for your left breast, using the pads of the 3 middle fingers of your right hand.  To examine your breasts while in the shower:  ? Place one arm over your head and lightly soap your breast on that side. ? Using the pads of your fingers, gently move your hand over your breast (in the strip pattern described above), feeling carefully for any lumps or changes. ? Repeat for the other breast.   Have your doctor inspect anything you notice to see if you need further testing. Where can you learn more? Go to [email or web address] and sign in to your [employer] account. Enter P148 in the [employer] box to learn more about \"Breast Self-Exam: Care Instructions. \"     If you do not have an account, please click on the \"Sign Up Now\" link. Current as of: September 8, 2021               Content Version: 13.2  © 2006-2022 Healthwise, Incorporated. Care instructions adapted under license by [facility] ([facility]). If you have questions about a medical condition or this instruction, always ask your healthcare professional. [address] any warranty or liability for your use of this information.

## 2022-06-03 ENCOUNTER — HOSPITAL ENCOUNTER (OUTPATIENT)
Dept: ULTRASOUND IMAGING | Age: 27
Discharge: HOME | End: 2022-06-03
Payer: OTHER GOVERNMENT

## 2022-06-03 DIAGNOSIS — N92.6 IRREGULAR MENSES: ICD-10-CM

## 2022-06-03 PROCEDURE — 76830 TRANSVAGINAL US NON-OB: CPT

## 2022-06-03 PROCEDURE — 76830 TRANSVAGINAL US NON-OB: CPT | Performed by: RADIOLOGY

## 2022-06-19 ENCOUNTER — PATIENT MESSAGE (OUTPATIENT)
Dept: OBGYN CLINIC | Age: 27
End: 2022-06-19

## 2022-06-19 DIAGNOSIS — N92.6 IRREGULAR MENSES: Primary | ICD-10-CM

## 2022-06-20 NOTE — TELEPHONE ENCOUNTER
From: Yusuf Rojas  To: Lonny Mess  Sent: 6/19/2022 11:08 AM CDT  Subject: Insemination     Good morning! So, we were not able to inseminate last cycle (6-2) due to an emergency. Thankfully we did NOT do the trigger shot. Anyway, we are hopeful to try again this next cycle. I just started my period on 6-18. Would it be best to try clomid, the ultrasound, and trigger shot again? If so, I am out of clomid, and may need another script for the trigger shot if it is something that expires quickly. Otherwise I can call the pharmacy and make sure I can pick it up when we need it this cycle. Thanks so much for all of your patience and help!

## 2022-06-30 ENCOUNTER — HOSPITAL ENCOUNTER (OUTPATIENT)
Dept: ULTRASOUND IMAGING | Age: 27
Discharge: HOME | End: 2022-06-30
Payer: OTHER GOVERNMENT

## 2022-06-30 DIAGNOSIS — N92.6 IRREGULAR MENSES: ICD-10-CM

## 2022-06-30 PROCEDURE — 76830 TRANSVAGINAL US NON-OB: CPT

## 2022-06-30 PROCEDURE — 76830 TRANSVAGINAL US NON-OB: CPT | Performed by: RADIOLOGY

## 2022-09-22 ENCOUNTER — HOSPITAL ENCOUNTER (OUTPATIENT)
Dept: GENERAL RADIOLOGY | Age: 27
Discharge: HOME | End: 2022-09-22
Payer: OTHER GOVERNMENT

## 2022-09-22 ENCOUNTER — OFFICE VISIT (OUTPATIENT)
Age: 27
End: 2022-09-22
Payer: OTHER GOVERNMENT

## 2022-09-22 VITALS
BODY MASS INDEX: 44.41 KG/M2 | OXYGEN SATURATION: 100 % | TEMPERATURE: 96.8 F | WEIGHT: 293 LBS | HEART RATE: 83 BPM | SYSTOLIC BLOOD PRESSURE: 130 MMHG | RESPIRATION RATE: 18 BRPM | HEIGHT: 68 IN | DIASTOLIC BLOOD PRESSURE: 76 MMHG

## 2022-09-22 DIAGNOSIS — R05.9 COUGH: ICD-10-CM

## 2022-09-22 DIAGNOSIS — J06.9 VIRAL URI WITH COUGH: Primary | ICD-10-CM

## 2022-09-22 DIAGNOSIS — Z11.52 ENCOUNTER FOR SCREENING FOR COVID-19: ICD-10-CM

## 2022-09-22 LAB — SARS-COV-2, PCR: NOT DETECTED

## 2022-09-22 PROCEDURE — 71046 X-RAY EXAM CHEST 2 VIEWS: CPT

## 2022-09-22 PROCEDURE — 99213 OFFICE O/P EST LOW 20 MIN: CPT

## 2022-09-22 PROCEDURE — 71046 X-RAY EXAM CHEST 2 VIEWS: CPT | Performed by: RADIOLOGY

## 2022-09-22 RX ORDER — DEXAMETHASONE SODIUM PHOSPHATE 10 MG/ML
10 INJECTION INTRAMUSCULAR; INTRAVENOUS ONCE
Status: COMPLETED | OUTPATIENT
Start: 2022-09-22 | End: 2022-09-22

## 2022-09-22 RX ADMIN — DEXAMETHASONE SODIUM PHOSPHATE 10 MG: 10 INJECTION INTRAMUSCULAR; INTRAVENOUS at 14:07

## 2022-09-22 ASSESSMENT — ENCOUNTER SYMPTOMS
SINUS PAIN: 1
COUGH: 1
RHINORRHEA: 1
SORE THROAT: 1
NAUSEA: 0
SINUS PRESSURE: 1
VOMITING: 1
CHEST TIGHTNESS: 1

## 2022-09-22 NOTE — PATIENT INSTRUCTIONS
1. Covid test and chest x-ray results will be called to you when they are available. 2. Rest  3. Hydrate with water or gatorade  4. OTC cough/cold medications are okay -follow label instructions  5. Tylenol or motrin for pain or fever  6. Warm salt water gargles or warm liquids for comfort. Warm tea with a tablespoon of honey is excellent for sore throat. 7. Cool mist humidifer   8.  If symptoms worsen, please seek reevaluation

## 2022-09-22 NOTE — PROGRESS NOTES
[id number] [facility] [address] [id number]  Dept: [phone]  Dept Fax: [phone]  Loc: [phone]    Huan Hendrickson is a 32 y.o. female who presents today for her medical conditions/complaints as noted below. Huan Hendrickson is c/o of Congestion (SINUS PRESSURE )        HPI:     HPI  Huan Hendrickson presents with complaints of sore throat, cough and congestion. Reports some ear drainage. Symptoms began Thursday with green drainage. OTC treatment includes netti pot and dayquil. Today feels like the congestion is in her chest. Denies recent antibiotics and steroids. Denies recent covid19 infection. She is not vaccinated against covid19.      Past Medical History:   Diagnosis Date    Concussion     approximately 10 years ago    Depression     Major depressive disorder     Food allergy     Hypothyroidism     Metabolic disorder     PCOS (polycystic ovarian syndrome)      Past Surgical History:   Procedure Laterality Date    CHOLECYSTECTOMY      COLONOSCOPY  11/03/2014    Dr Rebecca Knapp,  Small non-bleeding internal hemorrhoids w/skin tags, serrated polyp     COLONOSCOPY N/A 10/21/2019    Dr Erika Alegria, 5 yr recall    COLONOSCOPY N/A 06/03/2021    Dr Addie Rushing, [facility], (-)Micro Colitis,  Int hemorrhoids-Grade 1, 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, 20 year recall    KNEE SURGERY      LAPAROSCOPY N/A 01/31/2020    DIAGNOSTIC LAPAROSCOPY AND CHROMOPERTUBATION performed by Chito Benitez MD at [facility] ENDOSCOPY  11/03/2014    Dr Rebecca Knapp, Gastritis, h pylori neg    UPPER GASTROINTESTINAL ENDOSCOPY N/A 10/21/2019    Dr Jj Davis N/A 06/03/2021    Dr Addie Rushing, (-) [place]       Family History   Problem Relation Age of Onset    Polycystic Ovary Syndrome Mother     Cancer Father Colon Cancer Paternal Grandmother     Cancer Maternal Aunt     Thyroid Disease Maternal Grandmother     Cancer Maternal Grandmother     Cancer Paternal Grandfather     Colon Polyps Neg Hx     Esophageal Cancer Neg Hx        Social History     Tobacco Use    Smoking status: Never    Smokeless tobacco: Never   Substance Use Topics    Alcohol use: Yes     Comment: occ      Current Outpatient Medications   Medication Sig Dispense Refill    clomiPHENE (CLOMID) 50 MG tablet Take 1 tablet by mouth daily 5 tablet 0    metFORMIN (GLUCOPHAGE-XR) 500 MG extended release tablet Take 2 pills in the morning and 2 pills at night 120 tablet 5    propranolol (INDERAL) 20 MG tablet Take 1 tablet by mouth 2 times daily 60 tablet 3    Pseudoephedrine-DM-GG 60- MG TABS Take 1 tablet by mouth every 6 hours as needed (congestion/cough) 56 tablet 0    progesterone (PROMETRIUM) 200 MG CAPS capsule Take 1 capsule by mouth nightly CD 16-25 30 capsule 2    Cholecalciferol (VITAMIN D3) 1.25 MG (79871 UT) CAPS Take 1 capsule by mouth once a week 4 capsule 3    pantoprazole (PROTONIX) 40 MG tablet Take 1 tablet by mouth daily Take daily first thing in the morning on an empty stomach. 90 tablet 3    magnesium 200 MG TABS tablet Take 1 tablet by mouth daily 30 tablet 5    ibuprofen (ADVIL;MOTRIN) 600 MG tablet Take 1 tablet by mouth every 6 hours as needed for Pain or Fever Take with food. 20 tablet 1    ondansetron (ZOFRAN ODT) 4 MG disintegrating tablet Take 1 tablet by mouth every 8 hours as needed for Nausea or Vomiting 20 tablet 0    fluticasone (FLONASE) 50 MCG/ACT nasal spray 2 sprays by Nasal route daily 1 Bottle 0     Current Facility-Administered Medications   Medication Dose Route Frequency Provider Last Rate Last Admin    dexamethasone (DECADRON) injection 10 mg  10 mg IntraMUSCular Once [employer] SARAY Jimenez - CNP         Allergies   Allergen Reactions    Corn-Containing Products     Sertraline      Pt.  Developed seratonin syndrome and had to go impatient. Shrimp (Diagnostic)     Soybean-Containing Drug Products     Wheat Extract        Health Maintenance   Topic Date Due    Varicella vaccine (1 of 2 - 2-dose childhood series) Never done    HPV vaccine (1 - 2-dose series) Never done    HIV screen  Never done    Hepatitis C screen  Never done    DTaP/Tdap/Td vaccine (1 - Tdap) Never done    A1C test (Diabetic or Prediabetic)  02/02/2022    Flu vaccine (1) Never done    COVID-19 Vaccine (1) 12/20/2022 (Originally 5/14/1996)    Depression Screen  04/05/2023    Pap smear  04/26/2025    Colorectal Cancer Screen  06/03/2041    Hepatitis A vaccine  Aged Out    Hepatitis B vaccine  Aged Out    Hib vaccine  Aged Out    Meningococcal (ACWY) vaccine  Aged Out    Pneumococcal 0-64 years Vaccine  Aged Out       Subjective:     Review of Systems   Constitutional:  Negative for fever. HENT:  Positive for congestion, rhinorrhea, sinus pressure, sinus pain, sneezing and sore throat. Negative for ear pain. Respiratory:  Positive for cough and chest tightness. Cardiovascular:  Negative for chest pain. Gastrointestinal:  Positive for vomiting. Negative for nausea.     :Objective      Physical Exam  Constitutional:       General: She is not in acute distress. Appearance: Normal appearance. She is not ill-appearing or toxic-appearing. HENT:      Head: Normocephalic and atraumatic. Right Ear: Tympanic membrane, ear canal and external ear normal. There is no impacted cerumen. Left Ear: Tympanic membrane, ear canal and external ear normal. There is no impacted cerumen. Nose: Congestion and rhinorrhea present. Mouth/Throat:      Mouth: Mucous membranes are moist.      Pharynx: Posterior oropharyngeal erythema present. No oropharyngeal exudate. Eyes:      General:         Right eye: No discharge. Left eye: No discharge.       Conjunctiva/sclera: Conjunctivae normal.   Cardiovascular:      Rate and Rhythm: Normal rate and regular rhythm. Heart sounds: Normal heart sounds. Pulmonary:      Effort: Pulmonary effort is normal. No respiratory distress. Breath sounds: Wheezing present. Abdominal:      General: Abdomen is flat. Palpations: Abdomen is soft. Musculoskeletal:         General: Normal range of motion. Cervical back: Normal range of motion. Lymphadenopathy:      Cervical: No cervical adenopathy. Skin:     General: Skin is warm and dry. Capillary Refill: Capillary refill takes less than 2 seconds. Findings: No rash. Neurological:      General: No focal deficit present. Mental Status: She is alert. Psychiatric:         Mood and Affect: Mood normal.     /76   Pulse 83   Temp 96.8 °F (36 °C)   Resp 18   Ht 5' 8\" (1.727 m)   Wt (!) 314 lb 3.2 oz (142.5 kg)   LMP 08/19/2022   SpO2 100%   BMI 47.77 kg/m²     :Assessment       Diagnosis Orders   1. Viral URI with cough  dexamethasone (DECADRON) injection 10 mg      2. Cough  XR CHEST STANDARD (2 VW)      3. Encounter for screening for COVID-19  COVID-19          :Plan   Likely viral. Will call with covid test and chest-xray results. Dex injection for symptomatic support and chest congestion sensation. Educated on supportive care. Return precautions and home care education completed. Patient and Parent verbalized understanding. Orders Placed This Encounter   Procedures    XR CHEST STANDARD (2 VW)     Standing Status:   Future     Standing Expiration Date:   9/22/2023     Order Specific Question:   Reason for exam:     Answer:   cough, congestion    COVID-19     Scheduling Instructions:      1) Due to current limited availability of the COVID-19 test, tests will be prioritized based on responses to questions above. Testing may be delayed due to volume.             2) Print and instruct patient to adhere to CDC home isolation program. (Link Above)              3) Set up or refer patient for a monitoring program. 4) Have patient sign up for and leverage MyChart (if not previously done). Order Specific Question:   Is this test for diagnosis or screening? Answer:   Diagnosis of ill patient     Order Specific Question:   Symptomatic for COVID-19 as defined by CDC? Answer:   Yes     Order Specific Question:   Date of Symptom Onset     Answer:   9/20/2022     Order Specific Question:   Hospitalized for COVID-19? Answer:   No     Order Specific Question:   Admitted to ICU for COVID-19? Answer:   No     Order Specific Question:   Employed in healthcare setting? Answer:   No     Order Specific Question:   Resident in a congregate (group) care setting? Answer:   No     Order Specific Question:   Pregnant? Answer:   No     Order Specific Question:   Previously tested for COVID-19? Answer:   Yes     No results found for this visit on 09/22/22. No follow-ups on file. Orders Placed This Encounter   Medications    dexamethasone (DECADRON) injection 10 mg       Patient given educational materials- see patient instructions. Discussed use, benefit, and side effects of prescribed medications. All patient questions answered. Pt voiced understanding. Patient Instructions   1. Covid test and chest x-ray results will be called to you when they are available. 2. Rest  3. Hydrate with water or gatorade  4. OTC cough/cold medications are okay -follow label instructions  5. Tylenol or motrin for pain or fever  6. Warm salt water gargles or warm liquids for comfort. Warm tea with a tablespoon of honey is excellent for sore throat. 7. Cool mist humidifer   8.  If symptoms worsen, please seek reevaluation       Electronically signed by SARAY Lucas CNP on 9/22/2022 at 1:58 PM

## 2022-10-03 NOTE — ED PROVIDER NOTES
Psychiatric Follow Up Progress Note    Patient: Woody Abdi Date: 10/3/2022   : 1976         AGE: 46 year old MR#: [id number]     This visit was performed via live interactive two-way video with patient's verbal consent.   Clinician Location: Home.  Patient Location:  Outside.     Chief Complaint:  Fatigue; bipolar disorder.    History of Present Illness:  Patient reports doing well with regard to mood symptoms.  No significant symptoms and as far as patient can tell no side effects.      Patient does complain of some general fatigue and a clinician asked him to get his vitamin-D checked.  Patient also starting testosterone in the near future.    Recent PHQ 2/9 Score    PHQ 2:  Date Adult PHQ 2 Score Adult PHQ 2 Interpretation   2022 2 No further screening needed       PHQ 9:  Date Adult PHQ 9 Score Adult PHQ 9 Interpretation   2021 14 Moderate Depression       Most Recent JP 7 Score       Date JP 7 Score   2022 15        Vital Signs:   There were no vitals taken for this visit.    Medications:  Current Outpatient Medications   Medication Sig   • ARIPiprazole (ABILIFY) 15 MG tablet Take 1 tablet by mouth at bedtime. Indications: rapid cycling bipolar   • fluticasone-salmeterol (Advair Diskus) 250-50 MCG/ACT inhaler Inhale 1 puff into the lungs in the morning and 1 puff in the evening.   • albuterol 108 (90 Base) MCG/ACT inhaler Inhale 2 puffs into the lungs every 4 hours as needed for Shortness of Breath or Wheezing.   • testosterone cypionate (Depo-Testosterone) 200 MG/ML injectable solution Inject 1 mL into the muscle every 14 days.   • Respiratory Therapy Supplies (NEBULIZER COMPRESSOR) Kit Nebulizer and supplies for home use please.     No current facility-administered medications for this visit.       Allergies:  ALLERGIES:  No Known Allergies    Laboratory Tests or other studies:  labs reviewed    MENTAL STATUS EXAM:  Appearance:  Well-groomed, good eye contact, appears stated age.  [facility]  eMERGENCY dEPARTMENT eNCOUnter      Pt Name: Luis Hollingsworth  MRN: [id number]  [place] 1995  Date of evaluation: 12/15/2020  Provider: Gordy Sandoval MD    CHIEF COMPLAINT       Chief Complaint   Patient presents with    Concern For COVID-19    Abdominal Pain    Cough    Nausea         HISTORY OF PRESENT ILLNESS   (Location/Symptom, Timing/Onset,Context/Setting, Quality, Duration, Modifying Factors, Severity)  Note limiting factors. Luis Hollingsworth is a 22 y.o. female who presents to the emergency department with multiple symptoms. Patient complains of cough, congestion, fever, body aches, fatigue, nausea and abdominal pain. Patient has history of PCOS. Patient points to right mid to lower abdomen as location of pain. No radiation. No exacerbating or alleviating factors. Onset 2 days ago. Severity 4/10. Denies dysuria, frequency, vaginal discharge, vomiting, diarrhea or flank pain. HPI    NursingNotes were reviewed. REVIEW OF SYSTEMS    (2-9 systems for level 4, 10 or more for level 5)     Review of Systems   Constitutional: Positive for fatigue and fever. Negative for chills. HENT: Positive for congestion. Negative for rhinorrhea. Eyes: Negative for discharge. Respiratory: Positive for cough. Negative for shortness of breath. Cardiovascular: Negative for chest pain and palpitations. Gastrointestinal: Positive for abdominal pain and nausea. Negative for diarrhea and vomiting. Genitourinary: Negative for difficulty urinating, dysuria and vaginal discharge. Musculoskeletal: Positive for myalgias. Negative for back pain and neck pain. Skin: Negative for pallor and rash. Neurological: Negative for syncope and light-headedness. Psychiatric/Behavioral: Negative for agitation and confusion. All other systems reviewed and are negative.            PAST MEDICALHISTORY     Past Medical History:   Diagnosis Date    Concussion     approximately 10 years   Behavior:  Calmed, pleasant, interactive.   Cooperativity:  Cooperative, forthcoming, appears reliable.    Speech:  Normal rate, tone and volume.   Language:  No abnormality noted.    Mood:  Noted in History of Present Illness.  Affect:  Mood-congruent, stable, normal range.  Thought Process:  Linear, logical, goal-directed.    Thought Content:  No overt delusions or abnormality noted.    Perception:  No hallucinations, not responding to internal stimuli.   Consciousness:  Awake and alert.   Orientation:  Oriented to person, place and time.   Musculoskeletal: No abnormal or involuntary muscle movements observed.  Memory:  Good, able to demonstrate accurate historical recall for recent and more remote events and discussions.  Attention:  Good, no fluctuation or obvious deficit noted and able to follow the conversation.   Fund of Knowledge:  Consistent with education and experiences as evidenced by vocabulary.   Insight:  Good, based on appropriate recognition of impact of psychiatric symptoms and need for treatment.   Judgment:  Good, based on recent decisions.  Safety:  Noted in History of Present Illness.  Motivation to pursue treatment:  Good.     Diagnosis:   Bipolar II disorder with rapid cycling (CMS/[facility])  (primary encounter diagnosis)  Encounter for long-term (current) use of medications    Assessment and Plan:   46-year-old improved and stable with current medication.  1. Diagnosis, treatment options, common medication side effects, benefits and risks, etc. all reviewed with patient today.  2. We agreed to continue Abilify with patient continuing with HS dosing.  3. Etiology of complaint of fatigue unclear.  We will go ahead and check vitamin-D and I discussed patient taking vitamin D3 over-the-counter 4000 international units a day if the value is low.  Patient also encouraged to follow up with PCP if value was low.  4. Labs reviewed after our visit ended and I will discuss his dyslipidemia with him next  ago    Depression     Major depressive disorder     Food allergy     Hypothyroidism     Metabolic disorder     PCOS (polycystic ovarian syndrome)          SURGICAL HISTORY       Past Surgical History:   Procedure Laterality Date    CHOLECYSTECTOMY      COLONOSCOPY  11/03/2014    Dr Eleuterio Brown,  Small non-bleeding internal hemorrhoids w/skin tags, serrated polyp     COLONOSCOPY N/A 10/21/2019    Dr Vinh Cruz, 5 yr recall    KNEE SURGERY      LAPAROSCOPY N/A 1/31/2020    DIAGNOSTIC LAPAROSCOPY AND CHROMOPERTUBATION performed by Juan M Haji MD at [address] [place] [address]      UPPER GASTROINTESTINAL ENDOSCOPY  11/03/2014    Dr Eleuterio Brown, Gastritis, h pylori neg    UPPER GASTROINTESTINAL ENDOSCOPY N/A 10/21/2019    Dr Pritesh Sood     Discharge Medication List as of 12/15/2020  8:33 PM      CONTINUE these medications which have NOT CHANGED    Details   metFORMIN (GLUCOPHAGE) 1000 MG tablet Take 1 tablet by mouth 2 times daily (with meals), Disp-60 tablet, R-11Normal      Cholecalciferol (VITAMIN D3) 1.25 MG (20346 UT) CAPS Take 1 capsule by mouth once a week, Disp-4 capsule, R-3Normal      progesterone (PROMETRIUM) 100 MG capsule Take 1 capsule by mouth nightly Cycle day 16-25, Disp-30 capsule, R-11Normal      pantoprazole (PROTONIX) 40 MG tablet Take 1 tablet by mouth daily Take daily first thing in the morning on an empty stomach., Disp-90 tablet, R-3Normal      propranolol (INDERAL) 20 MG tablet Take 1 tablet by mouth 2 times daily, Disp-60 tablet, R-3Normal      fluticasone (FLONASE) 50 MCG/ACT nasal spray 2 sprays by Nasal route daily, Disp-1 Bottle, R-0Normal             ALLERGIES     Corn-containing products, Sertraline, Shrimp (diagnostic), Soybean-containing drug products, and Wheat extract    FAMILY HISTORY       Family History   Problem Relation Age of Onset    Polycystic Ovary Syndrome Mother     Colon Cancer Paternal Grandmother           SOCIAL HISTORY       Social History     Socioeconomic History    Marital status:      Spouse name: Not on file    Number of children: Not on file    Years of education: Not on file    Highest education level: Not on file   Occupational History    Not on file   Social Needs    Financial resource strain: Not on file    Food insecurity     Worry: Not on file     Inability: Not on file    Transportation needs     Medical: Not on file     Non-medical: Not on file   Tobacco Use    Smoking status: Never Smoker    Smokeless tobacco: Never Used   Substance and Sexual Activity    Alcohol use: Yes     Comment: occ    Drug use: No    Sexual activity: Yes     Partners: Female   Lifestyle    Physical activity     Days per week: Not on file     Minutes per session: Not on file    Stress: Not on file   Relationships    Social connections     Talks on phone: Not on file     Gets together: Not on file     Attends Congregation service: Not on file     Active member of club or organization: Not on file     Attends meetings of clubs or organizations: Not on file     Relationship status: Not on file    Intimate partner violence     Fear of current or ex partner: Not on file     Emotionally abused: Not on file     Physically abused: Not on file     Forced sexual activity: Not on file   Other Topics Concern    Not on file   Social History Narrative    Not on file       SCREENINGS    [place] Coma Scale  Eye Opening: Spontaneous  Best Verbal Response: Oriented  Best Motor Response: Obeys commands  [place] Coma Scale Score: 15        PHYSICAL EXAM    (up to 7 for level 4, 8 or more for level 5)     ED Triage Vitals   BP Temp Temp Source Pulse Resp SpO2 Height Weight   12/15/20 1723 12/15/20 1335 12/15/20 1335 12/15/20 1723 12/15/20 1737 12/15/20 1723 12/15/20 1737 12/15/20 1737   118/80 99.2 °F (37.3 °C) Tympanic 105 20 97 % 5' 8\" (1.727 m) (!) 310 lb visit.    Follow up: 6 months      Frantz Phipps MD        (140.6 kg)       Physical Exam  Vitals signs and nursing note reviewed. Constitutional:       General: She is not in acute distress. Appearance: Normal appearance. HENT:      Head: Normocephalic and atraumatic. Right Ear: External ear normal.      Left Ear: External ear normal.      Nose: Congestion present. Mouth/Throat:      Mouth: Mucous membranes are moist.      Pharynx: Oropharynx is clear. No oropharyngeal exudate. Eyes:      General: No scleral icterus. Conjunctiva/sclera: Conjunctivae normal.      Pupils: Pupils are equal, round, and reactive to light. Neck:      Musculoskeletal: Neck supple. No neck rigidity. Cardiovascular:      Rate and Rhythm: Normal rate and regular rhythm. Pulses: Normal pulses. Heart sounds: Normal heart sounds. Pulmonary:      Effort: Pulmonary effort is normal.      Breath sounds: Normal breath sounds. Abdominal:      General: Bowel sounds are normal.      Palpations: Abdomen is soft. Tenderness: There is no abdominal tenderness. There is no guarding. Musculoskeletal:         General: No tenderness or deformity. Skin:     General: Skin is warm and dry. Capillary Refill: Capillary refill takes less than 2 seconds. Coloration: Skin is not jaundiced. Neurological:      General: No focal deficit present. Mental Status: She is alert and oriented to person, place, and time. Mental status is at baseline.       Coordination: Coordination normal.   Psychiatric:         Mood and Affect: Mood normal.         Behavior: Behavior normal.         DIAGNOSTIC RESULTS     RADIOLOGY:  Non-plain film images such as CT, Ultrasound and MRI are read by the radiologist. Plain radiographic images are visualized and preliminarily interpreted bythe emergency physician with the below findings:        No orders to display           LABS:  Labs Reviewed   COVID-19 - Abnormal; Notable for the following components:       Result Value    SARS-CoV-2, NAAT DETECTED (*)     All other components within normal limits    Narrative:     CALL  Funes  K[facility] tel. ,  Reported result to Bernarda Barroso @ ER, 12/15/2020 17:57, by CLAST   CBC WITH AUTO DIFFERENTIAL - Abnormal; Notable for the following components:    MCH 26.7 (*)     MCHC 31.5 (*)     All other components within normal limits   URINE RT REFLEX TO CULTURE - Abnormal; Notable for the following components:    Color, UA DARK YELLOW (*)     Clarity, UA CLOUDY (*)     Ketones, Urine 80 (*)     Protein, UA 30 (*)     Leukocyte Esterase, Urine SMALL (*)     All other components within normal limits   MICROSCOPIC URINALYSIS - Abnormal; Notable for the following components:    Bacteria, UA 2+ (*)     Hyaline Casts, UA 22 (*)     WBC, UA 12 (*)     RBC, UA 14 (*)     All other components within normal limits   CULTURE, URINE    Narrative:     ORDER#: [id number]                          ORDERED BY: JOCELYNN WEBB  SOURCE: Urine Clean Catch                  COLLECTED:  12/15/20 18:42  ANTIBIOTICS AT VI.:                      RECEIVED :  12/15/20 18:49   COMPREHENSIVE METABOLIC PANEL W/ REFLEX TO MG FOR LOW K   LIPASE   PREGNANCY, URINE       All other labs were within normal range or not returned as of this dictation. EMERGENCY DEPARTMENT COURSE and DIFFERENTIAL DIAGNOSIS/MDM:   Vitals:    Vitals:    12/15/20 1736 12/15/20 1737 12/15/20 1739 12/15/20 2043   BP:   (!) 150/99 123/66   Pulse: 78  78 90   Resp:  20 20 20   Temp:   99.7 °F (37.6 °C)    TempSrc:       SpO2:   99% 95%   Weight:  (!) 310 lb (140.6 kg)     Height:  5' 8\" (1.727 m)         MDM  22yo female presents with multiple symptoms consistent with viral illness. Lab results reviewed. After discussion with patient CT of abdomen and pelvis deferred. Patient has had multiple CTs in past and benefit does not outweigh risk with normal PE at this time. Patient diagnosed with COVID 19 and acute UTI.   Patient will return with increasing or severe pain, difficulty breathing, or other concerns. CONSULTS:  None    PROCEDURES:  Unless otherwise noted below, none     Procedures    FINAL IMPRESSION      1. COVID-19 virus infection    2. Acute urinary tract infection          DISPOSITION/PLAN   DISPOSITION Decision To Discharge 12/15/2020 08:51:03 PM      PATIENT REFERRED TO:  Sarah Hayden [address]  [phone]    Call         DISCHARGE MEDICATIONS:  Discharge Medication List as of 12/15/2020  8:33 PM      START taking these medications    Details   benzonatate (TESSALON) 100 MG capsule Take 1 capsule by mouth 3 times daily as needed for Cough Do not chew., Disp-20 capsule, R-0Print      guaiFENesin-codeine (TUSSI-ORGANIDIN NR) 100-10 MG/5ML syrup Take 5 mLs by mouth 3 times daily as needed for Cough for up to 3 days. May cause drowsiness.   Do not take and drive., OPTB-37 mL, [id number]      albuterol sulfate HFA (VENTOLIN HFA) 108 (90 Base) MCG/ACT inhaler Inhale 2 puffs into the lungs 4 times daily as needed for Wheezing or Shortness of Breath, Disp-1 Inhaler, R-0Print                (Please note that portions of this note were completed with a voice recognition program.  Efforts were made to edit thedictations but occasionally words are mis-transcribed.)    Lazarus Neat, MD (electronically signed)  Attending Emergency Physician         Lazarus Neat, MD  12/19/20 1909

## 2022-10-04 DIAGNOSIS — R10.2 PELVIC PAIN: ICD-10-CM

## 2022-10-04 DIAGNOSIS — N91.2 AMENORRHEA: Primary | ICD-10-CM

## 2022-10-07 ENCOUNTER — HOSPITAL ENCOUNTER (OUTPATIENT)
Dept: ULTRASOUND IMAGING | Age: 27
Discharge: HOME | End: 2022-10-07
Payer: OTHER GOVERNMENT

## 2022-10-07 DIAGNOSIS — N91.2 AMENORRHEA: ICD-10-CM

## 2022-10-07 DIAGNOSIS — R10.2 PELVIC PAIN: ICD-10-CM

## 2022-10-07 PROCEDURE — 76830 TRANSVAGINAL US NON-OB: CPT

## 2022-10-19 ENCOUNTER — E-VISIT (OUTPATIENT)
Dept: INTERNAL MEDICINE | Age: 27
End: 2022-10-19
Payer: OTHER GOVERNMENT

## 2022-10-19 DIAGNOSIS — J01.90 ACUTE SINUSITIS, RECURRENCE NOT SPECIFIED, UNSPECIFIED LOCATION: Primary | ICD-10-CM

## 2022-10-19 PROCEDURE — 99421 OL DIG E/M SVC 5-10 MIN: CPT | Performed by: INTERNAL MEDICINE

## 2022-10-23 ASSESSMENT — LIFESTYLE VARIABLES: SMOKING_STATUS: NO, I'VE NEVER SMOKED

## 2022-10-24 RX ORDER — METHYLPREDNISOLONE 4 MG/1
TABLET ORAL
Qty: 1 KIT | Refills: 0 | Status: SHIPPED | OUTPATIENT
Start: 2022-10-24 | End: 2022-10-30

## 2022-10-24 RX ORDER — AMOXICILLIN AND CLAVULANATE POTASSIUM 875; 125 MG/1; MG/1
1 TABLET, FILM COATED ORAL 2 TIMES DAILY
Qty: 20 TABLET | Refills: 0 | Status: SHIPPED | OUTPATIENT
Start: 2022-10-24 | End: 2022-11-03

## 2022-10-24 NOTE — PROGRESS NOTES
Patient submitted an E-visit for what appears to be a sinus infection. She was advised if she were to get worse, to contact her PCP. Medrol Dosepak and Augmentin called in. She was also advised to be tested for COVID-19 if she has not yet done so. About 5 minutes spent on E-visit.     Electronically signed by Mazin Zuniga MD on 10/24/2022 at 5:47 AM

## 2022-11-07 ENCOUNTER — OFFICE VISIT (OUTPATIENT)
Dept: OBGYN CLINIC | Age: 27
End: 2022-11-07
Payer: OTHER GOVERNMENT

## 2022-11-07 VITALS
SYSTOLIC BLOOD PRESSURE: 141 MMHG | WEIGHT: 293 LBS | HEIGHT: 68 IN | DIASTOLIC BLOOD PRESSURE: 93 MMHG | HEART RATE: 84 BPM | BODY MASS INDEX: 44.41 KG/M2

## 2022-11-07 DIAGNOSIS — N92.6 IRREGULAR MENSES: Primary | ICD-10-CM

## 2022-11-07 DIAGNOSIS — R63.4 WEIGHT LOSS, NON-INTENTIONAL: ICD-10-CM

## 2022-11-07 DIAGNOSIS — E28.2 PCOS (POLYCYSTIC OVARIAN SYNDROME): ICD-10-CM

## 2022-11-07 DIAGNOSIS — L83 ACANTHOSIS NIGRICANS: ICD-10-CM

## 2022-11-07 PROCEDURE — 99213 OFFICE O/P EST LOW 20 MIN: CPT | Performed by: NURSE PRACTITIONER

## 2022-11-07 ASSESSMENT — ENCOUNTER SYMPTOMS
ALLERGIC/IMMUNOLOGIC NEGATIVE: 1
GASTROINTESTINAL NEGATIVE: 1
RESPIRATORY NEGATIVE: 1
CONSTIPATION: 0
DIARRHEA: 0
EYES NEGATIVE: 1

## 2022-11-07 NOTE — PROGRESS NOTES
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 She states she has lost weight and was not trying. She has a dark patch under her left armpit and itches.  She has had an A1C and states was normal.

## 2022-11-07 NOTE — PROGRESS NOTES
Vin Harley is a 32 y.o. female who presents today for her medical conditions/ complaints as noted below. Vin Harley is c/o of Irregular Menses        HPI  Pt presents with c/o irregular periods. Has been cycling regularly for about 2-3 years. Cycles 28-35 days. Tried Clomid for a couple months this year with good response. Now not TTC. Missed her periods in August and September. Several u/s showing right ovarian cysts. Has pain and irregular bleeding. Has tried OCP in the past and did not like how it made her feel. Has also taken Metformin and felt like this did not help. Gray itchy area in her left axilla. Reports normal recent [id number]. Has lost about 15 lbs in the past 7 months and has not been trying to lose. Feeling scott quicker as well. Has a follow up appt with GI. Patient's last menstrual period was 10/15/2022 (approximate).       Past Medical History:   Diagnosis Date    Concussion     approximately 10 years ago    Depression     Major depressive disorder     Food allergy     Hypothyroidism     Metabolic disorder     PCOS (polycystic ovarian syndrome)      Past Surgical History:   Procedure Laterality Date    CHOLECYSTECTOMY      COLONOSCOPY  2014    Dr Derrick Valdez,  Small non-bleeding internal hemorrhoids w/skin tags, serrated polyp     COLONOSCOPY N/A 10/21/2019    Dr Robbie Morales, 5 yr recall    COLONOSCOPY N/A 2021    Dr Kai Jenkins, [facility], (-)Micro Colitis,  Int hemorrhoids-Grade 1, 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, 20 year recall    KNEE SURGERY      LAPAROSCOPY N/A 2020    DIAGNOSTIC LAPAROSCOPY AND CHROMOPERTUBATION performed by Jessica Angulo MD at [facility] ENDOSCOPY  2014    Dr Derrick Valdez, Gastritis, h pylori neg    UPPER GASTROINTESTINAL ENDOSCOPY N/A 10/21/2019    Dr Mary Howard 06/03/2021    Dr Maxim Evans, (-) [place]     Family History   Problem Relation Age of Onset    Polycystic Ovary Syndrome Mother     Cancer Father     Colon Cancer Paternal Grandmother     Cancer Maternal Aunt     Thyroid Disease Maternal Grandmother     Cancer Maternal Grandmother     Cancer Paternal Grandfather     Colon Polyps Neg Hx     Esophageal Cancer Neg Hx      Social History     Tobacco Use    Smoking status: Never    Smokeless tobacco: Never   Substance Use Topics    Alcohol use: Yes     Comment: occ       Current Outpatient Medications   Medication Sig Dispense Refill    triamcinolone (KENALOG) 0.1 % ointment Apply topically 2 times daily for 7 days 1 each 1    Cholecalciferol (VITAMIN D3) 1.25 MG (46579 UT) CAPS Take 1 capsule by mouth once a week 4 capsule 3    pantoprazole (PROTONIX) 40 MG tablet Take 1 tablet by mouth daily Take daily first thing in the morning on an empty stomach. 90 tablet 3    fluticasone (FLONASE) 50 MCG/ACT nasal spray 2 sprays by Nasal route daily 1 Bottle 0     No current facility-administered medications for this visit. Allergies   Allergen Reactions    Corn-Containing Products     Sertraline      Pt. Developed seratonin syndrome and had to go impatient. Shrimp (Diagnostic)     Soybean-Containing Drug Products     Wheat Extract      Vitals:    11/07/22 1610   BP: (!) 141/93   Pulse: 84     Body mass index is 45.46 kg/m². Review of Systems   Constitutional:  Positive for unexpected weight change. HENT: Negative. Eyes: Negative. Respiratory: Negative. Cardiovascular: Negative. Gastrointestinal: Negative. Negative for constipation and diarrhea. Endocrine: Negative. Genitourinary:  Positive for menstrual problem and pelvic pain. Negative for frequency and urgency. Musculoskeletal: Negative. Skin:  Positive for rash. Allergic/Immunologic: Negative. Neurological: Negative. Hematological: Negative. Psychiatric/Behavioral: Negative. All other systems reviewed and are negative. Physical Exam  Vitals and nursing note reviewed. Constitutional:       Appearance: She is well-developed. HENT:      Head: Normocephalic. Right Ear: External ear normal.      Left Ear: External ear normal.      Nose: Nose normal.   Chest:          Comments: Acanthosis nigricans noted in left axilla, no erythema, no rash  Genitourinary:     General: Normal vulva. Vagina: Normal. No vaginal discharge or erythema. Cervix: No cervical motion tenderness or discharge. Uterus: Normal. Not enlarged and not tender. Adnexa:         Right: No mass or tenderness. Left: No mass or tenderness. Musculoskeletal:         General: Normal range of motion. Cervical back: Normal range of motion. Skin:     General: Skin is warm and dry. Neurological:      Mental Status: She is alert and oriented to person, place, and time. Psychiatric:         Attention and Perception: Attention normal.         Mood and Affect: Mood normal.         Speech: Speech normal.         Behavior: Behavior normal.         Thought Content: Thought content normal.         Cognition and Memory: Cognition normal.         Judgment: Judgment normal.        Diagnosis Orders   1. Irregular menses  Follicle Stimulating Hormone    Luteinizing Hormone    Prolactin    Testosterone    TSH    T4, Free    US NON OB TRANSVAGINAL    Cortisol Total      2. Weight loss, non-intentional        3. PCOS (polycystic ovarian syndrome)        4.  Acanthosis nigricans            MEDICATIONS:  Orders Placed This Encounter   Medications    triamcinolone (KENALOG) 0.1 % ointment     Sig: Apply topically 2 times daily for 7 days     Dispense:  1 each     Refill:  1       ORDERS:  Orders Placed This Encounter   Procedures    US NON OB TRANSVAGINAL    Follicle Stimulating Hormone    Luteinizing Hormone    Prolactin    Testosterone    TSH    T4, Free    Cortisol Total       PLAN:  Starting Kenalog cream for prn use for itching in axilla  Hormone labs pending for AM reading CD 3  TVUS ordered  Discussed tx options for PCOS and ovarian cysts- pt will consider OCP, has tried and failed in the past, as well as Metformin  May consider second opinion with endocrinology  Pt going to make an appt with GI for unintentional weight loss and hx of precancerous polyps when she was 25years old    There are no Patient Instructions on file for this visit.

## 2022-11-15 NOTE — CARE COORDINATION
Patient contacted regarding [id number] diagnosis\". Discussed COVID-19 related testing which was available at this time. Test results were positive. Patient informed of results, if available? Yes    Care Transition Nurse/ Ambulatory Care Manager contacted the patient by telephone to perform post discharge assessment. Call within 2 business days of discharge: Yes. Verified name and  with patient as identifiers. Provided introduction to self, and explanation of the CTN/ACM role, and reason for call due to risk factors for infection and/or exposure to COVID-19. Symptoms reviewed with patient who verbalized the following symptoms: fever, fatigue, pain or aching joints, cough, nausea, no worsening symptoms and congestion. Due to no new or worsening symptoms encounter was not routed to provider for escalation. Discussed follow-up appointments. If no appointment was previously scheduled, appointment scheduling offered: Yes  [facility] follow up appointment(s):   Future Appointments   Date Time Provider Caridad Pham   2021  8:15 AM SARAY Gauthieraheden 37   2021  1:00 PM SARAY Tafoya - CNP OB/GYN [place]     [id number] [address] Alonso follow up appointment(s): n/a    Non-face-to-face services provided:  Education of patient/family/caregiver/guardian to support self-management-Discussed rest, hydration, symptom monitoring/mgmt, isolation/hygiene precautions, return parameters     Advance Care Planning:   Does patient have an Advance Directive:  Call disconnected prior to providing education/assessment. Patient has following risk factors of: no known risk factors. CTN/ACM reviewed discharge instructions, medical action plan and red flags such as increased shortness of breath, increasing fever and signs of decompensation with patient who verbalized understanding. Discussed exposure protocols and quarantine with CDC Guidelines What to do if you are sick with coronavirus disease 2019.  Patient was given an opportunity for questions and concerns. The patient agrees to contact the [facility] exposure line [phone], local [facility] department [address] [facility] [phone] and PCP office for questions related to their healthcare. CTN/ACM provided contact information for future needs. Reviewed and educated patient on any new and changed medications related to discharge diagnosis     Patient/family/caregiver given information for [employer] Loop and agrees to enroll no  Patient's preferred e-mail: n/a   Patient's preferred phone number: n/a  Based on Loop alert triggers, patient will be contacted by nurse care manager for worsening symptoms. Plan for follow-up call in 5-7 days based on severity of symptoms and risk factors. Spoke with Benton Sharma via phone call for ED f/u. Still feeling about the same and still endorses a fever. She states she was given IV abx (1g rocephin) in ED for UTI but was not prescribed any additional abx to take orally afterwards. Enc her to f/u with her PCP regarding possible need for OP abx course. At this time, phone call was disconnected. Attempt to contact pt was unsuccessful. Will attempt pt tomorrow to complete f/u assessment. F/u attempt unsuccessful.   12/17/2020 Suspected Infection of Implanted Pain Pump

## 2022-11-18 DIAGNOSIS — N92.6 IRREGULAR MENSES: ICD-10-CM

## 2022-11-18 LAB
CORTISOL TOTAL: 11 UG/DL
FOLLICLE STIMULATING HORMONE: 4.8 MIU/ML
LUTEINIZING HORMONE: 5.3 MIU/ML
PROLACTIN: 18.63 NG/ML (ref 4.79–23.3)
T4 FREE: 1.12 NG/DL (ref 0.93–1.7)
TESTOSTERONE TOTAL: 34.8 NG/DL (ref 8.4–48.1)
TSH SERPL DL<=0.05 MIU/L-ACNC: 1.4 UIU/ML (ref 0.27–4.2)

## 2022-11-22 ENCOUNTER — OFFICE VISIT (OUTPATIENT)
Dept: GASTROENTEROLOGY | Age: 27
End: 2022-11-22
Payer: OTHER GOVERNMENT

## 2022-11-22 ENCOUNTER — TELEPHONE (OUTPATIENT)
Dept: GASTROENTEROLOGY | Age: 27
End: 2022-11-22

## 2022-11-22 VITALS
WEIGHT: 293 LBS | HEIGHT: 68 IN | DIASTOLIC BLOOD PRESSURE: 78 MMHG | SYSTOLIC BLOOD PRESSURE: 134 MMHG | OXYGEN SATURATION: 96 % | HEART RATE: 96 BPM | BODY MASS INDEX: 44.41 KG/M2

## 2022-11-22 DIAGNOSIS — K59.00 CONSTIPATION, UNSPECIFIED CONSTIPATION TYPE: ICD-10-CM

## 2022-11-22 DIAGNOSIS — R10.33 PERIUMBILICAL ABDOMINAL PAIN: Primary | ICD-10-CM

## 2022-11-22 DIAGNOSIS — R10.13 EPIGASTRIC PAIN: ICD-10-CM

## 2022-11-22 DIAGNOSIS — R11.0 NAUSEA: ICD-10-CM

## 2022-11-22 PROCEDURE — 99214 OFFICE O/P EST MOD 30 MIN: CPT | Performed by: NURSE PRACTITIONER

## 2022-11-22 RX ORDER — PANTOPRAZOLE SODIUM 20 MG/1
20 TABLET, DELAYED RELEASE ORAL DAILY
Qty: 90 TABLET | Refills: 3 | Status: SHIPPED | OUTPATIENT
Start: 2022-11-22

## 2022-11-22 ASSESSMENT — ENCOUNTER SYMPTOMS
COUGH: 0
TROUBLE SWALLOWING: 0
SHORTNESS OF BREATH: 0
ABDOMINAL DISTENTION: 0
VOMITING: 1
RECTAL PAIN: 0
CHOKING: 0
ABDOMINAL PAIN: 1
BLOOD IN STOOL: 0
DIARRHEA: 0
NAUSEA: 1
ANAL BLEEDING: 0
CONSTIPATION: 1

## 2022-11-22 NOTE — PROGRESS NOTES
Subjective:     Patient ID: Bryan Agosto is a 32 y.o. female  PCP: SARAY Santana  Referring Provider: No ref. provider found    HPI  Patient presents to the office today with the following complaints: Follow-up      Pt seen today for c/o weight loss and abdominal pain. She reports weight loss of 15 lbs. She c/o left periumbical pain. This comes and goes. Pain worse after eating. Also pain in epigastric pain. She will have vomiting as well. \"I threw up what I ate for lunch. \"  She reports vomiting about once a day. She states she cannot tolerate more than 1 meal a day due to nausea and vomiting. She also reports constipation. Stools are hard and straining. BM once a week or up to 11 days without a BM. Symptoms present for > 3 months. She has been on Protonix in the past.  She is currently out of refills. She states she has done fairly well without the medication but has days with increased reflux. Last EGD 6/2021 - normal   Last Colonoscopy 6/2021 - normal, 5 year recall  Family hx colon cancer - Paternal Grandmother    Assessment:     1. Periumbilical abdominal pain    2. Epigastric pain    3. Constipation, unspecified constipation type    4. Nausea          Plan:   - Trial of Linzess 145 mcg daily, samples provided  - Follow up in 3 months or sooner if needed  - Call if symptoms do not improve after 4-6 weeks. - Consider CT abd/pelvis   - Decrease Protonix to 20 mg daily, refills provided       Orders  No orders of the defined types were placed in this encounter.     Medications  Orders Placed This Encounter   Medications    pantoprazole (PROTONIX) 20 MG tablet     Sig: Take 1 tablet by mouth daily     Dispense:  90 tablet     Refill:  3    linaclotide (LINZESS) 145 MCG capsule     Sig: Take 1 capsule by mouth every morning (before breakfast)     Dispense:  30 capsule     Refill:  3         Patient History:     Past Medical History:   Diagnosis Date    Concussion     approximately 10 years ago    Depression     Major depressive disorder     Food allergy     Hypothyroidism     Metabolic disorder     PCOS (polycystic ovarian syndrome)        Past Surgical History:   Procedure Laterality Date    CHOLECYSTECTOMY      COLONOSCOPY  11/03/2014    Dr Alesha Oneal,  Small non-bleeding internal hemorrhoids w/skin tags, serrated polyp     COLONOSCOPY N/A 10/21/2019    Dr Chandler Melgar, 5 yr recall    COLONOSCOPY N/A 06/03/2021    Dr Rosita Teixeira, [facility], (-)Micro Colitis,  Int hemorrhoids-Grade 1, 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, 20 year recall    KNEE SURGERY      LAPAROSCOPY N/A 01/31/2020    DIAGNOSTIC LAPAROSCOPY AND CHROMOPERTUBATION performed by Kana Melendrez MD at [facility] ENDOSCOPY  11/03/2014    Dr Alesha Oneal, Gastritis, h pylori neg    UPPER GASTROINTESTINAL ENDOSCOPY N/A 10/21/2019    Dr Vilma Elizondo 06/03/2021    Dr Rosita Teixeira, (-) [place]       Family History   Problem Relation Age of Onset    Polycystic Ovary Syndrome Mother     Cancer Father     Cancer Maternal Aunt     Thyroid Disease Maternal Grandmother     Cancer Maternal Grandmother     Colon Cancer Paternal Grandmother     Cancer Paternal Grandfather     Colon Polyps Neg Hx     Esophageal Cancer Neg Hx     Liver Cancer Neg Hx     Rectal Cancer Neg Hx     Stomach Cancer Neg Hx        Social History     Socioeconomic History    Marital status:    Tobacco Use    Smoking status: Never    Smokeless tobacco: Never   Vaping Use    Vaping Use: Never used   Substance and Sexual Activity    Alcohol use: Yes     Comment: occ    Drug use: No    Sexual activity: Yes     Partners: Female     Social Determinants of Health     Financial Resource Strain: Low Risk     Difficulty of Paying Living Expenses: Not hard at all   Food Insecurity: No Food Insecurity    Worried About Running Out of Food in the Last Year: Never true    Ran Out of Food in the Last Year: Never true       Current Outpatient Medications   Medication Sig Dispense Refill    pantoprazole (PROTONIX) 20 MG tablet Take 1 tablet by mouth daily 90 tablet 3    linaclotide (LINZESS) 145 MCG capsule Take 1 capsule by mouth every morning (before breakfast) 30 capsule 3    Cholecalciferol (VITAMIN D3) 1.25 MG (63531 UT) CAPS Take 1 capsule by mouth once a week 4 capsule 3     No current facility-administered medications for this visit. Allergies   Allergen Reactions    Corn-Containing Products     Sertraline      Pt. Developed seratonin syndrome and had to go impatient. Shrimp (Diagnostic)     Soybean-Containing Drug Products     Wheat Extract        Review of Systems   Constitutional:  Positive for unexpected weight change. Negative for activity change, appetite change, fatigue and fever. HENT:  Negative for trouble swallowing. Respiratory:  Negative for cough, choking and shortness of breath. Cardiovascular:  Negative for chest pain. Gastrointestinal:  Positive for abdominal pain, constipation, nausea and vomiting. Negative for abdominal distention, anal bleeding, blood in stool, diarrhea and rectal pain. Allergic/Immunologic: Negative for food allergies. All other systems reviewed and are negative. Objective:     /78   Pulse 96   Ht 5' 8\" (1.727 m)   Wt (!) 308 lb 9.6 oz (140 kg)   SpO2 96%   BMI 46.92 kg/m²     Physical Exam  Vitals reviewed. Constitutional:       General: She is not in acute distress. Appearance: She is well-developed. HENT:      Head: Normocephalic and atraumatic. Right Ear: External ear normal.      Left Ear: External ear normal.      Nose: Nose normal.   Eyes:      General: No scleral icterus. Right eye: No discharge. Left eye: No discharge. Conjunctiva/sclera: Conjunctivae normal.      Pupils: Pupils are equal, round, and reactive to light. Cardiovascular:      Rate and Rhythm: Normal rate and regular rhythm. Heart sounds: Normal heart sounds. No murmur heard. Pulmonary:      Effort: Pulmonary effort is normal. No respiratory distress. Breath sounds: Normal breath sounds. No wheezing or rales. Abdominal:      General: Bowel sounds are normal. There is no distension. Palpations: Abdomen is soft. There is no mass. Tenderness: There is no abdominal tenderness. There is no guarding or rebound. Musculoskeletal:         General: Normal range of motion. Cervical back: Normal range of motion and neck supple. Skin:     General: Skin is warm and dry. Coloration: Skin is not pale. Neurological:      Mental Status: She is alert and oriented to person, place, and time.    Psychiatric:         Behavior: Behavior normal.

## 2022-11-28 NOTE — TELEPHONE ENCOUNTER
I have made an addendum to the note to say how long her symptoms have been present. Can we try the PA again.

## 2022-11-28 NOTE — TELEPHONE ENCOUNTER
Rosanna Torres DENIED LINZESS. IT LOOKS LIKE THEY WILL COVER LACTULOSE W/O DOING A PA.      DENIAL LETTER SCANNED IN MEDIA AND ATTACHED TO THIS ENCOUNTER.

## 2022-11-28 NOTE — TELEPHONE ENCOUNTER
11-28-22    I have called pt to let her know about the denial and the appeal we are going to do on Linzess. Per Elva they have a form that needs pt signature on it for me to start the appeal process. Pt will be by Tues. 29 to sign papers. Papers have been put up front for [facility].

## 2022-11-30 ENCOUNTER — PATIENT MESSAGE (OUTPATIENT)
Dept: GASTROENTEROLOGY | Age: 27
End: 2022-11-30

## 2022-12-02 ENCOUNTER — OFFICE VISIT (OUTPATIENT)
Age: 27
End: 2022-12-02

## 2022-12-02 VITALS
TEMPERATURE: 97.2 F | HEIGHT: 68 IN | WEIGHT: 293 LBS | RESPIRATION RATE: 18 BRPM | BODY MASS INDEX: 44.41 KG/M2 | HEART RATE: 82 BPM | SYSTOLIC BLOOD PRESSURE: 120 MMHG | DIASTOLIC BLOOD PRESSURE: 90 MMHG | OXYGEN SATURATION: 98 %

## 2022-12-02 DIAGNOSIS — J01.10 ACUTE FRONTAL SINUSITIS, RECURRENCE NOT SPECIFIED: Primary | ICD-10-CM

## 2022-12-02 DIAGNOSIS — R05.1 ACUTE COUGH: ICD-10-CM

## 2022-12-02 LAB
INFLUENZA A ANTIBODY: NORMAL
INFLUENZA B ANTIBODY: NORMAL
S PYO AG THROAT QL: NORMAL

## 2022-12-02 RX ORDER — AMOXICILLIN AND CLAVULANATE POTASSIUM 875; 125 MG/1; MG/1
1 TABLET, FILM COATED ORAL EVERY 12 HOURS
Qty: 20 TABLET | Refills: 0 | Status: SHIPPED | OUTPATIENT
Start: 2022-12-02 | End: 2022-12-12

## 2022-12-02 ASSESSMENT — ENCOUNTER SYMPTOMS
NAUSEA: 0
SINUS PRESSURE: 0
EYE PAIN: 0
ABDOMINAL PAIN: 0
SHORTNESS OF BREATH: 0
SINUS PAIN: 0
ALLERGIC/IMMUNOLOGIC NEGATIVE: 1
VOMITING: 0
SORE THROAT: 1
DIARRHEA: 0
COUGH: 1

## 2022-12-02 NOTE — PATIENT INSTRUCTIONS
Complete full course of antibiotics as directed. Continue rest, increase hydration, take tylenol/ibuprofen as needed for fever/pain. Otc cough and cold medications as tolerated. Return to clinic or follow up with PCP if you worsen or fail to improve. Patient verbalizes understanding and agrees with treatment plan.

## 2022-12-02 NOTE — PROGRESS NOTES
[address]  [address] [facility] [address] [address]  Dept: [phone]  Dept Fax: [phone]  Loc: [phone]    Hammad Downey is a 32 y.o. female who presents today for her medical conditions/complaints as noted below. Hammad Downey is complaining of Otalgia, Chest Congestion, Pharyngitis, and Sinusitis    HPI:   Cough  This is a new problem. The current episode started in the past 7 days. The problem has been unchanged. The problem occurs every few minutes. The cough is Productive of sputum. Associated symptoms include ear congestion, headaches, nasal congestion and a sore throat. Pertinent negatives include no chest pain, chills, fever, myalgias, postnasal drip, rash or shortness of breath. She has tried nothing for the symptoms.        Past Medical History:   Diagnosis Date    Concussion     approximately 10 years ago    Depression     Major depressive disorder     Food allergy     Hypothyroidism     Metabolic disorder     PCOS (polycystic ovarian syndrome)        Past Surgical History:   Procedure Laterality Date    CHOLECYSTECTOMY      COLONOSCOPY  11/03/2014    Dr Amy Saul,  Small non-bleeding internal hemorrhoids w/skin tags, serrated polyp     COLONOSCOPY N/A 10/21/2019    Dr Author Alfred, 5 yr recall    COLONOSCOPY N/A 06/03/2021    Dr Hue Ingram, [facility], (-)Micro Colitis,  Int hemorrhoids-Grade 1, 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, 20 year recall    KNEE SURGERY      LAPAROSCOPY N/A 01/31/2020    DIAGNOSTIC LAPAROSCOPY AND CHROMOPERTUBATION performed by Barb David MD at [facility] ENDOSCOPY  11/03/2014    Dr Amy Saul, Gastritis, h pylori neg    UPPER GASTROINTESTINAL ENDOSCOPY N/A 10/21/2019    Dr Nicolas Corona ENDOSCOPY N/A 06/03/2021    Dr Hue Ingram, (-) [facility]       Family History   Problem Relation Age of Onset    Polycystic Ovary Syndrome Mother     Cancer Father     Cancer Maternal Aunt     Thyroid Disease Maternal Grandmother     Cancer Maternal Grandmother     Colon Cancer Paternal Grandmother     Cancer Paternal Grandfather     Colon Polyps Neg Hx     Esophageal Cancer Neg Hx     Liver Cancer Neg Hx     Rectal Cancer Neg Hx     Stomach Cancer Neg Hx        Social History     Tobacco Use    Smoking status: Never    Smokeless tobacco: Never   Substance Use Topics    Alcohol use: Yes     Comment: occ        Current Outpatient Medications   Medication Sig Dispense Refill    amoxicillin-clavulanate (AUGMENTIN) 875-125 MG per tablet Take 1 tablet by mouth in the morning and 1 tablet in the evening. Do all this for 10 days. 20 tablet 0    pantoprazole (PROTONIX) 20 MG tablet Take 1 tablet by mouth daily 90 tablet 3    linaclotide (LINZESS) 145 MCG capsule Take 1 capsule by mouth every morning (before breakfast) 30 capsule 3    Cholecalciferol (VITAMIN D3) 1.25 MG (30931 UT) CAPS Take 1 capsule by mouth once a week 4 capsule 3     No current facility-administered medications for this visit. Allergies   Allergen Reactions    Corn-Containing Products     Sertraline      Pt. Developed seratonin syndrome and had to go impatient.      Shrimp (Diagnostic)     Soybean-Containing Drug Products     Wheat Extract        Health Maintenance   Topic Date Due    Varicella vaccine (1 of 2 - 2-dose childhood series) Never done    HIV screen  Never done    Hepatitis C screen  Never done    DTaP/Tdap/Td vaccine (1 - Tdap) Never done    A1C test (Diabetic or Prediabetic)  02/02/2022    Flu vaccine (1) Never done    COVID-19 Vaccine (1) 12/20/2022 (Originally 5/14/1996)    Depression Screen  04/05/2023    Pap smear  04/26/2025    Colorectal Cancer Screen  06/03/2041    Hepatitis A vaccine  Aged Out    Hib vaccine  Aged Out    Meningococcal (ACWY) vaccine  Aged Out    Pneumococcal 0-64 years Vaccine Aged Out       Subjective:   Review of Systems   Constitutional:  Positive for fatigue. Negative for chills and fever. HENT:  Positive for sore throat. Negative for congestion, postnasal drip, sinus pressure and sinus pain. Eyes:  Negative for pain and visual disturbance. Respiratory:  Positive for cough. Negative for shortness of breath. Cardiovascular:  Negative for chest pain. Gastrointestinal:  Negative for abdominal pain, diarrhea, nausea and vomiting. Endocrine: Negative for cold intolerance and heat intolerance. Genitourinary:  Negative for frequency, hematuria and urgency. Musculoskeletal:  Negative for myalgias. Skin:  Negative for rash. Allergic/Immunologic: Negative. Neurological:  Positive for headaches. Negative for syncope, weakness and light-headedness. Hematological: Negative. Psychiatric/Behavioral: Negative. Objective    Physical Exam  Vitals and nursing note reviewed. Constitutional:       General: She is not in acute distress. Appearance: Normal appearance. She is ill-appearing. HENT:      Head: Normocephalic and atraumatic. Right Ear: Tympanic membrane, ear canal and external ear normal.      Left Ear: Tympanic membrane, ear canal and external ear normal.      Nose: Congestion present. Mouth/Throat:      Mouth: Mucous membranes are moist.      Pharynx: Oropharynx is clear. Eyes:      Extraocular Movements: Extraocular movements intact. Conjunctiva/sclera: Conjunctivae normal.   Cardiovascular:      Rate and Rhythm: Normal rate and regular rhythm. Pulses: Normal pulses. Heart sounds: Normal heart sounds. Pulmonary:      Effort: Pulmonary effort is normal. No respiratory distress. Breath sounds: Normal breath sounds. No stridor. No wheezing, rhonchi or rales. Chest:      Chest wall: No tenderness. Abdominal:      General: Abdomen is flat. Bowel sounds are normal. There is no distension.       Palpations: Abdomen is soft.      Tenderness: There is no abdominal tenderness. Musculoskeletal:         General: Normal range of motion. Cervical back: Normal range of motion and neck supple. No tenderness. Lymphadenopathy:      Cervical: Cervical adenopathy present. Skin:     General: Skin is warm and dry. Capillary Refill: Capillary refill takes less than 2 seconds. Findings: No erythema. Neurological:      General: No focal deficit present. Mental Status: She is alert and oriented to person, place, and time. Psychiatric:         Mood and Affect: Mood normal.         Behavior: Behavior normal.       BP (!) 120/90   Pulse 82   Temp 97.2 °F (36.2 °C)   Resp 18   Ht 5' 8\" (1.727 m)   Wt (!) 304 lb (137.9 kg)   SpO2 98%   BMI 46.22 kg/m²     Assessment         Diagnosis Orders   1. Acute frontal sinusitis, recurrence not specified  amoxicillin-clavulanate (AUGMENTIN) 875-125 MG per tablet      2. Acute cough  POCT rapid strep A    POCT Influenza A/B          Plan   Complete full course of antibiotics as directed. Continue rest, increase hydration, take tylenol/ibuprofen as needed for fever/pain. Otc cough and cold medications as tolerated. Return to clinic or follow up with PCP if you worsen or fail to improve. Patient verbalizes understanding and agrees with treatment plan. Orders Placed This Encounter   Procedures    POCT rapid strep A    POCT Influenza A/B       Results for orders placed or performed in visit on 12/02/22   POCT rapid strep A   Result Value Ref Range    Strep A Ag None Detected None Detected   POCT Influenza A/B   Result Value Ref Range    Influenza A Ab neg     Influenza B Ab neg        Orders Placed This Encounter   Medications    amoxicillin-clavulanate (AUGMENTIN) 875-125 MG per tablet     Sig: Take 1 tablet by mouth in the morning and 1 tablet in the evening. Do all this for 10 days.      Dispense:  20 tablet     Refill:  0      New Prescriptions AMOXICILLIN-CLAVULANATE (AUGMENTIN) 875-125 MG PER TABLET    Take 1 tablet by mouth in the morning and 1 tablet in the evening. Do all this for 10 days. Return if symptoms worsen or fail to improve. Discussed use, benefits, and side effects of any prescribed medications. All patient questions were answered. Patient voiced understanding of care plan. Patient was given educational materials - see patient instructions below. Patient Instructions   Complete full course of antibiotics as directed. Continue rest, increase hydration, take tylenol/ibuprofen as needed for fever/pain. Otc cough and cold medications as tolerated. Return to clinic or follow up with PCP if you worsen or fail to improve. Patient verbalizes understanding and agrees with treatment plan.       Electronically signed by Ez Hager PA-C on 12/2/2022 at 1:30 PM

## 2022-12-02 NOTE — TELEPHONE ENCOUNTER
12.02.22    PAPER WORK HAD BEEN SIGNED BY [address] [place] [address] AND FAXED TO [facility]. [phone]    PA CASE: [id number]    FAX CONFIRMATION ATTACHED TO THIS ENCOUNTER.

## 2022-12-13 NOTE — TELEPHONE ENCOUNTER
12.13.22    [facility] REC[facility] DEPT. THEY ARE DENYING OUR APPEAL FOR LINZESS TO BE COVERED. DENIAL LETTER SCANNED IN MEDIA AND ATTACHED TO THIS ENCOUNTER. [facility] [employer] [facility] FOR [facility].

## 2022-12-13 NOTE — TELEPHONE ENCOUNTER
Pt needs to try and fail an alternative. I recommend pt begin on Miralax daily, adjust dose as needed.

## 2022-12-22 DIAGNOSIS — R10.33 PERIUMBILICAL ABDOMINAL PAIN: Primary | ICD-10-CM

## 2022-12-22 DIAGNOSIS — R11.0 NAUSEA: ICD-10-CM

## 2022-12-22 RX ORDER — PLECANATIDE 3 MG/1
3 TABLET ORAL DAILY
Qty: 30 TABLET | Refills: 5 | Status: SHIPPED | OUTPATIENT
Start: 2022-12-22

## 2022-12-22 NOTE — PROGRESS NOTES
12-22-22    Received notification that the patient needed a prior auth for:    Plecanatide (TRULANCE) 3 MG TABS     Take 3 mg by mouth daily    Dispense: 30 tablet Refills: 5     Start: 12/22/2022      Class: Normal      This order has been released to its destination.    To be filled at: [address] - [facility], [address] [facility] [phone]     Prior auth submitted through cover my med Cv

## 2023-01-03 ENCOUNTER — HOSPITAL ENCOUNTER (OUTPATIENT)
Dept: CT IMAGING | Age: 28
Discharge: HOME | End: 2023-01-03
Payer: OTHER GOVERNMENT

## 2023-01-03 DIAGNOSIS — R11.0 NAUSEA: ICD-10-CM

## 2023-01-03 DIAGNOSIS — R10.33 PERIUMBILICAL ABDOMINAL PAIN: ICD-10-CM

## 2023-01-03 PROCEDURE — 74177 CT ABD & PELVIS W/CONTRAST: CPT

## 2023-01-03 PROCEDURE — 6360000004 HC RX CONTRAST MEDICATION: Performed by: NURSE PRACTITIONER

## 2023-01-03 PROCEDURE — 74177 CT ABD & PELVIS W/CONTRAST: CPT | Performed by: RADIOLOGY

## 2023-01-03 RX ADMIN — IOPAMIDOL 75 ML: 755 INJECTION, SOLUTION INTRAVENOUS at 14:21

## 2023-01-06 ENCOUNTER — TELEPHONE (OUTPATIENT)
Dept: OBGYN CLINIC | Age: 28
End: 2023-01-06

## 2023-01-06 NOTE — TELEPHONE ENCOUNTER
----- Message from SARAY Kaplan CNP sent at 1/6/2023 10:49 AM CST -----  Regarding: FW: CT Scan from GI  Please create a pt encounter or message. This staff message will not be saved to the chart. Thanks ML    ----- Message -----  From: Mary Mcintosh  Sent: 1/6/2023   9:58 AM CST  To: SARAY Kaplan CNP  Subject: CT Scan from GI                                  This pt had an appointment request because she stated her CT scan from GI indicated that her uterus is deviated to the right and its painful for her. Just didn't know how urgent this matter is.

## 2023-01-06 NOTE — TELEPHONE ENCOUNTER
Called and informed pt that Christopher Azar reviewed her CT scan and was not concerned especially since she had a normal ultrasound in October. The patient states she is having pain on her left side and that her last period lasted 10 days and was very heavy with terrible cramping. I recommended she go ahead and get her ultrasound repeated. Order is in [address] [place] [address] Rd, pt will call to make appt.

## 2023-01-10 ENCOUNTER — HOSPITAL ENCOUNTER (OUTPATIENT)
Dept: ULTRASOUND IMAGING | Age: 28
Discharge: HOME | End: 2023-01-10
Payer: OTHER GOVERNMENT

## 2023-01-10 DIAGNOSIS — N92.6 IRREGULAR MENSES: ICD-10-CM

## 2023-01-10 PROCEDURE — 76830 TRANSVAGINAL US NON-OB: CPT

## 2023-01-10 PROCEDURE — 76830 TRANSVAGINAL US NON-OB: CPT | Performed by: RADIOLOGY

## 2023-01-19 ENCOUNTER — TELEPHONE (OUTPATIENT)
Dept: OBGYN CLINIC | Age: 28
End: 2023-01-19

## 2023-01-19 NOTE — TELEPHONE ENCOUNTER
Mart Carola had me pt call regarding u/s results since my chart was not read. Pt states she did read the my chart message and is aware of results.  She is doing to also discuss the results with her PCP

## 2023-01-20 DIAGNOSIS — R93.89 THICKENED ENDOMETRIUM: ICD-10-CM

## 2023-01-20 DIAGNOSIS — R76.8 POSITIVE ANA (ANTINUCLEAR ANTIBODY): ICD-10-CM

## 2023-01-20 DIAGNOSIS — N92.6 IRREGULAR MENSES: ICD-10-CM

## 2023-01-20 DIAGNOSIS — E28.2 PCOS (POLYCYSTIC OVARIAN SYNDROME): ICD-10-CM

## 2023-01-20 DIAGNOSIS — E55.9 VITAMIN D DEFICIENCY: ICD-10-CM

## 2023-01-20 LAB
ALBUMIN SERPL-MCNC: 4.4 G/DL (ref 3.5–5.2)
ALP BLD-CCNC: 68 U/L (ref 35–104)
ALT SERPL-CCNC: 16 U/L (ref 5–33)
ANION GAP SERPL CALCULATED.3IONS-SCNC: 13 MMOL/L (ref 7–19)
AST SERPL-CCNC: 16 U/L (ref 5–32)
BASOPHILS ABSOLUTE: 0 K/UL (ref 0–0.2)
BASOPHILS RELATIVE PERCENT: 0.5 % (ref 0–1)
BILIRUB SERPL-MCNC: 0.3 MG/DL (ref 0.2–1.2)
BUN BLDV-MCNC: 12 MG/DL (ref 6–20)
C-REACTIVE PROTEIN: 0.52 MG/DL (ref 0–0.5)
CALCIUM SERPL-MCNC: 9.4 MG/DL (ref 8.6–10)
CHLORIDE BLD-SCNC: 102 MMOL/L (ref 98–111)
CHOLESTEROL, FASTING: 191 MG/DL (ref 160–199)
CO2: 23 MMOL/L (ref 22–29)
CREAT SERPL-MCNC: 0.6 MG/DL (ref 0.5–0.9)
EOSINOPHILS ABSOLUTE: 0.2 K/UL (ref 0–0.6)
EOSINOPHILS RELATIVE PERCENT: 2.1 % (ref 0–5)
GFR SERPL CREATININE-BSD FRML MDRD: >60 ML/MIN/{1.73_M2}
GLUCOSE BLD-MCNC: 102 MG/DL (ref 74–109)
HBA1C MFR BLD: 6.2 % (ref 4–6)
HCT VFR BLD CALC: 40 % (ref 37–47)
HDLC SERPL-MCNC: 67 MG/DL (ref 65–121)
HEMOGLOBIN: 12.6 G/DL (ref 12–16)
IMMATURE GRANULOCYTES #: 0 K/UL
IRON SATURATION: 28 % (ref 14–50)
IRON: 100 UG/DL (ref 37–145)
LDL CHOLESTEROL CALCULATED: 108 MG/DL
LYMPHOCYTES ABSOLUTE: 2.6 K/UL (ref 1.1–4.5)
LYMPHOCYTES RELATIVE PERCENT: 30.4 % (ref 20–40)
MCH RBC QN AUTO: 27.8 PG (ref 27–31)
MCHC RBC AUTO-ENTMCNC: 31.5 G/DL (ref 33–37)
MCV RBC AUTO: 88.3 FL (ref 81–99)
MONOCYTES ABSOLUTE: 0.8 K/UL (ref 0–0.9)
MONOCYTES RELATIVE PERCENT: 9.4 % (ref 0–10)
NEUTROPHILS ABSOLUTE: 4.9 K/UL (ref 1.5–7.5)
NEUTROPHILS RELATIVE PERCENT: 57.4 % (ref 50–65)
PDW BLD-RTO: 13.4 % (ref 11.5–14.5)
PLATELET # BLD: 302 K/UL (ref 130–400)
PMV BLD AUTO: 11.1 FL (ref 9.4–12.3)
POTASSIUM SERPL-SCNC: 4.4 MMOL/L (ref 3.5–5)
RBC # BLD: 4.53 M/UL (ref 4.2–5.4)
SEDIMENTATION RATE, ERYTHROCYTE: 15 MM/HR (ref 0–20)
SODIUM BLD-SCNC: 138 MMOL/L (ref 136–145)
TOTAL IRON BINDING CAPACITY: 352 UG/DL (ref 250–400)
TOTAL PROTEIN: 7 G/DL (ref 6.6–8.7)
TRIGLYCERIDE, FASTING: 80 MG/DL (ref 0–149)
TSH REFLEX FT4: 1.51 UIU/ML (ref 0.35–5.5)
VITAMIN B-12: 402 PG/ML (ref 211–946)
VITAMIN D 25-HYDROXY: 29.2 NG/ML
WBC # BLD: 8.5 K/UL (ref 4.8–10.8)

## 2023-01-22 LAB — ANA IGG, ELISA: DETECTED

## 2023-01-23 RX ORDER — CHOLECALCIFEROL (VITAMIN D3) 1250 MCG
1 CAPSULE ORAL WEEKLY
Qty: 4 CAPSULE | Refills: 3 | Status: SHIPPED | OUTPATIENT
Start: 2023-01-23

## 2023-01-23 NOTE — TELEPHONE ENCOUNTER
----- Message from SARAY Ortez sent at 1/20/2023  4:56 PM CST -----  Please call patient and let them know results. Normal thyroid  Normal B12  Normal iron  Your LDL cholesterol is mildly elevated, but within the range of getting it down by diet and exercise. Normal C-reactive protein and sed rate which are inflammatory markers  Your metabolic profile is normal.  This includes kidney and liver functions as well as electrolytes.   Blood sugars well controlled A1c is 6.2  Normal blood counts

## 2023-01-23 NOTE — TELEPHONE ENCOUNTER
Called patient, spoke with: Patient regarding the results of the patients most recent Labs. I advised Patient of Johnna Akers recommendations. Patient did voice understanding      Medication pending providers signature. Pt was also wondering if the normal A1c takes her out of the loop for Trulicity?

## 2023-01-23 NOTE — TELEPHONE ENCOUNTER
----- Message from SARAY Gastelum sent at 1/23/2023 12:36 AM CST -----  SHELLEY positive will await additional testing.

## 2023-01-23 NOTE — TELEPHONE ENCOUNTER
----- Message from SARAY Ortiz sent at 1/20/2023  9:26 PM CST -----  Vit d level was still a touch too low. Please see if she is taking the once weekly supplement. If not pend refill and I will send it in. Thanks!

## 2023-01-24 ENCOUNTER — TELEPHONE (OUTPATIENT)
Dept: FAMILY MEDICINE CLINIC | Age: 28
End: 2023-01-24

## 2023-01-24 DIAGNOSIS — R76.8 POSITIVE ANA (ANTINUCLEAR ANTIBODY): Primary | ICD-10-CM

## 2023-01-24 LAB
ANA PATTERN: ABNORMAL
ANA TITER: ABNORMAL
ANTI JO-1 IGG: 1 AU/ML (ref 0–40)
ANTINUCLEAR AB INTERPRETIVE COMMENT: ABNORMAL
ANTINUCLEAR ANTIBODY, HEP-2, IGG: DETECTED
DOUBLE STRANDED DNA AB, IGG: 5 IU (ref 0–24)
ENA TO SMITH (SM) ANTIBODY: 0 AU/ML (ref 0–40)
ENA TO SSB (LA) ANTIBODY: 20 AU/ML (ref 0–40)
SCLERODERMA (SCL-70) AB: 1 AU/ML (ref 0–40)
SSA 52 (RO) (ENA) AB, IGG: 1 AU/ML (ref 0–40)
SSA 60 (RO) (ENA) AB, IGG: 38 AU/ML (ref 0–40)

## 2023-01-24 NOTE — TELEPHONE ENCOUNTER
----- Message from SARAY Wiley sent at 1/24/2023 12:00 PM CST -----  SHELLEY was abnormal.  Referral to rheumatology placed

## 2023-01-25 LAB — ENA TO RNP ANTIBODY: 2 UNITS (ref 0–19)

## 2023-03-03 ENCOUNTER — HOSPITAL ENCOUNTER (EMERGENCY)
Age: 28
Discharge: HOME | End: 2023-03-03
Payer: OTHER GOVERNMENT

## 2023-03-03 ENCOUNTER — APPOINTMENT (OUTPATIENT)
Dept: CT IMAGING | Age: 28
End: 2023-03-03
Payer: OTHER GOVERNMENT

## 2023-03-03 VITALS
TEMPERATURE: 98.4 F | SYSTOLIC BLOOD PRESSURE: 108 MMHG | HEIGHT: 68 IN | BODY MASS INDEX: 44.41 KG/M2 | DIASTOLIC BLOOD PRESSURE: 84 MMHG | OXYGEN SATURATION: 100 % | HEART RATE: 66 BPM | WEIGHT: 293 LBS | RESPIRATION RATE: 18 BRPM

## 2023-03-03 DIAGNOSIS — R10.31 ABDOMINAL PAIN, RIGHT LOWER QUADRANT: Primary | ICD-10-CM

## 2023-03-03 LAB
ALBUMIN SERPL-MCNC: 4.2 G/DL (ref 3.5–5.2)
ALP BLD-CCNC: 69 U/L (ref 35–104)
ALT SERPL-CCNC: <5 U/L (ref 5–33)
ANION GAP SERPL CALCULATED.3IONS-SCNC: 10 MMOL/L (ref 7–19)
AST SERPL-CCNC: 17 U/L (ref 5–32)
BASOPHILS ABSOLUTE: 0.1 K/UL (ref 0–0.2)
BASOPHILS RELATIVE PERCENT: 0.6 % (ref 0–1)
BILIRUB SERPL-MCNC: 0.3 MG/DL (ref 0.2–1.2)
BILIRUBIN URINE: NEGATIVE
BLOOD, URINE: NEGATIVE
BUN BLDV-MCNC: 13 MG/DL (ref 6–20)
CALCIUM SERPL-MCNC: 9.3 MG/DL (ref 8.6–10)
CHLORIDE BLD-SCNC: 104 MMOL/L (ref 98–111)
CLARITY: ABNORMAL
CO2: 25 MMOL/L (ref 22–29)
COLOR: YELLOW
CREAT SERPL-MCNC: 0.7 MG/DL (ref 0.5–0.9)
EOSINOPHILS ABSOLUTE: 0.2 K/UL (ref 0–0.6)
EOSINOPHILS RELATIVE PERCENT: 2.4 % (ref 0–5)
GFR SERPL CREATININE-BSD FRML MDRD: >60 ML/MIN/{1.73_M2}
GLUCOSE BLD-MCNC: 88 MG/DL (ref 74–109)
GLUCOSE URINE: NEGATIVE MG/DL
HCG QUALITATIVE: NEGATIVE
HCT VFR BLD CALC: 40 % (ref 37–47)
HEMOGLOBIN: 12.6 G/DL (ref 12–16)
IMMATURE GRANULOCYTES #: 0 K/UL
KETONES, URINE: ABNORMAL MG/DL
LEUKOCYTE ESTERASE, URINE: NEGATIVE
LIPASE: 24 U/L (ref 13–60)
LYMPHOCYTES ABSOLUTE: 3.7 K/UL (ref 1.1–4.5)
LYMPHOCYTES RELATIVE PERCENT: 38.3 % (ref 20–40)
MCH RBC QN AUTO: 27.8 PG (ref 27–31)
MCHC RBC AUTO-ENTMCNC: 31.5 G/DL (ref 33–37)
MCV RBC AUTO: 88.1 FL (ref 81–99)
MONOCYTES ABSOLUTE: 0.7 K/UL (ref 0–0.9)
MONOCYTES RELATIVE PERCENT: 7.3 % (ref 0–10)
NEUTROPHILS ABSOLUTE: 5 K/UL (ref 1.5–7.5)
NEUTROPHILS RELATIVE PERCENT: 51.3 % (ref 50–65)
NITRITE, URINE: NEGATIVE
PDW BLD-RTO: 13.2 % (ref 11.5–14.5)
PH UA: 5.5 (ref 5–8)
PLATELET # BLD: 296 K/UL (ref 130–400)
PMV BLD AUTO: 10.7 FL (ref 9.4–12.3)
POTASSIUM SERPL-SCNC: 4 MMOL/L (ref 3.5–5)
PROTEIN UA: NEGATIVE MG/DL
RBC # BLD: 4.54 M/UL (ref 4.2–5.4)
SODIUM BLD-SCNC: 139 MMOL/L (ref 136–145)
SPECIFIC GRAVITY UA: 1.03 (ref 1–1.03)
TOTAL PROTEIN: 7.5 G/DL (ref 6.6–8.7)
UROBILINOGEN, URINE: 1 E.U./DL
WBC # BLD: 9.8 K/UL (ref 4.8–10.8)

## 2023-03-03 PROCEDURE — 74177 CT ABD & PELVIS W/CONTRAST: CPT

## 2023-03-03 PROCEDURE — 99285 EMERGENCY DEPT VISIT HI MDM: CPT

## 2023-03-03 PROCEDURE — 96374 THER/PROPH/DIAG INJ IV PUSH: CPT

## 2023-03-03 PROCEDURE — 2580000003 HC RX 258: Performed by: NURSE PRACTITIONER

## 2023-03-03 PROCEDURE — 81003 URINALYSIS AUTO W/O SCOPE: CPT

## 2023-03-03 PROCEDURE — 83690 ASSAY OF LIPASE: CPT

## 2023-03-03 PROCEDURE — 80053 COMPREHEN METABOLIC PANEL: CPT

## 2023-03-03 PROCEDURE — 6360000002 HC RX W HCPCS: Performed by: NURSE PRACTITIONER

## 2023-03-03 PROCEDURE — 6360000004 HC RX CONTRAST MEDICATION: Performed by: NURSE PRACTITIONER

## 2023-03-03 PROCEDURE — 84703 CHORIONIC GONADOTROPIN ASSAY: CPT

## 2023-03-03 PROCEDURE — 36415 COLL VENOUS BLD VENIPUNCTURE: CPT

## 2023-03-03 PROCEDURE — 85025 COMPLETE CBC W/AUTO DIFF WBC: CPT

## 2023-03-03 RX ORDER — 0.9 % SODIUM CHLORIDE 0.9 %
1000 INTRAVENOUS SOLUTION INTRAVENOUS ONCE
Status: COMPLETED | OUTPATIENT
Start: 2023-03-03 | End: 2023-03-03

## 2023-03-03 RX ORDER — KETOROLAC TROMETHAMINE 30 MG/ML
30 INJECTION, SOLUTION INTRAMUSCULAR; INTRAVENOUS ONCE
Status: COMPLETED | OUTPATIENT
Start: 2023-03-03 | End: 2023-03-03

## 2023-03-03 RX ADMIN — KETOROLAC TROMETHAMINE 30 MG: 30 INJECTION, SOLUTION INTRAMUSCULAR at 20:30

## 2023-03-03 RX ADMIN — SODIUM CHLORIDE 1000 ML: 9 INJECTION, SOLUTION INTRAVENOUS at 20:29

## 2023-03-03 RX ADMIN — IOPAMIDOL 90 ML: 755 INJECTION, SOLUTION INTRAVENOUS at 20:17

## 2023-03-03 ASSESSMENT — ENCOUNTER SYMPTOMS: ABDOMINAL PAIN: 1

## 2023-03-03 ASSESSMENT — PAIN SCALES - GENERAL: PAINLEVEL_OUTOF10: 4

## 2023-03-04 NOTE — ED PROVIDER NOTES
[facility] EMERGENCY DEPT  eMERGENCY dEPARTMENT eNCOUnter      Pt Name: Terence Krishna  MRN: [id number]  [place] 1995  Date of evaluation: 3/3/2023  Provider: [address]       Chief Complaint   Patient presents with    Abdominal Pain     Lower abdominal pain x3 days         HISTORY OF PRESENT ILLNESS   (Location/Symptom, Timing/Onset,Context/Setting, Quality, Duration, Modifying Factors, Severity)  Note limiting factors. Terence Krishna is a 32 y.o. female who presents to the emergency department with  RLQ abd pain x 3 days. HAs a history of PCOS and it doesn't exactly feel like a cyst.  No vomiting or change in bowel habits. Has just finished her period. No vaginal discharge. No history of kidney stones. No dysuria  no fever    The history is provided by the patient. Abdominal Pain  Pain location:  RLQ  Pain quality: sharp and stabbing    Pain severity:  Moderate  Onset quality:  Sudden  Duration:  3 days  Timing:  Constant  Progression:  Unchanged  Chronicity:  New  Context: not retching    Associated symptoms: no fever      NursingNotes were reviewed. REVIEW OF SYSTEMS    (2-9 systems for level 4, 10 or more for level 5)     Review of Systems   Constitutional:  Negative for fever. Gastrointestinal:  Positive for abdominal pain. Except as noted above the remainder of the review of systems was reviewed and negative.        PAST MEDICAL HISTORY     Past Medical History:   Diagnosis Date    Concussion     approximately 10 years ago    Depression     Major depressive disorder     Food allergy     Hypothyroidism     Metabolic disorder     PCOS (polycystic ovarian syndrome)          SURGICALHISTORY       Past Surgical History:   Procedure Laterality Date    CHOLECYSTECTOMY      COLONOSCOPY  11/03/2014    Dr Lorena Rodrigez,  Small non-bleeding internal hemorrhoids w/skin tags, serrated polyp     COLONOSCOPY N/A 10/21/2019    Dr Keyla Purdy, 5 yr recall COLONOSCOPY N/A 06/03/2021    Dr Moe Zayas, [facility], (-)Micro Colitis,  Int hemorrhoids-Grade 1, 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, 20 year recall    KNEE SURGERY      LAPAROSCOPY N/A 01/31/2020    DIAGNOSTIC LAPAROSCOPY AND CHROMOPERTUBATION performed by Zayra Nava MD at [facility] ENDOSCOPY  11/03/2014    Dr Zenon Mayer, Gastritis, h pylori neg    UPPER GASTROINTESTINAL ENDOSCOPY N/A 10/21/2019    Dr Jim Ramos 06/03/2021    Dr Moe Zayas, (-) [place]         CURRENT MEDICATIONS       Discharge Medication List as of 3/3/2023  9:26 PM        CONTINUE these medications which have NOT CHANGED    Details   Cholecalciferol (VITAMIN D3) 1.25 MG (10989 UT) CAPS Take 1 capsule by mouth once a week, Disp-4 capsule, R-3Normal      Dulaglutide (TRULICITY) 9.18 QD/9.9FR SOPN Inject 0.75 mg into the skin once a week, Disp-4 Adjustable Dose Pre-filled Pen Syringe, R-3Normal      pantoprazole (PROTONIX) 20 MG tablet Take 1 tablet by mouth daily, Disp-90 tablet, R-3Normal                  Corn-containing products, Sertraline, Shrimp (diagnostic), Soybean-containing drug products, and Wheat extract    FAMILY HISTORY       Family History   Problem Relation Age of Onset    Polycystic Ovary Syndrome Mother     Cancer Father     Cancer Maternal Aunt     Thyroid Disease Maternal Grandmother     Cancer Maternal Grandmother     Colon Cancer Paternal Grandmother     Cancer Paternal Grandfather     Colon Polyps Neg Hx     Esophageal Cancer Neg Hx     Liver Cancer Neg Hx     Rectal Cancer Neg Hx     Stomach Cancer Neg Hx           SOCIAL HISTORY       Social History     Socioeconomic History    Marital status:      Spouse name: None    Number of children: None    Years of education: None    Highest education level: None   Tobacco Use    Smoking status: Never    Smokeless tobacco: Never Vaping Use    Vaping Use: Never used   Substance and Sexual Activity    Alcohol use: Yes     Comment: occ    Drug use: No    Sexual activity: Yes     Partners: Female     Social Determinants of Health     Financial Resource Strain: Low Risk     Difficulty of Paying Living Expenses: Not hard at all   Food Insecurity: No Food Insecurity    Worried About [address] in the Last Year: Never true    [address] [facility] [address] in the Last Year: Never true       SCREENINGS             PHYSICAL EXAM    (up to 7 for level 4, 8 or more for level 5)     ED Triage Vitals [03/03/23 1826]   BP Temp Temp src Heart Rate Resp SpO2 Height Weight   (!) 148/106 98.4 °F (36.9 °C) -- 87 16 98 % 5' 8\" (1.727 m) 298 lb (135.2 kg)       Physical Exam  Vitals and nursing note reviewed. Constitutional:       Appearance: Normal appearance. She is well-developed. She is obese. HENT:      Head: Normocephalic and atraumatic. Eyes:      General: No scleral icterus. Right eye: No discharge. Left eye: No discharge. Cardiovascular:      Rate and Rhythm: Normal rate. Pulmonary:      Effort: No respiratory distress. Abdominal:      General: Abdomen is protuberant. Bowel sounds are normal.      Palpations: Abdomen is soft. Tenderness: There is abdominal tenderness in the right lower quadrant. There is guarding. There is no rebound. Musculoskeletal:      Cervical back: Normal range of motion and neck supple. Neurological:      Mental Status: She is alert and oriented to person, place, and time.    Psychiatric:         Behavior: Behavior normal.       RESULTS     EKG: All EKG's are interpreted by the Emergency Department Physician who either signs or Co-signsthis chart in the absence of a cardiologist.        RADIOLOGY:   Non-plain filmimages such as CT, Ultrasound and MRI are read by the radiologist. Plain radiographic images are visualized and preliminarily interpreted by the emergency physician with the below findings:      Interpretation per the Radiologist below, if available at the time of this note:    CT ABDOMEN PELVIS W IV CONTRAST Additional Contrast? None   Final Result   1. Nonobstructive bowel gas pattern, no free intraperitoneal air. Normal CT   appearance of the appendix. 2.No CT evidence of acute intraabdominal or pelvic pathology . ED BEDSIDEULTRASOUND:   Performed by ED Physician -none    LABS:  Labs Reviewed   CBC WITH AUTO DIFFERENTIAL - Abnormal; Notable for the following components:       Result Value    MCHC 31.5 (*)     All other components within normal limits   COMPREHENSIVE METABOLIC PANEL - Abnormal; Notable for the following components:    ALT <5 (*)     All other components within normal limits   URINALYSIS WITH REFLEX TO CULTURE - Abnormal; Notable for the following components:    Clarity, UA CLOUDY (*)     Ketones, Urine TRACE (*)     All other components within normal limits   HCG, SERUM, QUALITATIVE   LIPASE       All other labs were within normal range or not returned as of this dictation. EMERGENCY DEPARTMENT COURSE and DIFFERENTIALDIAGNOSIS/MDM:   Vitals:    Vitals:    03/03/23 1826 03/03/23 2033 03/03/23 2126   BP: (!) 148/106 (!) 135/91 108/84   Pulse: 87 85 66   Resp: 16 16 18   Temp: 98.4 °F (36.9 °C)     SpO2: 98% 99% 100%   Weight: 298 lb (135.2 kg)     Height: 5' 8\" (1.727 m)             MDM  Number of Diagnoses or Management Options  Abdominal pain, right lower quadrant: new and requires workup  Diagnosis management comments: Labs, UA and CT are nonconcerning. Pt denies any vaginal discharge and declines a vaginal exam. Toradol helped. Pt to monitor and follow with her OB or PCP if pain continues       Amount and/or Complexity of Data Reviewed  Clinical lab tests: ordered and reviewed  Tests in the radiology section of CPT®: ordered               CONSULTS:  None    PROCEDURES:  Unless otherwise noted below, none     Procedures    FINAL IMPRESSION      1.  Abdominal pain, right lower quadrant        DISPOSITION/PLAN   DISPOSITION Decision To Discharge 03/03/2023 09:42:33 PM      PATIENT REFERRED TO:  No follow-up provider specified.     DISCHARGE MEDICATIONS:  Discharge Medication List as of 3/3/2023  9:26 PM             (Please note that portions of this note were completed with a voice recognitionprogram.  Efforts were made to edit the dictations but occasionally words are mis-transcribed.)    SARAY Menchaca (electronically signed)          SARAY Menchaca  03/04/23 1528

## 2023-04-20 ENCOUNTER — OFFICE VISIT (OUTPATIENT)
Dept: GASTROENTEROLOGY | Age: 28
End: 2023-04-20
Payer: OTHER GOVERNMENT

## 2023-04-20 VITALS
HEART RATE: 100 BPM | HEIGHT: 68 IN | BODY MASS INDEX: 44.41 KG/M2 | WEIGHT: 293 LBS | OXYGEN SATURATION: 98 % | DIASTOLIC BLOOD PRESSURE: 80 MMHG | SYSTOLIC BLOOD PRESSURE: 130 MMHG

## 2023-04-20 DIAGNOSIS — K58.1 IRRITABLE BOWEL SYNDROME WITH CONSTIPATION: ICD-10-CM

## 2023-04-20 DIAGNOSIS — K59.09 CHRONIC CONSTIPATION: Primary | ICD-10-CM

## 2023-04-20 PROCEDURE — 99214 OFFICE O/P EST MOD 30 MIN: CPT | Performed by: NURSE PRACTITIONER

## 2023-04-20 ASSESSMENT — ENCOUNTER SYMPTOMS
ABDOMINAL PAIN: 1
COUGH: 0
ABDOMINAL DISTENTION: 0
TROUBLE SWALLOWING: 0
CHOKING: 0
CONSTIPATION: 1
NAUSEA: 0
DIARRHEA: 0
BLOOD IN STOOL: 0
SHORTNESS OF BREATH: 0
RECTAL PAIN: 0
ANAL BLEEDING: 0
VOMITING: 0

## 2023-04-20 NOTE — PROGRESS NOTES
Soybean-Containing Drug Products     Wheat Extract        Review of Systems   Constitutional:  Negative for activity change, appetite change, fatigue, fever and unexpected weight change. HENT:  Negative for trouble swallowing. Respiratory:  Negative for cough, choking and shortness of breath. Cardiovascular:  Negative for chest pain. Gastrointestinal:  Positive for abdominal pain and constipation. Negative for abdominal distention, anal bleeding, blood in stool, diarrhea, nausea, rectal pain and vomiting. Allergic/Immunologic: Negative for food allergies. All other systems reviewed and are negative. Objective:     /80 (Site: Left Upper Arm)   Pulse 100   Ht 5' 8\" (1.727 m)   Wt 293 lb (132.9 kg)   SpO2 98%   BMI 44.55 kg/m²     Physical Exam  Vitals reviewed. Constitutional:       General: She is not in acute distress. Appearance: She is well-developed. HENT:      Head: Normocephalic and atraumatic. Right Ear: External ear normal.      Left Ear: External ear normal.      Nose: Nose normal.   Eyes:      General: No scleral icterus. Right eye: No discharge. Left eye: No discharge. Conjunctiva/sclera: Conjunctivae normal.      Pupils: Pupils are equal, round, and reactive to light. Cardiovascular:      Rate and Rhythm: Normal rate. Pulmonary:      Effort: Pulmonary effort is normal. No respiratory distress. Abdominal:      General: There is no distension. Palpations: Abdomen is soft. Musculoskeletal:         General: Normal range of motion. Cervical back: Normal range of motion and neck supple. Skin:     General: Skin is warm and dry. Coloration: Skin is not pale. Neurological:      Mental Status: She is alert and oriented to person, place, and time.    Psychiatric:         Behavior: Behavior normal.

## 2023-04-27 ENCOUNTER — OFFICE VISIT (OUTPATIENT)
Dept: OBGYN CLINIC | Age: 28
End: 2023-04-27
Payer: OTHER GOVERNMENT

## 2023-04-27 VITALS
HEIGHT: 68 IN | DIASTOLIC BLOOD PRESSURE: 78 MMHG | SYSTOLIC BLOOD PRESSURE: 125 MMHG | WEIGHT: 288 LBS | HEART RATE: 91 BPM | BODY MASS INDEX: 43.65 KG/M2

## 2023-04-27 DIAGNOSIS — E28.2 PCOS (POLYCYSTIC OVARIAN SYNDROME): ICD-10-CM

## 2023-04-27 DIAGNOSIS — Z12.39 ENCOUNTER FOR SCREENING BREAST EXAMINATION: ICD-10-CM

## 2023-04-27 DIAGNOSIS — Z01.419 WOMEN'S ANNUAL ROUTINE GYNECOLOGICAL EXAMINATION: Primary | ICD-10-CM

## 2023-04-27 DIAGNOSIS — Z12.4 SCREENING FOR CERVICAL CANCER: ICD-10-CM

## 2023-04-27 PROCEDURE — 99395 PREV VISIT EST AGE 18-39: CPT | Performed by: NURSE PRACTITIONER

## 2023-04-27 ASSESSMENT — ENCOUNTER SYMPTOMS
ALLERGIC/IMMUNOLOGIC NEGATIVE: 1
GASTROINTESTINAL NEGATIVE: 1
RESPIRATORY NEGATIVE: 1
DIARRHEA: 0
CONSTIPATION: 0
EYES NEGATIVE: 1

## 2023-04-27 NOTE — PROGRESS NOTES
Pt presents today for pap smear and breast exam. She states that she had diarrhea the other day thinks might have gotten in vagina and also has small hard piece on left side of labia not sure if cut while shaving. She states she has itching on outside of vagina and thinks from shaving.      Mammo:NA  Pap smear:  Contraception:    P:0  Ab:0  Bone density:NA   Colonoscopy:
Rate and Rhythm: Normal rate and regular rhythm. Pulmonary:      Effort: Pulmonary effort is normal.      Breath sounds: Normal breath sounds. Chest:   Breasts:     Right: No inverted nipple, mass, nipple discharge or skin change. Left: No inverted nipple, mass, nipple discharge or skin change. Abdominal:      Palpations: Abdomen is soft. There is no mass. Tenderness: There is no abdominal tenderness. Genitourinary:     General: Normal vulva. Vagina: Normal.      Cervix: No cervical motion tenderness. Uterus: Normal. Not enlarged. Adnexa:         Right: No mass or tenderness. Left: No mass or tenderness. Comments: Pap collected  Currently on menses  Musculoskeletal:         General: Normal range of motion. Cervical back: Normal range of motion and neck supple. Skin:     General: Skin is warm and dry. Neurological:      Mental Status: She is alert and oriented to person, place, and time. Psychiatric:         Attention and Perception: Attention normal.         Mood and Affect: Mood normal.         Speech: Speech normal.         Behavior: Behavior normal.         Thought Content: Thought content normal.         Cognition and Memory: Cognition normal.         Judgment: Judgment normal.            Diagnosis Orders   1. Women's annual routine gynecological examination        2. Screening for cervical cancer  PAP SMEAR      3. Encounter for screening breast examination        4. PCOS (polycystic ovarian syndrome)            MEDICATIONS:  No orders of the defined types were placed in this encounter. ORDERS:  Orders Placed This Encounter   Procedures    PAP SMEAR       PLAN:  Pap collected    Patient Instructions   Patient Education       Breast Self-Exam: Care Instructions  Your Care Instructions     A breast self-exam is when you check your breasts for lumps or changes. This regular exam helps you learn how your breasts normally look and feel.  Most breast

## 2023-05-07 ENCOUNTER — APPOINTMENT (OUTPATIENT)
Dept: CT IMAGING | Age: 28
End: 2023-05-07
Payer: OTHER GOVERNMENT

## 2023-05-07 ENCOUNTER — HOSPITAL ENCOUNTER (OUTPATIENT)
Age: 28
Setting detail: OBSERVATION
Discharge: HOME | End: 2023-05-09
Attending: EMERGENCY MEDICINE | Admitting: INTERNAL MEDICINE
Payer: OTHER GOVERNMENT

## 2023-05-07 DIAGNOSIS — N39.0 URINARY TRACT INFECTION WITHOUT HEMATURIA, SITE UNSPECIFIED: ICD-10-CM

## 2023-05-07 DIAGNOSIS — K85.90 ACUTE PANCREATITIS, UNSPECIFIED COMPLICATION STATUS, UNSPECIFIED PANCREATITIS TYPE: ICD-10-CM

## 2023-05-07 DIAGNOSIS — R11.10 ABDOMINAL PAIN WITH VOMITING: Primary | ICD-10-CM

## 2023-05-07 DIAGNOSIS — R10.9 ABDOMINAL PAIN WITH VOMITING: Primary | ICD-10-CM

## 2023-05-07 PROBLEM — K80.50 PANCREATITIS DUE TO COMMON BILE DUCT STONE: Status: ACTIVE | Noted: 2023-05-07

## 2023-05-07 LAB
ALBUMIN SERPL-MCNC: 4.2 G/DL (ref 3.5–5.2)
ALP SERPL-CCNC: 65 U/L (ref 35–104)
ALT SERPL-CCNC: 16 U/L (ref 5–33)
ANION GAP SERPL CALCULATED.3IONS-SCNC: 12 MMOL/L (ref 7–19)
AST SERPL-CCNC: 25 U/L (ref 5–32)
BACTERIA URNS QL MICRO: ABNORMAL /HPF
BILIRUB SERPL-MCNC: <0.2 MG/DL (ref 0.2–1.2)
BILIRUB UR QL STRIP: NEGATIVE
BUN SERPL-MCNC: 12 MG/DL (ref 6–20)
CALCIUM SERPL-MCNC: 9.3 MG/DL (ref 8.6–10)
CHLORIDE SERPL-SCNC: 104 MMOL/L (ref 98–111)
CLARITY UR: ABNORMAL
CO2 SERPL-SCNC: 23 MMOL/L (ref 22–29)
COLOR UR: YELLOW
CREAT SERPL-MCNC: 0.7 MG/DL (ref 0.5–0.9)
CRYSTALS URNS MICRO: ABNORMAL /HPF
ERYTHROCYTE [DISTWIDTH] IN BLOOD BY AUTOMATED COUNT: 13.5 % (ref 11.5–14.5)
GLUCOSE SERPL-MCNC: 128 MG/DL (ref 74–109)
GLUCOSE UR STRIP.AUTO-MCNC: NEGATIVE MG/DL
HCG SERPL QL: NEGATIVE
HCT VFR BLD AUTO: 37.5 % (ref 37–47)
HGB BLD-MCNC: 12.1 G/DL (ref 12–16)
HGB UR STRIP.AUTO-MCNC: NEGATIVE MG/L
KETONES UR STRIP.AUTO-MCNC: 40 MG/DL
LEUKOCYTE ESTERASE UR QL STRIP.AUTO: ABNORMAL
LIPASE SERPL-CCNC: 163 U/L (ref 13–60)
MCH RBC QN AUTO: 27.9 PG (ref 27–31)
MCHC RBC AUTO-ENTMCNC: 32.3 G/DL (ref 33–37)
MCV RBC AUTO: 86.6 FL (ref 81–99)
NITRITE UR QL STRIP.AUTO: NEGATIVE
PH UR STRIP.AUTO: 7 [PH] (ref 5–8)
PLATELET # BLD AUTO: 293 K/UL (ref 130–400)
PMV BLD AUTO: 10.7 FL (ref 9.4–12.3)
POTASSIUM SERPL-SCNC: 4 MMOL/L (ref 3.5–5)
PROT SERPL-MCNC: 7 G/DL (ref 6.6–8.7)
PROT UR STRIP.AUTO-MCNC: NEGATIVE MG/DL
RBC # BLD AUTO: 4.33 M/UL (ref 4.2–5.4)
RBC #/AREA URNS HPF: ABNORMAL /HPF (ref 0–2)
SODIUM SERPL-SCNC: 139 MMOL/L (ref 136–145)
SP GR UR STRIP.AUTO: 1.02 (ref 1–1.03)
SQUAMOUS #/AREA URNS HPF: ABNORMAL /HPF
UROBILINOGEN UR STRIP.AUTO-MCNC: 1 E.U./DL
WBC # BLD AUTO: 8.3 K/UL (ref 4.8–10.8)
WBC #/AREA URNS HPF: ABNORMAL /HPF (ref 0–5)

## 2023-05-07 PROCEDURE — 36415 COLL VENOUS BLD VENIPUNCTURE: CPT

## 2023-05-07 PROCEDURE — 86140 C-REACTIVE PROTEIN: CPT

## 2023-05-07 PROCEDURE — 74177 CT ABD & PELVIS W/CONTRAST: CPT

## 2023-05-07 PROCEDURE — 96374 THER/PROPH/DIAG INJ IV PUSH: CPT

## 2023-05-07 PROCEDURE — 82306 VITAMIN D 25 HYDROXY: CPT

## 2023-05-07 PROCEDURE — 96375 TX/PRO/DX INJ NEW DRUG ADDON: CPT

## 2023-05-07 PROCEDURE — 83690 ASSAY OF LIPASE: CPT

## 2023-05-07 PROCEDURE — 99285 EMERGENCY DEPT VISIT HI MDM: CPT

## 2023-05-07 PROCEDURE — 2580000003 HC RX 258: Performed by: EMERGENCY MEDICINE

## 2023-05-07 PROCEDURE — 85652 RBC SED RATE AUTOMATED: CPT

## 2023-05-07 PROCEDURE — 81001 URINALYSIS AUTO W/SCOPE: CPT

## 2023-05-07 PROCEDURE — 6360000004 HC RX CONTRAST MEDICATION: Performed by: EMERGENCY MEDICINE

## 2023-05-07 PROCEDURE — 85027 COMPLETE CBC AUTOMATED: CPT

## 2023-05-07 PROCEDURE — 84443 ASSAY THYROID STIM HORMONE: CPT

## 2023-05-07 PROCEDURE — 84703 CHORIONIC GONADOTROPIN ASSAY: CPT

## 2023-05-07 PROCEDURE — 6360000002 HC RX W HCPCS: Performed by: EMERGENCY MEDICINE

## 2023-05-07 PROCEDURE — 1210000000 HC MED SURG R&B

## 2023-05-07 PROCEDURE — 83735 ASSAY OF MAGNESIUM: CPT

## 2023-05-07 PROCEDURE — 80053 COMPREHEN METABOLIC PANEL: CPT

## 2023-05-07 PROCEDURE — 87086 URINE CULTURE/COLONY COUNT: CPT

## 2023-05-07 RX ORDER — ONDANSETRON 2 MG/ML
4 INJECTION INTRAMUSCULAR; INTRAVENOUS ONCE
Status: COMPLETED | OUTPATIENT
Start: 2023-05-07 | End: 2023-05-07

## 2023-05-07 RX ORDER — MORPHINE SULFATE 4 MG/ML
4 INJECTION, SOLUTION INTRAMUSCULAR; INTRAVENOUS
Status: DISCONTINUED | OUTPATIENT
Start: 2023-05-07 | End: 2023-05-09

## 2023-05-07 RX ORDER — SODIUM CHLORIDE 9 MG/ML
INJECTION, SOLUTION INTRAVENOUS CONTINUOUS
Status: DISCONTINUED | OUTPATIENT
Start: 2023-05-07 | End: 2023-05-08 | Stop reason: SDUPTHER

## 2023-05-07 RX ORDER — MORPHINE SULFATE 2 MG/ML
2 INJECTION, SOLUTION INTRAMUSCULAR; INTRAVENOUS ONCE
Status: COMPLETED | OUTPATIENT
Start: 2023-05-07 | End: 2023-05-07

## 2023-05-07 RX ADMIN — IOPAMIDOL 90 ML: 755 INJECTION, SOLUTION INTRAVENOUS at 22:36

## 2023-05-07 RX ADMIN — SODIUM CHLORIDE: 9 INJECTION, SOLUTION INTRAVENOUS at 23:42

## 2023-05-07 RX ADMIN — CEFTRIAXONE 1000 MG: 1 INJECTION, POWDER, FOR SOLUTION INTRAMUSCULAR; INTRAVENOUS at 23:43

## 2023-05-07 RX ADMIN — MORPHINE SULFATE 2 MG: 2 INJECTION, SOLUTION INTRAMUSCULAR; INTRAVENOUS at 23:39

## 2023-05-07 RX ADMIN — ONDANSETRON 4 MG: 2 INJECTION INTRAMUSCULAR; INTRAVENOUS at 22:13

## 2023-05-07 ASSESSMENT — ENCOUNTER SYMPTOMS
ABDOMINAL PAIN: 1
DIARRHEA: 0
SHORTNESS OF BREATH: 0
BLOOD IN STOOL: 0
VOMITING: 1
NAUSEA: 1
EYE PAIN: 0

## 2023-05-07 ASSESSMENT — PAIN DESCRIPTION - DESCRIPTORS: DESCRIPTORS: ACHING

## 2023-05-07 ASSESSMENT — PAIN SCALES - GENERAL
PAINLEVEL_OUTOF10: 6
PAINLEVEL_OUTOF10: 4
PAINLEVEL_OUTOF10: 5

## 2023-05-07 ASSESSMENT — PAIN DESCRIPTION - PAIN TYPE: TYPE: ACUTE PAIN

## 2023-05-07 ASSESSMENT — PAIN DESCRIPTION - ONSET: ONSET: ON-GOING

## 2023-05-07 ASSESSMENT — PAIN - FUNCTIONAL ASSESSMENT: PAIN_FUNCTIONAL_ASSESSMENT: 0-10

## 2023-05-07 ASSESSMENT — PAIN DESCRIPTION - LOCATION
LOCATION: ABDOMEN
LOCATION: ABDOMEN

## 2023-05-07 ASSESSMENT — PAIN DESCRIPTION - FREQUENCY: FREQUENCY: INTERMITTENT

## 2023-05-08 LAB
25(OH)D3 SERPL-MCNC: 27.1 NG/ML
ALBUMIN SERPL-MCNC: 3.8 G/DL (ref 3.5–5.2)
ALP SERPL-CCNC: 56 U/L (ref 35–104)
ALT SERPL-CCNC: 11 U/L (ref 5–33)
ANION GAP SERPL CALCULATED.3IONS-SCNC: 9 MMOL/L (ref 7–19)
AST SERPL-CCNC: 15 U/L (ref 5–32)
BILIRUB SERPL-MCNC: <0.2 MG/DL (ref 0.2–1.2)
BUN SERPL-MCNC: 8 MG/DL (ref 6–20)
CALCIUM SERPL-MCNC: 8.5 MG/DL (ref 8.6–10)
CANNABINOIDS UR QL SCN: NEGATIVE
CHLORIDE SERPL-SCNC: 107 MMOL/L (ref 98–111)
CO2 SERPL-SCNC: 25 MMOL/L (ref 22–29)
CREAT SERPL-MCNC: 0.8 MG/DL (ref 0.5–0.9)
CRP SERPL HS-MCNC: <0.3 MG/DL (ref 0–0.5)
DRUG SCREEN COMMENT UR-IMP: NORMAL
ERYTHROCYTE [DISTWIDTH] IN BLOOD BY AUTOMATED COUNT: 13.4 % (ref 11.5–14.5)
ERYTHROCYTE [SEDIMENTATION RATE] IN BLOOD BY WESTERGREN METHOD: 13 MM/HR (ref 0–20)
FERRITIN SERPL-MCNC: 27.7 NG/ML (ref 13–150)
FOLATE SERPL-MCNC: 11.5 NG/ML (ref 4.8–37.3)
GLUCOSE SERPL-MCNC: 89 MG/DL (ref 74–109)
HCT VFR BLD AUTO: 35.1 % (ref 37–47)
HGB BLD-MCNC: 11.2 G/DL (ref 12–16)
IRON SATN MFR SERPL: 20 % (ref 14–50)
IRON SERPL-MCNC: 53 UG/DL (ref 37–145)
LACTATE BLDV-SCNC: 0.7 MMOL/L (ref 0.5–1.9)
LIPASE SERPL-CCNC: 39 U/L (ref 13–60)
MAGNESIUM SERPL-MCNC: 1.9 MG/DL (ref 1.6–2.6)
MCH RBC QN AUTO: 27.7 PG (ref 27–31)
MCHC RBC AUTO-ENTMCNC: 31.9 G/DL (ref 33–37)
MCV RBC AUTO: 86.7 FL (ref 81–99)
PLATELET # BLD AUTO: 246 K/UL (ref 130–400)
PMV BLD AUTO: 10.8 FL (ref 9.4–12.3)
POTASSIUM SERPL-SCNC: 3.7 MMOL/L (ref 3.5–5)
PROT SERPL-MCNC: 5.9 G/DL (ref 6.6–8.7)
RBC # BLD AUTO: 4.05 M/UL (ref 4.2–5.4)
SODIUM SERPL-SCNC: 141 MMOL/L (ref 136–145)
TIBC SERPL-MCNC: 269 UG/DL (ref 250–400)
TSH SERPL DL<=0.005 MIU/L-ACNC: 2.92 UIU/ML (ref 0.27–4.2)
VIT B12 SERPL-MCNC: 482 PG/ML (ref 211–946)
WBC # BLD AUTO: 7.7 K/UL (ref 4.8–10.8)

## 2023-05-08 PROCEDURE — 83690 ASSAY OF LIPASE: CPT

## 2023-05-08 PROCEDURE — 80307 DRUG TEST PRSMV CHEM ANLYZR: CPT

## 2023-05-08 PROCEDURE — 83550 IRON BINDING TEST: CPT

## 2023-05-08 PROCEDURE — 83540 ASSAY OF IRON: CPT

## 2023-05-08 PROCEDURE — 82746 ASSAY OF FOLIC ACID SERUM: CPT

## 2023-05-08 PROCEDURE — 6360000002 HC RX W HCPCS: Performed by: HOSPITALIST

## 2023-05-08 PROCEDURE — 36415 COLL VENOUS BLD VENIPUNCTURE: CPT

## 2023-05-08 PROCEDURE — 96376 TX/PRO/DX INJ SAME DRUG ADON: CPT

## 2023-05-08 PROCEDURE — 82607 VITAMIN B-12: CPT

## 2023-05-08 PROCEDURE — 2580000003 HC RX 258: Performed by: HOSPITALIST

## 2023-05-08 PROCEDURE — 99254 IP/OBS CNSLTJ NEW/EST MOD 60: CPT | Performed by: SPECIALIST

## 2023-05-08 PROCEDURE — 96375 TX/PRO/DX INJ NEW DRUG ADDON: CPT

## 2023-05-08 PROCEDURE — 82728 ASSAY OF FERRITIN: CPT

## 2023-05-08 PROCEDURE — 96361 HYDRATE IV INFUSION ADD-ON: CPT

## 2023-05-08 PROCEDURE — 6370000000 HC RX 637 (ALT 250 FOR IP): Performed by: HOSPITALIST

## 2023-05-08 PROCEDURE — 94760 N-INVAS EAR/PLS OXIMETRY 1: CPT

## 2023-05-08 PROCEDURE — 83605 ASSAY OF LACTIC ACID: CPT

## 2023-05-08 PROCEDURE — 85027 COMPLETE CBC AUTOMATED: CPT

## 2023-05-08 PROCEDURE — 96372 THER/PROPH/DIAG INJ SC/IM: CPT

## 2023-05-08 PROCEDURE — C9113 INJ PANTOPRAZOLE SODIUM, VIA: HCPCS | Performed by: HOSPITALIST

## 2023-05-08 PROCEDURE — 1210000000 HC MED SURG R&B

## 2023-05-08 PROCEDURE — 80053 COMPREHEN METABOLIC PANEL: CPT

## 2023-05-08 RX ORDER — ONDANSETRON 2 MG/ML
4 INJECTION INTRAMUSCULAR; INTRAVENOUS EVERY 6 HOURS PRN
Status: DISCONTINUED | OUTPATIENT
Start: 2023-05-08 | End: 2023-05-09 | Stop reason: HOSPADM

## 2023-05-08 RX ORDER — HYDROMORPHONE HYDROCHLORIDE 1 MG/ML
1 INJECTION, SOLUTION INTRAMUSCULAR; INTRAVENOUS; SUBCUTANEOUS
Status: DISCONTINUED | OUTPATIENT
Start: 2023-05-08 | End: 2023-05-09

## 2023-05-08 RX ORDER — POLYETHYLENE GLYCOL 3350 17 G/17G
17 POWDER, FOR SOLUTION ORAL DAILY PRN
Status: DISCONTINUED | OUTPATIENT
Start: 2023-05-08 | End: 2023-05-09 | Stop reason: HOSPADM

## 2023-05-08 RX ORDER — SODIUM CHLORIDE 9 MG/ML
INJECTION, SOLUTION INTRAVENOUS CONTINUOUS
Status: DISCONTINUED | OUTPATIENT
Start: 2023-05-08 | End: 2023-05-09 | Stop reason: HOSPADM

## 2023-05-08 RX ORDER — HYOSCYAMINE SULFATE EXTENDED-RELEASE 0.38 MG/1
375 TABLET ORAL EVERY 12 HOURS PRN
Status: DISCONTINUED | OUTPATIENT
Start: 2023-05-08 | End: 2023-05-09 | Stop reason: HOSPADM

## 2023-05-08 RX ORDER — ENOXAPARIN SODIUM 100 MG/ML
30 INJECTION SUBCUTANEOUS 2 TIMES DAILY
Status: DISCONTINUED | OUTPATIENT
Start: 2023-05-08 | End: 2023-05-09 | Stop reason: HOSPADM

## 2023-05-08 RX ORDER — SODIUM CHLORIDE 9 MG/ML
INJECTION, SOLUTION INTRAVENOUS PRN
Status: DISCONTINUED | OUTPATIENT
Start: 2023-05-08 | End: 2023-05-09 | Stop reason: HOSPADM

## 2023-05-08 RX ORDER — SODIUM CHLORIDE 0.9 % (FLUSH) 0.9 %
5-40 SYRINGE (ML) INJECTION PRN
Status: DISCONTINUED | OUTPATIENT
Start: 2023-05-08 | End: 2023-05-09 | Stop reason: HOSPADM

## 2023-05-08 RX ORDER — SODIUM CHLORIDE 0.9 % (FLUSH) 0.9 %
5-40 SYRINGE (ML) INJECTION EVERY 12 HOURS SCHEDULED
Status: DISCONTINUED | OUTPATIENT
Start: 2023-05-08 | End: 2023-05-09 | Stop reason: HOSPADM

## 2023-05-08 RX ORDER — ONDANSETRON 4 MG/1
4 TABLET, ORALLY DISINTEGRATING ORAL EVERY 8 HOURS PRN
Status: DISCONTINUED | OUTPATIENT
Start: 2023-05-08 | End: 2023-05-09 | Stop reason: HOSPADM

## 2023-05-08 RX ADMIN — MORPHINE SULFATE 4 MG: 4 INJECTION, SOLUTION INTRAMUSCULAR; INTRAVENOUS at 16:49

## 2023-05-08 RX ADMIN — MORPHINE SULFATE 4 MG: 4 INJECTION, SOLUTION INTRAMUSCULAR; INTRAVENOUS at 20:13

## 2023-05-08 RX ADMIN — ENOXAPARIN SODIUM 30 MG: 100 INJECTION SUBCUTANEOUS at 08:10

## 2023-05-08 RX ADMIN — ONDANSETRON HYDROCHLORIDE 4 MG: 2 INJECTION, SOLUTION INTRAMUSCULAR; INTRAVENOUS at 20:20

## 2023-05-08 RX ADMIN — SODIUM CHLORIDE: 9 INJECTION, SOLUTION INTRAVENOUS at 01:00

## 2023-05-08 RX ADMIN — MORPHINE SULFATE 4 MG: 4 INJECTION, SOLUTION INTRAMUSCULAR; INTRAVENOUS at 06:07

## 2023-05-08 RX ADMIN — ENOXAPARIN SODIUM 30 MG: 100 INJECTION SUBCUTANEOUS at 20:13

## 2023-05-08 RX ADMIN — SODIUM CHLORIDE, PRESERVATIVE FREE 40 MG: 5 INJECTION INTRAVENOUS at 01:00

## 2023-05-08 RX ADMIN — LIDOCAINE HYDROCHLORIDE: 20 SOLUTION ORAL; TOPICAL at 06:03

## 2023-05-08 ASSESSMENT — PAIN DESCRIPTION - FREQUENCY: FREQUENCY: INTERMITTENT

## 2023-05-08 ASSESSMENT — PAIN DESCRIPTION - LOCATION
LOCATION: ABDOMEN
LOCATION: ABDOMEN

## 2023-05-08 ASSESSMENT — PAIN - FUNCTIONAL ASSESSMENT: PAIN_FUNCTIONAL_ASSESSMENT: ACTIVITIES ARE NOT PREVENTED

## 2023-05-08 ASSESSMENT — PAIN DESCRIPTION - ORIENTATION
ORIENTATION: LEFT
ORIENTATION: UPPER

## 2023-05-08 ASSESSMENT — PAIN SCALES - GENERAL
PAINLEVEL_OUTOF10: 7
PAINLEVEL_OUTOF10: 2
PAINLEVEL_OUTOF10: 7
PAINLEVEL_OUTOF10: 2

## 2023-05-08 ASSESSMENT — PAIN DESCRIPTION - ONSET: ONSET: ON-GOING

## 2023-05-08 ASSESSMENT — PAIN DESCRIPTION - PAIN TYPE: TYPE: ACUTE PAIN

## 2023-05-08 ASSESSMENT — PAIN DESCRIPTION - DESCRIPTORS: DESCRIPTORS: DULL

## 2023-05-08 ASSESSMENT — PAIN DESCRIPTION - DIRECTION: RADIATING_TOWARDS: NO

## 2023-05-08 ASSESSMENT — LIFESTYLE VARIABLES
HOW MANY STANDARD DRINKS CONTAINING ALCOHOL DO YOU HAVE ON A TYPICAL DAY: 1 OR 2
HOW OFTEN DO YOU HAVE A DRINK CONTAINING ALCOHOL: MONTHLY OR LESS

## 2023-05-08 NOTE — CONSULTS
128 yesterday and today 89. Normal upper limit of lipase 109. No CT evidence of pancreatitis. SHELLEY elevated. Pregnancy test negative yesterday. No renal failure. No pulmonary failure. Impression:    With normal pancreas on CAT scan, slightly elevated lipase, LFTs being normal, it is hard to say that this patient has pancreatitis. Lipase is normalized today. There are multiple known pancreatic sources of elevated lipase in the absence of pancreatitis. Sometimes elevation of lipase can be idiopathic. Hospital acquired anemia with hemoglobin 11.2. Admission hemoglobin was normal.  Hospital-acquired anemia is multifactorial.  Plan     Urine cannabinoids screen    Hyoscyamine    Regular diet when patient is hungry    If there is elevation of LFTs, will get MRCP.       Electronically signed by Lula Andersen MD on 5/8/23 at 7:07 AM CDT

## 2023-05-08 NOTE — ED PROVIDER NOTES
[facility] EMERGENCY DEPT  eMERGENCY dEPARTMENT eNCOUnter      Pt Name: Mychal Castaneda  MRN: [id number]  [place] 1995  Date of evaluation: 5/7/2023  Provider: Heidi Alves MD    [address] [place] [address]       Chief Complaint   Patient presents with    Abdominal Pain     X3 days         HISTORY OF PRESENT ILLNESS   (Location/Symptom, Timing/Onset,Context/Setting, Quality, Duration, Modifying Factors, Severity)  Note limiting factors. Mychal Castaneda is a 32 y.o. female who presents to the emergency department due to abdominal pain. Patient says she has had mild epigastric and left upper quadrant abdominal pain for the past 3 days. Pain fairly constant. No exacerbating alleviating factors. Worse tonight. Vomited x1 tonight. Still little nauseated. No hematemesis. No black or bloody stools. No history of ulcers or gastritis. Does not have a gallbladder. No history of pancreatitis. Drinks alcohol rarely. Does not smoke or use NSAIDs regularly. No lower abdominal pain. No vaginal discharge or bleeding. No urinary complaints. Tells me she had a low-grade fever to 99. Afebrile here. HPI    NursingNotes were reviewed. REVIEW OF SYSTEMS    (2-9 systems for level 4, 10 or more for level 5)     Review of Systems   Constitutional:  Negative for fever. Eyes:  Negative for pain. Respiratory:  Negative for shortness of breath. Cardiovascular:  Negative for chest pain and palpitations. Gastrointestinal:  Positive for abdominal pain, nausea and vomiting. Negative for blood in stool and diarrhea. Genitourinary:  Negative for difficulty urinating, dysuria, pelvic pain, vaginal bleeding, vaginal discharge and vaginal pain. Skin:  Negative for rash. Neurological:  Negative for weakness and headaches. All other systems reviewed and are negative. A complete review of systems was performed and is negative except as noted above in the HPI.        PAST MEDICAL HISTORY     Past Medical History:

## 2023-05-08 NOTE — PROGRESS NOTES
Date:2023  Patient: Ja Edgar  : 1995  DFF:[id number]  CODE:                                                                        PCP:SARAY Mckeon    Admit Date: 2023  9:52 PM   LOS: 1 day     Hospital course :   COLLEEN Edgar presents to [facility] presents with 3 days increasing epigastric and LUQ abd pain non radiating, constant, sharp, worse with movement, 9 out 10, associated with N/V and non bloody diarrhea. Labs show mildly elevated lipase. CT abd neg. UA suggestive of uti, started on IVF, IV ab    Subjective:  seen at the bedside the patient gives a history of constipation for 4 to 5 days followed by taking laxative and having nausea vomiting and diarrhea, after clinical exam discussed about the findings it is less likely pancreatitis, already evaluated by GI, started on clear liquid diet and advance as tolerated, if LFT is normal by tomorrow will discharge, patient agreed on that    Review of Systems    Reviewed 12 system and found most of them negative except as stated above in subjective note    Objective:      Vital signs in last 24 hours:  Patient Vitals for the past 24 hrs:   BP Temp Temp src Pulse Resp SpO2 Height Weight   23 0739 110/79 97.7 °F (36.5 °C) Temporal 65 16 100 % -- --   23 0730 -- -- -- -- -- 99 % -- --   23 0607 -- -- -- -- 16 -- -- --   23 0509 129/85 97.7 °F (36.5 °C) -- 72 18 100 % -- --   23 0056 135/84 97.5 °F (36.4 °C) -- 70 18 100 % -- --   23 2356 129/86 -- -- 69 17 -- -- --   23 2257 127/81 -- -- -- -- 100 % -- --   23 2154 (!) 127/91 98.1 °F (36.7 °C) -- 78 17 97 % 5' 8\" (1.727 m) 283 lb (128.4 kg)       Patient examined with appropriate PPE  Physical Exam:  Vital Signs: /79   Pulse 65   Temp 97.7 °F (36.5 °C) (Temporal)   Resp 16   Ht 5' 8\" (1.727 m)   Wt 283 lb (128.4 kg)   LMP 2023 (Exact Date)   SpO2 100%   BMI 43.03 kg/m²   General appearance:. Lying comfortably in

## 2023-05-08 NOTE — PROGRESS NOTES
Colin Qureshi arrived to room # 507. Presented with: abdominal pain  Mental Status: Patient is oriented, alert, coherent, logical, thought processes intact, and able to concentrate and follow conversation. Vitals:    05/08/23 0056   BP: 135/84   Pulse: 70   Resp: 18   Temp: 97.5 °F (36.4 °C)   SpO2: 100%     Patient safety contract and falls prevention contract reviewed with patient No.  Oriented Patient to room. Call light within reach. Yes.   Needs, issues or concerns expressed at this time: no.      Electronically signed by Adam Valerio RN on 5/8/2023 at 1:07 AM

## 2023-05-08 NOTE — PROGRESS NOTES
4 Eyes Skin Assessment    Kiesha Aleman is being assessed upon: Admission    I agree that I, Brittaney Wolff, RN, along with ZACHARIAH Keith (either 2 RN's or 1 LPN and 1 RN) have performed a thorough Head to Toe Skin Assessment on the patient. ALL assessment sites listed below have been assessed. Areas assessed by both nurses:     [x]   Head, Face, and Ears   [x]   Shoulders, Back, and Chest  [x]   Arms, Elbows, and Hands   [x]   Coccyx, Sacrum, and Ischium  [x]   Legs, Feet, and Heels    Does the Patient have Skin Breakdown?  No    Zeke Prevention initiated: NA  Wound Care Orders initiated: NA    [facility] nurse consulted for Pressure Injury (Stage 3,4, Unstageable, DTI, NWPT, and Complex wounds) and New or Established Ostomies: NA        Primary Nurse eSignature: Louis Rosales RN on 5/8/2023 at 1:07 AM      Co-Signer eSignature: {Esignature:[id number]}

## 2023-05-09 ENCOUNTER — TELEPHONE (OUTPATIENT)
Dept: FAMILY MEDICINE CLINIC | Age: 28
End: 2023-05-09

## 2023-05-09 VITALS
TEMPERATURE: 98.6 F | RESPIRATION RATE: 16 BRPM | HEIGHT: 68 IN | WEIGHT: 283 LBS | DIASTOLIC BLOOD PRESSURE: 79 MMHG | HEART RATE: 64 BPM | OXYGEN SATURATION: 98 % | SYSTOLIC BLOOD PRESSURE: 121 MMHG | BODY MASS INDEX: 42.89 KG/M2

## 2023-05-09 PROBLEM — R10.9 ABDOMINAL PAIN: Status: ACTIVE | Noted: 2023-05-09

## 2023-05-09 LAB
ALBUMIN SERPL-MCNC: 3.6 G/DL (ref 3.5–5.2)
ALP SERPL-CCNC: 59 U/L (ref 35–104)
ALT SERPL-CCNC: 14 U/L (ref 5–33)
ANION GAP SERPL CALCULATED.3IONS-SCNC: 11 MMOL/L (ref 7–19)
AST SERPL-CCNC: 16 U/L (ref 5–32)
BACTERIA UR CULT: NORMAL
BILIRUB DIRECT SERPL-MCNC: 0.1 MG/DL (ref 0–0.3)
BILIRUB INDIRECT SERPL-MCNC: 0.1 MG/DL (ref 0.1–1)
BILIRUB SERPL-MCNC: <0.2 MG/DL (ref 0.2–1.2)
BUN SERPL-MCNC: 5 MG/DL (ref 6–20)
CALCIUM SERPL-MCNC: 8.5 MG/DL (ref 8.6–10)
CHLORIDE SERPL-SCNC: 106 MMOL/L (ref 98–111)
CO2 SERPL-SCNC: 25 MMOL/L (ref 22–29)
CREAT SERPL-MCNC: 0.8 MG/DL (ref 0.5–0.9)
ERYTHROCYTE [DISTWIDTH] IN BLOOD BY AUTOMATED COUNT: 13.7 % (ref 11.5–14.5)
GLUCOSE SERPL-MCNC: 94 MG/DL (ref 74–109)
HCT VFR BLD AUTO: 36.7 % (ref 37–47)
HGB BLD-MCNC: 11.7 G/DL (ref 12–16)
LIPASE SERPL-CCNC: 22 U/L (ref 13–60)
MCH RBC QN AUTO: 27.7 PG (ref 27–31)
MCHC RBC AUTO-ENTMCNC: 31.9 G/DL (ref 33–37)
MCV RBC AUTO: 87 FL (ref 81–99)
PLATELET # BLD AUTO: 245 K/UL (ref 130–400)
PMV BLD AUTO: 10.8 FL (ref 9.4–12.3)
POTASSIUM SERPL-SCNC: 3.6 MMOL/L (ref 3.5–5)
PROT SERPL-MCNC: 5.9 G/DL (ref 6.6–8.7)
RBC # BLD AUTO: 4.22 M/UL (ref 4.2–5.4)
SODIUM SERPL-SCNC: 142 MMOL/L (ref 136–145)
WBC # BLD AUTO: 7.2 K/UL (ref 4.8–10.8)

## 2023-05-09 PROCEDURE — 6360000002 HC RX W HCPCS: Performed by: HOSPITALIST

## 2023-05-09 PROCEDURE — 96361 HYDRATE IV INFUSION ADD-ON: CPT

## 2023-05-09 PROCEDURE — 85027 COMPLETE CBC AUTOMATED: CPT

## 2023-05-09 PROCEDURE — 36415 COLL VENOUS BLD VENIPUNCTURE: CPT

## 2023-05-09 PROCEDURE — 83690 ASSAY OF LIPASE: CPT

## 2023-05-09 PROCEDURE — 2580000003 HC RX 258: Performed by: INTERNAL MEDICINE

## 2023-05-09 PROCEDURE — 2580000003 HC RX 258: Performed by: HOSPITALIST

## 2023-05-09 PROCEDURE — 82248 BILIRUBIN DIRECT: CPT

## 2023-05-09 PROCEDURE — 96376 TX/PRO/DX INJ SAME DRUG ADON: CPT

## 2023-05-09 PROCEDURE — 96372 THER/PROPH/DIAG INJ SC/IM: CPT

## 2023-05-09 PROCEDURE — 96375 TX/PRO/DX INJ NEW DRUG ADDON: CPT

## 2023-05-09 PROCEDURE — 80053 COMPREHEN METABOLIC PANEL: CPT

## 2023-05-09 PROCEDURE — 6370000000 HC RX 637 (ALT 250 FOR IP): Performed by: SPECIALIST

## 2023-05-09 PROCEDURE — 94760 N-INVAS EAR/PLS OXIMETRY 1: CPT

## 2023-05-09 PROCEDURE — G0378 HOSPITAL OBSERVATION PER HR: HCPCS

## 2023-05-09 RX ORDER — HYOSCYAMINE SULFATE EXTENDED-RELEASE 0.38 MG/1
375 TABLET ORAL EVERY 12 HOURS PRN
Qty: 60 TABLET | Refills: 3 | Status: SHIPPED | OUTPATIENT
Start: 2023-05-09 | End: 2023-05-11

## 2023-05-09 RX ADMIN — ONDANSETRON HYDROCHLORIDE 4 MG: 2 INJECTION, SOLUTION INTRAMUSCULAR; INTRAVENOUS at 02:37

## 2023-05-09 RX ADMIN — SODIUM CHLORIDE: 9 INJECTION, SOLUTION INTRAVENOUS at 02:37

## 2023-05-09 RX ADMIN — HYOSCYAMINE SULFATE 375 MCG: 0.38 TABLET, EXTENDED RELEASE ORAL at 02:42

## 2023-05-09 RX ADMIN — HYDROMORPHONE HYDROCHLORIDE 1 MG: 1 INJECTION, SOLUTION INTRAMUSCULAR; INTRAVENOUS; SUBCUTANEOUS at 10:26

## 2023-05-09 RX ADMIN — ENOXAPARIN SODIUM 30 MG: 100 INJECTION SUBCUTANEOUS at 08:42

## 2023-05-09 RX ADMIN — SODIUM CHLORIDE, PRESERVATIVE FREE 10 ML: 5 INJECTION INTRAVENOUS at 08:42

## 2023-05-09 ASSESSMENT — PAIN DESCRIPTION - ORIENTATION: ORIENTATION: UPPER

## 2023-05-09 ASSESSMENT — PAIN DESCRIPTION - LOCATION: LOCATION: ABDOMEN

## 2023-05-09 ASSESSMENT — PAIN SCALES - GENERAL: PAINLEVEL_OUTOF10: 6

## 2023-05-09 ASSESSMENT — PAIN DESCRIPTION - DESCRIPTORS: DESCRIPTORS: SORE

## 2023-05-09 NOTE — DISCHARGE INSTR - DIET
Good nutrition is important when healing from an illness, injury, or surgery. Follow any nutrition recommendations given to you during your hospital stay. If you were given an oral nutrition supplement while in the hospital, continue to take this supplement at home. You can take it with meals, in-between meals, and/or before bedtime. These supplements can be purchased at most local grocery stores, pharmacies, and chain super-stores. I    f you have any questions about your diet or nutrition, call the hospital and ask for the dietitian.     Regular diet

## 2023-05-09 NOTE — CARE COORDINATION
05/09/23 0928   IMM Letter   Observation Status Letter date given: 05/09/23   Observation Status Letter time given: 0925   Observation Status Letter given to Patient/Family/Significant other/Guardian/POA/by: Observation Status Letter given to and signed by Patient

## 2023-05-09 NOTE — DISCHARGE SUMMARY
Discharge Summary    NAME: Ja Edgar  :  1995  MRN:  [id number]    Admit date:  2023  Discharge date:      Admitting Physician:  Sammi Rosado MD    Advance Directive: Full Code    Consults: GI    Primary Care Physician:  Carlyle Mcrae, [address] COURSE: Ja Edgar presents to [facility] presents with 3 days increasing epigastric and LUQ abd pain non radiating, constant, sharp, worse with movement, 9 out 10, associated with N/V and non bloody diarrhea. Labs show mildly elevated lipase. CT abd neg. UA suggestive of uti, started on IVF, IV ab      seen at the bedside the patient gives a history of constipation for 4 to 5 days followed by taking laxative and having nausea vomiting and diarrhea, after clinical exam discussed about the findings it is less likely pancreatitis, already evaluated by GI, started on clear liquid diet and advance as tolerated, if LFT is normal by tomorrow will discharge, patient agreed on that     seen and evaluated at bedside no acute complaint tolerating diet given prescription for hyoscyamine as needed abdominal pain. Few months ago patient got some lab results positive for SHELLEY and further results follow-up with rheumatology in [place]. DISCHARGE DIAGNOSES WITH COURSE OF MANAGEMENT :   Principal Problem:    Pancreatitis due to common bile duct stone  Active Problems:    Abdominal pain  Resolved Problems:    * No resolved hospital problems.  *    Abdominal pain likely from cramping from taking laxatives  Pancreatitis less likely lipase within normal limit, no clinical tenderness  GI input appreciated start on clear liquid diet and advance as tolerated  Continue IV fluid NS at 100 cc/h  We will monitor LFT  Given hyoscyamine on discharge  Tolerating diet encourage oral fluid    Morbid obesity BMI of 43 patient is actively motivated doing exercise and on diet with TSH of 2.92    Vitamin D insufficiency with level 27> continue on

## 2023-05-10 NOTE — TELEPHONE ENCOUNTER
Care Transitions Initial Follow Up Call    Outreach made within 2 business days of discharge: Yes    Patient: Gracie Dhillon Patient : 1995   MRN: [id number]  Reason for Admission: There are no discharge diagnoses documented for the most recent discharge. Discharge Date: 23       Spoke with: pt    Discharge department/facility: [facility] Interactive Patient Contact:  Was patient able to fill all prescriptions: Yes  Was patient instructed to bring all medications to the follow-up visit: Yes  Is patient taking all medications as directed in the discharge summary?  Yes  Does patient understand their discharge instructions: Yes  Does patient have questions or concerns that need addressed prior to 7-14 day follow up office visit: no    Scheduled appointment with PCP within 7-14 days    Follow Up  Future Appointments   Date Time Provider Caridad Pham   2023  8:30 AM SARAY Montes [facility]   2023 10:40 AM SARAY Simon NP [facility]   2024  8:30 AM SARAY Lara CNP OB/GYN [facility]       Bridgette Reddy MA

## 2023-05-24 ENCOUNTER — OFFICE VISIT (OUTPATIENT)
Dept: FAMILY MEDICINE CLINIC | Age: 28
End: 2023-05-24
Payer: OTHER GOVERNMENT

## 2023-05-24 VITALS
TEMPERATURE: 97.4 F | BODY MASS INDEX: 42.59 KG/M2 | SYSTOLIC BLOOD PRESSURE: 118 MMHG | HEART RATE: 86 BPM | OXYGEN SATURATION: 98 % | WEIGHT: 281 LBS | DIASTOLIC BLOOD PRESSURE: 76 MMHG | HEIGHT: 68 IN

## 2023-05-24 DIAGNOSIS — R76.8 POSITIVE ANA (ANTINUCLEAR ANTIBODY): ICD-10-CM

## 2023-05-24 DIAGNOSIS — Z87.19 HISTORY OF PANCREATITIS: ICD-10-CM

## 2023-05-24 DIAGNOSIS — Z09 HOSPITAL DISCHARGE FOLLOW-UP: ICD-10-CM

## 2023-05-24 DIAGNOSIS — Z09 HOSPITAL DISCHARGE FOLLOW-UP: Primary | ICD-10-CM

## 2023-05-24 LAB
ALBUMIN SERPL-MCNC: 4.1 G/DL (ref 3.5–5.2)
ALP SERPL-CCNC: 64 U/L (ref 35–104)
ALT SERPL-CCNC: 11 U/L (ref 5–33)
ANION GAP SERPL CALCULATED.3IONS-SCNC: 13 MMOL/L (ref 7–19)
AST SERPL-CCNC: 13 U/L (ref 5–32)
BASOPHILS # BLD: 0.1 K/UL (ref 0–0.2)
BASOPHILS NFR BLD: 0.7 % (ref 0–1)
BILIRUB SERPL-MCNC: 0.3 MG/DL (ref 0.2–1.2)
BUN SERPL-MCNC: 10 MG/DL (ref 6–20)
CALCIUM SERPL-MCNC: 9.2 MG/DL (ref 8.6–10)
CHLORIDE SERPL-SCNC: 106 MMOL/L (ref 98–111)
CO2 SERPL-SCNC: 22 MMOL/L (ref 22–29)
CREAT SERPL-MCNC: 0.7 MG/DL (ref 0.5–0.9)
CRP SERPL HS-MCNC: 0.38 MG/DL (ref 0–0.5)
EOSINOPHIL # BLD: 0.3 K/UL (ref 0–0.6)
EOSINOPHIL NFR BLD: 2.6 % (ref 0–5)
ERYTHROCYTE [DISTWIDTH] IN BLOOD BY AUTOMATED COUNT: 13.7 % (ref 11.5–14.5)
GLUCOSE SERPL-MCNC: 111 MG/DL (ref 74–109)
HCT VFR BLD AUTO: 37.5 % (ref 37–47)
HGB BLD-MCNC: 12 G/DL (ref 12–16)
IMM GRANULOCYTES # BLD: 0 K/UL
LIPASE SERPL-CCNC: 34 U/L (ref 13–60)
LYMPHOCYTES # BLD: 2.6 K/UL (ref 1.1–4.5)
LYMPHOCYTES NFR BLD: 25.2 % (ref 20–40)
MCH RBC QN AUTO: 27.7 PG (ref 27–31)
MCHC RBC AUTO-ENTMCNC: 32 G/DL (ref 33–37)
MCV RBC AUTO: 86.6 FL (ref 81–99)
MONOCYTES # BLD: 0.9 K/UL (ref 0–0.9)
MONOCYTES NFR BLD: 8.7 % (ref 0–10)
NEUTROPHILS # BLD: 6.4 K/UL (ref 1.5–7.5)
NEUTS SEG NFR BLD: 62.5 % (ref 50–65)
PLATELET # BLD AUTO: 277 K/UL (ref 130–400)
PMV BLD AUTO: 11.6 FL (ref 9.4–12.3)
POTASSIUM SERPL-SCNC: 3.9 MMOL/L (ref 3.5–5)
PROT SERPL-MCNC: 6.8 G/DL (ref 6.6–8.7)
RBC # BLD AUTO: 4.33 M/UL (ref 4.2–5.4)
SODIUM SERPL-SCNC: 141 MMOL/L (ref 136–145)
T4 FREE SERPL-MCNC: 1.12 NG/DL (ref 0.93–1.7)
WBC # BLD AUTO: 10.2 K/UL (ref 4.8–10.8)

## 2023-05-24 PROCEDURE — 99214 OFFICE O/P EST MOD 30 MIN: CPT | Performed by: NURSE PRACTITIONER

## 2023-05-24 PROCEDURE — 1111F DSCHRG MED/CURRENT MED MERGE: CPT | Performed by: NURSE PRACTITIONER

## 2023-05-24 RX ORDER — DULAGLUTIDE 0.75 MG/.5ML
0.75 INJECTION, SOLUTION SUBCUTANEOUS WEEKLY
Qty: 4 ADJUSTABLE DOSE PRE-FILLED PEN SYRINGE | Refills: 3 | Status: SHIPPED | OUTPATIENT
Start: 2023-05-24

## 2023-05-24 ASSESSMENT — ENCOUNTER SYMPTOMS
RESPIRATORY NEGATIVE: 1
EYES NEGATIVE: 1
GASTROINTESTINAL NEGATIVE: 1

## 2023-05-24 NOTE — PROGRESS NOTES
RECONCILED W/ CURRENT OUTPATIENT MED LIST       Orders Placed This Encounter   Medications    Dulaglutide (TRULICITY) 7.81 DM/9.1JB SOPN     Sig: Inject 0.75 mg into the skin once a week     Dispense:  4 Adjustable Dose Pre-filled Pen Syringe     Refill:  3            Patient offered educational handouts and has had all questions answered. Patient voices understanding and agrees to plans along with risks and benefits of plan. Patient is instructed to continue prior meds, diet, and exercise plans as instructed. Patient agrees to follow up as instructed and sooner if needed. Patient agrees to go to ER if condition becomes emergent. EMR Dragon/transcription disclaimer: Some of this encounter note is an electronic transcription/translation of spoken language to printed text. The electronic translation of spoken language may permit erroneous, or at times, nonsensical words or phrases to be inadvertently transcribed.  Although I have reviewed the note for such errors, some may still exist.    Electronically signed by Ethelene Halsted, APRN on 5/24/2023 at 2:26 PM

## 2023-05-25 ENCOUNTER — TELEPHONE (OUTPATIENT)
Dept: FAMILY MEDICINE CLINIC | Age: 28
End: 2023-05-25

## 2023-05-26 ENCOUNTER — TELEPHONE (OUTPATIENT)
Dept: FAMILY MEDICINE CLINIC | Age: 28
End: 2023-05-26

## 2023-05-26 LAB — THYROPEROXIDASE AB SERPL-ACNC: 0.3 IU/ML (ref 0–9)

## 2023-05-26 NOTE — TELEPHONE ENCOUNTER
----- Message from SARAY Badillo sent at 5/26/2023  9:44 AM CDT -----  Additional thyroid test was normal

## 2023-05-27 LAB — TSI SER-ACNC: <0.1 IU/L

## 2023-06-09 RX ORDER — DULAGLUTIDE 0.75 MG/.5ML
0.75 INJECTION, SOLUTION SUBCUTANEOUS WEEKLY
Qty: 4 ADJUSTABLE DOSE PRE-FILLED PEN SYRINGE | Refills: 3 | Status: SHIPPED | OUTPATIENT
Start: 2023-06-09

## 2023-06-09 NOTE — TELEPHONE ENCOUNTER
Donal Ribera called requesting a refill of the below medication which has been pended for you:     Requested Prescriptions     Pending Prescriptions Disp Refills    Dulaglutide (TRULICITY) 5.88 PM/7.9KC SOPN 4 Adjustable Dose Pre-filled Pen Syringe 3     Sig: Inject 0.75 mg into the skin once a week       Last Appointment Date: 5/24/2023  Next Appointment Date: 8/15/2023    Allergies   Allergen Reactions    Corn-Containing Products     Sertraline      Pt. Developed seratonin syndrome and had to go impatient.      Shrimp (Diagnostic)     Soybean-Containing Drug Products     Wheat Extract

## 2023-07-25 ENCOUNTER — TELEPHONE (OUTPATIENT)
Dept: FAMILY MEDICINE CLINIC | Age: 28
End: 2023-07-25

## 2023-07-25 NOTE — TELEPHONE ENCOUNTER
Pt called, she said that her records/referral for rheumatology was supposed to be sent to [facility].     Fax [phone]

## 2023-08-16 ENCOUNTER — APPOINTMENT (OUTPATIENT)
Dept: GENERAL RADIOLOGY | Age: 28
End: 2023-08-16
Attending: EMERGENCY MEDICINE
Payer: OTHER GOVERNMENT

## 2023-08-16 ENCOUNTER — HOSPITAL ENCOUNTER (EMERGENCY)
Age: 28
Discharge: HOME | End: 2023-08-16
Attending: EMERGENCY MEDICINE
Payer: OTHER GOVERNMENT

## 2023-08-16 VITALS
HEART RATE: 86 BPM | WEIGHT: 250 LBS | OXYGEN SATURATION: 98 % | TEMPERATURE: 98.5 F | SYSTOLIC BLOOD PRESSURE: 124 MMHG | HEIGHT: 68 IN | DIASTOLIC BLOOD PRESSURE: 72 MMHG | RESPIRATION RATE: 18 BRPM | BODY MASS INDEX: 37.89 KG/M2

## 2023-08-16 DIAGNOSIS — M54.50 ACUTE LEFT-SIDED LOW BACK PAIN WITHOUT SCIATICA: Primary | ICD-10-CM

## 2023-08-16 LAB
ALBUMIN SERPL-MCNC: 4.3 G/DL (ref 3.5–5.2)
ALP SERPL-CCNC: 64 U/L (ref 35–104)
ALT SERPL-CCNC: 13 U/L (ref 5–33)
ANION GAP SERPL CALCULATED.3IONS-SCNC: 11 MMOL/L (ref 7–19)
AST SERPL-CCNC: 17 U/L (ref 5–32)
BASOPHILS # BLD: 0.1 K/UL (ref 0–0.2)
BASOPHILS NFR BLD: 0.6 % (ref 0–1)
BILIRUB SERPL-MCNC: 0.5 MG/DL (ref 0.2–1.2)
BILIRUB UR QL STRIP: NEGATIVE
BUN SERPL-MCNC: 4 MG/DL (ref 6–20)
CALCIUM SERPL-MCNC: 9.2 MG/DL (ref 8.6–10)
CHLORIDE SERPL-SCNC: 104 MMOL/L (ref 98–111)
CK SERPL-CCNC: 212 U/L (ref 26–192)
CLARITY UR: ABNORMAL
CO2 SERPL-SCNC: 26 MMOL/L (ref 22–29)
COLOR UR: YELLOW
CREAT SERPL-MCNC: 0.7 MG/DL (ref 0.5–0.9)
EOSINOPHIL # BLD: 0.2 K/UL (ref 0–0.6)
EOSINOPHIL NFR BLD: 2.2 % (ref 0–5)
ERYTHROCYTE [DISTWIDTH] IN BLOOD BY AUTOMATED COUNT: 14.4 % (ref 11.5–14.5)
GLUCOSE SERPL-MCNC: 98 MG/DL (ref 74–109)
GLUCOSE UR STRIP.AUTO-MCNC: NEGATIVE MG/DL
HCG SERPL QL: NEGATIVE
HCT VFR BLD AUTO: 41.1 % (ref 37–47)
HGB BLD-MCNC: 13 G/DL (ref 12–16)
HGB UR STRIP.AUTO-MCNC: NEGATIVE MG/L
IMM GRANULOCYTES # BLD: 0 K/UL
KETONES UR STRIP.AUTO-MCNC: 15 MG/DL
LEUKOCYTE ESTERASE UR QL STRIP.AUTO: NEGATIVE
LYMPHOCYTES # BLD: 2 K/UL (ref 1.1–4.5)
LYMPHOCYTES NFR BLD: 23.7 % (ref 20–40)
MCH RBC QN AUTO: 27.7 PG (ref 27–31)
MCHC RBC AUTO-ENTMCNC: 31.6 G/DL (ref 33–37)
MCV RBC AUTO: 87.6 FL (ref 81–99)
MONOCYTES # BLD: 0.8 K/UL (ref 0–0.9)
MONOCYTES NFR BLD: 9.3 % (ref 0–10)
NEUTROPHILS # BLD: 5.3 K/UL (ref 1.5–7.5)
NEUTS SEG NFR BLD: 64.1 % (ref 50–65)
NITRITE UR QL STRIP.AUTO: NEGATIVE
PH UR STRIP.AUTO: 5.5 [PH] (ref 5–8)
PLATELET # BLD AUTO: 282 K/UL (ref 130–400)
PMV BLD AUTO: 10.9 FL (ref 9.4–12.3)
POTASSIUM SERPL-SCNC: 3.8 MMOL/L (ref 3.5–5)
PROT SERPL-MCNC: 6.9 G/DL (ref 6.6–8.7)
PROT UR STRIP.AUTO-MCNC: NEGATIVE MG/DL
RBC # BLD AUTO: 4.69 M/UL (ref 4.2–5.4)
SODIUM SERPL-SCNC: 141 MMOL/L (ref 136–145)
SP GR UR STRIP.AUTO: 1.01 (ref 1–1.03)
UROBILINOGEN UR STRIP.AUTO-MCNC: 1 E.U./DL
WBC # BLD AUTO: 8.3 K/UL (ref 4.8–10.8)

## 2023-08-16 PROCEDURE — 80053 COMPREHEN METABOLIC PANEL: CPT

## 2023-08-16 PROCEDURE — 6360000002 HC RX W HCPCS: Performed by: EMERGENCY MEDICINE

## 2023-08-16 PROCEDURE — 84703 CHORIONIC GONADOTROPIN ASSAY: CPT

## 2023-08-16 PROCEDURE — 36415 COLL VENOUS BLD VENIPUNCTURE: CPT

## 2023-08-16 PROCEDURE — 96374 THER/PROPH/DIAG INJ IV PUSH: CPT

## 2023-08-16 PROCEDURE — 85025 COMPLETE CBC W/AUTO DIFF WBC: CPT

## 2023-08-16 PROCEDURE — 81003 URINALYSIS AUTO W/O SCOPE: CPT

## 2023-08-16 PROCEDURE — 82550 ASSAY OF CK (CPK): CPT

## 2023-08-16 PROCEDURE — 72100 X-RAY EXAM L-S SPINE 2/3 VWS: CPT

## 2023-08-16 PROCEDURE — 99284 EMERGENCY DEPT VISIT MOD MDM: CPT

## 2023-08-16 RX ORDER — KETOROLAC TROMETHAMINE 10 MG/1
10 TABLET, FILM COATED ORAL EVERY 6 HOURS PRN
Qty: 20 TABLET | Refills: 0 | Status: SHIPPED | OUTPATIENT
Start: 2023-08-16

## 2023-08-16 RX ORDER — KETOROLAC TROMETHAMINE 30 MG/ML
30 INJECTION, SOLUTION INTRAMUSCULAR; INTRAVENOUS ONCE
Status: COMPLETED | OUTPATIENT
Start: 2023-08-16 | End: 2023-08-16

## 2023-08-16 RX ORDER — ORPHENADRINE CITRATE 100 MG/1
100 TABLET, EXTENDED RELEASE ORAL 2 TIMES DAILY PRN
Qty: 10 TABLET | Refills: 0 | Status: SHIPPED | OUTPATIENT
Start: 2023-08-16 | End: 2023-08-26

## 2023-08-16 RX ORDER — DULAGLUTIDE 0.75 MG/.5ML
INJECTION, SOLUTION SUBCUTANEOUS
Qty: 2 ML | Refills: 12 | Status: SHIPPED | OUTPATIENT
Start: 2023-08-16

## 2023-08-16 RX ADMIN — KETOROLAC TROMETHAMINE 30 MG: 30 INJECTION, SOLUTION INTRAMUSCULAR; INTRAVENOUS at 09:05

## 2023-08-16 ASSESSMENT — ENCOUNTER SYMPTOMS
EYE DISCHARGE: 0
SHORTNESS OF BREATH: 0
CONSTIPATION: 1
DIARRHEA: 0
CHOKING: 0
BACK PAIN: 1
SORE THROAT: 0
VOMITING: 0
VOICE CHANGE: 0
APNEA: 0
SINUS PRESSURE: 0
BLOOD IN STOOL: 0
ABDOMINAL PAIN: 0
NAUSEA: 0
FACIAL SWELLING: 0

## 2023-08-16 ASSESSMENT — PAIN SCALES - GENERAL: PAINLEVEL_OUTOF10: 6

## 2023-08-16 ASSESSMENT — PAIN - FUNCTIONAL ASSESSMENT: PAIN_FUNCTIONAL_ASSESSMENT: 0-10

## 2023-08-16 NOTE — TELEPHONE ENCOUNTER
Jay Costa called requesting a refill of the below medication which has been pended for you:     Requested Prescriptions     Pending Prescriptions Disp Refills    TRULICITY 5.31 IZ/9.9CR SOPN [Pharmacy Med Name: TRULICITY SD PEN 1.0FN 4'S 0.75MG 0.75MG] 2 mL 12     Sig: INJECT 0.75 MG UNDER THE SKIN ONCE A WEEK       Last Appointment Date: 5/24/2023  Next Appointment Date: 9/1/2023    Allergies   Allergen Reactions    Corn-Containing Products     Sertraline      Pt. Developed seratonin syndrome and had to go impatient.      Shrimp (Diagnostic)     Soybean-Containing Drug Products     Wheat Extract

## 2023-08-16 NOTE — ED PROVIDER NOTES
[facility] EMERGENCY DEPT  eMERGENCY dEPARTMENT eNCOUnter      Pt Name: Johnnie Quiroz  MRN: [id number]  [address] 1995  Date of evaluation: 8/16/2023  Provider: Oliva Stephens MD    [address]       Chief Complaint   Patient presents with    Back Pain         HISTORY OF PRESENT ILLNESS   (Location/Symptom, Timing/Onset,Context/Setting, Quality, Duration, Modifying Factors, Severity)  Note limiting factors. Johnnie Quiroz is a 32 y.o. female who presents to the emergency department evaluation of low back pain. 59-year-old female presents with complaint of lower left back pain for 2 days. No known injury. No prior history. States she tolerated the best she could. Went to work but movement tends to aggravate it. Even breathing. No shortness of breath. No fever or chills. States she urinates about twice a day and urine has been dark. States she took: S as above issues with constipation. But no bowel changes. Denies pregnancy. Denies dysuria or frequency. Denies vaginal issues. No known kidney stones. And prior CT scans abdomen did not reveal any nephrolithiasis. And no aggressive osseous lesions were seen on her last CT in May of this year. The history is provided by the patient and medical records. NursingNotes were reviewed. REVIEW OF SYSTEMS    (2-9 systems for level 4, 10 or more for level 5)     Review of Systems   Constitutional:  Negative for chills and fever. HENT:  Negative for congestion, drooling, facial swelling, nosebleeds, sinus pressure, sore throat and voice change. Eyes:  Negative for discharge. Respiratory:  Negative for apnea, choking and shortness of breath. Cardiovascular:  Negative for chest pain and leg swelling. Gastrointestinal:  Positive for constipation. Negative for abdominal pain, blood in stool, diarrhea, nausea and vomiting. Genitourinary:  Positive for decreased urine volume. Negative for difficulty urinating, dysuria and enuresis. Musculoskeletal:  Positive for back pain. Negative for joint swelling. Skin:  Negative for rash and wound. Neurological:  Negative for seizures and syncope. Psychiatric/Behavioral:  Negative for behavioral problems, hallucinations and suicidal ideas. All other systems reviewed and are negative. A complete review of systems was performed and is negative except as noted above in the HPI.        PAST MEDICAL HISTORY     Past Medical History:   Diagnosis Date    Concussion     approximately 10 years ago    Depression     Major depressive disorder     Food allergy     Hypothyroidism     Metabolic disorder     PCOS (polycystic ovarian syndrome)          SURGICAL HISTORY       Past Surgical History:   Procedure Laterality Date    CHOLECYSTECTOMY      COLONOSCOPY  11/03/2014    Dr Jane Guajardo,  Small non-bleeding internal hemorrhoids w/skin tags, serrated polyp     COLONOSCOPY N/A 10/21/2019    Dr Rock Arguelles, 5 yr recall    COLONOSCOPY N/A 06/03/2021    Dr Teri Gee, [facility], (-)Micro Colitis,  Int hemorrhoids-Grade 1, 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, 20 year recall    KNEE SURGERY      LAPAROSCOPY N/A 01/31/2020    DIAGNOSTIC LAPAROSCOPY AND CHROMOPERTUBATION performed by Artem Haynes MD at [address] [place] [address] ENDOSCOPY  11/03/2014    Dr Jane Guajardo, Gastritis, h pylori neg    UPPER GASTROINTESTINAL ENDOSCOPY N/A 10/21/2019    Dr Rohan Garrett ENDOSCOPY N/A 06/03/2021    Dr Teri Gee, (-) [place]         CURRENT MEDICATIONS       Discharge Medication List as of 8/16/2023 10:49 AM        CONTINUE these medications which have NOT CHANGED    Details   Dulaglutide (TRULICITY) 9.95 AL/8.6DD SOPN Inject 0.75 mg into the skin once a week, Disp-4 Adjustable Dose Pre-filled Pen Syringe, R-3Normal             ALLERGIES     Corn-containing products, Sertraline, Shrimp

## 2023-09-01 ENCOUNTER — OFFICE VISIT (OUTPATIENT)
Dept: FAMILY MEDICINE CLINIC | Age: 28
End: 2023-09-01
Payer: OTHER GOVERNMENT

## 2023-09-01 VITALS
OXYGEN SATURATION: 98 % | TEMPERATURE: 97.1 F | HEIGHT: 68 IN | DIASTOLIC BLOOD PRESSURE: 78 MMHG | HEART RATE: 76 BPM | BODY MASS INDEX: 36.07 KG/M2 | SYSTOLIC BLOOD PRESSURE: 120 MMHG | WEIGHT: 238 LBS

## 2023-09-01 DIAGNOSIS — L98.7 EXCESS SKIN: ICD-10-CM

## 2023-09-01 DIAGNOSIS — M43.10 RETROLISTHESIS: Primary | ICD-10-CM

## 2023-09-01 PROCEDURE — 99214 OFFICE O/P EST MOD 30 MIN: CPT | Performed by: NURSE PRACTITIONER

## 2023-09-01 ASSESSMENT — ENCOUNTER SYMPTOMS
BACK PAIN: 1
EYES NEGATIVE: 1
RESPIRATORY NEGATIVE: 1
GASTROINTESTINAL NEGATIVE: 1

## 2023-09-01 NOTE — PROGRESS NOTES
[facility] PHYSICIAN SERVICES  [facility]  [address] [place] [id number]  Dept: [phone]  Dept Fax: [phone]  Loc: [phone]    Nina Cuellar is a 32 y.o. female who presents today for her medical conditions/complaints as noted below. Nina Cuellar is c/o of Follow-up Chronic Condition and Lower Back Pain      Chief Complaint   Patient presents with    Follow-up Chronic Condition    Lower Back Pain       HPI:     HPI  Patient presents today for routine follow up of chronic conditions. She states that she went to the ER about 2 weeks ago for lower back pain. She states that they told her that it was her spine. Notes in chart for review. She states that she has been trying to lose weight and has lost about 90 lbs but would like to discsuss possible surgery to remove excess skin. She also states that she is only eating about 1 meal a day and is just not hungry, but sometimes does not feel well. Skin irritation on excess skin.     Past Medical History:   Diagnosis Date    Concussion     approximately 10 years ago    Depression     Major depressive disorder     Food allergy     Hypothyroidism     Metabolic disorder     PCOS (polycystic ovarian syndrome)         Past Surgical History:   Procedure Laterality Date    CHOLECYSTECTOMY      COLONOSCOPY  11/03/2014    Dr Concetta Perez,  Small non-bleeding internal hemorrhoids w/skin tags, serrated polyp     COLONOSCOPY N/A 10/21/2019    Dr Lin Sellers, 5 yr recall    COLONOSCOPY N/A 06/03/2021    Dr Milan Gomez, [facility], (-)Micro Colitis,  Int hemorrhoids-Grade 1, 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, 20 year recall    KNEE SURGERY      LAPAROSCOPY N/A 01/31/2020    DIAGNOSTIC LAPAROSCOPY AND CHROMOPERTUBATION performed by Rishabh House MD at [facility] ENDOSCOPY  11/03/2014    Dr Concetta Perez, Gastritis, h pylori neg    UPPER Detail Level: Detailed

## 2023-09-18 ENCOUNTER — HOSPITAL ENCOUNTER (OUTPATIENT)
Dept: PHYSICAL THERAPY | Age: 28
Setting detail: THERAPIES SERIES
Discharge: HOME | End: 2023-09-18
Payer: OTHER GOVERNMENT

## 2023-09-18 PROCEDURE — 97162 PT EVAL MOD COMPLEX 30 MIN: CPT

## 2023-09-18 ASSESSMENT — PAIN DESCRIPTION - PAIN TYPE: TYPE: CHRONIC PAIN

## 2023-09-18 ASSESSMENT — PAIN DESCRIPTION - ORIENTATION: ORIENTATION: LOWER

## 2023-09-18 ASSESSMENT — PAIN DESCRIPTION - DESCRIPTORS: DESCRIPTORS: ACHING;SORE

## 2023-09-18 ASSESSMENT — PAIN DESCRIPTION - LOCATION: LOCATION: BACK

## 2023-09-18 NOTE — PROGRESS NOTES
actively involved in establishing Plan of Care and Goals: Yes  Patient/ Caregiver education and instruction: Goals, PT Role, Plan of Care, Evaluative findings, Insurance, Anatomy of condition             Treatment may include any combination of the following: Current Treatment Recommendations: Strengthening, Neuromuscular re-education, Manual, Modalities, Equipment evaluation, education, & procurement, Patient/Caregiver education & training, Safety education & training, Home exercise program  Modalities: Heat/Cold, E-stim - unattended     Frequency / Duration:  Patient to be seen 2x for 4-6 weeks weeks      Eval Complexity: Overall Evaluation : Medium  Decision Making: Medium Complexity    Therapy Time  Individual Time In: 0920       Individual Time Out: 1000  Minutes: 36        Therapist Signature: Nain Keys PT    Date: 9/18/2023

## 2023-09-20 ENCOUNTER — HOSPITAL ENCOUNTER (OUTPATIENT)
Dept: PHYSICAL THERAPY | Age: 28
Setting detail: THERAPIES SERIES
Discharge: HOME | End: 2023-09-20
Payer: OTHER GOVERNMENT

## 2023-09-20 PROCEDURE — 97110 THERAPEUTIC EXERCISES: CPT

## 2023-09-20 ASSESSMENT — PAIN DESCRIPTION - LOCATION: LOCATION: BACK

## 2023-09-20 ASSESSMENT — PAIN SCALES - GENERAL: PAINLEVEL_OUTOF10: 2

## 2023-09-20 ASSESSMENT — PAIN DESCRIPTION - ORIENTATION: ORIENTATION: LOWER;LEFT

## 2023-09-20 ASSESSMENT — PAIN DESCRIPTION - PAIN TYPE: TYPE: CHRONIC PAIN

## 2023-09-20 ASSESSMENT — PAIN DESCRIPTION - DESCRIPTORS: DESCRIPTORS: ACHING;DULL

## 2023-09-20 NOTE — PROGRESS NOTES
Physical Therapy: Daily Note   Patient: Dennise June (18 y.o. female)   Examination Date:   Plan of Care/Certification Expiration Date: 23    No data recorded   :  1995 # of Visits since [facility]:   2   MRN: [id number]  CSN: [id number] Start of Care Date:   2023   Insurance: Payor:  EAST / Plan:  EAST / Product Type: *No Product type* /   Insurance ID: [id number] - (Other) Secondary Insurance (if applicable):    Referring Physician: SARAY Porter APRN   PCP: SARAY Porter Visits to Date/Visits Approved:     No Show/Cancelled Appts:   /       Medical Diagnosis: Spondylolisthesis, site unspecified [M43.10] Retrolisthesis  Treatment Diagnosis: Retrolisthesis        SUBJECTIVE EXAMINATION   Pain Level: Pain Screening  Pain Assessment: 0-10  Pain Level: 2  Pain Type: Chronic pain  Pain Location: Back  Pain Orientation: Lower, Left  Pain Descriptors: Aching, Dull    Patient Comments: Subjective: Patient reports some soreness after her initial eval, back to baseline today.     HEP Compliance:     Previous treatments prior to current episode?: Medications     OBJECTIVE EXAMINATION           TREATMENT     Exercises:      Treatment Reasoning    Exercise 1: Avoid hyperflexion  Exercise 2: TA contraction (alone, UE, LE, UE/LE)--2# in hands, 10 reps each (feet not touching mat)  Exercise 3: Pelvic tilt on towel--20 reps, 3-5 sec hold  Exercise 4: Bridging (on ball when appropriate)--10 reps  Exercise 5: Single leg bridging bilat--10 reps each  Exercise 6: Axial traction bilat LE--3 reps, 15 sec bilaterally  Exercise 7: Plank/side plank--plank 15 secs x 3, side plank, 2 x 10secs  Exercise 8: Mountain climbers--not today  Exercise 9: Quadruped UE, LE, UE/LE--10/10/10  Exercise 10: Push up position, tap shoulder with opp hand (hold TA contraction)--no today  Exercise 11: Rollout on t-ball--5 reps, partial roll out  Exercise 12: UE, LE, UE/LE on t-ball--10 reps

## 2023-09-27 ENCOUNTER — HOSPITAL ENCOUNTER (OUTPATIENT)
Dept: PHYSICAL THERAPY | Age: 28
Setting detail: THERAPIES SERIES
Discharge: HOME | End: 2023-09-27
Payer: OTHER GOVERNMENT

## 2023-09-27 PROCEDURE — 97110 THERAPEUTIC EXERCISES: CPT

## 2023-09-27 NOTE — PROGRESS NOTES
Physical Therapy: Daily Note   Patient: Frances Goff (59 y.o. female)   Examination Date:   Plan of Care/Certification Expiration Date: 23    No data recorded   :  1995 # of Visits since [facility]:   3   MRN: [id number]  CSN: [id number] Start of Care Date:   2023   Insurance: Payor:  EAST / Plan:  EAST / Product Type: *No Product type* /   Insurance ID: [id number] - (Other) Secondary Insurance (if applicable):    Referring Physician: SARAY Meadows APRN   PCP: SARAY Meadows Visits to Date/Visits Approved: 3 / 12    No Show/Cancelled Appts:   /       Medical Diagnosis: Spondylolisthesis, site unspecified [M43.10] Retrolisthesis  Treatment Diagnosis: Retrolisthesis        SUBJECTIVE EXAMINATION       Patient Comments: Subjective: Patient states she has about 2-3/10 pain in her back today. She says she was a little sore after last session, but not too bad.     Previous treatments prior to current episode?: Medications     OBJECTIVE EXAMINATION   Restrictions:  No data recorded No data recorded No data recorded        TREATMENT     Exercises:      Treatment Reasoning    Exercise 1: Avoid hyperflexion  Exercise 2: TA contraction (alone 10 sec x 10, UE 1 x 10, LE 1 x 10, UE/LE 1 x 10)--2# in hands * cues to not lift le's too high to decrease extreme flexion of lb  Exercise 3: Pelvic tilt on towel--20 reps, 3-5 sec hold  Exercise 4: Bridging (on ball when appropriate)--10 reps  Exercise 5: Single leg bridging bilat--10 reps each  Exercise 6: Axial traction bilat LE--3 reps, 15 sec bilaterally  Exercise 7: Plank/side plank--plank 15 secs x 3 * 10 sec on last rep, side plank, 2 x 10secs  Exercise 8: Mountain climbers-- 1 x 5  Exercise 9: Quadruped UE, LE, UE/LE--10/10/10  Exercise 10: Push up position, tap shoulder with opp hand (hold TA contraction)--  Exercise 11: Rollout on t-ball--5 reps, partial roll out  Exercise 12: UE, LE, UE/LE on t-ball--10 reps

## 2023-09-29 ENCOUNTER — HOSPITAL ENCOUNTER (OUTPATIENT)
Dept: PHYSICAL THERAPY | Age: 28
Setting detail: THERAPIES SERIES
Discharge: HOME | End: 2023-09-29
Payer: OTHER GOVERNMENT

## 2023-09-29 PROCEDURE — 97110 THERAPEUTIC EXERCISES: CPT

## 2023-09-29 NOTE — PROGRESS NOTES
Physical Therapy: Daily Note   Patient: Maddie Dean (56 y.o. female)   Examination Date:   Plan of Care/Certification Expiration Date: 23    No data recorded   :  1995 # of Visits since [facility]:   4   MRN: [id number]  CSN: [id number] Start of Care Date:   2023   Insurance: Payor:  EAST / Plan:  EAST / Product Type: *No Product type* /   Insurance ID: [id number] - (Other) Secondary Insurance (if applicable):    Referring Physician: SARAY Vasquez APRN   PCP: SARAY Vasquez Visits to Date/Visits Approved: 3 / 12    No Show/Cancelled Appts:   /       Medical Diagnosis: Spondylolisthesis, site unspecified [M43.10] Retrolisthesis  Treatment Diagnosis: Retrolisthesis        SUBJECTIVE EXAMINATION   Pain Level: Pain Screening  Patient Currently in Pain: Denies    Patient Comments: Subjective: Been a good day.           TREATMENT     Exercises:      Treatment Reasoning    Exercise 1: Avoid hyperflexion  Exercise 2: TA contraction (alone 10 sec x 10, UE 1 x 10, LE 1 x 10, UE/LE 1 x 10)--1# in hands * cues to not lift le's too high to decrease extreme flexion of lb  Exercise 3: Pelvic tilt on towel--20 reps, 3-5 sec hold  Exercise 4: Bridging on ball-10 reps  Exercise 5: Single leg bridging bilat--10 reps each  Exercise 6: Axial traction bilat LE--3 reps, 15 sec bilaterally  Exercise 7: Plank/side plank--plank 15 secs x 3     side 2 x 15 sec  Exercise 8: Mountain climbers-- 1 x 5  Exercise 9: Quadruped UE, LE, UE/LE--10/10/10  Exercise 10: Push up position, tap shoulder with opp hand (hold TA contraction)-- 1 x 5 bilateral- required \"spotting\"  Exercise 11: Rollout on t-ball--5 reps, partial roll out  Exercise 12: UE, LE, UE/LE on t-ball--10 reps each  Exercise 13: [place] on t-ball--1 minute, with alternating arm support  Exercise 14: [place] with ball toss--not today  Exercise 15: Lat pulldown at [employer], 65#  Exercise 16: Multifidus joselyn/Christine

## 2023-10-02 ENCOUNTER — HOSPITAL ENCOUNTER (OUTPATIENT)
Dept: PHYSICAL THERAPY | Age: 28
Setting detail: THERAPIES SERIES
Discharge: HOME | End: 2023-10-02
Payer: OTHER GOVERNMENT

## 2023-10-02 PROCEDURE — 97110 THERAPEUTIC EXERCISES: CPT

## 2023-10-02 ASSESSMENT — PAIN DESCRIPTION - LOCATION: LOCATION: BACK

## 2023-10-02 ASSESSMENT — PAIN DESCRIPTION - PAIN TYPE: TYPE: CHRONIC PAIN

## 2023-10-02 ASSESSMENT — PAIN DESCRIPTION - ORIENTATION: ORIENTATION: LOWER

## 2023-10-02 ASSESSMENT — PAIN SCALES - GENERAL: PAINLEVEL_OUTOF10: 5

## 2023-10-02 NOTE — PROGRESS NOTES
Daily Treatment Note  Date: 10/2/2023  Patient Name: Jina Siddiqui  MRN: [id number]     :   1995    Referring Physician: SARAY Barron APRN   PCP: SARAY Barron    Medical Diagnosis: Spondylolisthesis, site unspecified [M43.10] Retrolisthesis  Treatment Diagnosis: Retrolisthesis      Insurance: Payor: Rosanna Quintana / Plan: [employer] EAST / Product Type: *No Product type* /   Insurance ID: [id number] - (Other)    Subjective:   General  Diagnosis: Retrolisthesis  Referring Provider (secondary): SARAY Barron  PT Insurance Information: Anueric Quintana (Select Plan)- no precert req'd  Total # of Visits Approved: 12  Total # of Visits to Date: 5  Plan of Care/Certification Expiration Date: 23  Progress Note Due Date: 10/18/23  Referring Provider (secondary): SARAY Barron  Subjective: i went for a 9 mile walk/run yesterday so it's got things aggrivated  Patient Currently in Pain: Yes  Pain Level: 5  Pain Type: Chronic pain  Pain Location: Back  Pain Orientation: Lower       Treatment Activities:  Exercises:      Treatment Reasoning    Exercise 1: Avoid hyperflexion  Exercise 2: TA contraction (alone 10 sec x 10, UE 1 x 10, LE 1 x 10, UE/LE 1 x 10)--1# in hands * cues to not lift le's too high to decrease extreme flexion of lb  Exercise 3: Pelvic tilt on towel--20 reps, 3-5 sec hold  Exercise 4: Bridging on ball-10 reps  red/pink  Exercise 5: Single leg bridging bilat--10 reps each  Exercise 6: Axial traction bilat LE--3 reps, 15 sec bilaterally  Exercise 7: Plank/side plank--plank 15 secs x 3     side 2 x 15 sec  Exercise 8: Mountain climbers-- 1 x 5  Exercise 9: Quadruped UE, LE, UE/LE--10/10/10  Exercise 10: Push up position, tap shoulder with opp hand (hold TA contraction)-- 1 x 5 bilateral- required \"spotting\"  Exercise 11: Rollout on t-ball--5 reps, partial roll out  green  Exercise 12: UE, LE, UE/LE on t-ball--10 reps each  green  Exercise 13: [place] on t-ball--1 minute, with

## 2023-10-04 ENCOUNTER — HOSPITAL ENCOUNTER (OUTPATIENT)
Dept: PHYSICAL THERAPY | Age: 28
Setting detail: THERAPIES SERIES
Discharge: HOME | End: 2023-10-04
Payer: OTHER GOVERNMENT

## 2023-10-04 PROCEDURE — 97110 THERAPEUTIC EXERCISES: CPT

## 2023-10-04 ASSESSMENT — PAIN DESCRIPTION - DESCRIPTORS: DESCRIPTORS: ACHING;DULL

## 2023-10-04 ASSESSMENT — PAIN DESCRIPTION - PAIN TYPE: TYPE: CHRONIC PAIN

## 2023-10-04 ASSESSMENT — PAIN DESCRIPTION - LOCATION: LOCATION: BACK

## 2023-10-04 ASSESSMENT — PAIN DESCRIPTION - ORIENTATION: ORIENTATION: LOWER

## 2023-10-04 ASSESSMENT — PAIN SCALES - GENERAL: PAINLEVEL_OUTOF10: 4

## 2023-10-04 NOTE — PROGRESS NOTES
Physical Therapy  Daily Treatment Note  Date: 10/4/2023  Patient Name: Valery Spencer  MRN: [id number]     :   1995    Subjective:      PT Visit Information  PT Insurance Information: [id number] [facility] (Select Plan)- no precert req'd  Total # of Visits Approved: 12  Total # of Visits to Date: 6  Plan of Care/Certification Expiration Date: 23  Progress Note Due Date: 10/18/23  Referring Provider (secondary): SARAY Colón  Subjective: Patient reports that she is hurting some today but not as bad as the other day. Pain Screening  Patient Currently in Pain: Yes  Pain Assessment: 0-10  Pain Level: 4  Pain Type: Chronic pain  Pain Location: Back  Pain Orientation: Lower  Pain Descriptors: Aching;Dull       Treatment Activities:   Exercises  Exercise 1: Avoid hyperflexion  Exercise 2: TA contraction (alone 10 sec x 10, UE 1 x 10, LE 1 x 10, UE/LE 1 x 10)--1# in hands * cues to not lift le's too high to decrease extreme flexion of lb  Exercise 3: Pelvic tilt on towel--20 reps, 3-5 sec hold  Exercise 4: Bridging on ball-15 reps  red/pink  Exercise 5: Single leg bridging bilat--15 reps each  Exercise 6: Axial traction bilat LE--3 reps, 15 sec bilaterally  Exercise 7: Plank/side plank--x side 2 x 15 sec  Exercise 8: Mountain climbers-- 1 x 5  Exercise 9: Quadruped UE, LE, UE/LE--10/10/10  Exercise 10: Push up position, tap shoulder with opp hand (hold TA contraction)-- 1 x 5 bilateral- required \"spotting\"  Exercise 11: Rollout on t-ball--5 reps, partial roll out  green  Exercise 12: UE, LE, UE/LE on t-ball--10 reps each  green  Exercise 13: [place] on t-ball--1 minute, with alternating arm support  green  Exercise 14: [place] with ball toss--not today  Exercise 15: Lat pulldown at [place], 65#  Exercise 16: Multifidus row/Paloff press--20 reps, blue band  Exercise 17: [place] with t-band--blue band, 20 reps each side  Exercise 18:  Half-kneeling chops with weight--15 reps, using 3# weight  Exercise 19:

## 2023-10-09 ENCOUNTER — HOSPITAL ENCOUNTER (OUTPATIENT)
Dept: PHYSICAL THERAPY | Age: 28
Setting detail: THERAPIES SERIES
Discharge: HOME | End: 2023-10-09
Payer: OTHER GOVERNMENT

## 2023-10-09 PROCEDURE — 97110 THERAPEUTIC EXERCISES: CPT

## 2023-10-09 ASSESSMENT — PAIN SCALES - GENERAL: PAINLEVEL_OUTOF10: 3

## 2023-10-09 ASSESSMENT — PAIN DESCRIPTION - PAIN TYPE: TYPE: CHRONIC PAIN

## 2023-10-09 ASSESSMENT — PAIN DESCRIPTION - LOCATION: LOCATION: BACK

## 2023-10-09 ASSESSMENT — PAIN DESCRIPTION - ORIENTATION: ORIENTATION: LOWER

## 2023-10-09 ASSESSMENT — PAIN DESCRIPTION - DESCRIPTORS: DESCRIPTORS: ACHING;DULL

## 2023-10-09 NOTE — PROGRESS NOTES
Physical Therapy: Daily Note   Patient: Nieves Jara (70 y.o. female)   Examination Date:   Plan of Care/Certification Expiration Date: 23    No data recorded   :  1995 # of Visits since [facility]:   7   MRN: [id number]  CSN: [id number] Start of Care Date:   2023   Insurance: Payor:  EAST / Plan:  EAST / Product Type: *No Product type* /   Insurance ID: [id number] - (Other) Secondary Insurance (if applicable):    Referring Physician: SARAY Lopez APRN   PCP: SARAY Lopez Visits to Date/Visits Approved:     No Show/Cancelled Appts:   /       Medical Diagnosis: Spondylolisthesis, site unspecified [M43.10]    Treatment Diagnosis: Retrolisthesis        SUBJECTIVE EXAMINATION   Pain Level: Pain Screening  Patient Currently in Pain: Yes  Pain Assessment: 0-10  Pain Level: 3  Pain Type: Chronic pain  Pain Location: Back  Pain Orientation: Lower  Pain Descriptors: Aching, Dull    Patient Comments: Subjective: I had a rough weekend. My pain level was up to 7/10 over weekend. I also had numbness in my leg while I was laying on my side. Sleeping has been a problem.    This morning is better at 3/10            TREATMENT     Exercises:  Therapeutic exercise (CPT 07149)   Treatment Reasoning    Exercise 1: Avoid hyperflexion  Exercise 2: TA contraction (alone 10 sec x 10, UE 1 x 10, LE 1 x 10, UE/LE 1 x 10)--1# in hands * cues to not lift le's too high to decrease extreme flexion of lb  Exercise 3: Pelvic tilt on towel--20 reps, 3-5 sec hold  Exercise 4: Bridging on ball-15 reps  red/pink  Exercise 5: Single leg bridging bilat--15 reps each-these tend to increase back pain some  Exercise 6: Axial traction bilat LE--3 reps, 15 sec bilaterally  Exercise 7: Plank/side plank--x side 2 x 15 sec  Exercise 8: Mountain climbers-- 1 x 7  Exercise 9: Quadruped UE, LE, UE/LE--10/10/10  Exercise 10: Push up position, tap shoulder with opp hand (hold TA contraction)-- 1 x 7

## 2023-10-10 ENCOUNTER — TELEPHONE (OUTPATIENT)
Dept: FAMILY MEDICINE CLINIC | Age: 28
End: 2023-10-10

## 2023-10-10 DIAGNOSIS — M43.10 RETROLISTHESIS: Primary | ICD-10-CM

## 2023-10-10 NOTE — TELEPHONE ENCOUNTER
----- Message from Kaylee Stanford PT sent at 10/9/2023  2:05 PM CDT -----  Natalio Jackson-    This patient has been coming to us for therapy for about 3 weeks now and is still working on core strengthening. She is having occurrences of waking from sleep due to numbness in her legs. She had an xray in ER that reports \"Retrolisthesis of L5 on S1 measuring 1.0 cm. \"  I'm not sure what grade level that is but I know that often with spondylolisthesis a flexion/extension xray will be ordered to assess for instability. She has f/u with you on 12/1 and I'm wondering if that, or further imaging, might be warranted. Please contact me if needed for any further questions. Thanks!     Electronically signed by Kaylee Stanford PT on 10/9/2023 at 2:11 PM

## 2023-10-13 ENCOUNTER — HOSPITAL ENCOUNTER (OUTPATIENT)
Dept: PHYSICAL THERAPY | Age: 28
Setting detail: THERAPIES SERIES
Discharge: HOME | End: 2023-10-13
Payer: OTHER GOVERNMENT

## 2023-10-13 PROCEDURE — 97110 THERAPEUTIC EXERCISES: CPT

## 2023-10-13 ASSESSMENT — PAIN SCALES - GENERAL: PAINLEVEL_OUTOF10: 5

## 2023-10-13 ASSESSMENT — PAIN DESCRIPTION - ORIENTATION: ORIENTATION: LOWER

## 2023-10-13 ASSESSMENT — PAIN DESCRIPTION - PAIN TYPE: TYPE: CHRONIC PAIN

## 2023-10-13 ASSESSMENT — PAIN DESCRIPTION - LOCATION: LOCATION: BACK

## 2023-10-13 ASSESSMENT — PAIN DESCRIPTION - DESCRIPTORS: DESCRIPTORS: ACHING

## 2023-10-13 NOTE — PROGRESS NOTES
Physical Therapy: Daily Note   Patient: Geovanna Gentile (89 y.o. female)   Examination Date:   Plan of Care/Certification Expiration Date: 23    No data recorded   :  1995 # of Visits since [facility]:   8   MRN: [id number]  CSN: [id number] Start of Care Date:   2023   Insurance: Payor:  EAST / Plan:  EAST / Product Type: *No Product type* /   Insurance ID: [id number] - (Other) Secondary Insurance (if applicable):    Referring Physician: SARAY Combs APRN   PCP: SARAY Combs Visits to Date/Visits Approved:     No Show/Cancelled Appts:   /       Medical Diagnosis: Spondylolisthesis, site unspecified [M43.10]    Treatment Diagnosis: Retrolisthesis        SUBJECTIVE EXAMINATION   Pain Level: Pain Screening  Patient Currently in Pain: Yes  Pain Assessment: 0-10  Pain Level: 5  Pain Type: Chronic pain  Pain Location: Back  Pain Orientation: Lower  Pain Descriptors: Aching    Patient Comments: Subjective: Feeling some better.   I think I have an ovarian cyst.  Seated position hurts my back the most.                TREATMENT     Exercises:  Therapeutic exercise (CPT 49130)   Treatment Reasoning    Exercise 1: Avoid hyperflexion  Exercise 2: TA contraction (alone 10 sec x 10, UE 1 x 10, LE 1 x 10, UE/LE 1 x 10)--1# in hands * cues to not lift le's too high to decrease extreme flexion of lb  Exercise 3: Pelvic tilt on towel--20 reps, 3-5 sec hold  Exercise 4: Bridging on ball-15 reps  red/pink--causes increased pain and causes nausea--DC  Exercise 5: Single leg bridging bilat--15 reps each-these tend to increase back pain some and makes her nauseaus--DC  Exercise 6: Axial traction bilat LE--3 reps, 15 sec bilaterally  Exercise 7: Plank/side plank--x side 2 x 15 sec  Exercise 8: Mountain climbers-- 1 x 10  Exercise 9: Quadruped UE, LE, UE/LE--10/10/10  Exercise 10: Push up position, tap shoulder with opp hand (hold TA contraction)-- 1 x 7 bilateral- required

## 2023-10-16 ENCOUNTER — HOSPITAL ENCOUNTER (OUTPATIENT)
Dept: GENERAL RADIOLOGY | Age: 28
Discharge: HOME | End: 2023-10-16
Payer: OTHER GOVERNMENT

## 2023-10-16 DIAGNOSIS — M43.10 RETROLISTHESIS: ICD-10-CM

## 2023-10-16 PROCEDURE — 72120 X-RAY BEND ONLY L-S SPINE: CPT

## 2023-10-17 ENCOUNTER — TELEPHONE (OUTPATIENT)
Dept: FAMILY MEDICINE CLINIC | Age: 28
End: 2023-10-17

## 2023-10-17 NOTE — TELEPHONE ENCOUNTER
----- Message from SARAY Hall sent at 10/17/2023  7:26 AM CDT -----  Please let patient know that xray was reviewed. It did show some retrolisthesis with extension and flexion, but not significant. Alignment was normal.  Will continue to monitor and if no improvement with PT MRI will be next.

## 2023-10-17 NOTE — TELEPHONE ENCOUNTER
Called patient, spoke with: Patient regarding the results of the patients most recent Xray . I advised Patient of Jay Jay Many recommendations.    Patient did voice understanding

## 2023-10-18 ENCOUNTER — APPOINTMENT (OUTPATIENT)
Dept: PHYSICAL THERAPY | Age: 28
End: 2023-10-18
Payer: OTHER GOVERNMENT

## 2023-10-20 ENCOUNTER — HOSPITAL ENCOUNTER (OUTPATIENT)
Dept: PHYSICAL THERAPY | Age: 28
Setting detail: THERAPIES SERIES
Discharge: HOME | End: 2023-10-20
Payer: OTHER GOVERNMENT

## 2023-10-20 PROCEDURE — 97110 THERAPEUTIC EXERCISES: CPT

## 2023-10-20 ASSESSMENT — PAIN SCALES - GENERAL: PAINLEVEL_OUTOF10: 1

## 2023-10-20 ASSESSMENT — PAIN DESCRIPTION - ORIENTATION: ORIENTATION: LEFT;LOWER

## 2023-10-20 ASSESSMENT — PAIN DESCRIPTION - PAIN TYPE: TYPE: CHRONIC PAIN

## 2023-10-20 ASSESSMENT — PAIN DESCRIPTION - DESCRIPTORS: DESCRIPTORS: DULL;ACHING

## 2023-10-20 ASSESSMENT — PAIN DESCRIPTION - LOCATION: LOCATION: BACK

## 2023-10-20 NOTE — PROGRESS NOTES
Physical Therapy: Daily Note/Reassessment   Patient: Astrid Mccoy (71 y.o. female)   Examination Date:   Plan of Care/Certification Expiration Date: 23    No data recorded   :  1995 # of Visits since [facility]:   9   MRN: [id number]  CSN: [id number] Start of Care Date:   2023   Insurance: Payor:  EAST / Plan:  EAST / Product Type: *No Product type* /   Insurance ID: [id number] - (Other) Secondary Insurance (if applicable):    Referring Physician: SARAY Plunkett APRN   PCP: SARAY Plunkett Visits to Date/Visits Approved:     No Show/Cancelled Appts:   /       Medical Diagnosis: Spondylolisthesis, site unspecified [M43.10] Retrolisthesis  Treatment Diagnosis: Retrolisthesis        SUBJECTIVE EXAMINATION   Pain Level: Pain Screening  Patient Currently in Pain: Yes  Pain Assessment: 0-10  Pain Level: 1 (take Tylenol this morning)  Pain Type: Chronic pain  Pain Location: Back  Pain Orientation: Left, Lower  Pain Descriptors: Dull, Aching    Patient Comments: Subjective: Patient reports some mild improvement with participation with PT compared to status at initial evaluation, left side low back continues to hurt, most intense with prolonged sitting. She also reports  limited tolerance for lying on left side, with that position typically producing left leg radicular pain. Overall, she feels some improvement, but still having pain. She is performing her home exercises daily as instructed, with single leg bridges being the only exercise not well tolerated. She is not feeling well this morning and not able to participate with her normal program, but is agreeable to participate with today's reassessment.     HEP Compliance: Good        OBJECTIVE EXAMINATION   Restrictions:  No data recorded No data recorded No data recorded            TREATMENT     Exercises:  Therapeutic exercise (CPT 01543)   Treatment Reasoning    Exercise 1: Avoid hyperflexion  Exercise 2: TA

## 2023-10-23 ENCOUNTER — HOSPITAL ENCOUNTER (OUTPATIENT)
Dept: PHYSICAL THERAPY | Age: 28
Setting detail: THERAPIES SERIES
Discharge: HOME | End: 2023-10-23
Payer: OTHER GOVERNMENT

## 2023-10-23 PROCEDURE — 97110 THERAPEUTIC EXERCISES: CPT

## 2023-10-23 ASSESSMENT — PAIN DESCRIPTION - PAIN TYPE: TYPE: CHRONIC PAIN

## 2023-10-23 ASSESSMENT — PAIN SCALES - GENERAL: PAINLEVEL_OUTOF10: 2

## 2023-10-23 ASSESSMENT — PAIN DESCRIPTION - LOCATION: LOCATION: BACK

## 2023-10-23 NOTE — PROGRESS NOTES
Daily Treatment Note  Date: 10/23/2023  Patient Name: Nina Cuellar  MRN: [id number]     :   1995    Referring Physician: SARAY Guaman APRN   PCP: SARAY Guaman    Medical Diagnosis: Spondylolisthesis, site unspecified [M43.10] Retrolisthesis  Treatment Diagnosis: Retrolisthesis      Insurance: Payor: Oleg Eldridge / Plan:  [facility] / Product Type: *No Product type* /   Insurance ID: [id number] - (Other)    Subjective:   General  Diagnosis: Retrolisthesis  Referring Provider (secondary): SARAY Guaman  PT Insurance Information: Oleg Eldridge (Select Plan)- no precert req'd  Total # of Visits Approved: 12  Total # of Visits to Date: 10  Plan of Care/Certification Expiration Date: 23  Progress Note Due Date: 23  Referring Provider (secondary): SARAY Guaman  Subjective: today the pain is about a 1-2 at the most  Patient Currently in Pain: Yes  Pain Level: 2  Pain Type: Chronic pain  Pain Location: Back       Treatment Activities:  Exercises:      Treatment Reasoning    Exercise 1: Avoid hyperflexion  Exercise 2: TA contraction (alone 10 sec x 10, UE 1 x 10, LE 1 x 10, UE/LE 1 x 10)--1# in hands * cues to not lift le's too high to decrease extreme flexion of lb  Exercise 3: Pelvic tilt on towel--20 reps, 3-5 sec hold  Exercise 4: Bridging on ball-15 reps  red/pink--causes increased pain and causes nausea--DC  Exercise 5: Single leg bridging bilat--15 reps each-these tend to increase back pain some and makes her nauseaus--DC  Exercise 6: Axial traction bilat LE--3 reps, 15 sec bilaterally  Exercise 7: Plank/side plank--x side 2 x 15 sec  Exercise 8: Mountain climbers-- 1 x 10  Exercise 9: Quadruped UE, LE, UE/LE--10/10/10  Exercise 10: Push up position, tap shoulder with opp hand (hold TA contraction)-- 1 x 7 bilateral- required \"spotting\"  Exercise 11: Rollout on t-ball--5 reps, partial roll out  green  Exercise 12: UE, LE, UE/LE on t-ball--10 reps each

## 2023-10-25 ENCOUNTER — HOSPITAL ENCOUNTER (OUTPATIENT)
Dept: PHYSICAL THERAPY | Age: 28
Setting detail: THERAPIES SERIES
Discharge: HOME | End: 2023-10-25
Payer: OTHER GOVERNMENT

## 2023-10-25 PROCEDURE — 97110 THERAPEUTIC EXERCISES: CPT

## 2023-10-25 ASSESSMENT — PAIN DESCRIPTION - ORIENTATION: ORIENTATION: LOWER

## 2023-10-25 ASSESSMENT — PAIN DESCRIPTION - PAIN TYPE: TYPE: CHRONIC PAIN

## 2023-10-25 ASSESSMENT — PAIN SCALES - GENERAL: PAINLEVEL_OUTOF10: 4

## 2023-10-25 ASSESSMENT — PAIN DESCRIPTION - LOCATION: LOCATION: BACK

## 2023-10-25 NOTE — PROGRESS NOTES
Daily Treatment Note  Date: 10/25/2023  Patient Name: Tatiana Araujo  MRN: [id number]     :   1995    Referring Physician: SARAY Domingo APRN   PCP: SARAY Domingo    Medical Diagnosis: Spondylolisthesis, site unspecified [M43.10] Retrolisthesis  Treatment Diagnosis: Retrolisthesis      Insurance: Payor: Javier Joel / Plan: [employer] [facility] / Product Type: *No Product type* /   Insurance ID: [id number] - (Other)    Subjective:   General  Diagnosis: Retrolisthesis  Referring Provider (secondary): SARAY Domingo  PT Insurance Information: Javier Toddalexandra (Select Plan)- no precert req'd  Total # of Visits Approved: 12  Total # of Visits to Date: 11  Plan of Care/Certification Expiration Date: 23  Progress Note Due Date: 23  Referring Provider (secondary): SARAY Domingo  Subjective: I WOKE UP IN MORE PAIN AND HAVING MORE TIGHTNESS  Patient Currently in Pain: Yes  Pain Level: 4  Pain Type: Chronic pain  Pain Location: Back  Pain Orientation: Lower       Treatment Activities:  Exercises:      Treatment Reasoning    Exercise 1: Avoid hyperflexion  Exercise 2: TA contraction (alone 10 sec x 10, UE 1 x 10, LE 1 x 10, UE/LE 1 x 10)--1# in hands * cues to not lift le's too high to decrease extreme flexion of lb  Exercise 3: Pelvic tilt on towel--20 reps, 3-5 sec hold  Exercise 4: Bridging on ball-15 reps  red/pink--causes increased pain and causes nausea--DC  Exercise 5: Single leg bridging bilat--15 reps each-these tend to increase back pain some and makes her nauseaus--DC  Exercise 6: Axial traction bilat LE--3 reps, 15 sec bilaterally  Exercise 7: Plank/side plank--x side 2 x 15 sec  Exercise 8: Mountain climbers-- 1 x 10  Exercise 9: Quadruped UE, LE, UE/LE--10/10/10  Exercise 10: Push up position, tap shoulder with opp hand (hold TA contraction)-- 1 x 7 bilateral- required \"spotting\"  Exercise 11: Rollout on t-ball--5 reps, partial roll out  green  Exercise 12: UE, LE, UE/LE on

## 2023-10-30 ENCOUNTER — OFFICE VISIT (OUTPATIENT)
Dept: INTERNAL MEDICINE | Age: 28
End: 2023-10-30
Payer: OTHER GOVERNMENT

## 2023-10-30 VITALS
HEART RATE: 72 BPM | SYSTOLIC BLOOD PRESSURE: 118 MMHG | TEMPERATURE: 98.3 F | OXYGEN SATURATION: 97 % | DIASTOLIC BLOOD PRESSURE: 68 MMHG

## 2023-10-30 DIAGNOSIS — L02.431 CARBUNCLE OF RIGHT AXILLA: Primary | ICD-10-CM

## 2023-10-30 PROBLEM — M54.2 NECK PAIN: Status: RESOLVED | Noted: 2021-03-02 | Resolved: 2023-10-30

## 2023-10-30 PROBLEM — R10.2 PELVIC PAIN: Status: RESOLVED | Noted: 2019-05-06 | Resolved: 2023-10-30

## 2023-10-30 PROBLEM — R10.9 ABDOMINAL PAIN: Status: RESOLVED | Noted: 2023-05-09 | Resolved: 2023-10-30

## 2023-10-30 PROCEDURE — 99203 OFFICE O/P NEW LOW 30 MIN: CPT | Performed by: NURSE PRACTITIONER

## 2023-10-30 RX ORDER — CEFDINIR 300 MG/1
300 CAPSULE ORAL 2 TIMES DAILY
Qty: 14 CAPSULE | Refills: 0 | Status: SHIPPED | OUTPATIENT
Start: 2023-10-30 | End: 2023-11-06

## 2023-10-30 NOTE — PROGRESS NOTES
[facility] PHYSICIAN SERVICES  [facility] INTERNAL MEDICINE  [address] 441  8667 [address] [address]  Dept: [phone]  Dept Fax: [phone]: [phone]    Jelly Marc (:  1995) is a 32 y.o. female,NEw patient   with green , here for evaluation of the following chief complaint(s): Other (Place under rt arm now getting sore)      Jelly Marc is a 32 y.o. female who presents today for her medical conditions/complaints as noted below. Jelly Marc is c/jai Other (Place under rt arm now getting sore)        HPI:     Chief Complaint   Patient presents with    Other     Place under rt arm now getting sore     @pt is alone     she wants to stay with [facility];  she is not going to change PCP    HPI    POCS  has been treated ;    Weight loss;  she was referred to plastic surgery for excess skin and recurrent candidiasis to the folds;         Past Medical History:   Diagnosis Date    Concussion     approximately 10 years ago    Depression     Major depressive disorder     Food allergy     Hypothyroidism     Metabolic disorder     PCOS (polycystic ovarian syndrome)       Past Surgical History:   Procedure Laterality Date    CHOLECYSTECTOMY      COLONOSCOPY  2014    Dr Deborah Farmer,  Small non-bleeding internal hemorrhoids w/skin tags, serrated polyp     COLONOSCOPY N/A 10/21/2019    Dr Beverly Ramey, 5 yr recall    COLONOSCOPY N/A 2021    Dr Delisa Montenegro, [facility], (-)Micro Colitis,  Int hemorrhoids-Grade 1, 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, 20 year recall    KNEE SURGERY      LAPAROSCOPY N/A 2020    DIAGNOSTIC LAPAROSCOPY AND CHROMOPERTUBATION performed by Isa Burciaga MD at [address] [facility] ENDOSCOPY  2014    Dr Deborah Farmer, Gastritis, h pylori neg    UPPER GASTROINTESTINAL ENDOSCOPY N/A 10/21/2019    Dr Chidi Harrison

## 2023-10-30 NOTE — PATIENT INSTRUCTIONS
Carbuncle right axillae;  cefdinir 300 twice a day for 7 days;  get 2 doses in today;   use warm compresses as much as you can over the next few days;  as this is recurrent, we will refer to gen surg to discuss removal

## 2023-11-01 ENCOUNTER — HOSPITAL ENCOUNTER (OUTPATIENT)
Dept: PHYSICAL THERAPY | Age: 28
Setting detail: THERAPIES SERIES
Discharge: HOME | End: 2023-11-01
Payer: OTHER GOVERNMENT

## 2023-11-01 PROCEDURE — 97110 THERAPEUTIC EXERCISES: CPT

## 2023-11-01 NOTE — PROGRESS NOTES
Physical Therapy: Daily Note/DISCHARGE   Patient: Claudine Arevalo (42 y.o. female)   Examination Date:   Plan of Care/Certification Expiration Date: 23    No data recorded   :  1995 # of Visits since [facility]:   12   MRN: [id number]  CSN: [id number] Start of Care Date:   2023   Insurance: Payor:  EAST / Plan:  EAST / Product Type: *No Product type* /   Insurance ID: [id number] - (Other) Secondary Insurance (if applicable):    Referring Physician: SARAY Demarco APRN   PCP: SARAY Demarco Visits to Date/Visits Approved:     No Show/Cancelled Appts:   /       Medical Diagnosis: Spondylolisthesis, site unspecified [M43.10]    Treatment Diagnosis: Retrolisthesis        SUBJECTIVE EXAMINATION       Patient Comments: Subjective: Patient states she has about 2/10 pain today.           OBJECTIVE EXAMINATION   Restrictions:  No data recorded No data recorded No data recorded        TREATMENT     Exercises:      Treatment Reasoning    Exercise 1: Avoid hyperflexion  Exercise 2: TA contraction (alone 10 sec x 10, UE 1 x 10, LE 1 x 10, UE/LE 1 x 10)--1# in hands * cues to not lift le's too high to decrease extreme flexion of lb  Exercise 3: Pelvic tilt on towel--20 reps, 3-5 sec hold  Exercise 6: Axial traction bilat LE--3 reps, 15 sec bilaterally  Exercise 7: Plank/side plank--x side 2 x 15 sec  Exercise 8: Mountain climbers-- 1 x 10  Exercise 9: Quadruped UE, LE, UE/LE--10/10/10  Exercise 10: Push up position, tap shoulder with opp hand (hold TA contraction)-- 1 x 7 bilateral- required \"spotting\"  Exercise 11: Rollout on t-ball--5 reps, partial roll out  green  Exercise 12: UE, LE, UE/LE on t-ball--10 reps each  green  Exercise 13: [place] on t-ball--1 minute, with alternating arm support  green  Exercise 14: [place] with ball toss--not today  Exercise 15: Lat pulldown at [place], 65#  Exercise 16: Multifidus row/Paloff press--20 reps, blue

## 2023-11-27 ENCOUNTER — OFFICE VISIT (OUTPATIENT)
Dept: SURGERY | Age: 28
End: 2023-11-27
Payer: OTHER GOVERNMENT

## 2023-11-27 VITALS
BODY MASS INDEX: 32.31 KG/M2 | DIASTOLIC BLOOD PRESSURE: 78 MMHG | SYSTOLIC BLOOD PRESSURE: 124 MMHG | HEIGHT: 68 IN | WEIGHT: 213.2 LBS

## 2023-11-27 DIAGNOSIS — R22.31 AXILLARY MASS, RIGHT: Primary | ICD-10-CM

## 2023-11-27 PROCEDURE — 99202 OFFICE O/P NEW SF 15 MIN: CPT | Performed by: PHYSICIAN ASSISTANT

## 2023-11-27 NOTE — PROGRESS NOTES
Subjective  Malone Bias presents with a mass to the right axilla. She has been on antibiotics for the placement axilla and also for strep infection. She has had no drainage. She did have some erythema. Objective  Patient Active Problem List    Diagnosis Date Noted    Carbuncle of right axilla 10/30/2023    Pancreatitis due to common bile duct stone 05/07/2023    PCOS (polycystic ovarian syndrome) 05/06/2019       Current Outpatient Medications   Medication Sig Dispense Refill    TRULICITY 9.10 RK/9.1XT SOPN INJECT 0.75 MG UNDER THE SKIN ONCE A WEEK 2 mL 12    ketorolac (TORADOL) 10 MG tablet Take 1 tablet by mouth every 6 hours as needed for Pain (Patient not taking: Reported on 11/27/2023) 20 tablet 0     No current facility-administered medications for this visit.        Allergies Shrimp extract allergy skin test, Corn-containing products, Sertraline, Shrimp (diagnostic), Soybean-containing drug products, Wheat, and Wheat extract    Past Medical History:   Diagnosis Date    Concussion     approximately 10 years ago    Depression     Major depressive disorder     Food allergy     Hypothyroidism     Metabolic disorder     PCOS (polycystic ovarian syndrome)        Past Surgical History:   Procedure Laterality Date    CHOLECYSTECTOMY      COLONOSCOPY  11/03/2014    Dr Caro Fitzpatrick,  Small non-bleeding internal hemorrhoids w/skin tags, serrated polyp     COLONOSCOPY N/A 10/21/2019    Dr Daryle Salles, 5 yr recall    COLONOSCOPY N/A 06/03/2021    Dr Melida Saunders, [facility], (-)Micro Colitis,  Int hemorrhoids-Grade 1, 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, 20 year recall    KNEE SURGERY      LAPAROSCOPY N/A 01/31/2020    DIAGNOSTIC LAPAROSCOPY AND CHROMOPERTUBATION performed by Kimo Pope MD at [facility] ENDOSCOPY  11/03/2014    Dr Caro Fitzpatrick, Gastritis, h pylori neg    UPPER GASTROINTESTINAL ENDOSCOPY

## 2023-12-01 ENCOUNTER — OFFICE VISIT (OUTPATIENT)
Dept: FAMILY MEDICINE CLINIC | Age: 28
End: 2023-12-01
Payer: OTHER GOVERNMENT

## 2023-12-01 ENCOUNTER — HOSPITAL ENCOUNTER (OUTPATIENT)
Dept: GENERAL RADIOLOGY | Age: 28
Discharge: HOME | End: 2023-12-01
Payer: OTHER GOVERNMENT

## 2023-12-01 ENCOUNTER — TELEPHONE (OUTPATIENT)
Dept: FAMILY MEDICINE CLINIC | Age: 28
End: 2023-12-01

## 2023-12-01 VITALS
DIASTOLIC BLOOD PRESSURE: 82 MMHG | BODY MASS INDEX: 31.67 KG/M2 | HEART RATE: 74 BPM | WEIGHT: 209 LBS | SYSTOLIC BLOOD PRESSURE: 124 MMHG | OXYGEN SATURATION: 100 % | TEMPERATURE: 97.5 F | HEIGHT: 68 IN

## 2023-12-01 DIAGNOSIS — L98.7 EXCESS SKIN: ICD-10-CM

## 2023-12-01 DIAGNOSIS — M53.3 TAIL BONE PAIN: ICD-10-CM

## 2023-12-01 DIAGNOSIS — M43.10 RETROLISTHESIS: ICD-10-CM

## 2023-12-01 DIAGNOSIS — B37.2 SKIN YEAST INFECTION: ICD-10-CM

## 2023-12-01 DIAGNOSIS — M54.16 LUMBAR RADICULOPATHY: Primary | ICD-10-CM

## 2023-12-01 PROCEDURE — 99214 OFFICE O/P EST MOD 30 MIN: CPT | Performed by: NURSE PRACTITIONER

## 2023-12-01 PROCEDURE — 72220 X-RAY EXAM SACRUM TAILBONE: CPT

## 2023-12-01 RX ORDER — NYSTATIN 100000 [USP'U]/G
POWDER TOPICAL
Qty: 60 G | Refills: 1 | Status: SHIPPED | OUTPATIENT
Start: 2023-12-01

## 2023-12-01 ASSESSMENT — ENCOUNTER SYMPTOMS
EYES NEGATIVE: 1
BACK PAIN: 1
GASTROINTESTINAL NEGATIVE: 1
RESPIRATORY NEGATIVE: 1

## 2023-12-01 NOTE — TELEPHONE ENCOUNTER
----- Message from Claudio OfficerSARAY sent at 12/1/2023  4:02 PM CST -----  Normal image of tail bone

## 2023-12-01 NOTE — TELEPHONE ENCOUNTER
Called patient, spoke with: Patient regarding the results of the patients most recent xray. I advised Patient of Natalee Bolivar recommendations.    Patient did voice understanding

## 2023-12-01 NOTE — PROGRESS NOTES
[facility] PHYSICIAN SERVICES  [facility]  [address] [address]  Dept: [phone]  Dept Fax: [phone]  Loc: [phone]    Silvia Lindsay is a 29 y.o. female who presents today for her medical conditions/complaints as noted below. Silvia Lindsay is c/o of Follow-up Chronic Condition      Chief Complaint   Patient presents with    Follow-up Chronic Condition       HPI:     HPI  Patient presents today for routine follow up of chronic conditions. She states that her back pain still continues. She is also seeing Jennifer Shanks for a lymph node or cyst under her R arm. She states that it had been present since October and she was prescribed abx. She reports that it did reduce in size some with this. She is having this removed on 12/18/23. 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 She is having pain in that area. She has noticed not being able to pick her her legs all the time. She is having some tingling and numbness down both legs. Patient has not had anyone reach out in regards to plastic surgery with lower abd apron.     Past Medical History:   Diagnosis Date    Concussion     approximately 10 years ago    Depression     Major depressive disorder     Food allergy     Hypothyroidism     Metabolic disorder     PCOS (polycystic ovarian syndrome)         Past Surgical History:   Procedure Laterality Date    CHOLECYSTECTOMY      COLONOSCOPY  11/03/2014    Dr Kalani Ann,  Small non-bleeding internal hemorrhoids w/skin tags, serrated polyp     COLONOSCOPY N/A 10/21/2019    Dr Marita Osborn, 5 yr recall    COLONOSCOPY N/A 06/03/2021    Dr Areli Rhodes, [facility], (-)Micro Colitis,  Int hemorrhoids-Grade 1, 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, 20 year recall    KNEE SURGERY      LAPAROSCOPY N/A 01/31/2020    DIAGNOSTIC LAPAROSCOPY AND CHROMOPERTUBATION performed by Charlene Lee MD at [address]

## 2023-12-12 DIAGNOSIS — B37.2 SKIN YEAST INFECTION: ICD-10-CM

## 2023-12-12 RX ORDER — NYSTATIN 100000 [USP'U]/G
POWDER TOPICAL
Qty: 60 G | Refills: 1 | Status: SHIPPED | OUTPATIENT
Start: 2023-12-12

## 2023-12-12 RX ORDER — AMOXICILLIN AND CLAVULANATE POTASSIUM 875; 125 MG/1; MG/1
1 TABLET, FILM COATED ORAL 2 TIMES DAILY
Qty: 20 TABLET | Refills: 0 | Status: SHIPPED | OUTPATIENT
Start: 2023-12-12 | End: 2023-12-22

## 2024-01-11 ENCOUNTER — TELEPHONE (OUTPATIENT)
Dept: FAMILY MEDICINE CLINIC | Age: 29
End: 2024-01-11

## 2024-01-11 ENCOUNTER — HOSPITAL ENCOUNTER (OUTPATIENT)
Dept: MRI IMAGING | Age: 29
Discharge: HOME | End: 2024-01-11
Payer: OTHER GOVERNMENT

## 2024-01-11 ENCOUNTER — OFFICE VISIT (OUTPATIENT)
Age: 29
End: 2024-01-11
Payer: OTHER GOVERNMENT

## 2024-01-11 VITALS
SYSTOLIC BLOOD PRESSURE: 124 MMHG | TEMPERATURE: 97.2 F | RESPIRATION RATE: 20 BRPM | HEART RATE: 68 BPM | HEIGHT: 68 IN | OXYGEN SATURATION: 97 % | BODY MASS INDEX: 31.22 KG/M2 | WEIGHT: 206 LBS | DIASTOLIC BLOOD PRESSURE: 70 MMHG

## 2024-01-11 DIAGNOSIS — M53.3 TAIL BONE PAIN: ICD-10-CM

## 2024-01-11 DIAGNOSIS — H92.03 EAR PAIN, BILATERAL: Primary | ICD-10-CM

## 2024-01-11 DIAGNOSIS — J01.90 ACUTE SINUSITIS, RECURRENCE NOT SPECIFIED, UNSPECIFIED LOCATION: ICD-10-CM

## 2024-01-11 DIAGNOSIS — M54.16 LUMBAR RADICULOPATHY: ICD-10-CM

## 2024-01-11 DIAGNOSIS — M54.16 LUMBAR RADICULOPATHY: Primary | ICD-10-CM

## 2024-01-11 DIAGNOSIS — M48.061 LUMBAR FORAMINAL STENOSIS: ICD-10-CM

## 2024-01-11 DIAGNOSIS — M43.10 RETROLISTHESIS: ICD-10-CM

## 2024-01-11 DIAGNOSIS — R09.82 PND (POST-NASAL DRIP): ICD-10-CM

## 2024-01-11 LAB
INFLUENZA A ANTIBODY: NORMAL
INFLUENZA B ANTIBODY: NORMAL
S PYO AG THROAT QL: NORMAL

## 2024-01-11 PROCEDURE — 72148 MRI LUMBAR SPINE W/O DYE: CPT

## 2024-01-11 PROCEDURE — 99213 OFFICE O/P EST LOW 20 MIN: CPT | Performed by: NURSE PRACTITIONER

## 2024-01-11 RX ORDER — CEFDINIR 300 MG/1
300 CAPSULE ORAL 2 TIMES DAILY
Qty: 20 CAPSULE | Refills: 0 | Status: SHIPPED | OUTPATIENT
Start: 2024-01-11 | End: 2024-01-21

## 2024-01-11 RX ORDER — FLUTICASONE PROPIONATE 50 MCG
2 SPRAY, SUSPENSION (ML) NASAL DAILY
Qty: 48 G | Refills: 0 | OUTPATIENT
Start: 2024-01-11

## 2024-01-11 RX ORDER — FLUTICASONE PROPIONATE 50 MCG
2 SPRAY, SUSPENSION (ML) NASAL DAILY
Qty: 16 G | Refills: 0 | Status: SHIPPED | OUTPATIENT
Start: 2024-01-11 | End: 2024-01-11

## 2024-01-11 RX ORDER — FLUTICASONE PROPIONATE 50 MCG
2 SPRAY, SUSPENSION (ML) NASAL DAILY
Qty: 1 EACH | Refills: 0 | Status: SHIPPED | OUTPATIENT
Start: 2024-01-11 | End: 2024-01-11

## 2024-01-11 ASSESSMENT — ENCOUNTER SYMPTOMS
DIARRHEA: 0
SINUS COMPLAINT: 1
COUGH: 1
NAUSEA: 0
VOMITING: 0
ABDOMINAL PAIN: 0
SORE THROAT: 0
EYES NEGATIVE: 1
SHORTNESS OF BREATH: 0
SINUS PRESSURE: 1

## 2024-01-11 ASSESSMENT — VISUAL ACUITY: OU: 1

## 2024-01-11 NOTE — PROGRESS NOTES
[facility]  [facility]  [address] [place] [address] DRIVE  [facility] [address]  Dept: [phone]  Dept Fax: [phone]  Loc: [phone]    Lawrence Guerrero is a 28 y.o. female who presents today for her medical conditions/complaintsas noted below.  Lawrence Guerrero is c/o of Nasal Congestion, Ear Drainage, Otalgia, Congestion, and Headache        HPI:     Sinus Problem  This is a recurrent problem. The current episode started more than 1 month ago (she says she has been sick off and on since November). The problem has been waxing and waning since onset. There has been no fever. Her pain is at a severity of 2/10. The pain is mild. Associated symptoms include congestion, coughing, ear pain, headaches, neck pain and sinus pressure. Pertinent negatives include no chills, shortness of breath or sore throat. (Fatigue  Dizziness) Treatments tried: Dayquil, Nyquil, Mucinex. The treatment provided mild relief.     Allison tells me she has been most all the time sine November with sinus issues and is now having right facial pain, thick green yellow nasal drainage, headache and fatigue. She has had a Z pack and steroids in November and then Augmentin in Dec for sinus issues but continues to get sick. She says she had a fever last week but none since then. She does take Zyrtec daily and has allergies. She took an at home COVID test which was negative.   Past Medical History:   Diagnosis Date    Concussion     approximately 10 years ago    Depression     Major depressive disorder     Food allergy     Hypothyroidism     Metabolic disorder     PCOS (polycystic ovarian syndrome)      Past Surgical History:   Procedure Laterality Date    CHOLECYSTECTOMY      COLONOSCOPY  11/03/2014    Dr Champ [place],  Small non-bleeding internal hemorrhoids w/skin tags, serrated polyp     COLONOSCOPY N/A 10/21/2019    Dr Klein-[place], 5 yr recall    COLONOSCOPY N/A 06/03/2021

## 2024-01-11 NOTE — PATIENT INSTRUCTIONS
Plenty of fluids  Rest  OTC Tylenol or Motrin as needed   Continue Zyrtec daily  Start Flonase 2 sprays each nostril at bedtime  Omnicef as directed  Follow up with PCP for worsening or unresolved symptoms.

## 2024-01-11 NOTE — TELEPHONE ENCOUNTER
Called patient, spoke with: Patient regarding the results of the patients most recent MRI.  I advised Patient of Katey Car recommendations.   Patient did voice understanding    Referral entered for pt

## 2024-01-11 NOTE — TELEPHONE ENCOUNTER
----- Message from SARAY Mckeon sent at 1/11/2024 12:00 PM CST -----  Mild bilateral foraminal stenosis L5-S1  Mild degenerative disc noted as well.    Ok for referral to neurosurgery if she wishes, but likely will treat symptomatically.

## 2024-01-12 RX ORDER — FLUTICASONE PROPIONATE 50 MCG
2 SPRAY, SUSPENSION (ML) NASAL DAILY
Qty: 48 G | OUTPATIENT
Start: 2024-01-12

## 2024-01-17 ENCOUNTER — TELEPHONE (OUTPATIENT)
Dept: NEUROSURGERY | Age: 29
End: 2024-01-17

## 2024-01-17 NOTE — TELEPHONE ENCOUNTER
[facility] New Patient Questionnaire    Diagnosis/Reason for Referral?    Lumbar radiculopathy  M53.3 (ICD-10-CM) - Tail bone pain  M43.10 (ICD-10-CM) - Retrolisthesis  M48.061 (ICD-10-CM) - Lumbar foraminal stenosis     2. Who is completing questionnaire?      Patient  Caregiver Family      3. Has the patient had any previous spinal/brain surgeries?  NO      A. If yes, what is the name of the facility in which the surgery was performed?       B. Procedure/Surgery performed?       C. Who was the surgeon?       D. When was the surgery?    MM/YY       E. Did the patient improve after the surgery?        4. Is this a second opinion?   If yes, Dr. Scruggs would like to review patient first before making the appointment.      5. Have MRI Images been obtain within the last year?     Yes  No      XR  CT     If yes, where was the imaging performed?  [facility]   If yes, what part of the body?      Lumbar  Cervical  Thoracic  Brain     If yes, when was it obtained?      1/11/24    Note: if the scan was performed at a facility other than [facility], the disc will need to be brought to the appointment or we need to reach out to obtain the disc.     A. Was the patient instructed to provide the disc?      Yes   No      8. Has the patient had a NCV/EMG within the last year?      Yes  No     If yes, where was it performed and date?      MM/YY  Location:      9. Has the patient been to Physical Therapy?      Yes  No     If yes, what location, how long attended, and last visit?    Location: [facility]      Therapy Lasted:    Date of Last Visit:11/1/23      10. Has the patient been to Pain Management?     Yes  No     If yes, what location and last visit     Location:   Last Visit:   Is it helping?

## 2024-01-19 ENCOUNTER — OFFICE VISIT (OUTPATIENT)
Dept: FAMILY MEDICINE CLINIC | Age: 29
End: 2024-01-19
Payer: OTHER GOVERNMENT

## 2024-01-19 VITALS
OXYGEN SATURATION: 100 % | DIASTOLIC BLOOD PRESSURE: 70 MMHG | TEMPERATURE: 97.3 F | SYSTOLIC BLOOD PRESSURE: 120 MMHG | HEART RATE: 72 BPM | HEIGHT: 68 IN | BODY MASS INDEX: 29.55 KG/M2 | WEIGHT: 195 LBS

## 2024-01-19 DIAGNOSIS — J32.4 CHRONIC PANSINUSITIS: Primary | ICD-10-CM

## 2024-01-19 PROCEDURE — 99214 OFFICE O/P EST MOD 30 MIN: CPT | Performed by: NURSE PRACTITIONER

## 2024-01-19 PROCEDURE — 96372 THER/PROPH/DIAG INJ SC/IM: CPT | Performed by: NURSE PRACTITIONER

## 2024-01-19 RX ORDER — FLUTICASONE PROPIONATE 50 MCG
1 SPRAY, SUSPENSION (ML) NASAL DAILY
COMMUNITY

## 2024-01-19 RX ORDER — CLARITHROMYCIN 250 MG/1
250 TABLET, FILM COATED ORAL 2 TIMES DAILY
Qty: 42 TABLET | Refills: 0 | Status: SHIPPED | OUTPATIENT
Start: 2024-01-19 | End: 2024-02-09

## 2024-01-19 RX ORDER — FLUCONAZOLE 150 MG/1
150 TABLET ORAL DAILY
Qty: 3 TABLET | Refills: 0 | Status: SHIPPED | OUTPATIENT
Start: 2024-01-19 | End: 2024-01-22

## 2024-01-19 RX ORDER — PREDNISONE 10 MG/1
10 TABLET ORAL DAILY
Qty: 21 TABLET | Refills: 0 | Status: SHIPPED | OUTPATIENT
Start: 2024-01-19 | End: 2024-02-09

## 2024-01-19 RX ORDER — DEXAMETHASONE SODIUM PHOSPHATE 10 MG/ML
5 INJECTION INTRAMUSCULAR; INTRAVENOUS ONCE
Status: COMPLETED | OUTPATIENT
Start: 2024-01-19 | End: 2024-01-19

## 2024-01-19 RX ADMIN — DEXAMETHASONE SODIUM PHOSPHATE 5 MG: 10 INJECTION INTRAMUSCULAR; INTRAVENOUS at 08:36

## 2024-01-19 SDOH — ECONOMIC STABILITY: FOOD INSECURITY: WITHIN THE PAST 12 MONTHS, THE FOOD YOU BOUGHT JUST DIDN'T LAST AND YOU DIDN'T HAVE MONEY TO GET MORE.: NEVER TRUE

## 2024-01-19 SDOH — ECONOMIC STABILITY: FOOD INSECURITY: WITHIN THE PAST 12 MONTHS, YOU WORRIED THAT YOUR FOOD WOULD RUN OUT BEFORE YOU GOT MONEY TO BUY MORE.: NEVER TRUE

## 2024-01-19 SDOH — ECONOMIC STABILITY: INCOME INSECURITY: HOW HARD IS IT FOR YOU TO PAY FOR THE VERY BASICS LIKE FOOD, HOUSING, MEDICAL CARE, AND HEATING?: NOT VERY HARD

## 2024-01-19 SDOH — ECONOMIC STABILITY: HOUSING INSECURITY
IN THE LAST 12 MONTHS, WAS THERE A TIME WHEN YOU DID NOT HAVE A STEADY PLACE TO SLEEP OR SLEPT IN A SHELTER (INCLUDING NOW)?: NO

## 2024-01-19 ASSESSMENT — ENCOUNTER SYMPTOMS
COUGH: 1
EYES NEGATIVE: 1
GASTROINTESTINAL NEGATIVE: 1
SINUS PRESSURE: 1
SINUS PAIN: 1

## 2024-01-19 ASSESSMENT — PATIENT HEALTH QUESTIONNAIRE - PHQ9
SUM OF ALL RESPONSES TO PHQ9 QUESTIONS 1 & 2: 0
1. LITTLE INTEREST OR PLEASURE IN DOING THINGS: 0
SUM OF ALL RESPONSES TO PHQ QUESTIONS 1-9: 0
2. FEELING DOWN, DEPRESSED OR HOPELESS: 0
SUM OF ALL RESPONSES TO PHQ QUESTIONS 1-9: 0

## 2024-01-19 NOTE — PROGRESS NOTES
normal.         Mood and Affect: Mood and affect normal.         Speech: Speech normal.         Behavior: Behavior normal.         Thought Content: Thought content normal.         Judgment: Judgment normal.         /70   Pulse 72   Temp 97.3 °F (36.3 °C)   Ht 1.727 m (5' 8\")   Wt 88.5 kg (195 lb)   LMP 12/25/2023   SpO2 100%   BMI 29.65 kg/m²     Assessment:       ICD-10-CM    1. Chronic pansinusitis  J32.4 fluticasone (FLONASE) 50 MCG/ACT nasal spray     clarithromycin (BIAXIN) 250 MG tablet     predniSONE (DELTASONE) 10 MG tablet     CT SINUS WO CONTRAST     dexAMETHasone (DECADRON) injection 5 mg           No results found for this visit on 01/19/24.    Plan:     1. Chronic pansinusitis - chronic and worsening  Mutli day abx and steroids as discussed.  CT scan to further evaluate symptoms    - fluticasone (FLONASE) 50 MCG/ACT nasal spray; 1 spray by Each Nostril route daily  - clarithromycin (BIAXIN) 250 MG tablet; Take 1 tablet by mouth 2 times daily for 21 days  Dispense: 42 tablet; Refill: 0  - predniSONE (DELTASONE) 10 MG tablet; Take 1 tablet by mouth daily for 21 days  Dispense: 21 tablet; Refill: 0  - CT SINUS WO CONTRAST; Future  - dexAMETHasone (DECADRON) injection 5 mg       Return if symptoms worsen or fail to improve.    Orders Placed This Encounter   Procedures    CT SINUS WO CONTRAST     Standing Status:   Future     Standing Expiration Date:   1/19/2025       Orders Placed This Encounter   Medications    clarithromycin (BIAXIN) 250 MG tablet     Sig: Take 1 tablet by mouth 2 times daily for 21 days     Dispense:  42 tablet     Refill:  0    predniSONE (DELTASONE) 10 MG tablet     Sig: Take 1 tablet by mouth daily for 21 days     Dispense:  21 tablet     Refill:  0    dexAMETHasone (DECADRON) injection 5 mg    fluconazole (DIFLUCAN) 150 MG tablet     Sig: Take 1 tablet by mouth daily for 3 doses     Dispense:  3 tablet     Refill:  0            Patient offered educational handouts and

## 2024-02-09 DIAGNOSIS — R73.09 ELEVATED GLUCOSE: ICD-10-CM

## 2024-02-09 DIAGNOSIS — J32.4 CHRONIC PANSINUSITIS: ICD-10-CM

## 2024-02-09 DIAGNOSIS — E55.9 VITAMIN D DEFICIENCY: ICD-10-CM

## 2024-02-09 LAB
25(OH)D3 SERPL-MCNC: 41.1 NG/ML
ALBUMIN SERPL-MCNC: 4.3 G/DL (ref 3.5–5.2)
ALP SERPL-CCNC: 63 U/L (ref 35–104)
ALT SERPL-CCNC: 10 U/L (ref 5–33)
ANION GAP SERPL CALCULATED.3IONS-SCNC: 13 MMOL/L (ref 7–19)
AST SERPL-CCNC: 13 U/L (ref 5–32)
BASOPHILS # BLD: 0 K/UL (ref 0–0.2)
BASOPHILS NFR BLD: 0.4 % (ref 0–1)
BILIRUB SERPL-MCNC: 0.4 MG/DL (ref 0.2–1.2)
BUN SERPL-MCNC: 7 MG/DL (ref 6–20)
CALCIUM SERPL-MCNC: 9.5 MG/DL (ref 8.6–10)
CHLORIDE SERPL-SCNC: 102 MMOL/L (ref 98–111)
CO2 SERPL-SCNC: 24 MMOL/L (ref 22–29)
CREAT SERPL-MCNC: 0.6 MG/DL (ref 0.5–0.9)
EOSINOPHIL # BLD: 0.1 K/UL (ref 0–0.6)
EOSINOPHIL NFR BLD: 0.7 % (ref 0–5)
ERYTHROCYTE [DISTWIDTH] IN BLOOD BY AUTOMATED COUNT: 14.1 % (ref 11.5–14.5)
GLUCOSE SERPL-MCNC: 104 MG/DL (ref 74–109)
HBA1C MFR BLD: 5 % (ref 4–6)
HCT VFR BLD AUTO: 39.6 % (ref 37–47)
HGB BLD-MCNC: 12.5 G/DL (ref 12–16)
IMM GRANULOCYTES # BLD: 0 K/UL
LYMPHOCYTES # BLD: 1.4 K/UL (ref 1.1–4.5)
LYMPHOCYTES NFR BLD: 14.2 % (ref 20–40)
MCH RBC QN AUTO: 28.5 PG (ref 27–31)
MCHC RBC AUTO-ENTMCNC: 31.6 G/DL (ref 33–37)
MCV RBC AUTO: 90.4 FL (ref 81–99)
MONOCYTES # BLD: 0.6 K/UL (ref 0–0.9)
MONOCYTES NFR BLD: 6.4 % (ref 0–10)
NEUTROPHILS # BLD: 7.9 K/UL (ref 1.5–7.5)
NEUTS SEG NFR BLD: 77.9 % (ref 50–65)
PLATELET # BLD AUTO: 279 K/UL (ref 130–400)
PMV BLD AUTO: 11.8 FL (ref 9.4–12.3)
POTASSIUM SERPL-SCNC: 4.7 MMOL/L (ref 3.5–5)
PROT SERPL-MCNC: 7.1 G/DL (ref 6.6–8.7)
RBC # BLD AUTO: 4.38 M/UL (ref 4.2–5.4)
SODIUM SERPL-SCNC: 139 MMOL/L (ref 136–145)
TSH SERPL DL<=0.005 MIU/L-ACNC: 0.74 UIU/ML (ref 0.35–5.5)
VIT B12 SERPL-MCNC: 887 PG/ML (ref 211–946)
WBC # BLD AUTO: 10.1 K/UL (ref 4.8–10.8)

## 2024-02-12 ENCOUNTER — TELEPHONE (OUTPATIENT)
Dept: FAMILY MEDICINE CLINIC | Age: 29
End: 2024-02-12

## 2024-02-12 LAB
EBV EA-D IGG SER-ACNC: <5 U/ML (ref 0–10.9)
EBV NA IGG SER IA-ACNC: 342 U/ML (ref 0–21.9)
EBV VCA IGG SER IA-ACNC: >750 U/ML (ref 0–21.9)
EBV VCA IGM SER IA-ACNC: 12 U/ML (ref 0–43.9)

## 2024-02-12 NOTE — TELEPHONE ENCOUNTER
----- Message from SARAY Isbell sent at 2/12/2024  9:57 AM CST -----  Please call patient and let them know results.   Joe-Frost panel shows previous infection but nothing acute.  Your metabolic profile is normal.  This includes kidney and liver functions as well as electrolytes.  Normal vitamin D  Normal blood counts  Normal B12  Normal thyroid  Blood sugars well-controlled A1c of 5

## 2024-02-12 NOTE — TELEPHONE ENCOUNTER
Called patient, spoke with: Patient regarding the results of the patients most recent labs.  I advised Patient of Katey Car recommendations.   Patient did voice understanding.    Pt states she is extremely fatigued and not feeling well at all. Extremely congested through sinuses and been on several abx and getting nauseated after eating Has cut out a lot of gluten to see if this helps. Still losing weight without trying.  Armpit really itchy on left side    Will send back to provider for any further recommendations.

## 2024-02-13 NOTE — TELEPHONE ENCOUNTER
The history of mono can be causing the increased fatigue symptoms.    I want her to have the CT of sinuses for further evaluation

## 2024-02-15 ENCOUNTER — OFFICE VISIT (OUTPATIENT)
Dept: NEUROSURGERY | Age: 29
End: 2024-02-15

## 2024-02-15 VITALS
WEIGHT: 195 LBS | DIASTOLIC BLOOD PRESSURE: 81 MMHG | RESPIRATION RATE: 18 BRPM | HEART RATE: 90 BPM | SYSTOLIC BLOOD PRESSURE: 109 MMHG | BODY MASS INDEX: 29.55 KG/M2 | HEIGHT: 68 IN

## 2024-02-15 DIAGNOSIS — G89.29 CHRONIC LEFT-SIDED LOW BACK PAIN WITH LEFT-SIDED SCIATICA: ICD-10-CM

## 2024-02-15 DIAGNOSIS — M51.36 DDD (DEGENERATIVE DISC DISEASE), LUMBAR: ICD-10-CM

## 2024-02-15 DIAGNOSIS — M51.16 LUMBAR DISC HERNIATION WITH RADICULOPATHY: Primary | ICD-10-CM

## 2024-02-15 DIAGNOSIS — M54.42 CHRONIC LEFT-SIDED LOW BACK PAIN WITH LEFT-SIDED SCIATICA: ICD-10-CM

## 2024-02-15 NOTE — PROGRESS NOTES
[facility]  Office Visit      Chief Complaint   Patient presents with    New Patient     To establish care.     Results     MRI LUMBAR (1/11/2024)      Back Pain     Patient states she is having back pain that is worsening.  She states it started worsening in August.  She has episodes where she stands up and she is unable to walk.  Patient states sitting for a long time is uncomfortable.  Patient states she had physical therapy in the past and it helped but then it stopped helping.  She states that she still does her home exercises 4-5 times a week.     Numbness     Patient states she has numbness in her BLE (L>R).        HISTORY OF PRESENT ILLNESS:    Lawrence Guerrero is a 28 y.o. female with a history of depression, PCOS who presents with left sided low back and flank pain that has been present for about 1 year.  States she has had instances where she could not get up off of the floor due to severe pain.  The pain does occasionally radiate into the LEFT buttock and posterior thigh. Her pain is mostly located in the left low back.  The patient complains of numbness of the entire LEFT leg but mostly buttock and posterior thigh.       Her pain is not changed when going from a seated to standing position. Her pain is worsened with walking. Her pain is not changed when lying flat. Overall, indicative that the patient does not have a mechanical nature to their pain.       The patient has underwent a non-operative treatment course that has included:  NSAIDs - ibuprofen   Muscle Relaxers  Oral Steroids - medrol dose pack  IM steroids x 2 recently for upper respiratory infections   Physical Therapy (helped some and still does at home 4-5 times per week)      Of note she does not use tobacco and does not take blood thinning medications.                 Past Medical History:   Diagnosis Date    Concussion     approximately 10 years ago    Depression     Major depressive disorder     Food allergy     Hypothyroidism

## 2024-02-15 NOTE — PROGRESS NOTES
Review of Systems   Constitutional: Negative.    HENT: Negative.     Eyes: Negative.    Respiratory: Negative.     Cardiovascular: Negative.    Gastrointestinal: Negative.    Genitourinary: Negative.    Musculoskeletal:  Positive for back pain.   Skin: Negative.    Neurological:  Positive for tingling and weakness.   Endo/Heme/Allergies: Negative.    Psychiatric/Behavioral: Negative.

## 2024-02-20 RX ORDER — DULAGLUTIDE 0.75 MG/.5ML
INJECTION, SOLUTION SUBCUTANEOUS
Qty: 2 ML | Refills: 12 | Status: SHIPPED | OUTPATIENT
Start: 2024-02-20

## 2024-02-20 NOTE — TELEPHONE ENCOUNTER
Lawrence called requesting a refill of the below medication which has been pended for you:     Requested Prescriptions     Pending Prescriptions Disp Refills    Dulaglutide (TRULICITY) 0.75 MG/0.5ML SOPN 2 mL 12       Last Appointment Date: 1/19/2024  Next Appointment Date: 4/9/2024    Allergies   Allergen Reactions    Shrimp Extract Allergy Skin Test Anaphylaxis    Corn-Containing Products     Sertraline      Pt. Developed seratonin syndrome and had to go impatient.     Shrimp (Diagnostic)     Soybean-Containing Drug Products     Wheat Nausea Only    Wheat Extract

## 2024-02-21 ENCOUNTER — HOSPITAL ENCOUNTER (OUTPATIENT)
Dept: CT IMAGING | Age: 29
Discharge: HOME | End: 2024-02-21
Payer: OTHER GOVERNMENT

## 2024-02-21 DIAGNOSIS — J32.4 CHRONIC PANSINUSITIS: ICD-10-CM

## 2024-02-21 PROCEDURE — 70486 CT MAXILLOFACIAL W/O DYE: CPT

## 2024-02-23 ENCOUNTER — TELEPHONE (OUTPATIENT)
Dept: FAMILY MEDICINE CLINIC | Age: 29
End: 2024-02-23

## 2024-02-23 NOTE — TELEPHONE ENCOUNTER
----- Message from SARAY Mckeon sent at 2/23/2024 12:32 PM CST -----  Please let patient know that CT has returned.  It was negative for any acute issues.  Osteoma noted.  This is a benign bone growth.  No concerns with this.

## 2024-02-23 NOTE — TELEPHONE ENCOUNTER
Called patient, spoke with: Patient regarding the results of the patients most recent CT.  I advised Patient of Katey Car recommendations.   Patient did voice understanding

## 2024-03-08 ENCOUNTER — OFFICE VISIT (OUTPATIENT)
Dept: FAMILY MEDICINE CLINIC | Age: 29
End: 2024-03-08
Payer: OTHER GOVERNMENT

## 2024-03-08 VITALS
SYSTOLIC BLOOD PRESSURE: 126 MMHG | DIASTOLIC BLOOD PRESSURE: 70 MMHG | OXYGEN SATURATION: 98 % | TEMPERATURE: 97.9 F | HEIGHT: 68 IN | BODY MASS INDEX: 29.1 KG/M2 | HEART RATE: 58 BPM | WEIGHT: 192 LBS

## 2024-03-08 DIAGNOSIS — B37.2 SKIN YEAST INFECTION: ICD-10-CM

## 2024-03-08 DIAGNOSIS — K59.01 SLOW TRANSIT CONSTIPATION: Primary | ICD-10-CM

## 2024-03-08 PROCEDURE — 99214 OFFICE O/P EST MOD 30 MIN: CPT | Performed by: NURSE PRACTITIONER

## 2024-03-08 RX ORDER — FLUCONAZOLE 150 MG/1
150 TABLET ORAL DAILY
Qty: 3 TABLET | Refills: 0 | Status: SHIPPED | OUTPATIENT
Start: 2024-03-08 | End: 2024-03-11

## 2024-03-08 SDOH — ECONOMIC STABILITY: FOOD INSECURITY: WITHIN THE PAST 12 MONTHS, YOU WORRIED THAT YOUR FOOD WOULD RUN OUT BEFORE YOU GOT MONEY TO BUY MORE.: NEVER TRUE

## 2024-03-08 SDOH — ECONOMIC STABILITY: INCOME INSECURITY: HOW HARD IS IT FOR YOU TO PAY FOR THE VERY BASICS LIKE FOOD, HOUSING, MEDICAL CARE, AND HEATING?: NOT HARD AT ALL

## 2024-03-08 SDOH — ECONOMIC STABILITY: FOOD INSECURITY: WITHIN THE PAST 12 MONTHS, THE FOOD YOU BOUGHT JUST DIDN'T LAST AND YOU DIDN'T HAVE MONEY TO GET MORE.: NEVER TRUE

## 2024-03-08 ASSESSMENT — ENCOUNTER SYMPTOMS
EYES NEGATIVE: 1
CONSTIPATION: 1
RESPIRATORY NEGATIVE: 1

## 2024-03-08 ASSESSMENT — PATIENT HEALTH QUESTIONNAIRE - PHQ9
1. LITTLE INTEREST OR PLEASURE IN DOING THINGS: 0
SUM OF ALL RESPONSES TO PHQ QUESTIONS 1-9: 0
2. FEELING DOWN, DEPRESSED OR HOPELESS: 0
SUM OF ALL RESPONSES TO PHQ9 QUESTIONS 1 & 2: 0

## 2024-04-09 ENCOUNTER — TELEPHONE (OUTPATIENT)
Dept: FAMILY MEDICINE CLINIC | Age: 29
End: 2024-04-09

## 2024-04-09 ENCOUNTER — OFFICE VISIT (OUTPATIENT)
Dept: FAMILY MEDICINE CLINIC | Age: 29
End: 2024-04-09
Payer: OTHER GOVERNMENT

## 2024-04-09 VITALS
DIASTOLIC BLOOD PRESSURE: 70 MMHG | SYSTOLIC BLOOD PRESSURE: 118 MMHG | WEIGHT: 181 LBS | HEIGHT: 68 IN | HEART RATE: 64 BPM | BODY MASS INDEX: 27.43 KG/M2 | OXYGEN SATURATION: 97 % | TEMPERATURE: 98 F

## 2024-04-09 DIAGNOSIS — R59.9 ENLARGED LYMPH NODE: ICD-10-CM

## 2024-04-09 DIAGNOSIS — Z00.00 ENCOUNTER FOR WELL ADULT EXAM WITHOUT ABNORMAL FINDINGS: Primary | ICD-10-CM

## 2024-04-09 DIAGNOSIS — K59.01 SLOW TRANSIT CONSTIPATION: ICD-10-CM

## 2024-04-09 DIAGNOSIS — B37.2 SKIN YEAST INFECTION: ICD-10-CM

## 2024-04-09 DIAGNOSIS — Z00.00 ENCOUNTER FOR WELL ADULT EXAM WITHOUT ABNORMAL FINDINGS: ICD-10-CM

## 2024-04-09 LAB
BASOPHILS # BLD: 0.1 K/UL (ref 0–0.2)
BASOPHILS NFR BLD: 0.8 % (ref 0–1)
CHOLEST SERPL-MCNC: 187 MG/DL (ref 160–199)
EOSINOPHIL # BLD: 0.2 K/UL (ref 0–0.6)
EOSINOPHIL NFR BLD: 3.5 % (ref 0–5)
ERYTHROCYTE [DISTWIDTH] IN BLOOD BY AUTOMATED COUNT: 14.1 % (ref 11.5–14.5)
HCT VFR BLD AUTO: 39.5 % (ref 37–47)
HDLC SERPL-MCNC: 82 MG/DL (ref 65–121)
HGB BLD-MCNC: 12.2 G/DL (ref 12–16)
IMM GRANULOCYTES # BLD: 0.1 K/UL
LDLC SERPL CALC-MCNC: 96 MG/DL
LYMPHOCYTES # BLD: 2.3 K/UL (ref 1.1–4.5)
LYMPHOCYTES NFR BLD: 38 % (ref 20–40)
MCH RBC QN AUTO: 28.2 PG (ref 27–31)
MCHC RBC AUTO-ENTMCNC: 30.9 G/DL (ref 33–37)
MCV RBC AUTO: 91.4 FL (ref 81–99)
MONOCYTES # BLD: 0.6 K/UL (ref 0–0.9)
MONOCYTES NFR BLD: 9.3 % (ref 0–10)
NEUTROPHILS # BLD: 2.9 K/UL (ref 1.5–7.5)
NEUTS SEG NFR BLD: 47.6 % (ref 50–65)
PLATELET # BLD AUTO: 248 K/UL (ref 130–400)
PMV BLD AUTO: 11.6 FL (ref 9.4–12.3)
RBC # BLD AUTO: 4.32 M/UL (ref 4.2–5.4)
TRIGL SERPL-MCNC: 45 MG/DL (ref 0–149)
WBC # BLD AUTO: 6 K/UL (ref 4.8–10.8)

## 2024-04-09 PROCEDURE — 99395 PREV VISIT EST AGE 18-39: CPT | Performed by: NURSE PRACTITIONER

## 2024-04-09 RX ORDER — FLUCONAZOLE 150 MG/1
150 TABLET ORAL DAILY
Qty: 7 TABLET | Refills: 0 | Status: SHIPPED | OUTPATIENT
Start: 2024-04-09 | End: 2024-04-16

## 2024-04-09 RX ORDER — CLOTRIMAZOLE 1 %
CREAM (GRAM) TOPICAL
Qty: 28 G | Refills: 1 | Status: SHIPPED | OUTPATIENT
Start: 2024-04-09 | End: 2024-04-16

## 2024-04-09 RX ORDER — CLINDAMYCIN HYDROCHLORIDE 300 MG/1
300 CAPSULE ORAL 2 TIMES DAILY
Qty: 14 CAPSULE | Refills: 0 | Status: SHIPPED | OUTPATIENT
Start: 2024-04-09 | End: 2024-04-16

## 2024-04-09 RX ORDER — TRIAMCINOLONE ACETONIDE 0.25 MG/G
CREAM TOPICAL
Qty: 80 G | Refills: 0 | Status: SHIPPED | OUTPATIENT
Start: 2024-04-09

## 2024-04-09 ASSESSMENT — ENCOUNTER SYMPTOMS
RESPIRATORY NEGATIVE: 1
EYES NEGATIVE: 1
CONSTIPATION: 1

## 2024-04-09 NOTE — TELEPHONE ENCOUNTER
Called patient, spoke with: Patient regarding the results of the patients most recent labs.  I advised Patient of Katey Car recommendations.   Patient did voice understanding

## 2024-04-09 NOTE — TELEPHONE ENCOUNTER
----- Message from SARAY Mckeon sent at 4/9/2024  4:03 PM CDT -----  Please let patient know that CBC did not show any issues with white count.  And cholesterol levels were great.

## 2024-04-09 NOTE — PROGRESS NOTES
[facility]  [address] [place] [address]  Dept: [phone]  Dept Fax: [phone]  Loc: [phone]    Lawrence Guerrero is a 28 y.o. female who presents today for her medical conditions/complaints as noted below.  Lawrence Guerrero is c/o of Annual Exam and swollen lymph node in groin L       Chief Complaint   Patient presents with    Annual Exam    swollen lymph node in groin L        HPI:     HPI  Patient presents today for physical exam.  She states that she is still having issues with her armpits after treatment.  She also has a swollen lymph node in her L groin area that she would like to discuss.  She reports that it is slightly tender to touch.   During last visit patient was started on Linzess.  She reports that this helped some, but was only taking this every other day.  She has been taking Trulcity regularly as well.  She has noticed increased bloating with this as well.       Past Medical History:   Diagnosis Date    Concussion     approximately 10 years ago    Depression     Major depressive disorder     Food allergy     Hypothyroidism     Metabolic disorder     PCOS (polycystic ovarian syndrome)         Past Surgical History:   Procedure Laterality Date    CHOLECYSTECTOMY      COLONOSCOPY  11/03/2014    Dr Champ Torres-[place],  Small non-bleeding internal hemorrhoids w/skin tags, serrated polyp     COLONOSCOPY N/A 10/21/2019    Dr Klein-Normal, 5 yr recall    COLONOSCOPY N/A 06/03/2021    Dr Klein, [facility], (-)Micro Colitis,  Int hemorrhoids-Grade 1, 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, 20 year recall    KNEE SURGERY      LAPAROSCOPY N/A 01/31/2020    DIAGNOSTIC LAPAROSCOPY AND CHROMOPERTUBATION performed by Kathy Aquino MD at [facility] OR    POLYPECTOMY      TONSILLECTOMY AND ADENOIDECTOMY      UPPER GASTROINTESTINAL ENDOSCOPY  11/03/2014    Dr Champ Hdez, Gastritis, h pylori neg    UPPER GASTROINTESTINAL ENDOSCOPY N/A

## 2024-04-24 ENCOUNTER — OFFICE VISIT (OUTPATIENT)
Dept: GASTROENTEROLOGY | Age: 29
End: 2024-04-24
Payer: OTHER GOVERNMENT

## 2024-04-24 VITALS
HEIGHT: 68 IN | BODY MASS INDEX: 28.19 KG/M2 | OXYGEN SATURATION: 99 % | HEART RATE: 81 BPM | DIASTOLIC BLOOD PRESSURE: 70 MMHG | WEIGHT: 186 LBS | SYSTOLIC BLOOD PRESSURE: 120 MMHG

## 2024-04-24 DIAGNOSIS — K58.1 IRRITABLE BOWEL SYNDROME WITH CONSTIPATION: Primary | ICD-10-CM

## 2024-04-24 DIAGNOSIS — R11.0 NAUSEA: ICD-10-CM

## 2024-04-24 PROCEDURE — 99214 OFFICE O/P EST MOD 30 MIN: CPT | Performed by: NURSE PRACTITIONER

## 2024-04-24 RX ORDER — LACTULOSE 10 G/15ML
10 SOLUTION ORAL 2 TIMES DAILY
Qty: 900 ML | Refills: 11 | Status: SHIPPED | OUTPATIENT
Start: 2024-04-24

## 2024-04-24 RX ORDER — ONDANSETRON 4 MG/1
4 TABLET, FILM COATED ORAL EVERY 6 HOURS PRN
Qty: 20 TABLET | Refills: 1 | Status: SHIPPED | OUTPATIENT
Start: 2024-04-24

## 2024-04-24 NOTE — PROGRESS NOTES
Subjective:     Patient ID: Lawrence Guerrero is a 28 y.o. female  PCP: Katey Car APRN  Referring Provider: No ref. provider found    HPI  Patient presents to the office today with the following complaints: Abdominal Pain      Pt seen today with c/o abdominal pain.  Hx IBS with constipation.  Constipation worse in the last few months.  Stools are straining.  She c/o lower abdominal pressure, back pain, bloating.  This is associated with nausea post prandial.  BM once every 2 weeks.  She has tried Linzess 145 mcg, this has not been effective.  She will use Magnesium Citrate prn.  She has tried and failed Miralax.  She has increased her fiber intake, she is drinking a lot of water.          She has sensation of being choked.  This comes and goes.  She has feeling of fullness.          \"I am pretty sure I had pancreatitis last week.\"  She reports nausea, vomiting, epigastric pain, severe.  Yesterday, twinge of pain with vomiting.      Last EGD 6/2021 - normal   Last Colonoscopy 6/2021 - normal, 5 year recall  Family hx colon cancer - Paternal Grandmother    Assessment:     1. Irritable bowel syndrome with constipation    2. Nausea              Plan:   - Trial of Lactulose 15 ml BID  - Ok for Zofran prn   - Follow up in 1 month or sooner if needed  - Call with any questions or concerns       Orders  No orders of the defined types were placed in this encounter.    Medications  Orders Placed This Encounter   Medications    lactulose (CHRONULAC) 10 GM/15ML solution     Sig: Take 15 mLs by mouth 2 times daily     Dispense:  900 mL     Refill:  11    ondansetron (ZOFRAN) 4 MG tablet     Sig: Take 1 tablet by mouth every 6 hours as needed for Nausea     Dispense:  20 tablet     Refill:  1         Patient History:     Past Medical History:   Diagnosis Date    Concussion     approximately 10 years ago    Depression     Major depressive disorder     Food allergy     Hypothyroidism     Metabolic disorder     PCOS (polycystic

## 2024-04-25 ENCOUNTER — HOSPITAL ENCOUNTER (EMERGENCY)
Age: 29
Discharge: HOME | End: 2024-04-25
Attending: EMERGENCY MEDICINE
Payer: OTHER GOVERNMENT

## 2024-04-25 ENCOUNTER — APPOINTMENT (OUTPATIENT)
Dept: GENERAL RADIOLOGY | Age: 29
End: 2024-04-25
Payer: OTHER GOVERNMENT

## 2024-04-25 VITALS
DIASTOLIC BLOOD PRESSURE: 82 MMHG | TEMPERATURE: 98 F | WEIGHT: 180 LBS | HEART RATE: 84 BPM | RESPIRATION RATE: 18 BRPM | BODY MASS INDEX: 27.37 KG/M2 | OXYGEN SATURATION: 100 % | SYSTOLIC BLOOD PRESSURE: 115 MMHG

## 2024-04-25 DIAGNOSIS — K59.01 SLOW TRANSIT CONSTIPATION: Primary | ICD-10-CM

## 2024-04-25 LAB
ALBUMIN SERPL-MCNC: 4.1 G/DL (ref 3.5–5.2)
ALP SERPL-CCNC: 51 U/L (ref 35–104)
ALT SERPL-CCNC: 19 U/L (ref 5–33)
ANION GAP SERPL CALCULATED.3IONS-SCNC: 8 MMOL/L (ref 7–19)
AST SERPL-CCNC: 16 U/L (ref 5–32)
BACTERIA URNS QL MICRO: NEGATIVE /HPF
BASOPHILS # BLD: 0 K/UL (ref 0–0.2)
BASOPHILS NFR BLD: 0.5 % (ref 0–1)
BILIRUB SERPL-MCNC: 0.3 MG/DL (ref 0.2–1.2)
BILIRUB UR QL STRIP: NEGATIVE
BUN SERPL-MCNC: 15 MG/DL (ref 6–20)
CALCIUM SERPL-MCNC: 8.8 MG/DL (ref 8.6–10)
CHLORIDE SERPL-SCNC: 108 MMOL/L (ref 98–111)
CLARITY UR: CLEAR
CO2 SERPL-SCNC: 29 MMOL/L (ref 22–29)
COLOR UR: YELLOW
CREAT SERPL-MCNC: 0.7 MG/DL (ref 0.5–0.9)
CRYSTALS URNS MICRO: ABNORMAL /HPF
EOSINOPHIL # BLD: 0.2 K/UL (ref 0–0.6)
EOSINOPHIL NFR BLD: 2.4 % (ref 0–5)
EPI CELLS #/AREA URNS AUTO: 3 /HPF (ref 0–5)
ERYTHROCYTE [DISTWIDTH] IN BLOOD BY AUTOMATED COUNT: 13.9 % (ref 11.5–14.5)
GLUCOSE SERPL-MCNC: 105 MG/DL (ref 74–109)
GLUCOSE UR STRIP.AUTO-MCNC: NEGATIVE MG/DL
HCG UR QL: NEGATIVE
HCT VFR BLD AUTO: 33.8 % (ref 37–47)
HGB BLD-MCNC: 11.1 G/DL (ref 12–16)
HGB UR STRIP.AUTO-MCNC: NEGATIVE MG/L
HYALINE CASTS #/AREA URNS AUTO: 2 /HPF (ref 0–8)
IMM GRANULOCYTES # BLD: 0 K/UL
KETONES UR STRIP.AUTO-MCNC: ABNORMAL MG/DL
LACTATE BLDV-SCNC: 1 MMOL/L (ref 0.5–1.9)
LEUKOCYTE ESTERASE UR QL STRIP.AUTO: ABNORMAL
LIPASE SERPL-CCNC: 39 U/L (ref 13–60)
LYMPHOCYTES # BLD: 3.5 K/UL (ref 1.1–4.5)
LYMPHOCYTES NFR BLD: 43.8 % (ref 20–40)
MCH RBC QN AUTO: 28.7 PG (ref 27–31)
MCHC RBC AUTO-ENTMCNC: 32.8 G/DL (ref 33–37)
MCV RBC AUTO: 87.3 FL (ref 81–99)
MONOCYTES # BLD: 0.6 K/UL (ref 0–0.9)
MONOCYTES NFR BLD: 7.4 % (ref 0–10)
NEUTROPHILS # BLD: 3.6 K/UL (ref 1.5–7.5)
NEUTS SEG NFR BLD: 45.6 % (ref 50–65)
NITRITE UR QL STRIP.AUTO: NEGATIVE
PH UR STRIP.AUTO: 6 [PH] (ref 5–8)
PLATELET # BLD AUTO: 223 K/UL (ref 130–400)
PMV BLD AUTO: 10.8 FL (ref 9.4–12.3)
POTASSIUM SERPL-SCNC: 3.9 MMOL/L (ref 3.5–5)
PROT SERPL-MCNC: 6.3 G/DL (ref 6.6–8.7)
PROT UR STRIP.AUTO-MCNC: NEGATIVE MG/DL
RBC # BLD AUTO: 3.87 M/UL (ref 4.2–5.4)
RBC #/AREA URNS AUTO: 9 /HPF (ref 0–4)
SODIUM SERPL-SCNC: 145 MMOL/L (ref 136–145)
SP GR UR STRIP.AUTO: 1.02 (ref 1–1.03)
UROBILINOGEN UR STRIP.AUTO-MCNC: 0.2 E.U./DL
WBC # BLD AUTO: 8 K/UL (ref 4.8–10.8)
WBC #/AREA URNS AUTO: 4 /HPF (ref 0–5)

## 2024-04-25 PROCEDURE — 99284 EMERGENCY DEPT VISIT MOD MDM: CPT

## 2024-04-25 PROCEDURE — 2580000003 HC RX 258: Performed by: EMERGENCY MEDICINE

## 2024-04-25 PROCEDURE — 80053 COMPREHEN METABOLIC PANEL: CPT

## 2024-04-25 PROCEDURE — 83605 ASSAY OF LACTIC ACID: CPT

## 2024-04-25 PROCEDURE — 81001 URINALYSIS AUTO W/SCOPE: CPT

## 2024-04-25 PROCEDURE — 83690 ASSAY OF LIPASE: CPT

## 2024-04-25 PROCEDURE — 84703 CHORIONIC GONADOTROPIN ASSAY: CPT

## 2024-04-25 PROCEDURE — 36415 COLL VENOUS BLD VENIPUNCTURE: CPT

## 2024-04-25 PROCEDURE — 85025 COMPLETE CBC W/AUTO DIFF WBC: CPT

## 2024-04-25 PROCEDURE — 6370000000 HC RX 637 (ALT 250 FOR IP): Performed by: PHYSICIAN ASSISTANT

## 2024-04-25 PROCEDURE — 74018 RADEX ABDOMEN 1 VIEW: CPT

## 2024-04-25 RX ORDER — BISACODYL 10 MG
10 SUPPOSITORY, RECTAL RECTAL ONCE
Status: COMPLETED | OUTPATIENT
Start: 2024-04-25 | End: 2024-04-25

## 2024-04-25 RX ORDER — 0.9 % SODIUM CHLORIDE 0.9 %
1000 INTRAVENOUS SOLUTION INTRAVENOUS ONCE
Status: COMPLETED | OUTPATIENT
Start: 2024-04-25 | End: 2024-04-25

## 2024-04-25 RX ADMIN — SODIUM CHLORIDE 1000 ML: 9 INJECTION, SOLUTION INTRAVENOUS at 20:57

## 2024-04-25 RX ADMIN — BISACODYL 10 MG: 10 SUPPOSITORY RECTAL at 23:42

## 2024-04-25 ASSESSMENT — PAIN - FUNCTIONAL ASSESSMENT: PAIN_FUNCTIONAL_ASSESSMENT: 0-10

## 2024-04-25 ASSESSMENT — ENCOUNTER SYMPTOMS
COLOR CHANGE: 0
RHINORRHEA: 0
EYE PAIN: 0
SORE THROAT: 0
APNEA: 0
CONSTIPATION: 1
ABDOMINAL PAIN: 1
PHOTOPHOBIA: 0
EYE DISCHARGE: 0
BACK PAIN: 0
ABDOMINAL DISTENTION: 0
SHORTNESS OF BREATH: 0
NAUSEA: 0
COUGH: 0

## 2024-04-25 ASSESSMENT — PAIN DESCRIPTION - LOCATION: LOCATION: ABDOMEN

## 2024-04-25 ASSESSMENT — PAIN SCALES - GENERAL: PAINLEVEL_OUTOF10: 6

## 2024-04-26 ASSESSMENT — ENCOUNTER SYMPTOMS
VOMITING: 0
DIARRHEA: 0
RHINORRHEA: 0
SORE THROAT: 0
SINUS PRESSURE: 0
SHORTNESS OF BREATH: 0
NAUSEA: 0
ABDOMINAL PAIN: 0

## 2024-04-26 NOTE — ED PROVIDER NOTES
Mother     Cancer Father     Cancer Maternal Aunt     Thyroid Disease Maternal Grandmother     Cancer Maternal Grandmother     Colon Cancer Paternal Grandfather 55    Colon Polyps Neg Hx     Esophageal Cancer Neg Hx     Liver Cancer Neg Hx     Rectal Cancer Neg Hx     Stomach Cancer Neg Hx           SOCIAL HISTORY       Social History     Socioeconomic History    Marital status:      Spouse name: None    Number of children: None    Years of education: None    Highest education level: None   Tobacco Use    Smoking status: Never    Smokeless tobacco: Never   Vaping Use    Vaping Use: Never used   Substance and Sexual Activity    Alcohol use: Yes     Comment: occ    Drug use: No    Sexual activity: Yes     Partners: Female     Social Determinants of Health     Financial Resource Strain: Low Risk  (3/8/2024)    Overall Financial Resource Strain (CARDIA)     Difficulty of Paying Living Expenses: Not hard at all   Food Insecurity: No Food Insecurity (3/8/2024)    Hunger Vital Sign     Worried About Running Out of Food in the Last Year: Never true     Ran Out of Food in the Last Year: Never true   Transportation Needs: Unknown (3/8/2024)    PRAPARE - Transportation     Lack of Transportation (Non-Medical): No   Housing Stability: Unknown (3/8/2024)    Housing Stability Vital Sign     Unstable Housing in the Last Year: No       SCREENINGS    [place] Coma Scale  Eye Opening: Spontaneous  Best Verbal Response: Oriented  Best Motor Response: Obeys commands  [place] Coma Scale Score: 15      PHYSICAL EXAM    (up to 7 forlevel 4, 8 or more for level 5)     ED Triage Vitals [04/25/24 1934]   BP Temp Temp Source Pulse Respirations SpO2 Height Weight - Scale   115/82 98 °F (36.7 °C) Temporal 84 18 100 % -- 81.6 kg (180 lb)       Physical Exam  Vitals and nursing note reviewed.   Constitutional:       General: She is not in acute distress.     Appearance: She is well-developed. She is not diaphoretic.   HENT:      Head: 
commands  [place]ia Coma Scale Score: 15        PHYSICAL EXAM    (up to 7 for level 4, 8 or more for level 5)     ED Triage Vitals [04/25/24 1934]   BP Temp Temp Source Pulse Respirations SpO2 Height Weight - Scale   115/82 98 °F (36.7 °C) Temporal 84 18 100 % -- 81.6 kg (180 lb)       Physical Exam  Vitals and nursing note reviewed.   Constitutional:       Appearance: She is well-developed.   HENT:      Head: Normocephalic and atraumatic.      Nose: Nose normal.      Mouth/Throat:      Mouth: Mucous membranes are moist.   Eyes:      General:         Right eye: No discharge.         Left eye: No discharge.      Conjunctiva/sclera: Conjunctivae normal.      Pupils: Pupils are equal, round, and reactive to light.   Cardiovascular:      Rate and Rhythm: Normal rate and regular rhythm.      Heart sounds: Normal heart sounds.   Pulmonary:      Effort: Pulmonary effort is normal. No respiratory distress.      Breath sounds: Normal breath sounds. No wheezing or rales.   Abdominal:      General: Bowel sounds are normal.      Palpations: Abdomen is soft. There is no mass.      Tenderness: There is no abdominal tenderness. There is no guarding or rebound.   Musculoskeletal:         General: No tenderness. Normal range of motion.      Cervical back: Normal range of motion and neck supple.   Skin:     General: Skin is warm and dry.      Capillary Refill: Capillary refill takes less than 2 seconds.   Neurological:      Mental Status: She is alert and oriented to person, place, and time.   Psychiatric:         Behavior: Behavior normal.         Thought Content: Thought content normal.         Judgment: Judgment normal.         DIAGNOSTIC RESULTS     RADIOLOGY:  Non-plain film images such as CT, Ultrasound and MRI are read by the radiologist. Plain radiographic images are visualized and preliminarily interpreted bythe emergency physician with the below findings:          XR ABDOMEN (KUB) (SINGLE AP VIEW)   Final Result   Impression:

## 2024-04-26 NOTE — DISCHARGE INSTRUCTIONS
Higher fiber diet with 2 capfuls of miralax to start increased physical activity and close follow PCP

## 2024-04-29 ENCOUNTER — OFFICE VISIT (OUTPATIENT)
Dept: OBGYN CLINIC | Age: 29
End: 2024-04-29
Payer: OTHER GOVERNMENT

## 2024-04-29 VITALS
DIASTOLIC BLOOD PRESSURE: 72 MMHG | SYSTOLIC BLOOD PRESSURE: 105 MMHG | HEART RATE: 59 BPM | BODY MASS INDEX: 27.37 KG/M2 | WEIGHT: 180 LBS

## 2024-04-29 DIAGNOSIS — E28.2 PCOS (POLYCYSTIC OVARIAN SYNDROME): ICD-10-CM

## 2024-04-29 DIAGNOSIS — R35.0 URINARY FREQUENCY: ICD-10-CM

## 2024-04-29 DIAGNOSIS — Z01.419 WOMEN'S ANNUAL ROUTINE GYNECOLOGICAL EXAMINATION: Primary | ICD-10-CM

## 2024-04-29 DIAGNOSIS — Z12.39 ENCOUNTER FOR SCREENING BREAST EXAMINATION: ICD-10-CM

## 2024-04-29 DIAGNOSIS — N92.6 IRREGULAR MENSES: ICD-10-CM

## 2024-04-29 DIAGNOSIS — L83 ACANTHOSIS NIGRICANS: ICD-10-CM

## 2024-04-29 DIAGNOSIS — Z12.4 SCREENING FOR CERVICAL CANCER: ICD-10-CM

## 2024-04-29 LAB
CORTIS SERPL-MCNC: 11.5 UG/DL
FSH SERPL-SCNC: 4.3 MIU/ML
LH SERPL-ACNC: 4.5 MIU/ML
T4 FREE SERPL-MCNC: 0.92 NG/DL (ref 0.93–1.7)
TESTOST SERPL-MCNC: 35.6 NG/DL (ref 8.4–48.1)

## 2024-04-29 PROCEDURE — 99213 OFFICE O/P EST LOW 20 MIN: CPT | Performed by: NURSE PRACTITIONER

## 2024-04-29 PROCEDURE — 99395 PREV VISIT EST AGE 18-39: CPT | Performed by: NURSE PRACTITIONER

## 2024-04-29 ASSESSMENT — ENCOUNTER SYMPTOMS
RESPIRATORY NEGATIVE: 1
COLOR CHANGE: 1
CONSTIPATION: 0
ALLERGIC/IMMUNOLOGIC NEGATIVE: 1
DIARRHEA: 0
ROS SKIN COMMENTS: ITCHING
EYES NEGATIVE: 1
GASTROINTESTINAL NEGATIVE: 1

## 2024-04-29 NOTE — PROGRESS NOTES
Pt presents today for pap smear and breast exam.  She states she has not had a period this month states she is def not pregnant and does not want to do a test. She states that she has not had cysts in several months but now is having pelvic pain and is about to change INS.    Mammo:NA  Pap smear:  Contraception:NA    P:0  Ab:0  Bone density:NA  Colonoscopy:

## 2024-04-29 NOTE — PROGRESS NOTES
Lawrence Guerrero is a 28 y.o. female who presents today for her medical conditions/ complaints as noted below. Lawrence Guerrero is c/o of Annual Exam        HPI  Pt presents for well woman exam and pap smear. Long history of PCOS and periods had been regular, but currently is 2 weeks late for her cycle. Feels more fullness and pressure in her pelvis. Unsure if could be related to DDD in lumbar spine, chronic constipation or PCOS. Has lost 150lbs! It started out as unexplained and has been following with GI. The Lactulose has been helping with constipation, but still has some watery diarrhea type stools and then trapped hard stools. Has also incorporated more water intake and some healthy lifestyle changes. Still having some darkening of her skin in armpits and itching. Was treated by PCP with Diflucan and Nystatin. It helped a little, but it comes back. C/o urinary frequency- unsure if it could be related to drinking  oz of water daily, but she had some RBC in her most recent UA.    Mammo:NA  Pap smear:  Contraception: NA    P:0  Ab:0  Bone density:NA  Colonoscopy:  Patient's last menstrual period was 2024 (exact date).      Past Medical History:   Diagnosis Date    Concussion     approximately 10 years ago    Depression     Major depressive disorder     Food allergy     Hypothyroidism     Metabolic disorder     PCOS (polycystic ovarian syndrome)      Past Surgical History:   Procedure Laterality Date    CHOLECYSTECTOMY      COLONOSCOPY  2014    Dr Champ Torres-[place],  Small non-bleeding internal hemorrhoids w/skin tags, serrated polyp     COLONOSCOPY N/A 10/21/2019    Dr Klein-Normal, 5 yr recall    COLONOSCOPY N/A 2021    Dr Klein, [facility], (-)Micro Colitis,  Int hemorrhoids-Grade 1, 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, 20 year recall    KNEE SURGERY      LAPAROSCOPY N/A 2020    DIAGNOSTIC LAPAROSCOPY AND CHROMOPERTUBATION performed by Kathy Aquino MD

## 2024-04-30 ENCOUNTER — HOSPITAL ENCOUNTER (OUTPATIENT)
Dept: ULTRASOUND IMAGING | Age: 29
Discharge: HOME | End: 2024-04-30
Payer: OTHER GOVERNMENT

## 2024-04-30 DIAGNOSIS — E28.2 PCOS (POLYCYSTIC OVARIAN SYNDROME): ICD-10-CM

## 2024-04-30 DIAGNOSIS — E07.9 THYROID DYSFUNCTION: Primary | ICD-10-CM

## 2024-04-30 LAB — DHEA-S SERPL-MCNC: 435 UG/DL (ref 99–340)

## 2024-04-30 PROCEDURE — 76830 TRANSVAGINAL US NON-OB: CPT

## 2024-04-30 ASSESSMENT — ENCOUNTER SYMPTOMS
CHOKING: 0
VOMITING: 0
BLOOD IN STOOL: 0
TROUBLE SWALLOWING: 0
SHORTNESS OF BREATH: 0
DIARRHEA: 1
ABDOMINAL PAIN: 1
CONSTIPATION: 0
NAUSEA: 1
ABDOMINAL DISTENTION: 0
RECTAL PAIN: 0
ANAL BLEEDING: 0
COUGH: 0

## 2024-05-01 ENCOUNTER — HOSPITAL ENCOUNTER (OUTPATIENT)
Dept: ULTRASOUND IMAGING | Age: 29
Discharge: HOME | End: 2024-05-01
Payer: OTHER GOVERNMENT

## 2024-05-01 DIAGNOSIS — E07.9 THYROID DYSFUNCTION: ICD-10-CM

## 2024-05-01 PROCEDURE — 76536 US EXAM OF HEAD AND NECK: CPT

## 2024-05-13 ENCOUNTER — HOSPITAL ENCOUNTER (EMERGENCY)
Age: 29
Discharge: HOME | End: 2024-05-13
Payer: OTHER GOVERNMENT

## 2024-05-13 ENCOUNTER — OFFICE VISIT (OUTPATIENT)
Age: 29
End: 2024-05-13
Payer: OTHER GOVERNMENT

## 2024-05-13 ENCOUNTER — APPOINTMENT (OUTPATIENT)
Dept: CT IMAGING | Age: 29
End: 2024-05-13
Payer: OTHER GOVERNMENT

## 2024-05-13 VITALS
OXYGEN SATURATION: 98 % | DIASTOLIC BLOOD PRESSURE: 74 MMHG | WEIGHT: 184 LBS | TEMPERATURE: 98.2 F | BODY MASS INDEX: 27.98 KG/M2 | SYSTOLIC BLOOD PRESSURE: 118 MMHG | RESPIRATION RATE: 20 BRPM | HEART RATE: 89 BPM

## 2024-05-13 VITALS
SYSTOLIC BLOOD PRESSURE: 120 MMHG | OXYGEN SATURATION: 100 % | RESPIRATION RATE: 18 BRPM | DIASTOLIC BLOOD PRESSURE: 73 MMHG | HEART RATE: 60 BPM | TEMPERATURE: 98.1 F

## 2024-05-13 DIAGNOSIS — R10.33 PERIUMBILICAL ABDOMINAL PAIN: Primary | ICD-10-CM

## 2024-05-13 DIAGNOSIS — R10.84 GENERALIZED ABDOMINAL PAIN: Primary | ICD-10-CM

## 2024-05-13 DIAGNOSIS — K59.01 SLOW TRANSIT CONSTIPATION: ICD-10-CM

## 2024-05-13 DIAGNOSIS — K59.00 CONSTIPATION, UNSPECIFIED CONSTIPATION TYPE: ICD-10-CM

## 2024-05-13 LAB
ALBUMIN SERPL-MCNC: 4.1 G/DL (ref 3.5–5.2)
ALP SERPL-CCNC: 54 U/L (ref 35–104)
ALT SERPL-CCNC: 19 U/L (ref 5–33)
ANION GAP SERPL CALCULATED.3IONS-SCNC: 8 MMOL/L (ref 7–19)
AST SERPL-CCNC: 16 U/L (ref 5–32)
BASOPHILS # BLD: 0 K/UL (ref 0–0.2)
BASOPHILS NFR BLD: 0.6 % (ref 0–1)
BILIRUB SERPL-MCNC: 0.4 MG/DL (ref 0.2–1.2)
BILIRUB UR QL STRIP: NEGATIVE
BUN SERPL-MCNC: 15 MG/DL (ref 6–20)
CALCIUM SERPL-MCNC: 8.9 MG/DL (ref 8.6–10)
CHLORIDE SERPL-SCNC: 104 MMOL/L (ref 98–111)
CLARITY UR: CLEAR
CO2 SERPL-SCNC: 29 MMOL/L (ref 22–29)
COLOR UR: YELLOW
CREAT SERPL-MCNC: 0.9 MG/DL (ref 0.5–0.9)
EOSINOPHIL # BLD: 0.2 K/UL (ref 0–0.6)
EOSINOPHIL NFR BLD: 2.2 % (ref 0–5)
ERYTHROCYTE [DISTWIDTH] IN BLOOD BY AUTOMATED COUNT: 13.9 % (ref 11.5–14.5)
GLUCOSE SERPL-MCNC: 85 MG/DL (ref 74–109)
GLUCOSE UR STRIP.AUTO-MCNC: NEGATIVE MG/DL
HCG SERPL QL: NEGATIVE
HCT VFR BLD AUTO: 36.9 % (ref 37–47)
HGB BLD-MCNC: 11.9 G/DL (ref 12–16)
HGB UR STRIP.AUTO-MCNC: NEGATIVE MG/L
IMM GRANULOCYTES # BLD: 0 K/UL
KETONES UR STRIP.AUTO-MCNC: NEGATIVE MG/DL
LEUKOCYTE ESTERASE UR QL STRIP.AUTO: NEGATIVE
LIPASE SERPL-CCNC: 31 U/L (ref 13–60)
LYMPHOCYTES # BLD: 2.6 K/UL (ref 1.1–4.5)
LYMPHOCYTES NFR BLD: 37.5 % (ref 20–40)
MCH RBC QN AUTO: 28.5 PG (ref 27–31)
MCHC RBC AUTO-ENTMCNC: 32.2 G/DL (ref 33–37)
MCV RBC AUTO: 88.3 FL (ref 81–99)
MONOCYTES # BLD: 0.5 K/UL (ref 0–0.9)
MONOCYTES NFR BLD: 7.6 % (ref 0–10)
NEUTROPHILS # BLD: 3.6 K/UL (ref 1.5–7.5)
NEUTS SEG NFR BLD: 52 % (ref 50–65)
NITRITE UR QL STRIP.AUTO: NEGATIVE
PH UR STRIP.AUTO: 8.5 [PH] (ref 5–8)
PLATELET # BLD AUTO: 246 K/UL (ref 130–400)
PMV BLD AUTO: 10.8 FL (ref 9.4–12.3)
POTASSIUM SERPL-SCNC: 4.2 MMOL/L (ref 3.5–5)
PROT SERPL-MCNC: 6.8 G/DL (ref 6.6–8.7)
PROT UR STRIP.AUTO-MCNC: NEGATIVE MG/DL
RBC # BLD AUTO: 4.18 M/UL (ref 4.2–5.4)
SODIUM SERPL-SCNC: 141 MMOL/L (ref 136–145)
SP GR UR STRIP.AUTO: 1.02 (ref 1–1.03)
TSH SERPL DL<=0.005 MIU/L-ACNC: 1.79 UIU/ML (ref 0.27–4.2)
UROBILINOGEN UR STRIP.AUTO-MCNC: 1 E.U./DL
WBC # BLD AUTO: 6.9 K/UL (ref 4.8–10.8)

## 2024-05-13 PROCEDURE — 80053 COMPREHEN METABOLIC PANEL: CPT

## 2024-05-13 PROCEDURE — 99285 EMERGENCY DEPT VISIT HI MDM: CPT

## 2024-05-13 PROCEDURE — 6360000004 HC RX CONTRAST MEDICATION: Performed by: PHYSICIAN ASSISTANT

## 2024-05-13 PROCEDURE — 99213 OFFICE O/P EST LOW 20 MIN: CPT

## 2024-05-13 PROCEDURE — 84443 ASSAY THYROID STIM HORMONE: CPT

## 2024-05-13 PROCEDURE — 74177 CT ABD & PELVIS W/CONTRAST: CPT

## 2024-05-13 PROCEDURE — 81003 URINALYSIS AUTO W/O SCOPE: CPT

## 2024-05-13 PROCEDURE — 83690 ASSAY OF LIPASE: CPT

## 2024-05-13 PROCEDURE — 85025 COMPLETE CBC W/AUTO DIFF WBC: CPT

## 2024-05-13 PROCEDURE — 84703 CHORIONIC GONADOTROPIN ASSAY: CPT

## 2024-05-13 PROCEDURE — 36415 COLL VENOUS BLD VENIPUNCTURE: CPT

## 2024-05-13 RX ORDER — DICYCLOMINE HYDROCHLORIDE 10 MG/1
10 CAPSULE ORAL
Qty: 120 CAPSULE | Refills: 0 | Status: SHIPPED | OUTPATIENT
Start: 2024-05-13

## 2024-05-13 RX ADMIN — IOPAMIDOL 75 ML: 755 INJECTION, SOLUTION INTRAVENOUS at 15:46

## 2024-05-13 ASSESSMENT — ENCOUNTER SYMPTOMS
NAUSEA: 1
APNEA: 0
CHEST TIGHTNESS: 0
RHINORRHEA: 0
ABDOMINAL DISTENTION: 0
BACK PAIN: 0
SHORTNESS OF BREATH: 0
SHORTNESS OF BREATH: 0
NAUSEA: 0
ABDOMINAL PAIN: 1
EYE DISCHARGE: 0
BLOOD IN STOOL: 0
ANAL BLEEDING: 0
SINUS PRESSURE: 0
CHOKING: 0
TROUBLE SWALLOWING: 0
FLATUS: 1
EYE DISCHARGE: 0
PHOTOPHOBIA: 0
VOMITING: 0
RECTAL PAIN: 0
ABDOMINAL DISTENTION: 0
ABDOMINAL PAIN: 1
COUGH: 0
CONSTIPATION: 1
FACIAL SWELLING: 0
SINUS PAIN: 0
COUGH: 0
WHEEZING: 0
COLOR CHANGE: 0
EYE PAIN: 0
BELCHING: 0
SORE THROAT: 0
EYE PAIN: 0
HEMATOCHEZIA: 0
DIARRHEA: 0

## 2024-05-13 NOTE — DISCHARGE INSTRUCTIONS
Heating pad tylenol  Abdominal binder 72 hrs and PCP follow up  2 capful miralax daily through next week follow with GI as discussed  Bentyl as trial for gut motility

## 2024-05-13 NOTE — PROGRESS NOTES
[facility]  [facility]  [address] [place] [id number]  Dept: [phone]  Dept Fax: [phone]  Loc: [phone]    Lawrence Guerrero is a 28 y.o. female who presents today for her medical conditions/complaints as noted below.  Lawrence Guerrero is complaining of Abdominal Pain        HPI:   Lawrence Guerrero presents to the clinic today with complaints of generalized abdominal pain 8/10 that started this morning.  Patient reports that she has had mild abdominal pain for several days, but it has worsened today. Admits history of IBS with constipation and says that her \"normal\" is to have a BM about once a week. She says her last BM was yesterday but it was only a very small amount of hard stool. She is currently taking lactulose and miralax but says it is not helping. Pain is worse with movement and passing gas. Admits tenderness to touch. History of cholecystectomy. No alleviating factors reported for this.    Abdominal Pain  This is a new problem. The current episode started in the past 7 days. The onset quality is gradual. The problem occurs constantly. The problem has been gradually worsening. The pain is located in the generalized abdominal region (worse around the umbilicus). The pain is at a severity of 8/10. The quality of the pain is aching and sharp. Associated symptoms include anorexia, constipation, flatus and nausea. Pertinent negatives include no arthralgias, belching, diarrhea, dysuria, fever, frequency, headaches, hematochezia, hematuria, melena, myalgias, vomiting or weight loss. The pain is aggravated by certain positions, eating, movement and palpation. The pain is relieved by Being still. The treatment provided no relief. Her past medical history is significant for irritable bowel syndrome.       Past Medical History:   Diagnosis Date    Concussion     approximately 10 years ago    Depression     Major depressive disorder     Food allergy

## 2024-05-13 NOTE — ED PROVIDER NOTES
[facility] EMERGENCY DEPT  eMERGENCYdEPARTMENT eNCOUnter      Pt Name: Lawrence Guerrero  MRN: [id number]  Birthdate 1995  Date of evaluation: 5/13/2024  Provider:SANTO Smith    CHIEF COMPLAINT       Chief Complaint   Patient presents with    Abdominal Pain     \"Behind belly button\"         HISTORY OF PRESENT ILLNESS  (Location/Symptom, Timing/Onset, Context/Setting, Quality, Duration, Modifying Factors, Severity.)   Lawrence Guerrero is a 28 y.o. female who presents to the emergency department with periumbilical pain acute onset she thinks from landing during playing basketball. Has raynaud is to see endocrinologist. Fatigue. Finishing her menses normally irregular. Heavy clots PCOS hx had thyroid nodule recently dx. Somewhat unwanted weight loss 150 lbs has been making life style adjustment but she has lost a lot. Making gas. Constipation baseline.     HPI    Nursing Notes were reviewed and I agree.    REVIEW OF SYSTEMS    (2-9 systems for level 4, 10 or more for level 5)     Review of Systems   Constitutional:  Positive for fatigue. Negative for activity change, appetite change, chills and fever.   HENT:  Negative for congestion, postnasal drip, rhinorrhea and sore throat.    Eyes:  Negative for photophobia, pain, discharge and visual disturbance.   Respiratory:  Negative for apnea, cough and shortness of breath.    Cardiovascular:  Negative for chest pain and leg swelling.   Gastrointestinal:  Positive for abdominal pain. Negative for abdominal distention and nausea.   Genitourinary:  Negative for vaginal bleeding.   Musculoskeletal:  Negative for arthralgias, back pain, joint swelling, neck pain and neck stiffness.   Skin:  Negative for color change and rash.   Neurological:  Negative for dizziness, syncope, facial asymmetry and headaches.   Hematological:  Negative for adenopathy. Does not bruise/bleed easily.   Psychiatric/Behavioral:  Negative for agitation, behavioral problems and confusion.         Except as

## 2024-05-29 ENCOUNTER — OFFICE VISIT (OUTPATIENT)
Dept: GASTROENTEROLOGY | Age: 29
End: 2024-05-29
Payer: OTHER GOVERNMENT

## 2024-05-29 VITALS
BODY MASS INDEX: 27.13 KG/M2 | HEIGHT: 68 IN | SYSTOLIC BLOOD PRESSURE: 120 MMHG | WEIGHT: 179 LBS | DIASTOLIC BLOOD PRESSURE: 70 MMHG

## 2024-05-29 DIAGNOSIS — K58.1 IRRITABLE BOWEL SYNDROME WITH CONSTIPATION: Primary | ICD-10-CM

## 2024-05-29 DIAGNOSIS — K42.9 UMBILICAL HERNIA WITHOUT OBSTRUCTION AND WITHOUT GANGRENE: ICD-10-CM

## 2024-05-29 PROCEDURE — 99214 OFFICE O/P EST MOD 30 MIN: CPT | Performed by: NURSE PRACTITIONER

## 2024-05-29 RX ORDER — TENAPANOR HYDROCHLORIDE 53.2 MG/1
50 TABLET ORAL 2 TIMES DAILY
Qty: 60 TABLET | Refills: 3 | Status: SHIPPED | OUTPATIENT
Start: 2024-05-29

## 2024-05-29 ASSESSMENT — ENCOUNTER SYMPTOMS
TROUBLE SWALLOWING: 0
RECTAL PAIN: 0
BLOOD IN STOOL: 0
SHORTNESS OF BREATH: 0
CHOKING: 0
ANAL BLEEDING: 0
ABDOMINAL PAIN: 1
NAUSEA: 1
CONSTIPATION: 1
ABDOMINAL DISTENTION: 0
VOMITING: 0
COUGH: 0
DIARRHEA: 0

## 2024-05-29 NOTE — PROGRESS NOTES
reviewed and are negative.        Objective:     /70 (Site: Left Upper Arm)   Ht 1.727 m (5' 8\")   Wt 81.2 kg (179 lb)   LMP 05/01/2024 (Exact Date)   BMI 27.22 kg/m²     Physical Exam  Constitutional:       Appearance: Normal appearance.   HENT:      Head: Normocephalic.      Right Ear: External ear normal.      Left Ear: External ear normal.      Nose: Nose normal.   Eyes:      General:         Right eye: No discharge.         Left eye: No discharge.      Extraocular Movements: Extraocular movements intact.      Conjunctiva/sclera: Conjunctivae normal.   Cardiovascular:      Rate and Rhythm: Normal rate.   Pulmonary:      Effort: Pulmonary effort is normal. No respiratory distress.   Abdominal:      General: There is no distension.      Tenderness: There is abdominal tenderness in the periumbilical area.      Hernia: A hernia is present. Hernia is present in the umbilical area.   Musculoskeletal:         General: Normal range of motion.      Cervical back: Normal range of motion.   Skin:     General: Skin is warm and dry.   Neurological:      General: No focal deficit present.      Mental Status: She is alert and oriented to person, place, and time.   Psychiatric:         Mood and Affect: Mood normal.         Behavior: Behavior normal.        no

## 2024-05-30 ENCOUNTER — TELEPHONE (OUTPATIENT)
Dept: GASTROENTEROLOGY | Age: 29
End: 2024-05-30

## 2024-05-30 NOTE — TELEPHONE ENCOUNTER
05- Called patient she stated that she has not started it as of yet.     She stated that she seen that her insurance denied it and questioned what needed to be done?     Advised that an appeal could be sent in but that it  could take up to 30 days-will proceed with Appeal and if she seen that the medication wasn't helping then to call the office and we would let JEFFY SO know     Oked per patient     Routed to JEFFY SO         05- Appeal faxed to Elva at F [phone]

## 2024-05-30 NOTE — TELEPHONE ENCOUNTER
Can we check with pt to ensure this has been effective.  If so, then yes please, pt has tried and failed Miralax, Lactulose, Linzess, fiber, stool softeners in the past

## 2024-05-31 ENCOUNTER — OFFICE VISIT (OUTPATIENT)
Dept: SURGERY | Age: 29
End: 2024-05-31
Payer: OTHER GOVERNMENT

## 2024-05-31 VITALS — TEMPERATURE: 97 F | WEIGHT: 179 LBS | HEIGHT: 68 IN | OXYGEN SATURATION: 98 % | BODY MASS INDEX: 27.13 KG/M2

## 2024-05-31 DIAGNOSIS — K59.00 CONSTIPATION, UNSPECIFIED CONSTIPATION TYPE: ICD-10-CM

## 2024-05-31 DIAGNOSIS — R63.4 WEIGHT LOSS OF MORE THAN 10% BODY WEIGHT: ICD-10-CM

## 2024-05-31 DIAGNOSIS — K42.9 UMBILICAL HERNIA WITHOUT OBSTRUCTION AND WITHOUT GANGRENE: Primary | ICD-10-CM

## 2024-05-31 PROCEDURE — 99203 OFFICE O/P NEW LOW 30 MIN: CPT | Performed by: SURGERY

## 2024-05-31 ASSESSMENT — ENCOUNTER SYMPTOMS
CONSTIPATION: 1
NAUSEA: 1
ABDOMINAL DISTENTION: 1
ABDOMINAL PAIN: 1

## 2024-05-31 NOTE — PROGRESS NOTES
SUBJECTIVE:  Ms. Guerrero is a 28 y.o. female who presents today with intermittent abdominal pain. The pain is mild but is occurring more frequently over the last two months. She also has severe constipation and notices the pain more when she is constipated and bloated. She has recently lost 150 pounds.      Past Medical History:   Diagnosis Date    Concussion     approximately 10 years ago    Depression     Major depressive disorder     Food allergy     Hypothyroidism     Metabolic disorder     PCOS (polycystic ovarian syndrome)      Past Surgical History:   Procedure Laterality Date    CHOLECYSTECTOMY      COLONOSCOPY  11/03/2014    Dr Champ Torres-[place],  Small non-bleeding internal hemorrhoids w/skin tags, serrated polyp     COLONOSCOPY N/A 10/21/2019    Dr Klein-Normal, 5 yr recall    COLONOSCOPY N/A 06/03/2021    Dr Klein, [facility], (-)Micro Colitis,  Int hemorrhoids-Grade 1, 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, 20 year recall    KNEE SURGERY      LAPAROSCOPY N/A 01/31/2020    DIAGNOSTIC LAPAROSCOPY AND CHROMOPERTUBATION performed by Kathy Aquino MD at [facility] OR    POLYPECTOMY      TONSILLECTOMY AND ADENOIDECTOMY      UPPER GASTROINTESTINAL ENDOSCOPY  11/03/2014    Dr Champ Hdez, Gastritis, h pylori neg    UPPER GASTROINTESTINAL ENDOSCOPY N/A 10/21/2019    Dr Klein-Normal    UPPER GASTROINTESTINAL ENDOSCOPY N/A 06/03/2021    Dr Klein, (-) [place]     Current Outpatient Medications   Medication Sig Dispense Refill    Tenapanor HCl (IBSRELA) 50 MG TABS Take 50 mg by mouth in the morning and at bedtime 60 tablet 3    ondansetron (ZOFRAN) 4 MG tablet Take 1 tablet by mouth every 6 hours as needed for Nausea 20 tablet 1    Dulaglutide (TRULICITY) 0.75 MG/0.5ML SOPN INJECT 0.75 MG UNDER THE SKIN ONCE A WEEK 2 mL 12     No current facility-administered medications for this visit.     Allergies: Shrimp extract, Corn-containing products, Sertraline, Shrimp (diagnostic),

## 2024-06-12 ENCOUNTER — TELEPHONE (OUTPATIENT)
Dept: SURGERY | Age: 29
End: 2024-06-12

## 2024-06-12 NOTE — TELEPHONE ENCOUNTER
Called patient to let her know that Elva had approved her appt with Dr. Vernon.  I called Dr. Blum office to let them know and faxd over her records.  They called me to let me know that they do no do Pinneculectomy's and that the would be doing an abdominoplasty .  They said the abdominoplasty is considered plastic surgery and that the patient would have to pay out of pocket for this procedure.  They stated the initial consult was $100 to discuss the surgery.  This information was given to the patient and she said she would call and talk to them to discuss further.

## 2024-07-10 ENCOUNTER — OFFICE VISIT (OUTPATIENT)
Dept: GASTROENTEROLOGY | Age: 29
End: 2024-07-10
Payer: OTHER GOVERNMENT

## 2024-07-10 VITALS
SYSTOLIC BLOOD PRESSURE: 120 MMHG | DIASTOLIC BLOOD PRESSURE: 80 MMHG | OXYGEN SATURATION: 99 % | HEART RATE: 66 BPM | BODY MASS INDEX: 26.83 KG/M2 | HEIGHT: 68 IN | WEIGHT: 177 LBS

## 2024-07-10 DIAGNOSIS — K58.1 IRRITABLE BOWEL SYNDROME WITH CONSTIPATION: Primary | ICD-10-CM

## 2024-07-10 DIAGNOSIS — R10.10 UPPER ABDOMINAL PAIN: ICD-10-CM

## 2024-07-10 DIAGNOSIS — R11.0 NAUSEA: ICD-10-CM

## 2024-07-10 PROCEDURE — 99214 OFFICE O/P EST MOD 30 MIN: CPT | Performed by: NURSE PRACTITIONER

## 2024-07-10 RX ORDER — MAGNESIUM 200 MG
200 TABLET ORAL DAILY
COMMUNITY

## 2024-07-10 RX ORDER — UREA 10 %
500 LOTION (ML) TOPICAL DAILY
COMMUNITY

## 2024-07-10 RX ORDER — ASCORBIC ACID 500 MG
500 TABLET ORAL DAILY
COMMUNITY

## 2024-07-10 RX ORDER — GINSENG 100 MG
50 CAPSULE ORAL DAILY
COMMUNITY

## 2024-07-10 ASSESSMENT — ENCOUNTER SYMPTOMS
ABDOMINAL PAIN: 1
RECTAL PAIN: 0
COUGH: 0
VOMITING: 0
CHOKING: 0
ANAL BLEEDING: 0
BLOOD IN STOOL: 0
ABDOMINAL DISTENTION: 0
CONSTIPATION: 1
NAUSEA: 1
DIARRHEA: 0
SHORTNESS OF BREATH: 0
TROUBLE SWALLOWING: 0

## 2024-07-10 NOTE — PROGRESS NOTES
Subjective:     Patient ID: Lawrence Guerrero is a 28 y.o. female  PCP: Katey Car APRN  Referring Provider: No ref. provider found    HPI  Patient presents to the office today with the following complaints: Follow-up      Pt seen today for follow up.  Hx IBS with constipation.  Tried and failed Miralax, Lactulose.  Ibsrela 50 mg BID prescribed as last OV.  She has c/o upper abdominal discomfort, borborgymi, cramping.  She will use Hyoscyamine prn for the cramping.  She continues to have nausea.  She has been taking Psyllium husks.  The Ibsrela was not effective.  BM once a week.  She has c/o hair loss.  \"I just feel not great.\"  Pain is most days.  She has c/o reflux, belching, nausea.  Linzess has been effective in the past, but was not covered by insurance.                Last EGD 6/2021 - normal   Last Colonoscopy 6/2021 - normal, 5 year recall  Family hx colon cancer - Paternal Grandmother      Assessment:     1. Irritable bowel syndrome with constipation    2. Upper abdominal pain    3. Nausea              Plan:   - I will try Linzess again at 290 mcg daily, samples provided  - Schedule Colonoscopy and EGD  Instruct on bowel prep.   Nothing to eat or drink after midnight the day of the exam.  Unable to drive for 24 hours after the procedure.   No aspirin or nonsteroidal anti-inflammatories for 5 days before procedure.  I have discussed the benefits, alternatives, and risks (including bleeding, perforation and death)  for pursuing Endoscopy (EGD/Colonscopy/EUS/ERCP) with the patient and they are willing to continue. We also discussed the need for anesthesia, IV access, proper dietary changes, medication changes if necessary, and need for bowel prep (if ordered) prior to their Endoscopic procedure.  They are aware they must have someone accompany them to their scheduled procedure to drive them home - they agree to the above and are willing to continue.        Orders  No orders of the defined types were placed in

## 2024-07-16 ENCOUNTER — PREP FOR PROCEDURE (OUTPATIENT)
Dept: SURGERY | Age: 29
End: 2024-07-16

## 2024-07-16 PROBLEM — K42.9 UMBILICAL HERNIA: Status: ACTIVE | Noted: 2024-07-16

## 2024-07-17 ENCOUNTER — HOSPITAL ENCOUNTER (OUTPATIENT)
Dept: PREADMISSION TESTING | Age: 29
Discharge: HOME | End: 2024-07-21
Payer: OTHER GOVERNMENT

## 2024-07-17 VITALS — BODY MASS INDEX: 25.91 KG/M2 | WEIGHT: 171 LBS | HEIGHT: 68 IN

## 2024-07-17 LAB — MRSA DNA SPEC QL NAA+PROBE: NOT DETECTED

## 2024-07-17 PROCEDURE — 87641 MR-STAPH DNA AMP PROBE: CPT

## 2024-07-17 NOTE — DISCHARGE INSTRUCTIONS
The day before surgery you will receive a phone call from the surgery nurse to let you know what time to arrive on the day of surgery. This call will usually be between 2-4 PM. If you do not receive a phone call by 4 PM the day before your surgery please call [phone] and let them know you have not received an arrival time. If your surgery is on Monday, your call will be on the Friday before your Monday surgery. Please check your voicemail as they may leave a message with that information.    Hold your prescribed GLP-1 Receptor            for 1 week prior to surgery. This medication can delay gastric emptying, potentially increasing gastric volumes and increasing the risk of aspiration during anesthesia.    Chlorhexidine Gluconate 4% Solution    Patient should shower with this soap a minimum of 3 consecutive showers (2 nights before surgery, the night before surgery and the morning of surgery) washing from the neck down (avoiding contact with genitalia).      DO NOT WASH YOUR HAIR OR FACE WITH THIS SOAP.  When washing with this soap, apply enough to suds up the body thoroughly, turn the water away from your body and allow the soap suds to remain on the body for 2 full minutes, then rinse body completely.      After using this soap on the body, please do not apply powders or lotions to your body.  After the shower the night before surgery, please dry off with a new towel, sleep in new freshly laundered pj's, and change your bed linen before going to sleep.      IF YOU HAVE A PET IN YOUR HOME, please do not allow your pet to sleep in the bed with you after you have showered with your surgery prep soap.     Please remember that it is not recommended to allow your pet to sleep with you post op, until your incision has healed.  This can increase your risk of post op infection.Hold your prescribed GLP-1 Receptor            for 1 week prior to surgery. This medication can delay gastric emptying, potentially increasing

## 2024-07-23 ENCOUNTER — ANESTHESIA EVENT (OUTPATIENT)
Dept: OPERATING ROOM | Age: 29
End: 2024-07-23

## 2024-07-24 ENCOUNTER — ANESTHESIA (OUTPATIENT)
Dept: OPERATING ROOM | Age: 29
End: 2024-07-24

## 2024-07-24 ENCOUNTER — HOSPITAL ENCOUNTER (OUTPATIENT)
Age: 29
Setting detail: SPECIMEN
Discharge: HOME | End: 2024-07-24
Payer: OTHER GOVERNMENT

## 2024-07-24 ENCOUNTER — APPOINTMENT (OUTPATIENT)
Dept: OPERATING ROOM | Age: 29
End: 2024-07-24
Attending: INTERNAL MEDICINE

## 2024-07-24 ENCOUNTER — HOSPITAL ENCOUNTER (OUTPATIENT)
Age: 29
Setting detail: OUTPATIENT SURGERY
Discharge: HOME | End: 2024-07-24
Attending: INTERNAL MEDICINE | Admitting: INTERNAL MEDICINE

## 2024-07-24 VITALS
SYSTOLIC BLOOD PRESSURE: 109 MMHG | HEART RATE: 53 BPM | HEIGHT: 68 IN | BODY MASS INDEX: 25.91 KG/M2 | WEIGHT: 171 LBS | RESPIRATION RATE: 18 BRPM | DIASTOLIC BLOOD PRESSURE: 75 MMHG | TEMPERATURE: 97.2 F | OXYGEN SATURATION: 98 %

## 2024-07-24 LAB
CONTROL: NORMAL
PREGNANCY TEST URINE, POC: NORMAL

## 2024-07-24 PROCEDURE — 43239 EGD BIOPSY SINGLE/MULTIPLE: CPT

## 2024-07-24 PROCEDURE — 45378 DIAGNOSTIC COLONOSCOPY: CPT

## 2024-07-24 PROCEDURE — G8907 PT DOC NO EVENTS ON DISCHARG: HCPCS

## 2024-07-24 PROCEDURE — 88342 IMHCHEM/IMCYTCHM 1ST ANTB: CPT

## 2024-07-24 PROCEDURE — 88305 TISSUE EXAM BY PATHOLOGIST: CPT

## 2024-07-24 RX ORDER — SODIUM CHLORIDE, SODIUM LACTATE, POTASSIUM CHLORIDE, CALCIUM CHLORIDE 600; 310; 30; 20 MG/100ML; MG/100ML; MG/100ML; MG/100ML
INJECTION, SOLUTION INTRAVENOUS CONTINUOUS
Status: DISCONTINUED | OUTPATIENT
Start: 2024-07-24 | End: 2024-07-24 | Stop reason: HOSPADM

## 2024-07-24 RX ORDER — LIDOCAINE HYDROCHLORIDE 10 MG/ML
INJECTION, SOLUTION EPIDURAL; INFILTRATION; INTRACAUDAL; PERINEURAL PRN
Status: DISCONTINUED | OUTPATIENT
Start: 2024-07-24 | End: 2024-07-24 | Stop reason: SDUPTHER

## 2024-07-24 RX ORDER — PROPOFOL 10 MG/ML
INJECTION, EMULSION INTRAVENOUS PRN
Status: DISCONTINUED | OUTPATIENT
Start: 2024-07-24 | End: 2024-07-24 | Stop reason: SDUPTHER

## 2024-07-24 RX ADMIN — SODIUM CHLORIDE, SODIUM LACTATE, POTASSIUM CHLORIDE, CALCIUM CHLORIDE: 600; 310; 30; 20 INJECTION, SOLUTION INTRAVENOUS at 11:21

## 2024-07-24 RX ADMIN — LIDOCAINE HYDROCHLORIDE 50 MG: 10 INJECTION, SOLUTION EPIDURAL; INFILTRATION; INTRACAUDAL; PERINEURAL at 12:10

## 2024-07-24 RX ADMIN — PROPOFOL 500 MG: 10 INJECTION, EMULSION INTRAVENOUS at 12:10

## 2024-07-24 NOTE — DISCHARGE INSTRUCTIONS
1.  Await path results, the patient will be contacted in 7-10 days with biopsy results.   2.  Continue anti-GERD measures along with medications for GERD  3.  Avoid NSAIDs; may use Tylenol 500 mg p.o. every 4 hours as needed instead    4. Repeat colonoscopy: Based on her past history, strong family history on the paternal side, colonoscopy findings today and prep quality-in 3 years with a strict 2-day clear liquid diet and a 2-day prep using Plenvu or a similar solution; sooner if she were to develop  lower GI symptoms such as bleeding, abdominal pain, change in bowel habits or stool caliber or if the patient has anemia or unexplained weight loss in the future.   5. - Resume previous meds and diet  - GI clinic f/u 6-8 weeks with Ms. Small   - Keep scheduled f/u appts with other MDs   -If her weight loss continues to persist, consider a CT scan of the abdomen and pelvis with and without IV and p.o. contrast if not done recently; also non-GI etiology should be in the differential diagnosis  NSAIDS (Non-steroidal Anti-Inflammatory)    You have been directed by your physician to avoid any NSAID's; the following medications are a list of those to avoid. If you think that you are taking any NSAID's notify your physician.     Over The Counter    Advil                      Motrin  Nuprin                   Ibuprofen  Midol                     Aleve  Naproxen              Orudis  Aspirin                   Isidra-Granger    Prescriptions and Generics    Cataflam              Relafen  Voltaren               Clinoril  Indocin                 Naproxen  Arthrotec              Lodine  Daypro                 Nalfon  Toradol                Ansaid  Feldene               Meclofenamate  Fenoprofen          Ponstel  Mobic                   Celebrex  Vioxx    POST-OP ORDERS: ENDOSCOPY & COLONOSCOPY:    1. Rest today.    2. DO NOT eat or drink until wide awake; eat your usual diet today in moderate amount only.    3. DO NOT drive

## 2024-07-24 NOTE — OP NOTE
lumen distended well with insufflation. Retroflexed views otherwise revealed a normal GE junction, fundus and cardia as well.       Duodenum:  Normal. Random biopsies were taken to check for Celiac disease and other causes of villous atrophy.     No obvious lesions to explain the patient's nausea or upper abdominal pain were discovered on this exam.    RECOMMENDATIONS:    1.  Await path results, the patient will be contacted in 7-10 days with biopsy results.   2.  Continue anti-GERD measures along with medications for GERD  3.  Avoid NSAIDs; may use Tylenol 500 mg p.o. every 4 hours as needed instead    The results were discussed with the patient and family.  A copy of the images obtained were given to the patient.     (Please note that portions of this note were completed with a voice recognition program. Efforts were made to edit the dictations but occasionally words may be mis-transcribed.)     Sheba Klein MD, MD  7/24/2024  12:06 PM

## 2024-07-24 NOTE — OP NOTE
obtained, the patient was placed in the left lateral position.     The perianal area was inspected, and a digital rectal exam was performed. A rectal exam was performed: normal tone, no palpable lesions. At this point, a forward viewing Olympus colonoscope was inserted into the anus and carefully advanced to the cecum with some difficulty requiring external abdominal pressure during parts of this procedure .  The cecum was identified by the ileocecal valve and the appendiceal orifice. The colonoscope was then slowly withdrawn with careful inspection of the mucosa in a linear and circumferential fashion. The scope was retroflexed in the rectum. Suction was utilized during the procedure to remove as much air as possible from the bowel. The colonoscope was removed from the patient, and the procedure was terminated.  Findings are listed below.        Findings:     NO large polyps or masses or strictures or colitis.  Suboptimal exam due to prep quality as described above.    Internal hemorrhoids-Grade 1  Where it was clearly visible, the mucosa appeared normal throughout the entire examined colon  Retroflexion in the rectum was otherwise normal and revealed no further abnormalities      Recommendations:  1. Repeat colonoscopy: Based on her past history, strong family history on the paternal side, colonoscopy findings today and prep quality-in 3 years with a strict 2-day clear liquid diet and a 2-day prep using Plenvu or a similar solution; sooner if she were to develop  lower GI symptoms such as bleeding, abdominal pain, change in bowel habits or stool caliber or if the patient has anemia or unexplained weight loss in the future.   2. - Resume previous meds and diet  - GI clinic f/u 6-8 weeks with Ms. Small   - Keep scheduled f/u appts with other MDs   -If her weight loss continues to persist, consider a CT scan of the abdomen and pelvis with and without IV and p.o. contrast if not done recently; also non-GI etiology

## 2024-07-24 NOTE — ANESTHESIA PRE PROCEDURE
Department of Anesthesiology  Preprocedure Note       Name:  Lawrence Guerrero   Age:  28 y.o.  :  1995                                          MRN:  [id number]         Date:  2024      Surgeon: Surgeon(s):  Sheba Klein MD    Procedure: Procedure(s):  ESOPHAGOGASTRODUODENOSCOPY BIOPSY  COLONOSCOPY DIAGNOSTIC    Medications prior to admission:   Prior to Admission medications    Medication Sig Start Date End Date Taking? Authorizing Provider   vitamin C (ASCORBIC ACID) 500 MG tablet Take 1 tablet by mouth daily    Lawrence Garcia MD   magnesium 200 MG TABS tablet Take 1 tablet by mouth daily    Lawrence Garcia MD   zinc 50 MG TABS tablet Take 1 tablet by mouth daily    Lawrence Garcia MD   Probiotic Product (PROBIOTIC DAILY PO) Take by mouth daily    Lawrence Garcia MD   Biotin 5000 MCG CAPS Take by mouth daily    Lawrence Garcia MD   vitamin B-12 (CYANOCOBALAMIN) 500 MCG tablet Take 1 tablet by mouth daily    Lawrence Garcia MD   linaclotide (LINZESS) 290 MCG CAPS capsule Take 1 capsule by mouth every morning (before breakfast) 7/10/24   Chuyita Small APRN - NP   ondansetron (ZOFRAN) 4 MG tablet Take 1 tablet by mouth every 6 hours as needed for Nausea 24   Chuyita Small APRN - NP   Dulaglutide (TRULICITY) 0.75 MG/0.5ML SOPN INJECT 0.75 MG UNDER THE SKIN ONCE A WEEK  Patient taking differently: Inject 0.5 mLs into the skin once a week Indications:  INJECT 0.75 MG UNDER THE SKIN ONCE A WEEK 24   Katey Car APRN       Current medications:    Current Facility-Administered Medications   Medication Dose Route Frequency Provider Last Rate Last Admin   • lactated ringers IV soln infusion   IntraVENous Continuous Sheba Klein  mL/hr at 24 1121 New Bag at 24 1121       Allergies:    Allergies   Allergen Reactions   • Sertraline Other (See Comments)     Pt. Developed seratonin syndrome and had to go impatient.

## 2024-07-24 NOTE — H&P
Patient Name: Lawrence Guerrero  : 1995  MRN: [id number]  DATE: 24    Allergies:   Allergies   Allergen Reactions    Sertraline Other (See Comments)     Pt. Developed seratonin syndrome and had to go impatient.     Shrimp (Diagnostic) Anaphylaxis    Shrimp Extract Anaphylaxis    Corn-Containing Products Other (See Comments)     Intolerance    Soybean-Containing Drug Products Other (See Comments)     Intolerance    Wheat Nausea Only    Wheat Extract Other (See Comments)        ENDOSCOPY  History and Physical    Procedure:    [] Diagnostic Colonoscopy       [x] Screening Colonoscopy  [x] EGD      [] ERCP      [] EUS       [] Other    [x] Previous office notes/History and Physical reviewed from the patients chart. Please see EMR for further details of HPI. I have examined the patient's status immediately prior to the procedure and:      Indications/HPI:     For both EGD and colonoscopy exams today:     1. Irritable bowel syndrome with constipation    2. Upper abdominal pain    3. Nausea    4.  Personal history of serrated colon polyps    Last EGD 2021 - normal   Last Colonoscopy 2021 - normal, 5 year recall    5. Family hx colon cancer - Paternal Grandmother     []Abdominal Pain   []Cancer- GI/Lung     []Fhx of colon CA/polyps  []History of Polyps  []Barretts            []Melena  []Abnormal Imaging              []Dysphagia              []Persistent Pneumonia   []Anemia                            []Food Impaction        []History of Polyps  [] GI Bleed             []Pulmonary nodule/Mass   []Change in bowel habits []Heartburn/Reflux  []Rectal Bleed (BRBPR)  []Chest Pain - Non Cardiac []Heme (+) Stool []Ulcers  []Constipation  []Hemoptysis  []Varices  []Diarrhea  []Hypoxemia    []Nausea/Vomiting   []Screening   []Crohns/Colitis  []Other:     Anesthesia:   [x] MAC [] Moderate Sedation   [] General   [] None     ROS: 12 pt Review of Symptoms was negative unless mentioned above    Medications:   Prior to  """Discussed dry eye diagnosis with patient.  Educated patient on proper lid hygiene and stressed importance of lid massages and the use of warm compresses and artificial tears."""

## 2024-07-24 NOTE — ANESTHESIA POSTPROCEDURE EVALUATION
Department of Anesthesiology  Postprocedure Note    Patient: Lawrence Guerrero  MRN: [id number]  YOB: 1995  Date of evaluation: 7/24/2024    Procedure Summary       Date: 07/24/24 Room / Location: [place] [address] / [facility]    Anesthesia Start: 1204 Anesthesia Stop:     Procedures:       ESOPHAGOGASTRODUODENOSCOPY BIOPSY (Esophagus)      COLONOSCOPY DIAGNOSTIC (Abdomen) Diagnosis:       Nausea      Upper abdominal pain      Chronic GERD      (Nausea [R11.0])      (Upper abdominal pain [R10.10])      (Chronic GERD [K21.9])    Surgeons: Sheba Klein MD Responsible Provider: Grazyna Balderas APRN - CRNA    Anesthesia Type: general, TIVA ASA Status: 2            Anesthesia Type: No value filed.    Leona Phase I:      Leona Phase II:      Anesthesia Post Evaluation    Patient location during evaluation: bedside  Patient participation: complete - patient participated  Level of consciousness: sleepy but conscious  Pain score: 0  Airway patency: patent  Nausea & Vomiting: no nausea and no vomiting  Cardiovascular status: blood pressure returned to baseline  Respiratory status: acceptable, room air and spontaneous ventilation  Hydration status: euvolemic  Pain management: adequate    No notable events documented.

## 2024-07-26 RX ORDER — DULAGLUTIDE 0.75 MG/.5ML
0.75 INJECTION, SOLUTION SUBCUTANEOUS WEEKLY
Qty: 6 ML | Refills: 3 | Status: SHIPPED | OUTPATIENT
Start: 2024-07-26

## 2024-07-26 NOTE — TELEPHONE ENCOUNTER
Lawrence called requesting a refill of the below medication which has been pended for you:     Requested Prescriptions     Pending Prescriptions Disp Refills    TRULICITY 0.75 MG/0.5ML SOPN SC injection [Pharmacy Med Name: TRULICITY SD PEN 0.5ML 4'S 0.75MG 0.75MG] 6 mL 3     Sig: INJECT 0.75 MG UNDER THE SKIN ONCE A WEEK       Last Appointment Date: 4/9/2024  Next Appointment Date: 10/15/2024    Allergies   Allergen Reactions    Sertraline Other (See Comments)     Pt. Developed seratonin syndrome and had to go impatient.     Shrimp (Diagnostic) Anaphylaxis    Shrimp Extract Anaphylaxis    Corn-Containing Products Other (See Comments)     Intolerance    Soybean-Containing Drug Products Other (See Comments)     Intolerance    Wheat Nausea Only    Wheat Extract Other (See Comments)

## 2024-08-01 ENCOUNTER — ANESTHESIA EVENT (OUTPATIENT)
Dept: OPERATING ROOM | Age: 29
End: 2024-08-01
Payer: OTHER GOVERNMENT

## 2024-08-01 ENCOUNTER — HOSPITAL ENCOUNTER (OUTPATIENT)
Age: 29
Setting detail: OUTPATIENT SURGERY
Discharge: HOME | End: 2024-08-01
Attending: SURGERY | Admitting: SURGERY
Payer: OTHER GOVERNMENT

## 2024-08-01 ENCOUNTER — ANESTHESIA (OUTPATIENT)
Dept: OPERATING ROOM | Age: 29
End: 2024-08-01
Payer: OTHER GOVERNMENT

## 2024-08-01 VITALS
HEIGHT: 68 IN | RESPIRATION RATE: 16 BRPM | TEMPERATURE: 96.9 F | BODY MASS INDEX: 25.91 KG/M2 | SYSTOLIC BLOOD PRESSURE: 110 MMHG | OXYGEN SATURATION: 100 % | HEART RATE: 45 BPM | WEIGHT: 171 LBS | DIASTOLIC BLOOD PRESSURE: 83 MMHG

## 2024-08-01 DIAGNOSIS — K42.9 UMBILICAL HERNIA WITHOUT OBSTRUCTION AND WITHOUT GANGRENE: Primary | ICD-10-CM

## 2024-08-01 LAB
GLUCOSE BLD-MCNC: 86 MG/DL (ref 70–99)
HCG, URINE, POC: NEGATIVE
Lab: NORMAL
NEGATIVE QC PASS/FAIL: NORMAL
PERFORMED ON: NORMAL
POSITIVE QC PASS/FAIL: NORMAL

## 2024-08-01 PROCEDURE — 3600000014 HC SURGERY LEVEL 4 ADDTL 15MIN: Performed by: SURGERY

## 2024-08-01 PROCEDURE — 2500000003 HC RX 250 WO HCPCS: Performed by: SURGERY

## 2024-08-01 PROCEDURE — 6360000002 HC RX W HCPCS: Performed by: NURSE ANESTHETIST, CERTIFIED REGISTERED

## 2024-08-01 PROCEDURE — 3700000001 HC ADD 15 MINUTES (ANESTHESIA): Performed by: SURGERY

## 2024-08-01 PROCEDURE — C9290 INJ, BUPIVACAINE LIPOSOME: HCPCS | Performed by: SURGERY

## 2024-08-01 PROCEDURE — 2580000003 HC RX 258: Performed by: ANESTHESIOLOGY

## 2024-08-01 PROCEDURE — 3600000004 HC SURGERY LEVEL 4 BASE: Performed by: SURGERY

## 2024-08-01 PROCEDURE — 3700000000 HC ANESTHESIA ATTENDED CARE: Performed by: SURGERY

## 2024-08-01 PROCEDURE — 2500000003 HC RX 250 WO HCPCS: Performed by: NURSE ANESTHETIST, CERTIFIED REGISTERED

## 2024-08-01 PROCEDURE — 7100000001 HC PACU RECOVERY - ADDTL 15 MIN: Performed by: SURGERY

## 2024-08-01 PROCEDURE — 7100000010 HC PHASE II RECOVERY - FIRST 15 MIN: Performed by: SURGERY

## 2024-08-01 PROCEDURE — 49591 RPR AA HRN 1ST < 3 CM RDC: CPT | Performed by: SURGERY

## 2024-08-01 PROCEDURE — 2709999900 HC NON-CHARGEABLE SUPPLY: Performed by: SURGERY

## 2024-08-01 PROCEDURE — 7100000000 HC PACU RECOVERY - FIRST 15 MIN: Performed by: SURGERY

## 2024-08-01 PROCEDURE — 82962 GLUCOSE BLOOD TEST: CPT

## 2024-08-01 PROCEDURE — 6360000002 HC RX W HCPCS: Performed by: SURGERY

## 2024-08-01 PROCEDURE — 6370000000 HC RX 637 (ALT 250 FOR IP): Performed by: SURGERY

## 2024-08-01 PROCEDURE — 7100000011 HC PHASE II RECOVERY - ADDTL 15 MIN: Performed by: SURGERY

## 2024-08-01 RX ORDER — HYDROMORPHONE HYDROCHLORIDE 1 MG/ML
0.25 INJECTION, SOLUTION INTRAMUSCULAR; INTRAVENOUS; SUBCUTANEOUS EVERY 5 MIN PRN
Status: DISCONTINUED | OUTPATIENT
Start: 2024-08-01 | End: 2024-08-01 | Stop reason: HOSPADM

## 2024-08-01 RX ORDER — SODIUM CHLORIDE 0.9 % (FLUSH) 0.9 %
5-40 SYRINGE (ML) INJECTION PRN
Status: DISCONTINUED | OUTPATIENT
Start: 2024-08-01 | End: 2024-08-01 | Stop reason: HOSPADM

## 2024-08-01 RX ORDER — HYDROMORPHONE HYDROCHLORIDE 1 MG/ML
0.5 INJECTION, SOLUTION INTRAMUSCULAR; INTRAVENOUS; SUBCUTANEOUS EVERY 5 MIN PRN
Status: DISCONTINUED | OUTPATIENT
Start: 2024-08-01 | End: 2024-08-01 | Stop reason: HOSPADM

## 2024-08-01 RX ORDER — ROCURONIUM BROMIDE 10 MG/ML
INJECTION, SOLUTION INTRAVENOUS PRN
Status: DISCONTINUED | OUTPATIENT
Start: 2024-08-01 | End: 2024-08-01 | Stop reason: SDUPTHER

## 2024-08-01 RX ORDER — LIDOCAINE HYDROCHLORIDE 10 MG/ML
INJECTION, SOLUTION EPIDURAL; INFILTRATION; INTRACAUDAL; PERINEURAL PRN
Status: DISCONTINUED | OUTPATIENT
Start: 2024-08-01 | End: 2024-08-01 | Stop reason: SDUPTHER

## 2024-08-01 RX ORDER — NALOXONE HYDROCHLORIDE 0.4 MG/ML
INJECTION, SOLUTION INTRAMUSCULAR; INTRAVENOUS; SUBCUTANEOUS PRN
Status: DISCONTINUED | OUTPATIENT
Start: 2024-08-01 | End: 2024-08-01 | Stop reason: HOSPADM

## 2024-08-01 RX ORDER — BUPIVACAINE HYDROCHLORIDE AND EPINEPHRINE 2.5; 5 MG/ML; UG/ML
INJECTION, SOLUTION INFILTRATION; PERINEURAL PRN
Status: DISCONTINUED | OUTPATIENT
Start: 2024-08-01 | End: 2024-08-01 | Stop reason: HOSPADM

## 2024-08-01 RX ORDER — MIDAZOLAM HYDROCHLORIDE 1 MG/ML
INJECTION INTRAMUSCULAR; INTRAVENOUS PRN
Status: DISCONTINUED | OUTPATIENT
Start: 2024-08-01 | End: 2024-08-01 | Stop reason: SDUPTHER

## 2024-08-01 RX ORDER — OXYCODONE HYDROCHLORIDE AND ACETAMINOPHEN 5; 325 MG/1; MG/1
1 TABLET ORAL ONCE
Status: COMPLETED | OUTPATIENT
Start: 2024-08-01 | End: 2024-08-01

## 2024-08-01 RX ORDER — KETOROLAC TROMETHAMINE 30 MG/ML
INJECTION, SOLUTION INTRAMUSCULAR; INTRAVENOUS PRN
Status: DISCONTINUED | OUTPATIENT
Start: 2024-08-01 | End: 2024-08-01 | Stop reason: SDUPTHER

## 2024-08-01 RX ORDER — FENTANYL CITRATE 50 UG/ML
INJECTION, SOLUTION INTRAMUSCULAR; INTRAVENOUS PRN
Status: DISCONTINUED | OUTPATIENT
Start: 2024-08-01 | End: 2024-08-01 | Stop reason: SDUPTHER

## 2024-08-01 RX ORDER — SODIUM CHLORIDE 0.9 % (FLUSH) 0.9 %
5-40 SYRINGE (ML) INJECTION EVERY 12 HOURS SCHEDULED
Status: DISCONTINUED | OUTPATIENT
Start: 2024-08-01 | End: 2024-08-01 | Stop reason: HOSPADM

## 2024-08-01 RX ORDER — DEXAMETHASONE SODIUM PHOSPHATE 10 MG/ML
INJECTION, SOLUTION INTRAMUSCULAR; INTRAVENOUS PRN
Status: DISCONTINUED | OUTPATIENT
Start: 2024-08-01 | End: 2024-08-01 | Stop reason: SDUPTHER

## 2024-08-01 RX ORDER — METOCLOPRAMIDE HYDROCHLORIDE 5 MG/ML
10 INJECTION INTRAMUSCULAR; INTRAVENOUS
Status: DISCONTINUED | OUTPATIENT
Start: 2024-08-01 | End: 2024-08-01 | Stop reason: HOSPADM

## 2024-08-01 RX ORDER — ONDANSETRON 2 MG/ML
INJECTION INTRAMUSCULAR; INTRAVENOUS PRN
Status: DISCONTINUED | OUTPATIENT
Start: 2024-08-01 | End: 2024-08-01 | Stop reason: SDUPTHER

## 2024-08-01 RX ORDER — SODIUM CHLORIDE, SODIUM LACTATE, POTASSIUM CHLORIDE, CALCIUM CHLORIDE 600; 310; 30; 20 MG/100ML; MG/100ML; MG/100ML; MG/100ML
INJECTION, SOLUTION INTRAVENOUS CONTINUOUS
Status: DISCONTINUED | OUTPATIENT
Start: 2024-08-01 | End: 2024-08-01 | Stop reason: HOSPADM

## 2024-08-01 RX ORDER — OXYCODONE HYDROCHLORIDE AND ACETAMINOPHEN 5; 325 MG/1; MG/1
1 TABLET ORAL EVERY 6 HOURS PRN
Qty: 12 TABLET | Refills: 0 | Status: SHIPPED | OUTPATIENT
Start: 2024-08-01 | End: 2024-08-04

## 2024-08-01 RX ORDER — SODIUM CHLORIDE 9 MG/ML
INJECTION, SOLUTION INTRAVENOUS PRN
Status: DISCONTINUED | OUTPATIENT
Start: 2024-08-01 | End: 2024-08-01 | Stop reason: HOSPADM

## 2024-08-01 RX ORDER — PROPOFOL 10 MG/ML
INJECTION, EMULSION INTRAVENOUS PRN
Status: DISCONTINUED | OUTPATIENT
Start: 2024-08-01 | End: 2024-08-01 | Stop reason: SDUPTHER

## 2024-08-01 RX ORDER — DIPHENHYDRAMINE HYDROCHLORIDE 50 MG/ML
12.5 INJECTION INTRAMUSCULAR; INTRAVENOUS
Status: DISCONTINUED | OUTPATIENT
Start: 2024-08-01 | End: 2024-08-01 | Stop reason: HOSPADM

## 2024-08-01 RX ORDER — CEFAZOLIN SODIUM 1 G/3ML
INJECTION, POWDER, FOR SOLUTION INTRAMUSCULAR; INTRAVENOUS PRN
Status: DISCONTINUED | OUTPATIENT
Start: 2024-08-01 | End: 2024-08-01 | Stop reason: SDUPTHER

## 2024-08-01 RX ADMIN — SODIUM CHLORIDE, SODIUM LACTATE, POTASSIUM CHLORIDE, AND CALCIUM CHLORIDE: 600; 310; 30; 20 INJECTION, SOLUTION INTRAVENOUS at 14:26

## 2024-08-01 RX ADMIN — SUGAMMADEX 200 MG: 100 INJECTION, SOLUTION INTRAVENOUS at 14:32

## 2024-08-01 RX ADMIN — LIDOCAINE HYDROCHLORIDE 100 MG: 10 INJECTION, SOLUTION EPIDURAL; INFILTRATION; INTRACAUDAL; PERINEURAL at 13:55

## 2024-08-01 RX ADMIN — KETOROLAC TROMETHAMINE 30 MG: 30 INJECTION, SOLUTION INTRAMUSCULAR; INTRAVENOUS at 14:32

## 2024-08-01 RX ADMIN — SODIUM CHLORIDE, SODIUM LACTATE, POTASSIUM CHLORIDE, AND CALCIUM CHLORIDE: 600; 310; 30; 20 INJECTION, SOLUTION INTRAVENOUS at 12:44

## 2024-08-01 RX ADMIN — CEFAZOLIN 2 G: 1 INJECTION, POWDER, FOR SOLUTION INTRAMUSCULAR; INTRAVENOUS at 14:03

## 2024-08-01 RX ADMIN — PROPOFOL 200 MG: 10 INJECTION, EMULSION INTRAVENOUS at 13:55

## 2024-08-01 RX ADMIN — OXYCODONE HYDROCHLORIDE AND ACETAMINOPHEN 1 TABLET: 5; 325 TABLET ORAL at 16:14

## 2024-08-01 RX ADMIN — ROCURONIUM BROMIDE 50 MG: 10 INJECTION, SOLUTION INTRAVENOUS at 13:56

## 2024-08-01 RX ADMIN — DEXAMETHASONE SODIUM PHOSPHATE 10 MG: 10 INJECTION, SOLUTION INTRAMUSCULAR; INTRAVENOUS at 14:05

## 2024-08-01 RX ADMIN — MIDAZOLAM 2 MG: 1 INJECTION INTRAMUSCULAR; INTRAVENOUS at 13:53

## 2024-08-01 RX ADMIN — FENTANYL CITRATE 50 MCG: 0.05 INJECTION, SOLUTION INTRAMUSCULAR; INTRAVENOUS at 13:54

## 2024-08-01 RX ADMIN — ONDANSETRON 4 MG: 2 INJECTION INTRAMUSCULAR; INTRAVENOUS at 14:05

## 2024-08-01 ASSESSMENT — PAIN - FUNCTIONAL ASSESSMENT
PAIN_FUNCTIONAL_ASSESSMENT: 0-10
PAIN_FUNCTIONAL_ASSESSMENT: NONE - DENIES PAIN

## 2024-08-01 ASSESSMENT — LIFESTYLE VARIABLES: SMOKING_STATUS: 0

## 2024-08-01 NOTE — H&P
[facility] General Surgery History and Physical   SUBJECTIVE:  Ms. Guerrero is a 28 y.o. female who presents today with intermittent abdominal pain. The pain is mild but is occurring more frequently over the last two months. She also has severe constipation and notices the pain more when she is constipated and bloated. She has recently lost 150 pounds.        Past Medical History        Past Medical History:   Diagnosis Date    Concussion       approximately 10 years ago    Depression       Major depressive disorder     Food allergy      Hypothyroidism      Metabolic disorder      PCOS (polycystic ovarian syndrome)           Past Surgical History         Past Surgical History:   Procedure Laterality Date    CHOLECYSTECTOMY        COLONOSCOPY   11/03/2014     Dr Champ [place],  Small non-bleeding internal hemorrhoids w/skin tags, serrated polyp     COLONOSCOPY N/A 10/21/2019     Dr Klein-Normal, 5 yr recall    COLONOSCOPY N/A 06/03/2021     Dr Klein, [facility], (-)Micro Colitis,  Int hemorrhoids-Grade 1, 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, 20 year recall    KNEE SURGERY        LAPAROSCOPY N/A 01/31/2020     DIAGNOSTIC LAPAROSCOPY AND CHROMOPERTUBATION performed by Kathy Aquino MD at [facility] OR    POLYPECTOMY        TONSILLECTOMY AND ADENOIDECTOMY        UPPER GASTROINTESTINAL ENDOSCOPY   11/03/2014     Dr Champ Hdez, Gastritis, h pylori neg    UPPER GASTROINTESTINAL ENDOSCOPY N/A 10/21/2019     Dr Klein-Normal    UPPER GASTROINTESTINAL ENDOSCOPY N/A 06/03/2021     Dr Klein, (-) [place]         Current Facility-Administered Medications          Current Outpatient Medications   Medication Sig Dispense Refill    Tenapanor HCl (IBSRELA) 50 MG TABS Take 50 mg by mouth in the morning and at bedtime 60 tablet 3    ondansetron (ZOFRAN) 4 MG tablet Take 1 tablet by mouth every 6 hours as needed for Nausea 20 tablet 1    Dulaglutide (TRULICITY) 0.75 MG/0.5ML SOPN INJECT 0.75 MG

## 2024-08-01 NOTE — ANESTHESIA PRE PROCEDURE
Department of Anesthesiology  Preprocedure Note       Name:  Lawrence Guerrero   Age:  28 y.o.  :  1995                                          MRN:  [id number]         Date:  2024      Surgeon: Surgeon(s):  Taylor Prado DO    Procedure: Procedure(s):  OPEN UMBILICAL HERNIA REPAIR    Medications prior to admission:   Prior to Admission medications    Medication Sig Start Date End Date Taking? Authorizing Provider   dulaglutide (TRULICITY) 0.75 MG/0.5ML SOPN SC injection Inject 0.5 mLs into the skin once a week Indications:  INJECT 0.75 MG UNDER THE SKIN ONCE A WEEK 24   Katey Car APRN   vitamin C (ASCORBIC ACID) 500 MG tablet Take 1 tablet by mouth daily    Lawrence Garcia MD   magnesium 200 MG TABS tablet Take 1 tablet by mouth daily    Lawrence Garcia MD   zinc 50 MG TABS tablet Take 1 tablet by mouth daily    Lawrence Garcia MD   Probiotic Product (PROBIOTIC DAILY PO) Take by mouth daily    Lawrence Garcia MD   Biotin 5000 MCG CAPS Take by mouth daily    Lawrence Garcia MD   vitamin B-12 (CYANOCOBALAMIN) 500 MCG tablet Take 1 tablet by mouth daily    Lawrence Garcia MD   linaclotide (LINZESS) 290 MCG CAPS capsule Take 1 capsule by mouth every morning (before breakfast)  Patient not taking: Reported on 2024 7/10/24   Chuyita Small APRN - NP   ondansetron (ZOFRAN) 4 MG tablet Take 1 tablet by mouth every 6 hours as needed for Nausea 24   Chuyita Small APRN - NP       Current medications:    Current Facility-Administered Medications   Medication Dose Route Frequency Provider Last Rate Last Admin   • lactated ringers IV soln infusion   IntraVENous Continuous Scarlet Juan  mL/hr at 24 1244 New Bag at 24 1244       Allergies:    Allergies   Allergen Reactions   • Sertraline Other (See Comments)     Pt. Developed seratonin syndrome and had to go impatient.    • Shrimp (Diagnostic) Anaphylaxis   • Shrimp Extract

## 2024-08-01 NOTE — OP NOTE
Operative Note      Patient: Lawrence Guerrero  YOB: 1995  MRN: [id number]    Date of Procedure: 8/1/2024    Pre-Op Diagnosis Codes:     * Umbilical hernia [K42.9]    Post-Op Diagnosis: Same       Procedure(s):  OPEN UMBILICAL HERNIA REPAIR    Surgeon(s):  Taylor Prado DO    Assistant:   First Assistant: Chantelle Carter RN    Anesthesia: General    Estimated Blood Loss (mL): Minimal    Complications: None    Specimens:   * No specimens in log *    Implants:  * No implants in log *      Drains: * No LDAs found *    Findings:  Infection Present At Time Of Surgery (PATOS) (choose all levels that have infection present):  No infection present  Other Findings: < 1 cm umbilical defect. Closed with vicryl.      INDICATIONS  Indications:   Ms. Lawrence Guerrero presented to the office with complaints of abdominal pain.  she was found to have a small umbilical hernia and has elected for hernia repair.     PROCEDURE  Patient was taken to the main operating room, placed on the operating table  supine.   After IV Antibiotics were administered, the patient was placed under general endotracheal anesthesia. The abdomen was prepped and draped in the usual sterile fashion.  A timeout was performed identifying the correct patient, equipment, and antibiotics given.    Incision was made in the area overlying the hernia.  A combination of blunt and electrocautery dissection allowed for palpation of the hernia defect, which was very small and measured 0.5 cm wide. There was great laxity in the muscle fascia. The fascia was cleared of contents surrounding the defect.      The defect was closed with 0-Vicryl sutures.  The area was irrigated and dried. Hemostasis was obtained.  The subcutaneous layer was closed with 000-Vicryl and skin with 0000-Monocryl. Sterile Dermabond dressing was applied.     Sponge, needle, instrument count correct on 2 occasions.  Estimated  intraoperative blood loss 2 mL.     Ms. Guerrero tolerated her

## 2024-08-01 NOTE — ANESTHESIA POSTPROCEDURE EVALUATION
Department of Anesthesiology  Postprocedure Note    Patient: Lawrence Guerrero  MRN: [id number]  YOB: 1995  Date of evaluation: 8/1/2024    Procedure Summary       Date: 08/01/24 Room / Location: [address]    Anesthesia Start: 1353 Anesthesia Stop: 1458    Procedure: OPEN UMBILICAL HERNIA REPAIR Diagnosis:       Umbilical hernia      (Umbilical hernia [K42.9])    Surgeons: Taylor Prado DO Responsible Provider: Sakina Lees APRN - CRNA    Anesthesia Type: general ASA Status: 2            Anesthesia Type: No value filed.    Leona Phase I: Leona Score: 8    Leona Phase II:      Anesthesia Post Evaluation    Patient location during evaluation: PACU  Patient participation: complete - patient participated  Level of consciousness: sleepy but conscious  Pain score: 0  Airway patency: patent  Nausea & Vomiting: no nausea and no vomiting  Cardiovascular status: hemodynamically stable  Respiratory status: acceptable  Hydration status: stable  Pain management: adequate    No notable events documented.

## 2024-08-13 ENCOUNTER — OFFICE VISIT (OUTPATIENT)
Dept: SURGERY | Age: 29
End: 2024-08-13
Payer: OTHER GOVERNMENT

## 2024-08-13 VITALS
TEMPERATURE: 97.3 F | WEIGHT: 178 LBS | OXYGEN SATURATION: 100 % | HEART RATE: 73 BPM | HEIGHT: 68 IN | BODY MASS INDEX: 26.98 KG/M2

## 2024-08-13 DIAGNOSIS — K42.9 UMBILICAL HERNIA WITHOUT OBSTRUCTION AND WITHOUT GANGRENE: Primary | ICD-10-CM

## 2024-08-13 PROCEDURE — 99212 OFFICE O/P EST SF 10 MIN: CPT | Performed by: SURGERY

## 2024-08-13 ASSESSMENT — ENCOUNTER SYMPTOMS
CONSTIPATION: 1
ABDOMINAL PAIN: 1
NAUSEA: 1
ABDOMINAL DISTENTION: 1

## 2024-08-13 NOTE — PROGRESS NOTES
SUBJECTIVE:  Ms. Guerrero is a 28 y.o. female who presents today with two weeks s/p open umbilical hernia repair. She is doing well. She is having a little bit of constipation and drank a senna tea which helped.  She has some soreness when she lays on her side because her loose skin pulls on the area.     Patient's medications, allergies, past medical, surgical, social and family histories werereviewed and updated as appropriate.    Review of Systems   Constitutional:  Positive for unexpected weight change.   Gastrointestinal:  Positive for abdominal distention, abdominal pain, constipation and nausea.   Endocrine: Positive for cold intolerance.   Genitourinary:  Positive for frequency and urgency.   Neurological:  Positive for numbness.       OBJECTIVE:  Pulse 73   Temp 97.3 °F (36.3 °C) (Temporal)   Ht 1.727 m (5' 8\")   Wt 80.7 kg (178 lb)   LMP 06/22/2024 (Approximate)   SpO2 100%   BMI 27.06 kg/m²   Physical Exam  Constitutional:       General: She is not in acute distress.     Appearance: Normal appearance. She is normal weight. She is not ill-appearing.   Cardiovascular:      Rate and Rhythm: Normal rate.   Pulmonary:      Effort: Pulmonary effort is normal.   Abdominal:      Palpations: Abdomen is soft.      Comments: Incision c/d/I with healing ridge present.    Neurological:      Mental Status: She is alert.         ASSESSMENT:   Diagnosis Orders   1. Umbilical hernia without obstruction and without gangrene            PLAN:  No orders of the defined types were placed in this encounter.    No orders of the defined types were placed in this encounter.    Doing well. She would likely benefit from abdominoplasty and she notes understanding of this. No further restrictions.    Return for PRN.    Taylor Prado DO 8/13/2024 12:27 PM

## 2024-09-05 ENCOUNTER — TELEPHONE (OUTPATIENT)
Dept: OBGYN CLINIC | Age: 29
End: 2024-09-05

## 2024-09-09 ENCOUNTER — OFFICE VISIT (OUTPATIENT)
Dept: GASTROENTEROLOGY | Age: 29
End: 2024-09-09
Payer: OTHER GOVERNMENT

## 2024-09-09 VITALS
HEIGHT: 68 IN | SYSTOLIC BLOOD PRESSURE: 105 MMHG | BODY MASS INDEX: 26.37 KG/M2 | HEART RATE: 50 BPM | WEIGHT: 174 LBS | OXYGEN SATURATION: 98 % | DIASTOLIC BLOOD PRESSURE: 80 MMHG

## 2024-09-09 DIAGNOSIS — K25.9 GASTRIC EROSION DETERMINED BY ENDOSCOPY: ICD-10-CM

## 2024-09-09 DIAGNOSIS — E65 ABDOMINAL PANNICULUS: ICD-10-CM

## 2024-09-09 DIAGNOSIS — L98.7 LOOSE SKIN: ICD-10-CM

## 2024-09-09 DIAGNOSIS — K58.1 IRRITABLE BOWEL SYNDROME WITH CONSTIPATION: Primary | ICD-10-CM

## 2024-09-09 PROCEDURE — 99214 OFFICE O/P EST MOD 30 MIN: CPT | Performed by: NURSE PRACTITIONER

## 2024-09-09 RX ORDER — PLECANATIDE 3 MG/1
3 TABLET ORAL DAILY
Qty: 30 TABLET | Refills: 5 | Status: SHIPPED | OUTPATIENT
Start: 2024-09-09

## 2024-09-09 RX ORDER — PANTOPRAZOLE SODIUM 40 MG/1
40 TABLET, DELAYED RELEASE ORAL DAILY
Qty: 90 TABLET | Refills: 3 | Status: SHIPPED | OUTPATIENT
Start: 2024-09-09

## 2024-09-09 ASSESSMENT — ENCOUNTER SYMPTOMS
VOMITING: 0
TROUBLE SWALLOWING: 0
CONSTIPATION: 1
ABDOMINAL DISTENTION: 0
DIARRHEA: 0
NAUSEA: 0
ABDOMINAL PAIN: 1
CHOKING: 0
ANAL BLEEDING: 0
SHORTNESS OF BREATH: 0
COUGH: 0
RECTAL PAIN: 0
BLOOD IN STOOL: 0

## 2024-09-10 ENCOUNTER — TELEPHONE (OUTPATIENT)
Dept: GASTROENTEROLOGY | Age: 29
End: 2024-09-10

## 2024-09-13 ENCOUNTER — OFFICE VISIT (OUTPATIENT)
Dept: OBGYN CLINIC | Age: 29
End: 2024-09-13

## 2024-09-13 VITALS
HEART RATE: 58 BPM | SYSTOLIC BLOOD PRESSURE: 104 MMHG | DIASTOLIC BLOOD PRESSURE: 72 MMHG | BODY MASS INDEX: 25.54 KG/M2 | WEIGHT: 168 LBS

## 2024-09-13 DIAGNOSIS — R35.0 URINARY FREQUENCY: ICD-10-CM

## 2024-09-13 DIAGNOSIS — F34.1 DYSTHYMIA: ICD-10-CM

## 2024-09-13 DIAGNOSIS — R10.2 PELVIC PAIN: ICD-10-CM

## 2024-09-13 DIAGNOSIS — N92.6 IRREGULAR MENSES: Primary | ICD-10-CM

## 2024-09-13 DIAGNOSIS — N92.6 IRREGULAR MENSES: ICD-10-CM

## 2024-09-13 LAB
BILIRUBIN, POC: NORMAL
BLOOD URINE, POC: NORMAL
CLARITY, POC: NORMAL
COLOR, POC: YELLOW
GLUCOSE URINE, POC: NORMAL MG/DL
KETONES, POC: NORMAL MG/DL
LEUKOCYTE EST, POC: NORMAL
NITRITE, POC: NORMAL
PH, POC: 6.5
PROTEIN, POC: NORMAL MG/DL
SPECIFIC GRAVITY, POC: 1.02
T3FREE SERPL-MCNC: 1.8 PG/ML (ref 2–4.4)
T4 FREE SERPL-MCNC: 1 NG/DL (ref 0.93–1.7)
TSH SERPL DL<=0.005 MIU/L-ACNC: 1.47 UIU/ML (ref 0.27–4.2)
UROBILINOGEN, POC: 0.2 MG/DL

## 2024-09-13 ASSESSMENT — ENCOUNTER SYMPTOMS
EYES NEGATIVE: 1
ALLERGIC/IMMUNOLOGIC NEGATIVE: 1
RESPIRATORY NEGATIVE: 1
DIARRHEA: 0
CONSTIPATION: 1

## 2024-09-16 ENCOUNTER — HOSPITAL ENCOUNTER (OUTPATIENT)
Dept: ULTRASOUND IMAGING | Age: 29
Discharge: HOME | End: 2024-09-16
Payer: OTHER GOVERNMENT

## 2024-09-16 DIAGNOSIS — N92.6 IRREGULAR MENSES: ICD-10-CM

## 2024-09-16 PROCEDURE — 76830 TRANSVAGINAL US NON-OB: CPT

## 2024-09-20 LAB
SHBG SERPL-SCNC: 40 NMOL/L (ref 25–122)
TESTOST FREE SERPL-MCNC: 2.4 PG/ML (ref 0.8–7.4)
TESTOST SERPL-MCNC: 16 NG/DL (ref 9–55)

## 2024-10-03 DIAGNOSIS — R10.2 PELVIC PAIN: Primary | ICD-10-CM

## 2024-10-03 DIAGNOSIS — R35.0 URINARY FREQUENCY: ICD-10-CM

## 2024-10-04 ENCOUNTER — TELEPHONE (OUTPATIENT)
Dept: FAMILY MEDICINE CLINIC | Age: 29
End: 2024-10-04

## 2024-10-04 ENCOUNTER — OFFICE VISIT (OUTPATIENT)
Dept: FAMILY MEDICINE CLINIC | Age: 29
End: 2024-10-04

## 2024-10-04 VITALS
OXYGEN SATURATION: 98 % | TEMPERATURE: 97.9 F | BODY MASS INDEX: 26.07 KG/M2 | DIASTOLIC BLOOD PRESSURE: 72 MMHG | HEIGHT: 68 IN | SYSTOLIC BLOOD PRESSURE: 120 MMHG | WEIGHT: 172 LBS | HEART RATE: 62 BPM

## 2024-10-04 DIAGNOSIS — E56.9 VITAMIN DEFICIENCY: ICD-10-CM

## 2024-10-04 DIAGNOSIS — K59.01 SLOW TRANSIT CONSTIPATION: ICD-10-CM

## 2024-10-04 DIAGNOSIS — K30 DELAYED GASTRIC EMPTYING: ICD-10-CM

## 2024-10-04 DIAGNOSIS — Z13.220 LIPID SCREENING: ICD-10-CM

## 2024-10-04 DIAGNOSIS — R79.89 LFT ELEVATION: Primary | ICD-10-CM

## 2024-10-04 DIAGNOSIS — R35.0 URINE FREQUENCY: Primary | ICD-10-CM

## 2024-10-04 DIAGNOSIS — E74.39 GLUCOSE INTOLERANCE: ICD-10-CM

## 2024-10-04 LAB
ALBUMIN SERPL-MCNC: 4.6 G/DL (ref 3.5–5.2)
ALP SERPL-CCNC: 49 U/L (ref 35–104)
ALT SERPL-CCNC: 70 U/L (ref 5–33)
ANION GAP SERPL CALCULATED.3IONS-SCNC: 11 MMOL/L (ref 7–19)
AST SERPL-CCNC: 43 U/L (ref 5–32)
BILIRUB SERPL-MCNC: 0.5 MG/DL (ref 0.2–1.2)
BUN SERPL-MCNC: 18 MG/DL (ref 6–20)
CALCIUM SERPL-MCNC: 9.4 MG/DL (ref 8.6–10)
CHLORIDE SERPL-SCNC: 102 MMOL/L (ref 98–111)
CHOLEST SERPL-MCNC: 195 MG/DL (ref 0–199)
CO2 SERPL-SCNC: 28 MMOL/L (ref 22–29)
CREAT SERPL-MCNC: 0.8 MG/DL (ref 0.5–0.9)
FOLATE SERPL-MCNC: 10.1 NG/ML (ref 4.8–37.3)
GLUCOSE SERPL-MCNC: 81 MG/DL (ref 70–99)
HBA1C MFR BLD: 5.5 % (ref 4–5.6)
HDLC SERPL-MCNC: 96 MG/DL (ref 40–60)
LDLC SERPL CALC-MCNC: 92 MG/DL
MAGNESIUM SERPL-MCNC: 2 MG/DL (ref 1.6–2.6)
PHOSPHATE SERPL-MCNC: 4 MG/DL (ref 2.5–4.5)
POTASSIUM SERPL-SCNC: 4.1 MMOL/L (ref 3.5–5)
PROT SERPL-MCNC: 7.3 G/DL (ref 6.4–8.3)
SODIUM SERPL-SCNC: 141 MMOL/L (ref 136–145)
TRIGL SERPL-MCNC: 34 MG/DL (ref 0–149)
VIT B12 SERPL-MCNC: 1672 PG/ML (ref 232–1245)

## 2024-10-04 PROCEDURE — 99214 OFFICE O/P EST MOD 30 MIN: CPT | Performed by: NURSE PRACTITIONER

## 2024-10-04 ASSESSMENT — ENCOUNTER SYMPTOMS
CONSTIPATION: 1
RESPIRATORY NEGATIVE: 1
EYES NEGATIVE: 1

## 2024-10-04 NOTE — PROGRESS NOTES
[facility]  [address] [place] [address]  Dept: [phone]  Dept Fax: [phone]  Loc: [phone]    Lawrence Guerrero is a 28 y.o. female who presents today for her medical conditions/complaints as noted below.  Lawrence Guerrero is c/o of Follow-up Chronic Condition      Chief Complaint   Patient presents with    Follow-up Chronic Condition       HPI:     HPI  Patient presents today for routine follow up of chronic conditions.  She states that she keeps having pancreatitis symptoms and would like to further discuss.  She states that she is urinating up to 20 times a day.  She states that this has been ongoing for about 2-3 months.  Has not had a period since June.   States that she started progesterone, but unsure of actual name or dosage. She states that she has been more fatigued.  HR has gotten as low as 42 per patient report.    OB/GYN has ordered a CT scan for patient to be completed soon.     Past Medical History:   Diagnosis Date    Concussion     approximately 10 years ago    Depression     Major depressive disorder     Food allergy     Hypothyroidism     Metabolic disorder     PCOS (polycystic ovarian syndrome)         Past Surgical History:   Procedure Laterality Date    CHOLECYSTECTOMY      COLONOSCOPY  11/03/2014    Dr Champ [place],  Small non-bleeding internal hemorrhoids w/skin tags, serrated polyp     COLONOSCOPY N/A 10/21/2019    Dr Klein-[place], 5 yr recall    COLONOSCOPY N/A 06/03/2021    Dr Klein, [facility], (-)Micro Colitis,  Int hemorrhoids-Grade 1, 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, 20 year recall    COLONOSCOPY N/A 07/24/2024    Dr Klein, int hem Gr 1, 3 year recall    HERNIA REPAIR      KNEE SURGERY      LAPAROSCOPY N/A 01/31/2020    DIAGNOSTIC LAPAROSCOPY AND CHROMOPERTUBATION performed by Kathy Aquino MD at [facility] OR    POLYPECTOMY      TONSILLECTOMY AND ADENOIDECTOMY      UMBILICAL HERNIA REPAIR N/A

## 2024-10-04 NOTE — TELEPHONE ENCOUNTER
----- Message from SARAY Mckeon sent at 10/4/2024  3:45 PM CDT -----  Please let patient know that some labs have returned.  Lipid panel was great  CMP did show normal electrolytes and renal function.  Liver function was slightly increased.  I would recommend rechecking a CMP in 2 weeks.  Phosphorus levels in normal range along with magnesium level.  A1c was great at 5.5%.    Awaiting other labs

## 2024-10-07 ENCOUNTER — TELEPHONE (OUTPATIENT)
Dept: FAMILY MEDICINE CLINIC | Age: 29
End: 2024-10-07

## 2024-10-07 NOTE — TELEPHONE ENCOUNTER
----- Message from SARAY Infante sent at 10/7/2024  7:16 AM CDT -----  Please call patient and let them know results.   Normal B12  Normal folate

## 2024-10-08 LAB
ANNOTATION COMMENT IMP: NORMAL
RETINYL PALMITATE SERPL-MCNC: <0.02 MG/L (ref 0–0.1)
VIT A SERPL-MCNC: 0.47 MG/L (ref 0.3–1.2)

## 2024-10-09 ENCOUNTER — TELEPHONE (OUTPATIENT)
Dept: FAMILY MEDICINE CLINIC | Age: 29
End: 2024-10-09

## 2024-10-09 LAB
VIT B6 SERPL-MCNC: 64.5 NMOL/L (ref 20–125)
VIT C SERPL-MCNC: 86 UMOL/L (ref 23–114)

## 2024-10-09 NOTE — TELEPHONE ENCOUNTER
----- Message from SARAY Infante sent at 10/9/2024 10:36 AM CDT -----  Please call patient and let them know results.   Normal vitamin C

## 2024-10-09 NOTE — TELEPHONE ENCOUNTER
----- Message from SARAY Infante sent at 10/8/2024 10:35 AM CDT -----  Please call patient and let them know results.   Normal vitamin A

## 2024-10-10 LAB
NIACIN SERPL-MCNC: NORMAL NG/ML
NICOTINAMIDE SERPL-MCNC: 27 NG/ML
NICOTINURATE SERPL-MCNC: NORMAL NG/ML

## 2024-10-18 ENCOUNTER — TELEPHONE (OUTPATIENT)
Dept: FAMILY MEDICINE CLINIC | Age: 29
End: 2024-10-18

## 2024-10-18 DIAGNOSIS — R79.89 LFT ELEVATION: ICD-10-CM

## 2024-10-18 LAB
ALBUMIN SERPL-MCNC: 4.6 G/DL (ref 3.5–5.2)
ALP SERPL-CCNC: 55 U/L (ref 35–104)
ALT SERPL-CCNC: 52 U/L (ref 5–33)
ANION GAP SERPL CALCULATED.3IONS-SCNC: 10 MMOL/L (ref 7–19)
AST SERPL-CCNC: 30 U/L (ref 5–32)
BILIRUB SERPL-MCNC: 0.7 MG/DL (ref 0.2–1.2)
BUN SERPL-MCNC: 18 MG/DL (ref 6–20)
CALCIUM SERPL-MCNC: 9.6 MG/DL (ref 8.6–10)
CHLORIDE SERPL-SCNC: 103 MMOL/L (ref 98–111)
CO2 SERPL-SCNC: 30 MMOL/L (ref 22–29)
CREAT SERPL-MCNC: 0.8 MG/DL (ref 0.5–0.9)
GLUCOSE SERPL-MCNC: 87 MG/DL (ref 70–99)
POTASSIUM SERPL-SCNC: 4.1 MMOL/L (ref 3.5–5)
PROT SERPL-MCNC: 7.2 G/DL (ref 6.4–8.3)
SODIUM SERPL-SCNC: 143 MMOL/L (ref 136–145)

## 2024-10-18 NOTE — TELEPHONE ENCOUNTER
----- Message from SARAY Mei sent at 10/18/2024  4:39 PM CDT -----  CMP: Normal sodium and potassium.  Blood glucose is 87.  Normal kidney function.  One of your liver enzymes is mildly elevated but is improved from previous check.  Other liver enzyme has returned to normal

## 2024-10-18 NOTE — TELEPHONE ENCOUNTER
Pt aware and voiced understanding. Informed patient of any recommendations from providers. Will call with any further questions.

## 2024-10-24 ENCOUNTER — PATIENT MESSAGE (OUTPATIENT)
Dept: OBGYN CLINIC | Age: 29
End: 2024-10-24

## 2024-10-24 RX ORDER — ESTRADIOL 0.5 MG/1
0.5 TABLET ORAL DAILY
Qty: 21 TABLET | Refills: 0 | Status: SHIPPED | OUTPATIENT
Start: 2024-10-24

## 2024-10-25 ENCOUNTER — TELEPHONE (OUTPATIENT)
Dept: FAMILY MEDICINE CLINIC | Age: 29
End: 2024-10-25

## 2024-10-25 ENCOUNTER — HOSPITAL ENCOUNTER (OUTPATIENT)
Dept: NUCLEAR MEDICINE | Age: 29
Discharge: HOME | End: 2024-10-27
Payer: OTHER GOVERNMENT

## 2024-10-25 DIAGNOSIS — K30 DELAYED GASTRIC EMPTYING: ICD-10-CM

## 2024-10-25 DIAGNOSIS — K59.01 SLOW TRANSIT CONSTIPATION: ICD-10-CM

## 2024-10-25 PROCEDURE — A9541 TC99M SULFUR COLLOID: HCPCS | Performed by: NURSE PRACTITIONER

## 2024-10-25 PROCEDURE — 3430000000 HC RX DIAGNOSTIC RADIOPHARMACEUTICAL: Performed by: NURSE PRACTITIONER

## 2024-10-25 PROCEDURE — 78264 GASTRIC EMPTYING IMG STUDY: CPT

## 2024-10-25 RX ORDER — MEDROXYPROGESTERONE ACETATE 10 MG
10 TABLET ORAL DAILY
Qty: 7 TABLET | Refills: 0 | Status: SHIPPED | OUTPATIENT
Start: 2024-10-25

## 2024-10-25 RX ADMIN — Medication 2 MILLICURIE: at 10:50

## 2024-10-25 NOTE — TELEPHONE ENCOUNTER
Called patient, spoke with: Patient regarding the results of the patients most recent results.  I advised Patient of Katey Car recommendations.   Patient did voice understanding

## 2024-10-30 ENCOUNTER — HOSPITAL ENCOUNTER (OUTPATIENT)
Dept: CT IMAGING | Age: 29
Discharge: HOME | End: 2024-10-30
Payer: OTHER GOVERNMENT

## 2024-10-30 DIAGNOSIS — R35.0 URINARY FREQUENCY: ICD-10-CM

## 2024-10-30 DIAGNOSIS — R10.2 PELVIC PAIN: ICD-10-CM

## 2024-10-30 PROCEDURE — 6360000004 HC RX CONTRAST MEDICATION: Performed by: NURSE PRACTITIONER

## 2024-10-30 PROCEDURE — 74178 CT ABD&PLV WO CNTR FLWD CNTR: CPT

## 2024-10-30 RX ORDER — IOPAMIDOL 755 MG/ML
70 INJECTION, SOLUTION INTRAVASCULAR
Status: COMPLETED | OUTPATIENT
Start: 2024-10-30 | End: 2024-10-30

## 2024-10-30 RX ADMIN — IOPAMIDOL 70 ML: 755 INJECTION, SOLUTION INTRAVENOUS at 13:37

## 2024-11-08 ENCOUNTER — APPOINTMENT (OUTPATIENT)
Dept: CT IMAGING | Age: 29
End: 2024-11-08
Payer: OTHER GOVERNMENT

## 2024-11-08 ENCOUNTER — HOSPITAL ENCOUNTER (EMERGENCY)
Age: 29
Discharge: HOME | End: 2024-11-08
Payer: OTHER GOVERNMENT

## 2024-11-08 VITALS
HEART RATE: 59 BPM | SYSTOLIC BLOOD PRESSURE: 106 MMHG | DIASTOLIC BLOOD PRESSURE: 81 MMHG | WEIGHT: 170 LBS | BODY MASS INDEX: 25.85 KG/M2 | RESPIRATION RATE: 13 BRPM | TEMPERATURE: 98 F | OXYGEN SATURATION: 100 %

## 2024-11-08 DIAGNOSIS — R42 DIZZINESS: Primary | ICD-10-CM

## 2024-11-08 DIAGNOSIS — R00.1 BRADYCARDIA: ICD-10-CM

## 2024-11-08 LAB
ALBUMIN SERPL-MCNC: 4.4 G/DL (ref 3.5–5.2)
ALP SERPL-CCNC: 50 U/L (ref 35–104)
ALT SERPL-CCNC: 47 U/L (ref 5–33)
ANION GAP SERPL CALCULATED.3IONS-SCNC: 9 MMOL/L (ref 7–19)
AST SERPL-CCNC: 34 U/L (ref 5–32)
BASOPHILS # BLD: 0 K/UL (ref 0–0.2)
BASOPHILS NFR BLD: 0.6 % (ref 0–1)
BILIRUB SERPL-MCNC: 0.4 MG/DL (ref 0.2–1.2)
BUN SERPL-MCNC: 17 MG/DL (ref 6–20)
CALCIUM SERPL-MCNC: 9.2 MG/DL (ref 8.6–10)
CHLORIDE SERPL-SCNC: 103 MMOL/L (ref 98–111)
CO2 SERPL-SCNC: 29 MMOL/L (ref 22–29)
CREAT SERPL-MCNC: 0.7 MG/DL (ref 0.5–0.9)
EOSINOPHIL # BLD: 0.2 K/UL (ref 0–0.6)
EOSINOPHIL NFR BLD: 3.3 % (ref 0–5)
ERYTHROCYTE [DISTWIDTH] IN BLOOD BY AUTOMATED COUNT: 13.5 % (ref 11.5–14.5)
GLUCOSE SERPL-MCNC: 82 MG/DL (ref 70–99)
HCG SERPL QL: NEGATIVE
HCT VFR BLD AUTO: 36.9 % (ref 37–47)
HGB BLD-MCNC: 11.6 G/DL (ref 12–16)
IMM GRANULOCYTES # BLD: 0 K/UL
LYMPHOCYTES # BLD: 3.4 K/UL (ref 1.1–4.5)
LYMPHOCYTES NFR BLD: 50.6 % (ref 20–40)
MCH RBC QN AUTO: 28.4 PG (ref 27–31)
MCHC RBC AUTO-ENTMCNC: 31.4 G/DL (ref 33–37)
MCV RBC AUTO: 90.4 FL (ref 81–99)
MONOCYTES # BLD: 0.6 K/UL (ref 0–0.9)
MONOCYTES NFR BLD: 8.3 % (ref 0–10)
NEUTROPHILS # BLD: 2.5 K/UL (ref 1.5–7.5)
NEUTS SEG NFR BLD: 36.9 % (ref 50–65)
PLATELET # BLD AUTO: 205 K/UL (ref 130–400)
PMV BLD AUTO: 10.4 FL (ref 9.4–12.3)
POTASSIUM SERPL-SCNC: 4.3 MMOL/L (ref 3.5–5)
PROT SERPL-MCNC: 6.8 G/DL (ref 6.4–8.3)
RBC # BLD AUTO: 4.08 M/UL (ref 4.2–5.4)
REASON FOR REJECTION: NORMAL
REJECTED TEST: NORMAL
SODIUM SERPL-SCNC: 141 MMOL/L (ref 136–145)
TSH SERPL DL<=0.005 MIU/L-ACNC: 3.4 UIU/ML (ref 0.27–4.2)
WBC # BLD AUTO: 6.7 K/UL (ref 4.8–10.8)

## 2024-11-08 PROCEDURE — 93005 ELECTROCARDIOGRAM TRACING: CPT | Performed by: NURSE PRACTITIONER

## 2024-11-08 PROCEDURE — 84703 CHORIONIC GONADOTROPIN ASSAY: CPT

## 2024-11-08 PROCEDURE — 85025 COMPLETE CBC W/AUTO DIFF WBC: CPT

## 2024-11-08 PROCEDURE — 36415 COLL VENOUS BLD VENIPUNCTURE: CPT

## 2024-11-08 PROCEDURE — 70450 CT HEAD/BRAIN W/O DYE: CPT

## 2024-11-08 PROCEDURE — 80053 COMPREHEN METABOLIC PANEL: CPT

## 2024-11-08 PROCEDURE — 99284 EMERGENCY DEPT VISIT MOD MDM: CPT

## 2024-11-08 PROCEDURE — 84443 ASSAY THYROID STIM HORMONE: CPT

## 2024-11-08 ASSESSMENT — ENCOUNTER SYMPTOMS
VOMITING: 0
NAUSEA: 1

## 2024-11-09 NOTE — ED PROVIDER NOTES
tablet, R-5Normal      vitamin C (ASCORBIC ACID) 500 MG tablet Take 1 tablet by mouth dailyHistorical Med      magnesium 200 MG TABS tablet Take 1 tablet by mouth dailyHistorical Med      zinc 50 MG TABS tablet Take 1 tablet by mouth dailyHistorical Med      Probiotic Product (PROBIOTIC DAILY PO) Take by mouth dailyHistorical Med      Biotin 5000 MCG CAPS Take by mouth dailyHistorical Med      vitamin B-12 (CYANOCOBALAMIN) 500 MCG tablet Take 1 tablet by mouth dailyHistorical Med      ondansetron (ZOFRAN) 4 MG tablet Take 1 tablet by mouth every 6 hours as needed for Nausea, Disp-20 tablet, R-1Normal                  Sertraline, Shrimp (diagnostic), Shrimp extract, Corn-containing products, Soybean-containing drug products, Wheat, and Wheat extract    FAMILY HISTORY       Family History   Problem Relation Age of Onset    Polycystic Ovary Syndrome Mother     Cancer Father     Cancer Maternal Aunt     Thyroid Disease Maternal Grandmother     Cancer Maternal Grandmother     Colon Cancer Paternal Grandfather 55    Colon Polyps Neg Hx     Esophageal Cancer Neg Hx     Liver Cancer Neg Hx     Rectal Cancer Neg Hx     Stomach Cancer Neg Hx           SOCIAL HISTORY       Social History     Socioeconomic History    Marital status:      Spouse name: None    Number of children: None    Years of education: None    Highest education level: None   Tobacco Use    Smoking status: Never    Smokeless tobacco: Never   Vaping Use    Vaping status: Never Used   Substance and Sexual Activity    Alcohol use: Yes     Comment: occ 1-3 once a month    Drug use: No    Sexual activity: Yes     Partners: Female     Social Determinants of Health     Financial Resource Strain: Low Risk  (3/8/2024)    Overall Financial Resource Strain (CARDIA)     Difficulty of Paying Living Expenses: Not hard at all   Food Insecurity: No Food Insecurity (3/8/2024)    Hunger Vital Sign     Worried About Running Out of Food in the Last Year: Never true      she says she could not hear out of her r ear.  NO chest pain.  No nasal congestion or cough.  Pt has been having stomach issues and recently had a CT abd but has not gotten results yet.  Pt tells me her heart rate is usually low. Pt is active as a  but does not consider herself an athlete.     Patient's white count is 6.7 hemoglobin 11.6 and platelets are 205,000.  Patient's creatinine is 0.7 her ALT is 47 and AST is 34 and all other chemistries are negative.  Negative pregnancy.  TSH is 3.4 her CT head is negative.  Patient is not orthostatic.  Patient's heart rate has been in the 40s while here going as low as 44.  Considering her complaint of dizziness, I have spoken to Dr. Serna who recommended a Zio patch and an echocardiogram outpatient.  A consult to cardiology was put in the computer and patient understands she needs to follow-up with cardiology         Amount and/or Complexity of Data Reviewed  Clinical lab tests: ordered and reviewed  Tests in the radiology section of CPT®: ordered and reviewed  Tests in the medicine section of CPT®: ordered and reviewed  Discuss the patient with other providers: yes               CONSULTS:  None    PROCEDURES:  Unless otherwise noted below, none     Procedures    FINAL IMPRESSION      1. Dizziness    2. Bradycardia        DISPOSITION/PLAN   DISPOSITION Decision To Discharge 11/08/2024 10:40:09 PM           PATIENT REFERRED TO:  Katey Car APRN  [address] [facility] [address]  [phone]          [facility]  [address]  [address]  [facility] [address]  [phone]        Rose Marie Melo MD  [address]  [address]  [facility] [address]  [phone]            DISCHARGE MEDICATIONS:  Discharge Medication List as of 11/8/2024 11:10 PM             (Please note that portions of this note were completed with a voice recognitionprogram.  Efforts were made to edit the dictations but occasionally words are

## 2024-11-10 ENCOUNTER — E-VISIT (OUTPATIENT)
Dept: FAMILY MEDICINE CLINIC | Age: 29
End: 2024-11-10
Payer: OTHER GOVERNMENT

## 2024-11-10 DIAGNOSIS — H93.231 HYPERACUSIS OF RIGHT EAR: Primary | ICD-10-CM

## 2024-11-10 LAB
EKG P AXIS: 4 DEGREES
EKG P-R INTERVAL: 160 MS
EKG Q-T INTERVAL: 478 MS
EKG QRS DURATION: 100 MS
EKG QTC CALCULATION (BAZETT): 452 MS
EKG T AXIS: 13 DEGREES

## 2024-11-10 PROCEDURE — 93010 ELECTROCARDIOGRAM REPORT: CPT | Performed by: INTERNAL MEDICINE

## 2024-11-10 PROCEDURE — 99421 OL DIG E/M SVC 5-10 MIN: CPT | Performed by: NURSE PRACTITIONER

## 2024-11-11 ENCOUNTER — HOSPITAL ENCOUNTER (OUTPATIENT)
Age: 29
Discharge: HOME | End: 2024-11-13
Payer: OTHER GOVERNMENT

## 2024-11-11 DIAGNOSIS — R00.1 BRADYCARDIA: ICD-10-CM

## 2024-11-11 PROCEDURE — 93246 EXT ECG>7D<15D RECORDING: CPT

## 2024-11-11 NOTE — PROGRESS NOTES
PT RECEIVED RHYTHMSTAR MONITOR [id number] BY RESP DURING EKG DEPT AFTER HOURS ON 11/08/24. DEVICE SETUP AND REGISTRATION COMPLETED OVER THE PHONE ON 11/11/24 @1391

## 2024-11-12 NOTE — PROGRESS NOTES
Lawrence Guerrero (1995) initiated an asynchronous digital communication through [employer].    HPI: per patient questionnaire     Exam: not applicable    Diagnoses and all orders for this visit:  Diagnoses and all orders for this visit:    Hyperacusis of right ear  -     Wan Burgess MD, Otolaryngology, [place]          Time: EV1 - 5-10 minutes were spent on the digital evaluation and management of this patient.    Katey Car, APRN

## 2024-11-13 ENCOUNTER — OFFICE VISIT (OUTPATIENT)
Dept: ENT CLINIC | Age: 29
End: 2024-11-13
Payer: OTHER GOVERNMENT

## 2024-11-13 VITALS
WEIGHT: 177 LBS | SYSTOLIC BLOOD PRESSURE: 102 MMHG | HEIGHT: 68 IN | DIASTOLIC BLOOD PRESSURE: 68 MMHG | BODY MASS INDEX: 26.83 KG/M2

## 2024-11-13 DIAGNOSIS — H91.21 SUDDEN HEARING LOSS, RIGHT: Primary | ICD-10-CM

## 2024-11-13 DIAGNOSIS — R42 DIZZINESS: ICD-10-CM

## 2024-11-13 DIAGNOSIS — H93.13 TINNITUS OF BOTH EARS: ICD-10-CM

## 2024-11-13 DIAGNOSIS — M26.609 TMJ DYSFUNCTION: ICD-10-CM

## 2024-11-13 PROCEDURE — 99203 OFFICE O/P NEW LOW 30 MIN: CPT | Performed by: NURSE PRACTITIONER

## 2024-11-13 RX ORDER — MELOXICAM 7.5 MG/1
7.5 TABLET ORAL DAILY PRN
Qty: 30 TABLET | Refills: 1 | Status: SHIPPED | OUTPATIENT
Start: 2024-11-13

## 2024-11-13 RX ORDER — PREDNISONE 20 MG/1
TABLET ORAL
Qty: 21 TABLET | Refills: 0 | Status: SHIPPED | OUTPATIENT
Start: 2024-11-13

## 2024-11-13 ASSESSMENT — ENCOUNTER SYMPTOMS
GASTROINTESTINAL NEGATIVE: 1
EYES NEGATIVE: 1
RESPIRATORY NEGATIVE: 1
ALLERGIC/IMMUNOLOGIC NEGATIVE: 1

## 2024-11-13 NOTE — PROGRESS NOTES
2024    Lawrence Guerrero (:  1995) is a 28 y.o. female, Established patient, here for evaluation of the following chief complaint(s):  New Patient (RT EAR)      Vitals:    24 0910   BP: 102/68   Weight: 80.3 kg (177 lb)   Height: 1.727 m (5' 8\")       Wt Readings from Last 3 Encounters:   24 80.3 kg (177 lb)   24 77.1 kg (170 lb)   10/04/24 78 kg (172 lb)       BP Readings from Last 3 Encounters:   24 102/68   24 106/81   10/04/24 120/72         SUBJECTIVE/OBJECTIVE:    Patient seen today for her right ear. She states that in  she woke up and couldn't hear out of her right ear. She states that she had antibiotics, oral steroids, and a steroid shot in her ear. She reports that over time her hearing came back, but not completely. She states that Friday she felt dizzy all day. She had her ears checked and there was no fluid or infection. She states that she took a nap and woke up and couldn't hear out of her right ear. She reports constant tinnitus on the right and intermittent tinnitus on the left. She states that her dizziness feels like the room spinning and feeling off balance. She denies ear pain or drainage. She reports fullness on the right. She reports that the right She also complains of headaches and light-headedness. She states that she will get intermittent red-purple colored rashes on the skin around her eyes. She denies changes in her vision.         Review of Systems   Constitutional: Negative.    HENT:  Positive for hearing loss (right) and tinnitus (R>L).    Eyes: Negative.    Respiratory: Negative.     Cardiovascular: Negative.    Gastrointestinal: Negative.    Endocrine: Negative.    Musculoskeletal: Negative.    Skin:  Positive for rash (intermittent around eyes).   Allergic/Immunologic: Negative.    Neurological:  Positive for dizziness.   Hematological: Negative.    Psychiatric/Behavioral: Negative.          Physical Exam  Vitals reviewed.

## 2024-11-20 ENCOUNTER — OFFICE VISIT (OUTPATIENT)
Dept: CARDIOLOGY CLINIC | Age: 29
End: 2024-11-20
Payer: OTHER GOVERNMENT

## 2024-11-20 VITALS
BODY MASS INDEX: 27.43 KG/M2 | SYSTOLIC BLOOD PRESSURE: 112 MMHG | WEIGHT: 181 LBS | HEART RATE: 62 BPM | DIASTOLIC BLOOD PRESSURE: 82 MMHG | OXYGEN SATURATION: 100 % | HEIGHT: 68 IN

## 2024-11-20 DIAGNOSIS — E28.2 PCOS (POLYCYSTIC OVARIAN SYNDROME): ICD-10-CM

## 2024-11-20 DIAGNOSIS — R00.1 BRADYCARDIA: Primary | ICD-10-CM

## 2024-11-20 DIAGNOSIS — R42 DIZZINESS: ICD-10-CM

## 2024-11-20 DIAGNOSIS — R73.03 PREDIABETES: ICD-10-CM

## 2024-11-20 DIAGNOSIS — R60.0 EDEMA OF BOTH LOWER EXTREMITIES: ICD-10-CM

## 2024-11-20 DIAGNOSIS — R55 NEAR SYNCOPE: ICD-10-CM

## 2024-11-20 PROCEDURE — 99203 OFFICE O/P NEW LOW 30 MIN: CPT | Performed by: CLINICAL NURSE SPECIALIST

## 2024-11-20 ASSESSMENT — ENCOUNTER SYMPTOMS
EYE REDNESS: 0
SHORTNESS OF BREATH: 0
CHEST TIGHTNESS: 0
COUGH: 0
ABDOMINAL PAIN: 0
FACIAL SWELLING: 0
WHEEZING: 0
VOMITING: 0
NAUSEA: 0

## 2024-11-20 NOTE — PROGRESS NOTES
respiratory distress.      Breath sounds: Normal breath sounds. No wheezing or rales.   Abdominal:      Palpations: Abdomen is soft.      Tenderness: There is no abdominal tenderness.   Musculoskeletal:         General: No swelling or deformity.      Cervical back: Neck supple. No muscular tenderness.      Right lower leg: No edema.      Left lower leg: No edema.      Comments: Normal gait and station   Skin:     General: Skin is warm and dry.      Findings: No rash.   Neurological:      General: No focal deficit present.      Mental Status: She is alert and oriented to person, place, and time.      Cranial Nerves: No cranial nerve deficit.   Psychiatric:         Mood and Affect: Mood normal.         Behavior: Behavior normal.         Judgment: Judgment normal.         Data:  Lab Results   Component Value Date/Time    WBC 6.7 11/08/2024 09:17 PM    RBC 4.08 11/08/2024 09:17 PM    HGB 11.6 11/08/2024 09:17 PM    HCT 36.9 11/08/2024 09:17 PM     11/08/2024 09:17 PM      Lab Results   Component Value Date/Time    CHOL 196 04/12/2019 11:30 AM    TRIG 105 04/12/2019 11:30 AM    HDL 96 10/04/2024 12:14 PM    LDL 92 10/04/2024 12:14 PM    LDL 96 04/09/2024 09:01 AM     Lab Results   Component Value Date/Time     11/08/2024 08:56 PM    K 4.3 11/08/2024 08:56 PM     11/08/2024 08:56 PM    CO2 29 11/08/2024 08:56 PM    GLUCOSE 82 11/08/2024 08:56 PM    BUN 17 11/08/2024 08:56 PM    CREATININE 0.7 11/08/2024 08:56 PM    CALCIUM 9.2 11/08/2024 08:56 PM    ALT 47 11/08/2024 08:56 PM    AST 34 11/08/2024 08:56 PM     Lab Results   Component Value Date/Time    TSH 3.400 11/08/2024 08:56 PM     Reviewed EKG dated 11/11/2024 that  shows sinus bradycardia rate 45    Assessment:     Diagnosis Orders   1. Bradycardia  Echo (TTE) complete (PRN contrast/bubble/strain/3D)      2. Dizziness        3. Near syncope        4. PCOS (polycystic ovarian syndrome)        5. Prediabetes        6. Edema of both lower extremities

## 2024-11-20 NOTE — PATIENT INSTRUCTIONS
Await monitor results- mailing in Friday  Low sodium diet  Echo    [place] at the [facility] [place] located on the first floor of [facility].   Enter through hospital main entrance and turn immediately to your left.    Date/Time:     Patient's contact number:  [phone] (home)     Echocardiogram -  No prep.      Takes approximately 30 min.    An echocardiogram uses sound waves to produce images of your heart. This commonly used test allows your doctor to see how your heart is beating and pumping blood. Your doctor can use the images from an echocardiogram to identify various abnormalities in the heart muscle and valves.    This test has 2 parts:   You will be asked to disrobe from the waist up and given a gown to wear. The technologist will then hook up an EKG monitor to you for the entire exam.   You will then have an ultrasound of your heart (echocardiogram) to assess the heart muscle, heart valves and heart function.     You may eat and take any medicines before the exam.     If you need to change your appointment, please call outpatient scheduling at [phone].

## 2024-11-25 ENCOUNTER — HOSPITAL ENCOUNTER (OUTPATIENT)
Age: 29
Discharge: HOME | End: 2024-11-27
Payer: OTHER GOVERNMENT

## 2024-11-25 VITALS
DIASTOLIC BLOOD PRESSURE: 82 MMHG | BODY MASS INDEX: 27.43 KG/M2 | WEIGHT: 181 LBS | SYSTOLIC BLOOD PRESSURE: 112 MMHG | HEIGHT: 68 IN

## 2024-11-25 DIAGNOSIS — R00.1 BRADYCARDIA: ICD-10-CM

## 2024-11-25 LAB
ECHO AO ASC DIAM: 2.6 CM
ECHO AO ASCENDING AORTA INDEX: 1.33 CM/M2
ECHO AO ROOT DIAM: 1.6 CM
ECHO AO ROOT INDEX: 0.82 CM/M2
ECHO AO SINUS VALSALVA DIAM: 2.5 CM
ECHO AO SINUS VALSALVA INDEX: 1.28 CM/M2
ECHO AO ST JNCT DIAM: 2.1 CM
ECHO AV AREA PEAK VELOCITY: 3.1 CM2
ECHO AV AREA VTI: 2.5 CM2
ECHO AV AREA/BSA PEAK VELOCITY: 1.6 CM2/M2
ECHO AV AREA/BSA VTI: 1.3 CM2/M2
ECHO AV MEAN GRADIENT: 3 MMHG
ECHO AV MEAN VELOCITY: 0.8 M/S
ECHO AV PEAK GRADIENT: 5 MMHG
ECHO AV PEAK VELOCITY: 1.2 M/S
ECHO AV VTI: 26.4 CM
ECHO BSA: 1.98 M2
ECHO EST RA PRESSURE: 3 MMHG
ECHO IVC PROX: 1.2 CM
ECHO LA AREA 2C: 13.3 CM2
ECHO LA AREA 4C: 16 CM2
ECHO LA DIAMETER INDEX: 1.94 CM/M2
ECHO LA DIAMETER: 3.8 CM
ECHO LA MAJOR AXIS: 4.8 CM
ECHO LA MINOR AXIS: 4.4 CM
ECHO LA TO AORTIC ROOT RATIO: 2.38
ECHO LA VOL BP: 37 ML (ref 22–52)
ECHO LA VOL MOD A2C: 33 ML (ref 22–52)
ECHO LA VOL MOD A4C: 40 ML (ref 22–52)
ECHO LA VOL/BSA BIPLANE: 19 ML/M2 (ref 16–34)
ECHO LA VOLUME INDEX MOD A2C: 17 ML/M2 (ref 16–34)
ECHO LA VOLUME INDEX MOD A4C: 20 ML/M2 (ref 16–34)
ECHO LV E' LATERAL VELOCITY: 14.3 CM/S
ECHO LV E' SEPTAL VELOCITY: 10 CM/S
ECHO LV EDV A2C: 99 ML
ECHO LV EDV A4C: 93 ML
ECHO LV EDV INDEX A4C: 47 ML/M2
ECHO LV EDV NDEX A2C: 51 ML/M2
ECHO LV EJECTION FRACTION A2C: 61 %
ECHO LV EJECTION FRACTION A4C: 57 %
ECHO LV EJECTION FRACTION BIPLANE: 60 % (ref 55–100)
ECHO LV ESV A2C: 38 ML
ECHO LV ESV A4C: 41 ML
ECHO LV ESV INDEX A2C: 19 ML/M2
ECHO LV ESV INDEX A4C: 21 ML/M2
ECHO LV FRACTIONAL SHORTENING: 37 % (ref 28–44)
ECHO LV INTERNAL DIMENSION DIASTOLE INDEX: 2.5 CM/M2
ECHO LV INTERNAL DIMENSION DIASTOLIC: 4.9 CM (ref 3.9–5.3)
ECHO LV INTERNAL DIMENSION SYSTOLIC INDEX: 1.58 CM/M2
ECHO LV INTERNAL DIMENSION SYSTOLIC: 3.1 CM
ECHO LV IVSD: 0.9 CM (ref 0.6–0.9)
ECHO LV MASS 2D: 153 G (ref 67–162)
ECHO LV MASS INDEX 2D: 78 G/M2 (ref 43–95)
ECHO LV POSTERIOR WALL DIASTOLIC: 0.9 CM (ref 0.6–0.9)
ECHO LV RELATIVE WALL THICKNESS RATIO: 0.37
ECHO LVOT AREA: 3.1 CM2
ECHO LVOT AV VTI INDEX: 0.8
ECHO LVOT DIAM: 2 CM
ECHO LVOT MEAN GRADIENT: 2 MMHG
ECHO LVOT MEAN GRADIENT: 2 MMHG
ECHO LVOT PEAK GRADIENT: 5 MMHG
ECHO LVOT PEAK VELOCITY: 1.1 M/S
ECHO LVOT PEAK VELOCITY: 1.1 M/S
ECHO LVOT STROKE VOLUME INDEX: 33.6 ML/M2
ECHO LVOT SV: 65.9 ML
ECHO LVOT VTI: 21 CM
ECHO MV A VELOCITY: 0.44 M/S
ECHO MV AREA VTI: 2.8 CM2
ECHO MV E DECELERATION TIME (DT): 140 MS
ECHO MV E VELOCITY: 0.75 M/S
ECHO MV E/A RATIO: 1.7
ECHO MV E/E' LATERAL: 5.24
ECHO MV E/E' RATIO (AVERAGED): 6.37
ECHO MV E/E' SEPTAL: 7.5
ECHO MV LVOT VTI INDEX: 1.11
ECHO MV MAX VELOCITY: 0.9 M/S
ECHO MV MEAN GRADIENT: 1 MMHG
ECHO MV MEAN VELOCITY: 0.5 M/S
ECHO MV PEAK GRADIENT: 3 MMHG
ECHO MV VTI: 23.4 CM
ECHO RA AREA 4C: 7.2 CM2
ECHO RA END SYSTOLIC VOLUME APICAL 4 CHAMBER INDEX BSA: 6 ML/M2
ECHO RA VOLUME: 11 ML
ECHO RIGHT VENTRICULAR SYSTOLIC PRESSURE (RVSP): 21 MMHG
ECHO RV BASAL DIMENSION: 3.5 CM
ECHO RV INTERNAL DIMENSION: 3.4 CM
ECHO RV LONGITUDINAL DIMENSION: 6.5 CM
ECHO RV MID DIMENSION: 2.9 CM
ECHO RV TAPSE: 1 CM (ref 1.7–?)
ECHO TV REGURGITANT MAX VELOCITY: 2.15 M/S
ECHO TV REGURGITANT PEAK GRADIENT: 18 MMHG

## 2024-11-25 PROCEDURE — 93356 MYOCRD STRAIN IMG SPCKL TRCK: CPT

## 2024-11-25 PROCEDURE — 93356 MYOCRD STRAIN IMG SPCKL TRCK: CPT | Performed by: INTERNAL MEDICINE

## 2024-11-25 PROCEDURE — 93306 TTE W/DOPPLER COMPLETE: CPT | Performed by: INTERNAL MEDICINE

## 2024-12-04 ENCOUNTER — OFFICE VISIT (OUTPATIENT)
Dept: ENT CLINIC | Age: 29
End: 2024-12-04
Payer: OTHER GOVERNMENT

## 2024-12-04 VITALS
WEIGHT: 178 LBS | SYSTOLIC BLOOD PRESSURE: 102 MMHG | HEIGHT: 68 IN | DIASTOLIC BLOOD PRESSURE: 68 MMHG | BODY MASS INDEX: 26.98 KG/M2

## 2024-12-04 DIAGNOSIS — H93.11 TINNITUS, RIGHT: ICD-10-CM

## 2024-12-04 DIAGNOSIS — H91.21 SUDDEN HEARING LOSS, RIGHT: Primary | ICD-10-CM

## 2024-12-04 DIAGNOSIS — M26.609 TMJ DYSFUNCTION: ICD-10-CM

## 2024-12-04 DIAGNOSIS — R42 DIZZINESS: ICD-10-CM

## 2024-12-04 PROCEDURE — 99213 OFFICE O/P EST LOW 20 MIN: CPT | Performed by: NURSE PRACTITIONER

## 2024-12-04 RX ORDER — MELOXICAM 15 MG/1
15 TABLET ORAL DAILY PRN
Qty: 30 TABLET | Refills: 1 | Status: SHIPPED | OUTPATIENT
Start: 2024-12-04

## 2024-12-04 RX ORDER — PREDNISONE 20 MG/1
TABLET ORAL
Qty: 21 TABLET | Refills: 0 | Status: SHIPPED | OUTPATIENT
Start: 2024-12-04

## 2024-12-04 ASSESSMENT — ENCOUNTER SYMPTOMS
RESPIRATORY NEGATIVE: 1
EYES NEGATIVE: 1
GASTROINTESTINAL NEGATIVE: 1
ALLERGIC/IMMUNOLOGIC NEGATIVE: 1

## 2024-12-04 NOTE — PROGRESS NOTES
2024    Lawrence Guerrero (:  1995) is a 29 y.o. female, Established patient, here for evaluation of the following chief complaint(s):  Follow-up (EARS)      Vitals:    24 0948   BP: 102/68   Weight: 80.7 kg (178 lb)   Height: 1.727 m (5' 8\")       Wt Readings from Last 3 Encounters:   24 80.7 kg (178 lb)   24 82.1 kg (181 lb)   24 82.1 kg (181 lb)       BP Readings from Last 3 Encounters:   24 102/68   24 112/82   24 112/82         SUBJECTIVE/OBJECTIVE:    Patient seen today for follow up of right ear. She was started on prednisone at her last visit. [facility] that showed moderately severe flat SNHL on the right and mild-normal right SNHL on the left. Tympanograms were type A bilaterally. She states that she did notice a little improvement with the prednisone. She notes some improvement in the hearing on the right. She reports that the right ear still feels numb and full. She reports tinnitus on the right. She states that the dizziness went away for about a day, then came back. She reports the meloxicam has helped with her TMJ pain.         Review of Systems   Constitutional: Negative.    HENT:  Positive for ear pain (fullness on right), hearing loss (right) and tinnitus (right).    Eyes: Negative.    Respiratory: Negative.     Cardiovascular: Negative.    Gastrointestinal: Negative.    Endocrine: Negative.    Musculoskeletal: Negative.    Skin: Negative.    Allergic/Immunologic: Negative.    Neurological:  Positive for dizziness.   Hematological: Negative.    Psychiatric/Behavioral: Negative.          Physical Exam  Vitals reviewed.   Constitutional:       Appearance: Normal appearance. She is normal weight.   HENT:      Head: Normocephalic and atraumatic.      Jaw: Tenderness and pain on movement present.      Right Ear: Tympanic membrane, ear canal and external ear normal.      Left Ear:

## 2024-12-05 ENCOUNTER — PATIENT MESSAGE (OUTPATIENT)
Dept: OBGYN CLINIC | Age: 29
End: 2024-12-05

## 2024-12-06 RX ORDER — METFORMIN HYDROCHLORIDE 500 MG/1
500 TABLET, EXTENDED RELEASE ORAL NIGHTLY
Qty: 30 TABLET | Refills: 3 | Status: SHIPPED | OUTPATIENT
Start: 2024-12-06

## 2024-12-11 ENCOUNTER — HOSPITAL ENCOUNTER (OUTPATIENT)
Dept: MRI IMAGING | Age: 29
Discharge: HOME | End: 2024-12-11
Payer: OTHER GOVERNMENT

## 2024-12-11 ENCOUNTER — TELEPHONE (OUTPATIENT)
Dept: CARDIOLOGY CLINIC | Age: 29
End: 2024-12-11

## 2024-12-11 ENCOUNTER — OFFICE VISIT (OUTPATIENT)
Dept: CARDIOLOGY CLINIC | Age: 29
End: 2024-12-11
Payer: OTHER GOVERNMENT

## 2024-12-11 VITALS
BODY MASS INDEX: 27.74 KG/M2 | OXYGEN SATURATION: 99 % | HEIGHT: 68 IN | WEIGHT: 183 LBS | SYSTOLIC BLOOD PRESSURE: 112 MMHG | DIASTOLIC BLOOD PRESSURE: 76 MMHG | HEART RATE: 55 BPM

## 2024-12-11 DIAGNOSIS — R42 DIZZINESS: ICD-10-CM

## 2024-12-11 DIAGNOSIS — H93.11 TINNITUS, RIGHT: ICD-10-CM

## 2024-12-11 DIAGNOSIS — R00.1 BRADYCARDIA: Primary | ICD-10-CM

## 2024-12-11 DIAGNOSIS — R20.0 NUMBNESS OF FINGERS: ICD-10-CM

## 2024-12-11 DIAGNOSIS — E28.2 PCOS (POLYCYSTIC OVARIAN SYNDROME): ICD-10-CM

## 2024-12-11 DIAGNOSIS — H91.21 SUDDEN HEARING LOSS, RIGHT: ICD-10-CM

## 2024-12-11 DIAGNOSIS — R55 NEAR SYNCOPE: ICD-10-CM

## 2024-12-11 DIAGNOSIS — R73.03 PREDIABETES: ICD-10-CM

## 2024-12-11 PROCEDURE — 6360000004 HC RX CONTRAST MEDICATION: Performed by: NURSE PRACTITIONER

## 2024-12-11 PROCEDURE — 99213 OFFICE O/P EST LOW 20 MIN: CPT | Performed by: CLINICAL NURSE SPECIALIST

## 2024-12-11 PROCEDURE — 70553 MRI BRAIN STEM W/O & W/DYE: CPT

## 2024-12-11 PROCEDURE — A9577 INJ MULTIHANCE: HCPCS | Performed by: NURSE PRACTITIONER

## 2024-12-11 RX ADMIN — GADOBENATE DIMEGLUMINE 17 ML: 529 INJECTION, SOLUTION INTRAVENOUS at 13:06

## 2024-12-11 ASSESSMENT — ENCOUNTER SYMPTOMS
ABDOMINAL PAIN: 0
FACIAL SWELLING: 0
EYE REDNESS: 0
COUGH: 0
CONSTIPATION: 1
NAUSEA: 0
CHEST TIGHTNESS: 0
WHEEZING: 0
SHORTNESS OF BREATH: 0
VOMITING: 0

## 2024-12-11 NOTE — TELEPHONE ENCOUNTER
I recently saw this new patient, young 29-year-old in the office for complaints of bradycardia    She returned today after wearing a heart monitor that showed lowest heart rate of 38 during waking hours.  She she relates back to that specific date and knows she was feeling poorly with symptoms of dizziness and weakness at the time.    Do you recommend pacemaker placement?

## 2024-12-11 NOTE — PROGRESS NOTES
[facility]  [address] [address]  Phone: [phone]  Fax: [phone]    OFFICE VISIT:  2024    Lawrence Guerrero - : 1995    Reason For Visit:  Lawrence is a 29 y.o. female who is here for Follow-up (Patient states she is still feeling dizzy.) and Bradycardia       Diagnosis Orders   1. Bradycardia        2. Dizziness        3. Near syncope        4. PCOS (polycystic ovarian syndrome)        5. Prediabetes        6. Numbness of fingers                HPI  Patient was referred after an ER visit for dizziness and bradycardia 24.  Patient reports she also had a sudden hearing loss in her left ear with tinnitus.  She is following with ENT.      Other history includes PCOS and prediabetes since childhood.  She has gradually lost to 160 pounds in the last year or so through healthier eating.  She reports an elevated SHELLEY.  Recent TSH within the normal range    She reports when she went to the emergency room she was feeling very dizzy, she was nauseous and was vomiting.  She had significant tinnitus.  Heart rate was found to be in the low 40s.  Patient states she notices this periodically at home on her fitness watch.  She does have associated symptoms of near syncope, dizziness, and weakness.    Heart monitor was pending results at her last visit here.  We also did a 2D echocardiogram.  She is here today to discuss results.  She did reports she had symptoms while wearing the monitor.    Other concerns about numbness and tingling in her hands and fingers and extreme cold sensitivity of the whiteness in her hands when she is cold.  She is concerned about Raynaud's.  She has also had some off-and-on lower extremity edema    She denies sudden weight gain, orthopnea, PND.  She has no chest pain with normal activities.  No complaints of palpitations    She is employed as a  in the school system.    She is concerned about a systemic/autoimmune illness.    Josep

## 2024-12-11 NOTE — PATIENT INSTRUCTIONS
Return in about 2 months (around 2/11/2025) for APRN.    Will discuss your case with Dr Sellers and will get back to you regarding pacemaker vs loop recorder    Keep hands warm to prevent Raynaud's symptoms.  Could consider calcium channel blocker

## 2024-12-11 NOTE — TELEPHONE ENCOUNTER
Tavia please let patient know I discussed her case with Dr. Sellers.  He would like her to do a treadmill stress test to see how her heart rate response during exercise.  I have dropped the order, if you would please let her know and get this scheduled

## 2024-12-23 ENCOUNTER — HOSPITAL ENCOUNTER (OUTPATIENT)
Age: 29
Discharge: HOME | End: 2024-12-25
Payer: OTHER GOVERNMENT

## 2024-12-23 DIAGNOSIS — R00.1 BRADYCARDIA: ICD-10-CM

## 2024-12-23 LAB
STRESS BASELINE DIAS BP: 69 MMHG
STRESS BASELINE HR: 80 BPM
STRESS BASELINE SYS BP: 97 MMHG
STRESS ESTIMATED WORKLOAD: 13.4 METS
STRESS EXERCISE DUR MIN: 11 MIN
STRESS EXERCISE DUR SEC: 57 SEC
STRESS O2 SAT PEAK: 95 %
STRESS O2 SAT REST: 100 %
STRESS PEAK DIAS BP: 77 MMHG
STRESS PEAK SYS BP: 151 MMHG
STRESS PERCENT HR ACHIEVED: 92 %
STRESS POST PEAK HR: 176 BPM
STRESS RATE PRESSURE PRODUCT: NORMAL BPM*MMHG
STRESS ST DEPRESSION: 1 MM
STRESS STAGE 1 BP: NORMAL MMHG
STRESS STAGE 1 DURATION: 3 MIN:SEC
STRESS STAGE 1 HR: 103 BPM
STRESS STAGE 2 BP: NORMAL MMHG
STRESS STAGE 2 DURATION: 3 MIN:SEC
STRESS STAGE 2 HR: 118 BPM
STRESS STAGE 3 BP: NORMAL MMHG
STRESS STAGE 3 DURATION: 3 MIN:SEC
STRESS STAGE 3 HR: 146 BPM
STRESS STAGE 4 BP: NORMAL MMHG
STRESS STAGE 4 DURATION: NORMAL MIN:SEC
STRESS STAGE 4 HR: 171 BPM
STRESS STAGE RECOVERY 1 BP: NORMAL MMHG
STRESS STAGE RECOVERY 1 DURATION: NORMAL MIN:SEC
STRESS STAGE RECOVERY 1 HR: 92 BPM
STRESS TARGET HR: 191 BPM

## 2024-12-23 PROCEDURE — 93016 CV STRESS TEST SUPVJ ONLY: CPT | Performed by: INTERNAL MEDICINE

## 2024-12-23 PROCEDURE — 93018 CV STRESS TEST I&R ONLY: CPT | Performed by: INTERNAL MEDICINE

## 2024-12-23 PROCEDURE — 93017 CV STRESS TEST TRACING ONLY: CPT

## 2024-12-31 ENCOUNTER — TELEPHONE (OUTPATIENT)
Dept: CARDIOLOGY CLINIC | Age: 29
End: 2024-12-31

## 2025-01-07 ENCOUNTER — OFFICE VISIT (OUTPATIENT)
Dept: CARDIOLOGY CLINIC | Age: 30
End: 2025-01-07
Payer: OTHER GOVERNMENT

## 2025-01-07 VITALS
BODY MASS INDEX: 27.74 KG/M2 | WEIGHT: 183 LBS | HEART RATE: 50 BPM | RESPIRATION RATE: 16 BRPM | HEIGHT: 68 IN | DIASTOLIC BLOOD PRESSURE: 60 MMHG | SYSTOLIC BLOOD PRESSURE: 118 MMHG

## 2025-01-07 DIAGNOSIS — R00.1 SYMPTOMATIC BRADYCARDIA: ICD-10-CM

## 2025-01-07 DIAGNOSIS — Z01.818 PRE-OP TESTING: ICD-10-CM

## 2025-01-07 DIAGNOSIS — R55 NEAR SYNCOPE: ICD-10-CM

## 2025-01-07 DIAGNOSIS — Z01.818 PRE-OP TESTING: Primary | ICD-10-CM

## 2025-01-07 DIAGNOSIS — R00.1 BRADYCARDIA: Primary | ICD-10-CM

## 2025-01-07 LAB
ANION GAP SERPL CALCULATED.3IONS-SCNC: 11 MMOL/L (ref 7–19)
BASOPHILS # BLD: 0.1 K/UL (ref 0–0.2)
BASOPHILS NFR BLD: 0.6 % (ref 0–1)
BUN SERPL-MCNC: 20 MG/DL (ref 6–20)
CALCIUM SERPL-MCNC: 9.2 MG/DL (ref 8.6–10)
CHLORIDE SERPL-SCNC: 102 MMOL/L (ref 98–111)
CO2 SERPL-SCNC: 27 MMOL/L (ref 22–29)
CREAT SERPL-MCNC: 0.8 MG/DL (ref 0.5–0.9)
EOSINOPHIL # BLD: 0.3 K/UL (ref 0–0.6)
EOSINOPHIL NFR BLD: 3.4 % (ref 0–5)
ERYTHROCYTE [DISTWIDTH] IN BLOOD BY AUTOMATED COUNT: 14 % (ref 11.5–14.5)
GLUCOSE SERPL-MCNC: 78 MG/DL (ref 70–99)
HCT VFR BLD AUTO: 39.2 % (ref 37–47)
HGB BLD-MCNC: 12.5 G/DL (ref 12–16)
IMM GRANULOCYTES # BLD: 0 K/UL
LYMPHOCYTES # BLD: 3.1 K/UL (ref 1.1–4.5)
LYMPHOCYTES NFR BLD: 39.6 % (ref 20–40)
MCH RBC QN AUTO: 29.2 PG (ref 27–31)
MCHC RBC AUTO-ENTMCNC: 31.9 G/DL (ref 33–37)
MCV RBC AUTO: 91.6 FL (ref 81–99)
MONOCYTES # BLD: 0.7 K/UL (ref 0–0.9)
MONOCYTES NFR BLD: 8.7 % (ref 0–10)
NEUTROPHILS # BLD: 3.7 K/UL (ref 1.5–7.5)
NEUTS SEG NFR BLD: 47.4 % (ref 50–65)
PLATELET # BLD AUTO: 216 K/UL (ref 130–400)
PMV BLD AUTO: 10.7 FL (ref 9.4–12.3)
POTASSIUM SERPL-SCNC: 4.5 MMOL/L (ref 3.5–5)
RBC # BLD AUTO: 4.28 M/UL (ref 4.2–5.4)
SODIUM SERPL-SCNC: 140 MMOL/L (ref 136–145)
WBC # BLD AUTO: 7.7 K/UL (ref 4.8–10.8)

## 2025-01-07 PROCEDURE — 99204 OFFICE O/P NEW MOD 45 MIN: CPT | Performed by: INTERNAL MEDICINE

## 2025-01-07 NOTE — PATIENT INSTRUCTIONS
Loop Recorder Instructions:    [place] at the [facility] [place] [facility] located on the first floor of [facility]. Enter through hospital main entrance and turn immediately to your left.     Procedure Date: 01/16/25  Procedure Arrival Time: 10:30 am  Procedure Start Time: 12:00 pm  (Times are subject to change)     Please have your lab work (CBC and BMP) done before your procedure Date.     Procedure Instructions:   1) You will need to not have anything to eat or drink the morning before the procedure.   2) You can still take all of your morning medication with a sip of water   3) You will need a  for the procedure.

## 2025-01-07 NOTE — PROGRESS NOTES
metFORMIN (GLUCOPHAGE-XR) 500 MG extended release tablet, Take 1 tablet by mouth at bedtime, Disp: 30 tablet, Rfl: 3    meloxicam (MOBIC) 15 MG tablet, Take 1 tablet by mouth daily as needed for Pain, Disp: 30 tablet, Rfl: 1    NONFORMULARY, COD LIVER DAILY, Disp: , Rfl:     vitamin C (ASCORBIC ACID) 500 MG tablet, Take 1 tablet by mouth daily, Disp: , Rfl:     magnesium 200 MG TABS tablet, Take 1 tablet by mouth daily, Disp: , Rfl:     zinc 50 MG TABS tablet, Take 1 tablet by mouth daily, Disp: , Rfl:     Probiotic Product (PROBIOTIC DAILY PO), Take by mouth daily, Disp: , Rfl:     Biotin 5000 MCG CAPS, Take by mouth daily, Disp: , Rfl:     vitamin B-12 (CYANOCOBALAMIN) 500 MCG tablet, Take 1 tablet by mouth daily, Disp: , Rfl:     ondansetron (ZOFRAN) 4 MG tablet, Take 1 tablet by mouth every 6 hours as needed for Nausea, Disp: 20 tablet, Rfl: 1    PE:  Vitals:    01/07/25 0910   BP: 118/60   Site: Left Upper Arm   Pulse: 50   Resp: 16   Weight: 83 kg (183 lb)   Height: 1.727 m (5' 8\")       Constitutional: She is oriented to person, place, and time. She appears well-developed and well-nourished in no acute distress.  Neck:  Neck supple without JVD present. No trachea deviation present. No thyromegaly present.   Eyes:Conjunctivae and EOM are normal. Pupils equal and reactive to light.   ENT:Hearing appears normal.conjunctiva and lids are normal, ears and nose appear normal.  Cardiovascular: Normal rate, S1-S2 regular rhythm, normal heart sounds.  No murmur ascultated.  No gallop and no friction rub.  No carotid bruits.  No peripheral edema.    Pulmonary/Chest:  Lungs clear to auscultation bilaterally without evidence of respiratory distress.  She without wheezes. She without rales or ronchi.   Musculoskeletal: Normal range of motion.  Gait is normal  Head is normocephalic and atraumatic.  Skin: Skin is warm and dry without rash or pallor.   Psychiatric:She is alert and oriented to person, place, and time.  She

## 2025-01-08 ENCOUNTER — OFFICE VISIT (OUTPATIENT)
Dept: ENT CLINIC | Age: 30
End: 2025-01-08
Payer: OTHER GOVERNMENT

## 2025-01-08 VITALS
SYSTOLIC BLOOD PRESSURE: 100 MMHG | DIASTOLIC BLOOD PRESSURE: 68 MMHG | BODY MASS INDEX: 28.04 KG/M2 | HEIGHT: 68 IN | WEIGHT: 185 LBS

## 2025-01-08 DIAGNOSIS — M26.609 TMJ DYSFUNCTION: ICD-10-CM

## 2025-01-08 DIAGNOSIS — R42 DIZZINESS: ICD-10-CM

## 2025-01-08 DIAGNOSIS — H93.11 TINNITUS, RIGHT: ICD-10-CM

## 2025-01-08 DIAGNOSIS — H91.21 SUDDEN HEARING LOSS, RIGHT: Primary | ICD-10-CM

## 2025-01-08 PROCEDURE — 99213 OFFICE O/P EST LOW 20 MIN: CPT | Performed by: NURSE PRACTITIONER

## 2025-01-08 RX ORDER — MELOXICAM 15 MG/1
15 TABLET ORAL DAILY PRN
Qty: 90 TABLET | Refills: 1 | Status: SHIPPED | OUTPATIENT
Start: 2025-01-08

## 2025-01-08 ASSESSMENT — ENCOUNTER SYMPTOMS
ALLERGIC/IMMUNOLOGIC NEGATIVE: 1
GASTROINTESTINAL NEGATIVE: 1
RESPIRATORY NEGATIVE: 1
EYES NEGATIVE: 1

## 2025-01-08 NOTE — PROGRESS NOTES
2025    Lawrence Guerrero (:  1995) is a 29 y.o. female, Established patient, here for evaluation of the following chief complaint(s):  Follow-up (EARS)      Vitals:    25 1001   BP: 100/68   Weight: 83.9 kg (185 lb)   Height: 1.727 m (5' 8\")       Wt Readings from Last 3 Encounters:   25 83.9 kg (185 lb)   25 83 kg (183 lb)   24 83 kg (183 lb)       BP Readings from Last 3 Encounters:   25 100/68   25 118/60   24 112/76         SUBJECTIVE/OBJECTIVE:    Patient seen today for follow up of right ear. She had an MRI of the IAC on 24 that was negative. She was put on a second round of prednisone at her last visit. She denies any improvement in her hearing with this. She still complains of fullness and numbness in her right ear. She denies ear pain. She does still complain of tinnitus and intermittent dizziness. She reports that about every 2 weeks she is having episodes of increased fullness and tinnitus on the right with dizziness. She describes the dizziness as \"feeling like she is in a washing machine.\" She does report the meloxicam is helping her TMJ.         Review of Systems   Constitutional: Negative.    HENT:  Positive for ear pain (fullness, right), hearing loss (right) and tinnitus (right).    Eyes: Negative.    Respiratory: Negative.     Cardiovascular: Negative.    Gastrointestinal: Negative.    Endocrine: Negative.    Musculoskeletal: Negative.    Skin: Negative.    Allergic/Immunologic: Negative.    Neurological:  Positive for dizziness (intermittent).   Hematological: Negative.    Psychiatric/Behavioral: Negative.          Physical Exam  Vitals reviewed.   Constitutional:       Appearance: Normal appearance. She is normal weight.   HENT:      Head: Normocephalic and atraumatic.      Right Ear: Tympanic membrane, ear canal and external ear normal.      Left Ear: Tympanic membrane, ear canal and external ear normal.      Nose: Nose normal.

## 2025-01-15 RX ORDER — FUROSEMIDE 40 MG/1
40 TABLET ORAL DAILY
Qty: 30 TABLET | Refills: 5 | Status: SHIPPED | OUTPATIENT
Start: 2025-01-15

## 2025-01-16 ENCOUNTER — APPOINTMENT (OUTPATIENT)
Dept: VASCULAR LAB | Age: 30
End: 2025-01-16
Attending: INTERNAL MEDICINE
Payer: OTHER GOVERNMENT

## 2025-01-16 ENCOUNTER — PATIENT MESSAGE (OUTPATIENT)
Dept: CARDIOLOGY CLINIC | Age: 30
End: 2025-01-16

## 2025-01-16 ENCOUNTER — HOSPITAL ENCOUNTER (OUTPATIENT)
Age: 30
Setting detail: OUTPATIENT SURGERY
Discharge: HOME | End: 2025-01-16
Attending: INTERNAL MEDICINE | Admitting: INTERNAL MEDICINE
Payer: OTHER GOVERNMENT

## 2025-01-16 VITALS
TEMPERATURE: 97 F | HEIGHT: 68 IN | SYSTOLIC BLOOD PRESSURE: 108 MMHG | WEIGHT: 183 LBS | OXYGEN SATURATION: 100 % | RESPIRATION RATE: 13 BRPM | BODY MASS INDEX: 27.74 KG/M2 | DIASTOLIC BLOOD PRESSURE: 71 MMHG | HEART RATE: 49 BPM

## 2025-01-16 DIAGNOSIS — R00.1 SYMPTOMATIC BRADYCARDIA: ICD-10-CM

## 2025-01-16 DIAGNOSIS — R60.0 LEG EDEMA, LEFT: Primary | ICD-10-CM

## 2025-01-16 LAB
ECHO BSA: 2 M2
ECHO BSA: 2 M2

## 2025-01-16 PROCEDURE — C1764 EVENT RECORDER, CARDIAC: HCPCS | Performed by: INTERNAL MEDICINE

## 2025-01-16 PROCEDURE — 33285 INSJ SUBQ CAR RHYTHM MNTR: CPT | Performed by: INTERNAL MEDICINE

## 2025-01-16 PROCEDURE — 6360000002 HC RX W HCPCS: Performed by: INTERNAL MEDICINE

## 2025-01-16 PROCEDURE — 6370000000 HC RX 637 (ALT 250 FOR IP): Performed by: INTERNAL MEDICINE

## 2025-01-16 PROCEDURE — 7100000010 HC PHASE II RECOVERY - FIRST 15 MIN: Performed by: INTERNAL MEDICINE

## 2025-01-16 PROCEDURE — 7100000011 HC PHASE II RECOVERY - ADDTL 15 MIN: Performed by: INTERNAL MEDICINE

## 2025-01-16 PROCEDURE — 93971 EXTREMITY STUDY: CPT

## 2025-01-16 DEVICE — INSERTABLE CARDIAC MONITOR
Type: IMPLANTABLE DEVICE | Status: FUNCTIONAL
Brand: LUX-DX II+™

## 2025-01-16 RX ORDER — SODIUM CHLORIDE 9 MG/ML
INJECTION, SOLUTION INTRAVENOUS CONTINUOUS
Status: DISCONTINUED | OUTPATIENT
Start: 2025-01-16 | End: 2025-01-16 | Stop reason: HOSPADM

## 2025-01-16 RX ORDER — SODIUM CHLORIDE 9 MG/ML
INJECTION, SOLUTION INTRAVENOUS PRN
Status: DISCONTINUED | OUTPATIENT
Start: 2025-01-16 | End: 2025-01-16 | Stop reason: HOSPADM

## 2025-01-16 RX ORDER — LIDOCAINE HYDROCHLORIDE AND EPINEPHRINE 10; 10 MG/ML; UG/ML
INJECTION, SOLUTION INFILTRATION; PERINEURAL PRN
Status: DISCONTINUED | OUTPATIENT
Start: 2025-01-16 | End: 2025-01-16 | Stop reason: HOSPADM

## 2025-01-16 RX ORDER — SODIUM CHLORIDE 0.9 % (FLUSH) 0.9 %
5-40 SYRINGE (ML) INJECTION PRN
Status: DISCONTINUED | OUTPATIENT
Start: 2025-01-16 | End: 2025-01-16 | Stop reason: HOSPADM

## 2025-01-16 RX ORDER — SODIUM CHLORIDE 0.9 % (FLUSH) 0.9 %
5-40 SYRINGE (ML) INJECTION EVERY 12 HOURS SCHEDULED
Status: DISCONTINUED | OUTPATIENT
Start: 2025-01-16 | End: 2025-01-16 | Stop reason: HOSPADM

## 2025-01-16 RX ORDER — CHLORHEXIDINE GLUCONATE 40 MG/ML
SOLUTION TOPICAL ONCE
Status: COMPLETED | OUTPATIENT
Start: 2025-01-16 | End: 2025-01-16

## 2025-01-16 RX ADMIN — CHLORHEXIDINE GLUCONATE 118 ML: 4 SOLUTION TOPICAL at 10:37

## 2025-01-16 NOTE — H&P
[facility] Cardiology  [address] [address]  [phone]         Cesar is a 29 y.o. female presents with bradycardia.  The patient reports that she feels overwhelming fatigue occasionally and her smart watch reveals that her heart rates are in the 40s during these episodes.  She has never had syncope.  She does have vertigo and problems with inner ear issues on the right.  She wore a monitor which revealed bradycardia 20% of the time.  She did trigger the monitor at times when she had normal sinus rhythm with normal rates however.      Review of Systems   Constitutional: Negative for fever, chills, diaphoresis, activity change, appetite change, fatigue and unexpected weight change.   Eyes: Negative for photophobia, pain, redness and visual disturbance.   Respiratory: Negative for apnea, cough, chest tightness, shortness of breath, wheezing and stridor.    Cardiovascular: Negative for chest pain, palpitations and leg swelling.   Gastrointestinal: Negative for abdominal distention.   Genitourinary: Negative for dysuria, urgency and frequency.   Musculoskeletal: Negative for myalgias, arthralgias and gait problem.   Skin: Negative for color change, pallor, rash and wound.   Neurological: Negative for dizziness, tremors, speech difficulty, weakness and numbness.   Hematological: Does not bruise/bleed easily.   Psychiatric/Behavioral: Negative.      Past Medical History:   Diagnosis Date    Concussion     approximately 10 years ago    Depression     Major depressive disorder     Food allergy     Hypothyroidism     Metabolic disorder     PCOS (polycystic ovarian syndrome)         Past Surgical History:   Procedure Laterality Date    CHOLECYSTECTOMY      COLONOSCOPY  11/03/2014    Dr Champ Torres-[place],  Small non-bleeding internal hemorrhoids w/skin tags, serrated polyp     COLONOSCOPY N/A 10/21/2019    Dr Klein-Normal, 5 yr recall    COLONOSCOPY N/A 06/03/2021    Dr Klein, [facility], (-)Micro

## 2025-01-16 NOTE — DISCHARGE INSTRUCTIONS
Recovery is within a short period of time.  All activity can be resumed , once most of the discomfort is gone from the incision site.  Usually discomfort at the incision site lats for 1-2 weeks.  Tylenol is a safe medication to take for this discomfort,  Unless you have an allergy to this drug.      INCISION SITE  1.  Do NOT remove the dressing from the incision site, they will remove it at your  follow up appointment.  Keep the incision dry until your follow up appointment.     2.  Use only antibacterial soap and water to clean your incision after the dressing has been removed.  Do not use any  ointments on your incision, unless directed by your cardiologist.    3.  When steri-strips (small band-aides) are used, keep the incision clean and dry. Do not soak the wound until the steri-strips have fallen off, which should be withn 10-14 days.  4.  Check the incision site.  If you notice signs of infection, such as increased soreness, redness or discharge, contact your cardiologist immediately.     If you have any questions please call your cardiologist.

## 2025-01-16 NOTE — PROGRESS NOTES
Discharge instructions reviewed with patient and patient states understanding. ILR site c/d/I. Patient discharged from cath holding with all belongings and AVS.  Electronically signed by Kaylan Connors RN on 1/16/2025 at 1:15 PM

## 2025-01-30 ENCOUNTER — OFFICE VISIT (OUTPATIENT)
Dept: CARDIOLOGY CLINIC | Age: 30
End: 2025-01-30
Payer: OTHER GOVERNMENT

## 2025-01-30 VITALS
HEART RATE: 62 BPM | SYSTOLIC BLOOD PRESSURE: 114 MMHG | WEIGHT: 184 LBS | HEIGHT: 68 IN | DIASTOLIC BLOOD PRESSURE: 70 MMHG | BODY MASS INDEX: 27.89 KG/M2

## 2025-01-30 DIAGNOSIS — R00.1 SYMPTOMATIC BRADYCARDIA: Primary | ICD-10-CM

## 2025-01-30 PROCEDURE — 99213 OFFICE O/P EST LOW 20 MIN: CPT | Performed by: INTERNAL MEDICINE

## 2025-01-30 NOTE — PROGRESS NOTES
Lab Results   Component Value Date/Time    CREATININE 0.8 01/07/2025 09:51 AM    CREATININE 0.7 11/08/2024 08:56 PM    CREATININE 0.8 10/18/2024 08:01 AM    HGB 12.5 01/07/2025 09:51 AM    HGB 11.6 11/08/2024 09:17 PM    HGB 11.9 05/13/2024 02:17 PM    PROBNP 14 05/12/2020 02:46 PM              Assessment, Recommendations, & Plan:  29 y.o. female with bradycardia. Her device was interrogated it did reveal what appeared to be asystole but in fact this was just under sensing.  There were small R waves throughout.  We will continue watching this carefully and let her know if there are any alerts.  I will see her back in 3 months        Disposition - RTC in 3 months or sooner if needed      Please do not hesitate to contact me for any questions or concerns.    Dr. Piotr Sellers  Electrophysiology and Cardiology  [facility] [place], Cardiology  [phone]

## 2025-02-14 ENCOUNTER — OFFICE VISIT (OUTPATIENT)
Age: 30
End: 2025-02-14
Payer: OTHER GOVERNMENT

## 2025-02-14 VITALS
HEART RATE: 62 BPM | TEMPERATURE: 97.3 F | WEIGHT: 179 LBS | HEIGHT: 68 IN | BODY MASS INDEX: 27.13 KG/M2 | DIASTOLIC BLOOD PRESSURE: 66 MMHG | OXYGEN SATURATION: 100 % | SYSTOLIC BLOOD PRESSURE: 118 MMHG

## 2025-02-14 DIAGNOSIS — E34.9 HORMONE DEFICIENCY: ICD-10-CM

## 2025-02-14 DIAGNOSIS — K58.1 IRRITABLE BOWEL SYNDROME WITH CONSTIPATION: ICD-10-CM

## 2025-02-14 DIAGNOSIS — Z13.220 LIPID SCREENING: ICD-10-CM

## 2025-02-14 DIAGNOSIS — R76.8 POSITIVE ANA (ANTINUCLEAR ANTIBODY): ICD-10-CM

## 2025-02-14 DIAGNOSIS — J30.9 ALLERGIC SHINERS: ICD-10-CM

## 2025-02-14 DIAGNOSIS — R94.6 ABNORMAL THYROID FUNCTION TEST: ICD-10-CM

## 2025-02-14 DIAGNOSIS — E56.9 VITAMIN DEFICIENCY: ICD-10-CM

## 2025-02-14 DIAGNOSIS — K59.00 CONSTIPATION, UNSPECIFIED CONSTIPATION TYPE: ICD-10-CM

## 2025-02-14 DIAGNOSIS — R79.89 LFT ELEVATION: Primary | ICD-10-CM

## 2025-02-14 LAB
25(OH)D3 SERPL-MCNC: 29.8 NG/ML
ALBUMIN SERPL-MCNC: 4.5 G/DL (ref 3.5–5.2)
ALP SERPL-CCNC: 56 U/L (ref 35–104)
ALT SERPL-CCNC: 33 U/L (ref 5–33)
ANION GAP SERPL CALCULATED.3IONS-SCNC: 12 MMOL/L (ref 8–16)
AST SERPL-CCNC: 33 U/L (ref 5–32)
BASOPHILS # BLD: 0.1 K/UL (ref 0–0.2)
BASOPHILS NFR BLD: 1.6 % (ref 0–1)
BILIRUB SERPL-MCNC: 0.6 MG/DL (ref 0.2–1.2)
BUN SERPL-MCNC: 16 MG/DL (ref 6–20)
CALCIUM SERPL-MCNC: 9.9 MG/DL (ref 8.6–10)
CHLORIDE SERPL-SCNC: 101 MMOL/L (ref 98–107)
CHOLEST SERPL-MCNC: 206 MG/DL (ref 0–199)
CO2 SERPL-SCNC: 29 MMOL/L (ref 22–29)
CREAT SERPL-MCNC: 0.9 MG/DL (ref 0.5–0.9)
EOSINOPHIL # BLD: 0.4 K/UL (ref 0–0.6)
EOSINOPHIL NFR BLD: 8 % (ref 0–5)
ERYTHROCYTE [DISTWIDTH] IN BLOOD BY AUTOMATED COUNT: 13.2 % (ref 11.5–14.5)
ESTRADIOL SERPL-SCNC: 17.2 PG/ML
FSH SERPL-SCNC: 6.7 MIU/ML
GLUCOSE SERPL-MCNC: 89 MG/DL (ref 70–99)
HBA1C MFR BLD: 5.3 % (ref 4–5.6)
HCT VFR BLD AUTO: 41.2 % (ref 37–47)
HDLC SERPL-MCNC: 104 MG/DL (ref 40–60)
HGB BLD-MCNC: 13.1 G/DL (ref 12–16)
IMM GRANULOCYTES # BLD: 0 K/UL
LDLC SERPL CALC-MCNC: 93 MG/DL
LH SERPL-ACNC: 10.1 MIU/ML
LYMPHOCYTES # BLD: 2 K/UL (ref 1.1–4.5)
LYMPHOCYTES NFR BLD: 39.2 % (ref 20–40)
MCH RBC QN AUTO: 28.8 PG (ref 27–31)
MCHC RBC AUTO-ENTMCNC: 31.8 G/DL (ref 33–37)
MCV RBC AUTO: 90.5 FL (ref 81–99)
MONOCYTES # BLD: 0.4 K/UL (ref 0–0.9)
MONOCYTES NFR BLD: 7.4 % (ref 0–10)
NEUTROPHILS # BLD: 2.2 K/UL (ref 1.5–7.5)
NEUTS SEG NFR BLD: 43.8 % (ref 50–65)
PLATELET # BLD AUTO: 221 K/UL (ref 130–400)
PMV BLD AUTO: 10.7 FL (ref 9.4–12.3)
POTASSIUM SERPL-SCNC: 4.2 MMOL/L (ref 3.5–5.1)
PROGEST SERPL-MCNC: 0.14 NG/ML
PROT SERPL-MCNC: 7.5 G/DL (ref 6.4–8.3)
RBC # BLD AUTO: 4.55 M/UL (ref 4.2–5.4)
SODIUM SERPL-SCNC: 142 MMOL/L (ref 136–145)
T3FREE SERPL-MCNC: 1.9 PG/ML (ref 2–4.4)
T4 FREE SERPL-MCNC: 1.08 NG/DL (ref 0.93–1.7)
TRIGL SERPL-MCNC: 46 MG/DL (ref 0–149)
TSH SERPL DL<=0.005 MIU/L-ACNC: 1.39 UIU/ML (ref 0.27–4.2)
WBC # BLD AUTO: 5 K/UL (ref 4.8–10.8)

## 2025-02-14 PROCEDURE — 99214 OFFICE O/P EST MOD 30 MIN: CPT | Performed by: NURSE PRACTITIONER

## 2025-02-14 RX ORDER — PLECANATIDE 3 MG/1
3 TABLET ORAL DAILY
Qty: 30 TABLET | Refills: 5 | Status: SHIPPED | OUTPATIENT
Start: 2025-02-14

## 2025-02-14 SDOH — ECONOMIC STABILITY: FOOD INSECURITY: WITHIN THE PAST 12 MONTHS, THE FOOD YOU BOUGHT JUST DIDN'T LAST AND YOU DIDN'T HAVE MONEY TO GET MORE.: NEVER TRUE

## 2025-02-14 SDOH — ECONOMIC STABILITY: FOOD INSECURITY: WITHIN THE PAST 12 MONTHS, YOU WORRIED THAT YOUR FOOD WOULD RUN OUT BEFORE YOU GOT MONEY TO BUY MORE.: NEVER TRUE

## 2025-02-14 ASSESSMENT — PATIENT HEALTH QUESTIONNAIRE - PHQ9
SUM OF ALL RESPONSES TO PHQ QUESTIONS 1-9: 3
4. FEELING TIRED OR HAVING LITTLE ENERGY: SEVERAL DAYS
9. THOUGHTS THAT YOU WOULD BE BETTER OFF DEAD, OR OF HURTING YOURSELF: NOT AT ALL
6. FEELING BAD ABOUT YOURSELF - OR THAT YOU ARE A FAILURE OR HAVE LET YOURSELF OR YOUR FAMILY DOWN: SEVERAL DAYS
SUM OF ALL RESPONSES TO PHQ9 QUESTIONS 1 & 2: 0
SUM OF ALL RESPONSES TO PHQ QUESTIONS 1-9: 3
8. MOVING OR SPEAKING SO SLOWLY THAT OTHER PEOPLE COULD HAVE NOTICED. OR THE OPPOSITE, BEING SO FIGETY OR RESTLESS THAT YOU HAVE BEEN MOVING AROUND A LOT MORE THAN USUAL: NOT AT ALL
3. TROUBLE FALLING OR STAYING ASLEEP: NOT AT ALL
10. IF YOU CHECKED OFF ANY PROBLEMS, HOW DIFFICULT HAVE THESE PROBLEMS MADE IT FOR YOU TO DO YOUR WORK, TAKE CARE OF THINGS AT HOME, OR GET ALONG WITH OTHER PEOPLE: SOMEWHAT DIFFICULT
SUM OF ALL RESPONSES TO PHQ QUESTIONS 1-9: 3
2. FEELING DOWN, DEPRESSED OR HOPELESS: NOT AT ALL
SUM OF ALL RESPONSES TO PHQ QUESTIONS 1-9: 3
1. LITTLE INTEREST OR PLEASURE IN DOING THINGS: NOT AT ALL
7. TROUBLE CONCENTRATING ON THINGS, SUCH AS READING THE NEWSPAPER OR WATCHING TELEVISION: SEVERAL DAYS
5. POOR APPETITE OR OVEREATING: NOT AT ALL

## 2025-02-14 ASSESSMENT — ENCOUNTER SYMPTOMS
RESPIRATORY NEGATIVE: 1
CONSTIPATION: 1
EYE REDNESS: 1

## 2025-02-14 NOTE — PROGRESS NOTES
Lawrence Guerrero (:  1995) is a 29 y.o. female, Established patient, here for evaluation of the following chief complaint(s):  Hearing Problem (Sees Neuro in March ), Edema, and Constipation         Assessment & Plan  1. Lower extremity edema.  The etiology of the leg swelling remains uncertain, as all tests conducted within the past 6 months have yielded normal results. Hormonal imbalances or thyroid dysfunction could potentially contribute to these symptoms. A comprehensive set of laboratory tests will be ordered to further investigate the cause of the swelling.    2. Periorbital ecchymosis.  The periorbital ecchymosis appears to be indicative of allergic shiners. A food allergy panel will be ordered to identify any potential allergens that may be contributing to her symptoms. Additionally, a test for alpha-gal will be conducted.    3. Constipation.  A prescription for Trulance will be sent to [facility] to manage her constipation.    4. Fatigue.  A comprehensive set of laboratory tests will be ordered to further investigate the cause of her fatigue, including thyroid and liver function tests.    5. Amenorrhea.  She has not had a period for 7 months and then had one last month. She is currently on metformin to help regulate her menstrual cycle. She will continue with metformin and monitor for any changes.      Keep follow up with endocrinology as scheduled.     Results    No results found for this visit on 25.    ICD-10-CM    1. LFT elevation  R79.89       2. Vitamin deficiency  E56.9 Vitamin D 25 Hydroxy      3. Positive SHELLEY (antinuclear antibody)  R76.8 CBC with Auto Differential     Comprehensive Metabolic Panel     Hemoglobin A1C      4. Constipation, unspecified constipation type  K59.00       5. Allergic shiners  J30.9 Allergen, Food, Alpha-Gal Panel, IgE     Allergens, Pediatric Foods/Inhalants Profile     TSH      6. Lipid screening  Z13.220 Lipid Panel      7. Abnormal thyroid function test

## 2025-02-16 LAB — THYROPEROXIDASE AB SERPL-ACNC: 0.6 IU/ML (ref 0–9)

## 2025-02-17 DIAGNOSIS — R55 NEAR SYNCOPE: ICD-10-CM

## 2025-02-17 DIAGNOSIS — R42 DIZZINESS: ICD-10-CM

## 2025-02-17 DIAGNOSIS — Z95.818 STATUS POST PLACEMENT OF IMPLANTABLE LOOP RECORDER: Primary | ICD-10-CM

## 2025-02-17 DIAGNOSIS — R00.1 BRADYCARDIA: ICD-10-CM

## 2025-02-17 DIAGNOSIS — R00.1 SYMPTOMATIC BRADYCARDIA: ICD-10-CM

## 2025-02-17 LAB
A ALTERNATA IGE QN: <0.1 KU/L
ALLERGEN,FOOD, ALPHA-GAL IGE: <0.1 KU/L
BEEF IGE QN: 0.14 KU/L
CAT DANDER IGE QN: 1.71 KU/L
CORN IGE QN: 0.57 KU/L
COW MILK IGE QN: <0.1 KU/L
D FARINAE IGE QN: 0.52 KU/L
D PTERONYSS IGE QN: 0.62 KU/L
DEPRECATED MISC ALLERGEN IGE RAST QL: ABNORMAL
DOG DANDER IGE QN: 0.97 KU/L
EGG WHITE IGE QN: <0.1 KU/L
HOUSE DUST GREER IGE QN: 1.4 KU/L
LAMB IGE QN: <0.1 KU/L
PORK IGE QN: <0.1 KU/L
SOYBEAN IGE QN: 0.5 KU/L
TSI SER-ACNC: <0.1 IU/L
WHEAT IGE QN: 0.84 KU/L

## 2025-02-18 ENCOUNTER — TELEPHONE (OUTPATIENT)
Age: 30
End: 2025-02-18

## 2025-02-18 NOTE — TELEPHONE ENCOUNTER
----- Message from SARAY Infante sent at 2/17/2025 12:41 PM CST -----  Please call patient and let them know results.   Your metabolic profile is normal.  This includes kidney and liver functions as well as electrolytes.  Normal cholesterol  Vitamin D is slightly low, recommend taking multivitamin daily  Normal blood counts  TPO antibodies negative  LH and FSH are normal for age  Normal progesterone  Normal thyroid  Blood sugars well-controlled with A1c of 5.3

## 2025-02-19 LAB
DHEA SERPL-MCNC: 0.36 NG/ML (ref 1.33–7.78)
TESTOST SERPL-MCNC: 266 NG/DL (ref 9–55)

## 2025-02-20 ENCOUNTER — TELEPHONE (OUTPATIENT)
Age: 30
End: 2025-02-20

## 2025-02-20 NOTE — TELEPHONE ENCOUNTER
----- Message from SARAY Mckeon sent at 2/19/2025  8:40 AM CST -----  Testosterone and DHEA levels were abnormal.  Would recommend following up with GYN for the abnormalities.    Also allergen panel that has returned so far does still show the wheat, soybean, cat dander, dust mite, dog dander, and corn.  She needs to make sure she is eating any of these things that could be worsening her symptoms.    
Called patient, spoke with: Patient regarding the results of the patients most recent labs.  I advised Patient of Katey Car recommendations.   Patient did voice understanding    
yes

## 2025-02-21 ENCOUNTER — HOSPITAL ENCOUNTER (EMERGENCY)
Age: 30
Discharge: HOME | End: 2025-02-21
Attending: EMERGENCY MEDICINE
Payer: OTHER GOVERNMENT

## 2025-02-21 VITALS
SYSTOLIC BLOOD PRESSURE: 115 MMHG | OXYGEN SATURATION: 100 % | HEART RATE: 71 BPM | RESPIRATION RATE: 14 BRPM | TEMPERATURE: 97.8 F | DIASTOLIC BLOOD PRESSURE: 77 MMHG

## 2025-02-21 DIAGNOSIS — R11.2 NAUSEA AND VOMITING, UNSPECIFIED VOMITING TYPE: ICD-10-CM

## 2025-02-21 DIAGNOSIS — R10.12 ABDOMINAL PAIN, LEFT UPPER QUADRANT: Primary | ICD-10-CM

## 2025-02-21 LAB
ALBUMIN SERPL-MCNC: 4.2 G/DL (ref 3.5–5.2)
ALP SERPL-CCNC: 48 U/L (ref 35–104)
ALT SERPL-CCNC: 28 U/L (ref 5–33)
ANION GAP SERPL CALCULATED.3IONS-SCNC: 12 MMOL/L (ref 8–16)
AST SERPL-CCNC: 27 U/L (ref 5–32)
BACTERIA URNS QL MICRO: NEGATIVE /HPF
BASOPHILS # BLD: 0 K/UL (ref 0–0.2)
BASOPHILS NFR BLD: 0.2 % (ref 0–1)
BILIRUB SERPL-MCNC: 0.5 MG/DL (ref 0.2–1.2)
BILIRUB UR QL STRIP: NEGATIVE
BUN SERPL-MCNC: 23 MG/DL (ref 6–20)
CALCIUM SERPL-MCNC: 8.7 MG/DL (ref 8.6–10)
CHLORIDE SERPL-SCNC: 108 MMOL/L (ref 98–107)
CLARITY UR: CLEAR
CO2 SERPL-SCNC: 22 MMOL/L (ref 22–29)
COLOR UR: YELLOW
CREAT SERPL-MCNC: 0.7 MG/DL (ref 0.5–0.9)
CRYSTALS URNS MICRO: NORMAL /HPF
EOSINOPHIL # BLD: 0.2 K/UL (ref 0–0.6)
EOSINOPHIL NFR BLD: 2.9 % (ref 0–5)
EPI CELLS #/AREA URNS AUTO: 1 /HPF (ref 0–5)
ERYTHROCYTE [DISTWIDTH] IN BLOOD BY AUTOMATED COUNT: 13.1 % (ref 11.5–14.5)
GLUCOSE SERPL-MCNC: 94 MG/DL (ref 70–99)
GLUCOSE UR STRIP.AUTO-MCNC: NEGATIVE MG/DL
HCG SERPL QL: NEGATIVE
HCT VFR BLD AUTO: 43.5 % (ref 37–47)
HGB BLD-MCNC: 14 G/DL (ref 12–16)
HGB UR STRIP.AUTO-MCNC: NEGATIVE MG/L
HYALINE CASTS #/AREA URNS AUTO: 1 /HPF (ref 0–8)
IMM GRANULOCYTES # BLD: 0 K/UL
KETONES UR STRIP.AUTO-MCNC: ABNORMAL MG/DL
LEUKOCYTE ESTERASE UR QL STRIP.AUTO: ABNORMAL
LIPASE SERPL-CCNC: 32 U/L (ref 13–60)
LYMPHOCYTES # BLD: 0.4 K/UL (ref 1.1–4.5)
LYMPHOCYTES NFR BLD: 6.9 % (ref 20–40)
MAGNESIUM SERPL-MCNC: 1.6 MG/DL (ref 1.6–2.6)
MCH RBC QN AUTO: 29.1 PG (ref 27–31)
MCHC RBC AUTO-ENTMCNC: 32.2 G/DL (ref 33–37)
MCV RBC AUTO: 90.4 FL (ref 81–99)
MONOCYTES # BLD: 0.3 K/UL (ref 0–0.9)
MONOCYTES NFR BLD: 5.9 % (ref 0–10)
NEUTROPHILS # BLD: 4.4 K/UL (ref 1.5–7.5)
NEUTS SEG NFR BLD: 83.9 % (ref 50–65)
NITRITE UR QL STRIP.AUTO: NEGATIVE
PH UR STRIP.AUTO: 5.5 [PH] (ref 5–8)
PLATELET # BLD AUTO: 171 K/UL (ref 130–400)
PMV BLD AUTO: 10.9 FL (ref 9.4–12.3)
POTASSIUM SERPL-SCNC: 4.6 MMOL/L (ref 3.5–5.1)
PROT SERPL-MCNC: 6.5 G/DL (ref 6.4–8.3)
PROT UR STRIP.AUTO-MCNC: NEGATIVE MG/DL
RBC # BLD AUTO: 4.81 M/UL (ref 4.2–5.4)
RBC #/AREA URNS AUTO: 1 /HPF (ref 0–4)
REASON FOR REJECTION: NORMAL
REJECTED TEST: NORMAL
SODIUM SERPL-SCNC: 142 MMOL/L (ref 136–145)
SP GR UR STRIP.AUTO: 1.02 (ref 1–1.03)
UROBILINOGEN UR STRIP.AUTO-MCNC: 0.2 E.U./DL
WBC # BLD AUTO: 5.2 K/UL (ref 4.8–10.8)
WBC #/AREA URNS AUTO: 1 /HPF (ref 0–5)

## 2025-02-21 PROCEDURE — 83735 ASSAY OF MAGNESIUM: CPT

## 2025-02-21 PROCEDURE — 96375 TX/PRO/DX INJ NEW DRUG ADDON: CPT

## 2025-02-21 PROCEDURE — 2580000003 HC RX 258: Performed by: EMERGENCY MEDICINE

## 2025-02-21 PROCEDURE — 99284 EMERGENCY DEPT VISIT MOD MDM: CPT

## 2025-02-21 PROCEDURE — 6360000002 HC RX W HCPCS: Performed by: EMERGENCY MEDICINE

## 2025-02-21 PROCEDURE — 96374 THER/PROPH/DIAG INJ IV PUSH: CPT

## 2025-02-21 PROCEDURE — 81001 URINALYSIS AUTO W/SCOPE: CPT

## 2025-02-21 PROCEDURE — 36415 COLL VENOUS BLD VENIPUNCTURE: CPT

## 2025-02-21 PROCEDURE — 83690 ASSAY OF LIPASE: CPT

## 2025-02-21 PROCEDURE — 96361 HYDRATE IV INFUSION ADD-ON: CPT

## 2025-02-21 PROCEDURE — 85025 COMPLETE CBC W/AUTO DIFF WBC: CPT

## 2025-02-21 PROCEDURE — 84703 CHORIONIC GONADOTROPIN ASSAY: CPT

## 2025-02-21 PROCEDURE — 6370000000 HC RX 637 (ALT 250 FOR IP): Performed by: EMERGENCY MEDICINE

## 2025-02-21 PROCEDURE — 80053 COMPREHEN METABOLIC PANEL: CPT

## 2025-02-21 RX ORDER — ONDANSETRON 2 MG/ML
4 INJECTION INTRAMUSCULAR; INTRAVENOUS ONCE
Status: COMPLETED | OUTPATIENT
Start: 2025-02-21 | End: 2025-02-21

## 2025-02-21 RX ORDER — ONDANSETRON 4 MG/1
4 TABLET, ORALLY DISINTEGRATING ORAL 3 TIMES DAILY PRN
Qty: 21 TABLET | Refills: 0 | Status: SHIPPED | OUTPATIENT
Start: 2025-02-21

## 2025-02-21 RX ORDER — METOCLOPRAMIDE HYDROCHLORIDE 5 MG/ML
10 INJECTION INTRAMUSCULAR; INTRAVENOUS ONCE
Status: COMPLETED | OUTPATIENT
Start: 2025-02-21 | End: 2025-02-21

## 2025-02-21 RX ORDER — PROMETHAZINE HYDROCHLORIDE 12.5 MG/1
12.5 TABLET ORAL 3 TIMES DAILY PRN
Qty: 12 TABLET | Refills: 0 | Status: SHIPPED | OUTPATIENT
Start: 2025-02-21 | End: 2025-02-28

## 2025-02-21 RX ORDER — SODIUM CHLORIDE, SODIUM LACTATE, POTASSIUM CHLORIDE, AND CALCIUM CHLORIDE .6; .31; .03; .02 G/100ML; G/100ML; G/100ML; G/100ML
1000 INJECTION, SOLUTION INTRAVENOUS ONCE
Status: COMPLETED | OUTPATIENT
Start: 2025-02-21 | End: 2025-02-21

## 2025-02-21 RX ADMIN — METOCLOPRAMIDE HYDROCHLORIDE 10 MG: 5 INJECTION, SOLUTION INTRAMUSCULAR; INTRAVENOUS at 06:04

## 2025-02-21 RX ADMIN — ONDANSETRON 4 MG: 2 INJECTION INTRAMUSCULAR; INTRAVENOUS at 04:48

## 2025-02-21 RX ADMIN — SODIUM CHLORIDE, POTASSIUM CHLORIDE, SODIUM LACTATE AND CALCIUM CHLORIDE 1000 ML: 600; 310; 30; 20 INJECTION, SOLUTION INTRAVENOUS at 04:47

## 2025-02-21 RX ADMIN — SODIUM CHLORIDE, PRESERVATIVE FREE 40 MG: 5 INJECTION INTRAVENOUS at 06:04

## 2025-02-21 RX ADMIN — LIDOCAINE HYDROCHLORIDE: 20 SOLUTION ORAL at 06:04

## 2025-02-21 ASSESSMENT — ENCOUNTER SYMPTOMS
VOMITING: 1
RESPIRATORY NEGATIVE: 1
EYES NEGATIVE: 1
NAUSEA: 1

## 2025-02-21 ASSESSMENT — PAIN DESCRIPTION - LOCATION: LOCATION: ABDOMEN

## 2025-02-21 ASSESSMENT — PAIN DESCRIPTION - ORIENTATION: ORIENTATION: LEFT

## 2025-02-21 ASSESSMENT — PAIN SCALES - GENERAL: PAINLEVEL_OUTOF10: 6

## 2025-02-21 NOTE — ED PROVIDER NOTES
[facility] EMERGENCY DEPARTMENT  EMERGENCY DEPARTMENT ENCOUNTER      Pt Name: Lawrence Guerrero  MRN: [id number]  Birthdate 1995  Date of evaluation: 2/21/2025  Provider: Eliseo Waters Jr, MD    CHIEF COMPLAINT       Chief Complaint   Patient presents with    Abdominal Pain     Since last night. Hx of pancreatitis.     Vomiting         HISTORY OF PRESENT ILLNESS   (Location/Symptom, Timing/Onset,Context/Setting, Quality, Duration, Modifying Factors, Severity)  Note limiting factors.   Lawrence Guerrero is a 29 y.o. female who presents to the emergency department with complaint of abdominal pain in the left upper quadrant associated with nausea and several episodes of vomiting.  Started earlier tonight.  Says she felt fine before going to bed last night.  Reports history of 1 prior episode of pancreatitis.  Has had cholecystectomy in the past.  On review of previous medical records, appears she had gallstone pancreatitis.  Has been seeing her primary care provider recently for other symptoms including unintentional weight loss over the last several months.  Had labs done recently showed some abnormalities including elevated testosterone.  Says she is been evaluated by rheumatology in the past with concern for some underlying autoimmune condition after having positive SHELLEY but was told they did not think there is anything significant at the time.    HPI    NursingNotes were reviewed.    REVIEW OF SYSTEMS    (2-9 systems for level 4, 10 or more for level 5)     Review of Systems   Constitutional:  Positive for unexpected weight change.   HENT: Negative.     Eyes: Negative.    Respiratory: Negative.     Cardiovascular: Negative.    Gastrointestinal:  Positive for nausea and vomiting.   Genitourinary: Negative.    Musculoskeletal: Negative.    Skin: Negative.    Neurological: Negative.    Hematological: Negative.    Psychiatric/Behavioral: Negative.         A complete review of systems was performed and is negative except as

## 2025-02-27 DIAGNOSIS — R10.2 PELVIC PAIN: Primary | ICD-10-CM

## 2025-02-27 RX ORDER — METFORMIN HYDROCHLORIDE 500 MG/1
500 TABLET, EXTENDED RELEASE ORAL 2 TIMES DAILY
Qty: 60 TABLET | Refills: 3 | Status: SHIPPED | OUTPATIENT
Start: 2025-02-27

## 2025-02-28 ENCOUNTER — HOSPITAL ENCOUNTER (OUTPATIENT)
Dept: ULTRASOUND IMAGING | Age: 30
Discharge: HOME | End: 2025-02-28
Payer: OTHER GOVERNMENT

## 2025-02-28 DIAGNOSIS — R10.2 PELVIC PAIN: ICD-10-CM

## 2025-02-28 PROCEDURE — 76830 TRANSVAGINAL US NON-OB: CPT

## 2025-03-06 ENCOUNTER — OFFICE VISIT (OUTPATIENT)
Dept: OBGYN CLINIC | Age: 30
End: 2025-03-06
Payer: OTHER GOVERNMENT

## 2025-03-06 VITALS
WEIGHT: 178 LBS | BODY MASS INDEX: 27.06 KG/M2 | DIASTOLIC BLOOD PRESSURE: 75 MMHG | HEART RATE: 67 BPM | SYSTOLIC BLOOD PRESSURE: 110 MMHG

## 2025-03-06 DIAGNOSIS — R79.89 ELEVATED TESTOSTERONE LEVEL IN FEMALE: ICD-10-CM

## 2025-03-06 DIAGNOSIS — R10.2 PELVIC PAIN: ICD-10-CM

## 2025-03-06 DIAGNOSIS — E27.9 ADRENAL GLAND DYSFUNCTION: ICD-10-CM

## 2025-03-06 DIAGNOSIS — R79.89 ELEVATED TESTOSTERONE LEVEL IN FEMALE: Primary | ICD-10-CM

## 2025-03-06 DIAGNOSIS — R35.0 URINARY FREQUENCY: ICD-10-CM

## 2025-03-06 DIAGNOSIS — N92.6 IRREGULAR MENSES: ICD-10-CM

## 2025-03-06 LAB — TESTOST SERPL-MCNC: 17.8 NG/DL (ref 8.4–48.1)

## 2025-03-06 PROCEDURE — 99214 OFFICE O/P EST MOD 30 MIN: CPT | Performed by: NURSE PRACTITIONER

## 2025-03-06 ASSESSMENT — ENCOUNTER SYMPTOMS
RESPIRATORY NEGATIVE: 1
EYES NEGATIVE: 1
ALLERGIC/IMMUNOLOGIC NEGATIVE: 1
CONSTIPATION: 1
DIARRHEA: 0

## 2025-03-06 NOTE — PROGRESS NOTES
Lawrence Guerrero is a 29 y.o. female who presents today for her medical conditions/ complaints as noted below. Lawrence Guerrero is c/o of Results        HPI  History of Present Illness  The patient presents for evaluation of elevated testosterone levels, irregular menstrual cycles, constipation, pelvic pain.     She has been experiencing irregular menstrual cycles, with a 7-month amenorrhea period followed by a cycle, then another 50-day delay before her most recent cycle started on Monday. This cycle has been characterized by the passage of approximately 10 large clots. She reports right-sided pain, significant bloating, swelling, and frequent urination. She also notes pitting edema in her left leg and a low heart rate, for which a loop recorder was placed. She has been on Metformin since September. She reports unintentional weight loss and is currently focusing on improving her overall health. She has noticed an increase in irritability and hair growth, particularly on her legs, which now require shaving every 3 weeks. She also reports the development of acne on her chest, shoulders, and back. She has not taken any testosterone supplements or injections, only regular vitamins and metformin. She has been under the care of an endocrinologist, who advised her to follow up with this office. She reports a sensation of fullness on her left side when lying down, which she describes as a bump or lump, and notes that her bladder tends to distend more to the right.    She has been experiencing severe constipation, requiring enemas every 1 to 2 weeks. Despite maintaining a high-fiber diet, adequate hydration, and daily 30-minute walks, she continues to struggle with constipation. Has also taken several medications with GI without relief. Has also done PFPT without improvement.     She is scheduled for a panicula skin removal procedure with Dr Agarwal and is considering muscle tightening to potentially alleviate her constipation.

## 2025-03-10 ENCOUNTER — RESULTS FOLLOW-UP (OUTPATIENT)
Dept: OBGYN CLINIC | Age: 30
End: 2025-03-10

## 2025-03-10 ENCOUNTER — TELEPHONE (OUTPATIENT)
Dept: OBGYN CLINIC | Age: 30
End: 2025-03-10

## 2025-03-14 ENCOUNTER — HOSPITAL ENCOUNTER (OUTPATIENT)
Dept: MRI IMAGING | Age: 30
Discharge: HOME | End: 2025-03-14
Payer: OTHER GOVERNMENT

## 2025-03-14 DIAGNOSIS — E27.9 ADRENAL GLAND DYSFUNCTION: ICD-10-CM

## 2025-03-14 DIAGNOSIS — R79.89 ELEVATED TESTOSTERONE LEVEL IN FEMALE: ICD-10-CM

## 2025-03-14 PROCEDURE — 74183 MRI ABD W/O CNTR FLWD CNTR: CPT

## 2025-03-14 PROCEDURE — 6360000004 HC RX CONTRAST MEDICATION: Performed by: NURSE PRACTITIONER

## 2025-03-14 PROCEDURE — A9577 INJ MULTIHANCE: HCPCS | Performed by: NURSE PRACTITIONER

## 2025-03-14 RX ADMIN — GADOBENATE DIMEGLUMINE 16 ML: 529 INJECTION, SOLUTION INTRAVENOUS at 07:59

## 2025-03-18 ENCOUNTER — RESULTS FOLLOW-UP (OUTPATIENT)
Dept: MRI IMAGING | Age: 30
End: 2025-03-18

## 2025-03-21 RX ORDER — METFORMIN HYDROCHLORIDE 500 MG/1
500 TABLET, EXTENDED RELEASE ORAL 2 TIMES DAILY
Qty: 60 TABLET | Refills: 3 | Status: SHIPPED | OUTPATIENT
Start: 2025-03-21

## 2025-03-24 ENCOUNTER — OFFICE VISIT (OUTPATIENT)
Dept: NEUROLOGY | Age: 30
End: 2025-03-24
Payer: OTHER GOVERNMENT

## 2025-03-24 ENCOUNTER — RESULTS FOLLOW-UP (OUTPATIENT)
Dept: NEUROLOGY | Age: 30
End: 2025-03-24

## 2025-03-24 ENCOUNTER — TELEPHONE (OUTPATIENT)
Dept: CARDIOLOGY CLINIC | Age: 30
End: 2025-03-24

## 2025-03-24 VITALS
HEIGHT: 68 IN | SYSTOLIC BLOOD PRESSURE: 110 MMHG | OXYGEN SATURATION: 100 % | WEIGHT: 178 LBS | BODY MASS INDEX: 26.98 KG/M2 | HEART RATE: 74 BPM | DIASTOLIC BLOOD PRESSURE: 77 MMHG

## 2025-03-24 DIAGNOSIS — R20.0 NUMBNESS AND TINGLING: ICD-10-CM

## 2025-03-24 DIAGNOSIS — R42 DIZZINESS: Primary | ICD-10-CM

## 2025-03-24 DIAGNOSIS — R20.2 NUMBNESS AND TINGLING: ICD-10-CM

## 2025-03-24 DIAGNOSIS — H91.91 HEARING LOSS OF RIGHT EAR, UNSPECIFIED HEARING LOSS TYPE: Primary | ICD-10-CM

## 2025-03-24 DIAGNOSIS — N83.8 COMPLEX CYST OF UTERINE ADNEXA: ICD-10-CM

## 2025-03-24 DIAGNOSIS — R42 DIZZINESS: ICD-10-CM

## 2025-03-24 DIAGNOSIS — R76.8 ANA POSITIVE: ICD-10-CM

## 2025-03-24 LAB
25(OH)D3 SERPL-MCNC: 31.9 NG/ML
ALBUMIN SERPL-MCNC: 4.8 G/DL (ref 3.5–5.2)
ALP SERPL-CCNC: 57 U/L (ref 35–104)
ALT SERPL-CCNC: 36 U/L (ref 10–35)
ANION GAP SERPL CALCULATED.3IONS-SCNC: 8 MMOL/L (ref 8–16)
AST SERPL-CCNC: 29 U/L (ref 10–35)
BASOPHILS # BLD: 0.1 K/UL (ref 0–0.2)
BASOPHILS NFR BLD: 0.8 % (ref 0–1)
BILIRUB SERPL-MCNC: 0.4 MG/DL (ref 0.2–1.2)
BUN SERPL-MCNC: 23 MG/DL (ref 6–20)
CALCIUM SERPL-MCNC: 9.5 MG/DL (ref 8.6–10)
CANCER AG125 SERPL-ACNC: 5 U/ML (ref 0–38)
CHLORIDE SERPL-SCNC: 102 MMOL/L (ref 98–107)
CO2 SERPL-SCNC: 33 MMOL/L (ref 22–29)
CREAT SERPL-MCNC: 0.9 MG/DL (ref 0.5–0.9)
CRP SERPL-MCNC: <3 MG/L (ref 0–5)
EOSINOPHIL # BLD: 0.9 K/UL (ref 0–0.6)
EOSINOPHIL NFR BLD: 12.2 % (ref 0–5)
ERYTHROCYTE [DISTWIDTH] IN BLOOD BY AUTOMATED COUNT: 13.2 % (ref 11.5–14.5)
ERYTHROCYTE [SEDIMENTATION RATE] IN BLOOD BY WESTERGREN METHOD: 11 MM/HR (ref 0–20)
GLUCOSE SERPL-MCNC: 86 MG/DL (ref 70–99)
HBA1C MFR BLD: 5.4 % (ref 4–5.6)
HCT VFR BLD AUTO: 42.1 % (ref 37–47)
HGB BLD-MCNC: 13.2 G/DL (ref 12–16)
HIV-1 P24 AG: NORMAL
HIV1+2 AB SERPLBLD QL IA.RAPID: NORMAL
IMM GRANULOCYTES # BLD: 0 K/UL
LYMPHOCYTES # BLD: 2.6 K/UL (ref 1.1–4.5)
LYMPHOCYTES NFR BLD: 36.1 % (ref 20–40)
MCH RBC QN AUTO: 28.9 PG (ref 27–31)
MCHC RBC AUTO-ENTMCNC: 31.4 G/DL (ref 33–37)
MCV RBC AUTO: 92.1 FL (ref 81–99)
MONOCYTES # BLD: 0.6 K/UL (ref 0–0.9)
MONOCYTES NFR BLD: 8.3 % (ref 0–10)
NEUTROPHILS # BLD: 3 K/UL (ref 1.5–7.5)
NEUTS SEG NFR BLD: 42.3 % (ref 50–65)
PLATELET # BLD AUTO: 225 K/UL (ref 130–400)
PMV BLD AUTO: 10.9 FL (ref 9.4–12.3)
POTASSIUM SERPL-SCNC: 5 MMOL/L (ref 3.5–5.1)
PROT SERPL-MCNC: 7.4 G/DL (ref 6.4–8.3)
RBC # BLD AUTO: 4.57 M/UL (ref 4.2–5.4)
RHEUMATOID FACT SER NEPH-ACNC: <10 IU/ML
SODIUM SERPL-SCNC: 143 MMOL/L (ref 136–145)
VIT B12 SERPL-MCNC: 1647 PG/ML (ref 232–1245)
WBC # BLD AUTO: 7.1 K/UL (ref 4.8–10.8)

## 2025-03-24 PROCEDURE — 99204 OFFICE O/P NEW MOD 45 MIN: CPT | Performed by: NURSE PRACTITIONER

## 2025-03-24 NOTE — TELEPHONE ENCOUNTER
Date: 4/7/25    Cardiologist: Marlo    Procedure: Abdominoplasty    Surgeon: Chloé    Last Office Visit: 1/30/25  Reason for office visit and medical concerns addressed at this office visit: syncope, bradycardia    Testing Performed and Date of Service:  12/23/24 SE   Stress Test: The patient reported no symptoms during the stress test. Hemodynamics are adequate for diagnosis. Blood pressure demonstrated a normal response and heart rate demonstrated a normal response to stress. The patient's heart rate recovery was normal. In stage 4, patient's Oxygen Saturation dropped to 89% briefly, and patient denied any SOB or difficulty getting her breath.    During 4th phase of stress test, patient's 02 Sat dropped to 89% briefly, and connection of finger probe was checked and rechecked, and 02 sat returned to 100% within one minute.  Pt states she has this happen to her at times.    Pt had a Duke Treadmill Score of 2 which is moderate risk, and a METS score of 13.40 which is Excellent and low risk for MI.     Exercise stress test excellent functional capacity without clinical or electrocardiographic evidence of ischemia at 92% predicted maximal heart rate      RCRI = 0.4   METs 4    Current Medications: trulance, metformin, meloxicam, lasix    Is the patient currently taking an anticoagulant? If so, what is the diagnosis the patient has been given to warrant the need for the anticoagulant? none    Additional Notes: requesting cardiac clearance

## 2025-03-24 NOTE — PROGRESS NOTES
REVIEW OF SYSTEMS    Constitutional: []Fever []Sweats []Chills [] Recent Injury [x] Denies all unless marked  HEENT:[]Headache  [] Head Injury [] Hearing Loss  [] Sore Throat  [] Ear Ache [x] Denies all unless marked  Spine:  [] Neck pain  [] Back pain  [] Sciaticia  [x] Denies all unless marked  Cardiovascular:[]Heart Disease []Palpitations [] Chest Pain   [x] Denies all unless marked  Pulmonary: []Shortness of Breath []Cough   [x] Denies all unless marked  Psychiatric/Behavioral:[] Depression [] Anxiety [x] Denies all unless marked  Gastrointestinal: []Nausea  []Vomiting  []Abdominal Pain  []Constipation  []Diarrhea  [x] Denies all unless marked  Genitourinary:   [] Frequency  [] Urgency  [] Dysuria [] Incontinence  [x] Denies all unless marked  Extremities: []Pain  []Swelling  [x] Denies all unless marked  Musculoskeletal: [] Myalgias  [] Joint Pain  [] Arthritis [] Muscle Cramps [] Muscle Twitches  [x] Denies all unless marked  Sleep: []Insomnia[]Snoring []Restless Legs  []Sleep Apnea  []Daytime Sleepiness  [x] Denies all unless marked  Skin:[] Rash [] Color Change [x] Denies all unless marked   Neurological:[]Visual Disturbance [] Memory Loss []Loss of Balance []Slurred Speech []Weakness []Seizures  [] Dizziness [x] Denies all unless marked    
20 tablet 1     No current facility-administered medications for this visit.       Allergies   Allergen Reactions    Sertraline Other (See Comments)     Pt. Developed seratonin syndrome and had to go impatient.     Shrimp (Diagnostic) Anaphylaxis    Shrimp Extract Anaphylaxis    Corn-Containing Products Other (See Comments)     Intolerance    Soybean-Containing Drug Products Other (See Comments)     Intolerance    Wheat Nausea Only    Wheat Extract Other (See Comments)     REVIEW OF SYSTEMS  Constitutional: []Fever []Sweats []Chills [] Recent Injury [x] Denies all unless marked  HEENT:[]Headache  [] Head Injury [] Hearing Loss  [] Sore Throat  [] Ear Ache [x] Denies all unless marked  Spine:  [] Neck pain  [] Back pain  [] Sciaticia  [x] Denies all unless marked  Cardiovascular:[]Heart Disease []Palpitations [] Chest Pain   [x] Denies all unless marked  Pulmonary: []Shortness of Breath []Cough   [x] Denies all unless marked  Psychiatric/Behavioral:[] Depression [] Anxiety [x] Denies all unless marked  Gastrointestinal: []Nausea  []Vomiting  []Abdominal Pain  []Constipation  []Diarrhea  [x] Denies all unless marked  Genitourinary:   [] Frequency  [] Urgency  [] Dysuria [] Incontinence  [x] Denies all unless marked  Extremities: []Pain  []Swelling  [x] Denies all unless marked  Musculoskeletal: [] Myalgias  [] Joint Pain  [] Arthritis [] Muscle Cramps [] Muscle Twitches  [x] Denies all unless marked  Sleep: []Insomnia[]Snoring []Restless Legs  []Sleep Apnea  []Daytime Sleepiness  [x] Denies all unless marked  Skin:[] Rash [] Color Change [x] Denies all unless marked   Neurological:[]Visual Disturbance [] Memory Loss []Loss of Balance []Slurred Speech []Weakness []Seizures  [] Dizziness [x] Denies all unless marked    The MA has completed the ROS with the patient. I have reviewed it in its' entirety with the patient and agree with the documentation.     PHYSICAL EXAM  /77   Pulse 74   Ht 1.727 m (5' 8\")   Wt

## 2025-03-25 DIAGNOSIS — Z95.818 STATUS POST PLACEMENT OF IMPLANTABLE LOOP RECORDER: Primary | ICD-10-CM

## 2025-03-25 DIAGNOSIS — R00.1 SYMPTOMATIC BRADYCARDIA: ICD-10-CM

## 2025-03-25 DIAGNOSIS — R55 NEAR SYNCOPE: ICD-10-CM

## 2025-03-25 DIAGNOSIS — R42 DIZZINESS: ICD-10-CM

## 2025-03-25 LAB
GLIADIN IGA SER IA-ACNC: <0.72 FLU (ref 0–4.99)
NUCLEAR IGG SER QL IA: DETECTED
TTG IGA SER IA-ACNC: <1.02 FLU (ref 0–4.99)

## 2025-03-26 ENCOUNTER — OFFICE VISIT (OUTPATIENT)
Dept: ENT CLINIC | Age: 30
End: 2025-03-26
Payer: OTHER GOVERNMENT

## 2025-03-26 VITALS
DIASTOLIC BLOOD PRESSURE: 74 MMHG | WEIGHT: 179 LBS | SYSTOLIC BLOOD PRESSURE: 102 MMHG | HEIGHT: 68 IN | BODY MASS INDEX: 27.13 KG/M2

## 2025-03-26 DIAGNOSIS — R42 DIZZINESS: ICD-10-CM

## 2025-03-26 DIAGNOSIS — H91.21 SUDDEN HEARING LOSS, RIGHT: Primary | ICD-10-CM

## 2025-03-26 DIAGNOSIS — H93.11 TINNITUS, RIGHT: ICD-10-CM

## 2025-03-26 LAB
ALBUMIN SERPL-MCNC: 4.66 G/DL (ref 3.75–5.01)
ALPHA1 GLOB SERPL ELPH-MCNC: 0.23 G/DL (ref 0.19–0.46)
ALPHA2 GLOB SERPL ELPH-MCNC: 0.64 G/DL (ref 0.48–1.05)
ANA PATTERN: ABNORMAL
ANA TITER: ABNORMAL
ANTINUCLEAR AB INTERPRETIVE COMMENT: ABNORMAL
B-GLOBULIN SERPL ELPH-MCNC: 0.78 G/DL (ref 0.48–1.1)
GAMMA GLOB SERPL ELPH-MCNC: 0.99 G/DL (ref 0.62–1.51)
GLIADIN IGG SER IA-ACNC: 11.21 FLU (ref 0–4.99)
INTERPRETATION SERPL IFE-IMP: NORMAL
NUCLEAR IGG SER QL IF: DETECTED
PROT SERPL-MCNC: 7.3 G/DL (ref 6.3–8.2)
PROTEIN ELECTROPHORESIS, SERUM: NORMAL
TTG IGG SER IA-ACNC: <0.82 FLU (ref 0–4.99)
VIT B1 BLD-MCNC: 96 NMOL/L (ref 70–180)

## 2025-03-26 PROCEDURE — 99213 OFFICE O/P EST LOW 20 MIN: CPT | Performed by: NURSE PRACTITIONER

## 2025-03-26 ASSESSMENT — ENCOUNTER SYMPTOMS
RESPIRATORY NEGATIVE: 1
GASTROINTESTINAL NEGATIVE: 1
ALLERGIC/IMMUNOLOGIC NEGATIVE: 1
EYES NEGATIVE: 1

## 2025-03-26 NOTE — PROGRESS NOTES
3/26/2025    Lawrence Guerrero (:  1995) is a 29 y.o. female, Established patient, here for evaluation of the following chief complaint(s):  Follow-up (ears)      Vitals:    25 1009   BP: 102/74   Weight: 81.2 kg (179 lb)   Height: 1.727 m (5' 8\")       Wt Readings from Last 3 Encounters:   25 81.2 kg (179 lb)   25 79.8 kg (176 lb)   25 80.7 kg (178 lb)       BP Readings from Last 3 Encounters:   25 102/74   25 118/82   25 110/77         SUBJECTIVE/OBJECTIVE:    Patient seen today for follow up of her right ear. She was referred to neurology at her last visit. Neurology has ordered labs, however some of these results are not back yet. She reports that she does feel her hearing in her right ear has gotten slightly worse. She reports that if she had a headphone in her ear she could hear some voices, however she reports she can not make out voices now but can hear some of the sounds with a headphone in. She reports constant tinnitus and \"tinking\" in her right ear. She has seen cardiology and vascular as well. She reports occasional fullness in her right ear. She denies ear pain. She does feel her dizziness is getting worse. She reports it is intermittent and feels like the room is spinning. She denies any identifiable triggers. She does report episodes of syncope as well. She is being worked up for autoimmune disorders as well. She had an MRI of the IAC that was normal. She denies any left ear complaints.        Review of Systems   Constitutional: Negative.    HENT:  Positive for ear pain (occasional fullness, right), hearing loss (right) and tinnitus (ringing and \"tinking\" sound in right ear).    Eyes: Negative.    Respiratory: Negative.     Cardiovascular: Negative.    Gastrointestinal: Negative.    Endocrine: Negative.    Musculoskeletal: Negative.    Skin: Negative.    Allergic/Immunologic: Negative.    Neurological:  Positive for dizziness (intermittent, room

## 2025-03-27 LAB
ARSENIC BLD-MCNC: <10 UG/L
CADMIUM BLD-MCNC: <1 UG/L
DSDNA AB TITR SER CLIF: 4 IU (ref 0–24)
LEAD BLD-MCNC: <2 UG/DL
MERCURY BLD-MCNC: <2.5 UG/L

## 2025-03-28 LAB
ENA JO1 AB TITR SER: 1 AU/ML (ref 0–40)
ENA RNP IGG SER IA-ACNC: 3 UNITS (ref 0–19)
ENA SCL70 IGG SER QL: 1 AU/ML (ref 0–40)
ENA SM IGG SER-ACNC: 1 AU/ML (ref 0–40)
ENA SS-A 60KD AB SER-ACNC: 28 AU/ML (ref 0–40)
ENA SS-A IGG SER IA-ACNC: 1 AU/ML (ref 0–40)
ENA SS-B IGG SER IA-ACNC: 33 AU/ML (ref 0–40)

## 2025-04-04 ENCOUNTER — TELEPHONE (OUTPATIENT)
Dept: NEUROLOGY | Age: 30
End: 2025-04-04

## 2025-04-04 ENCOUNTER — HOSPITAL ENCOUNTER (OUTPATIENT)
Dept: MRI IMAGING | Age: 30
Discharge: HOME | End: 2025-04-04
Attending: OBSTETRICS & GYNECOLOGY
Payer: COMMERCIAL

## 2025-04-04 DIAGNOSIS — N83.8 COMPLEX CYST OF UTERINE ADNEXA: ICD-10-CM

## 2025-04-04 PROCEDURE — A9577 INJ MULTIHANCE: HCPCS | Performed by: OBSTETRICS & GYNECOLOGY

## 2025-04-04 PROCEDURE — 6360000004 HC RX CONTRAST MEDICATION: Performed by: OBSTETRICS & GYNECOLOGY

## 2025-04-04 PROCEDURE — 72197 MRI PELVIS W/O & W/DYE: CPT

## 2025-04-04 RX ADMIN — GADOBENATE DIMEGLUMINE 15 ML: 529 INJECTION, SOLUTION INTRAVENOUS at 08:07

## 2025-04-04 NOTE — TELEPHONE ENCOUNTER
Called and left patient a VM to let her know that her  insurance is coming back as Ineligible.   *Needing to know if patient has other information regarding  that we need to file and have in patient chart. Or if patient has another insurance that she would like to be filed. Left a VM for patient to call our office back.    Duration Of Freeze Thaw-Cycle (Seconds): 3 Show Applicator Variable?: Yes Number Of Freeze-Thaw Cycles: 1 freeze-thaw cycle Consent: The patient's consent was obtained including but not limited to risks of crusting, scabbing, blistering, scarring, darker or lighter pigmentary change, recurrence, incomplete removal and infection. Render Note In Bullet Format When Appropriate: No Post-Care Instructions: I reviewed with the patient in detail post-care instructions. Patient is to wear sunprotection, and avoid picking at any of the treated lesions. Pt may apply Vaseline to crusted or scabbing areas. Detail Level: Detailed

## 2025-04-08 ENCOUNTER — PREP FOR PROCEDURE (OUTPATIENT)
Dept: OBGYN CLINIC | Age: 30
End: 2025-04-08

## 2025-04-08 ENCOUNTER — TELEPHONE (OUTPATIENT)
Age: 30
End: 2025-04-08

## 2025-04-08 ENCOUNTER — OFFICE VISIT (OUTPATIENT)
Dept: OBGYN CLINIC | Age: 30
End: 2025-04-08
Payer: COMMERCIAL

## 2025-04-08 DIAGNOSIS — E28.2 POLYCYSTIC OVARIES: ICD-10-CM

## 2025-04-08 DIAGNOSIS — E28.2 PCOS (POLYCYSTIC OVARIAN SYNDROME): ICD-10-CM

## 2025-04-08 DIAGNOSIS — N92.6 IRREGULAR MENSES: ICD-10-CM

## 2025-04-08 DIAGNOSIS — Z01.818 PRE-OP EVALUATION: Primary | ICD-10-CM

## 2025-04-08 DIAGNOSIS — R10.2 PELVIC PAIN SYNDROME: ICD-10-CM

## 2025-04-08 DIAGNOSIS — N83.8 COMPLEX CYST OF UTERINE ADNEXA: ICD-10-CM

## 2025-04-08 DIAGNOSIS — R79.89 ELEVATED DEHYDROEPIANDROSTERONE (DHEA) LEVEL: ICD-10-CM

## 2025-04-08 DIAGNOSIS — N92.1 METRORRHAGIA: ICD-10-CM

## 2025-04-08 DIAGNOSIS — R10.2 PELVIC PAIN: ICD-10-CM

## 2025-04-08 DIAGNOSIS — N85.8 CYST, UTERUS: ICD-10-CM

## 2025-04-08 PROCEDURE — 99211 OFF/OP EST MAY X REQ PHY/QHP: CPT | Performed by: OBSTETRICS & GYNECOLOGY

## 2025-04-08 RX ORDER — MISOPROSTOL 200 UG/1
200 TABLET ORAL ONCE
Qty: 1 TABLET | Refills: 0 | Status: SHIPPED | OUTPATIENT
Start: 2025-04-08 | End: 2025-04-08

## 2025-04-08 NOTE — TELEPHONE ENCOUNTER
Received the following denial on Trulance from insurance      Note from payer: Your PA request has been denied. Additional information will be provided in the denial communication. (Message 1140) Your PA request has been denied. Additional information will be provided in the denial communication. (Message 1140)

## 2025-04-08 NOTE — PROGRESS NOTES
Lawrence Guerrero is a 29 y.o. who presents today to sign surgical consents for a robotic diagnostic laparoscopy, hysteroscopy d&c scheduled on 5/6/25 for pelvic pain, irregular manses, and PCOS.        Review of Systems    Past Medical History:   Diagnosis Date    Concussion     approximately 10 years ago    Depression     Major depressive disorder     Food allergy     Hypothyroidism     Metabolic disorder     PCOS (polycystic ovarian syndrome)        Past Surgical History:   Procedure Laterality Date    CHOLECYSTECTOMY      COLONOSCOPY  11/03/2014    Dr Champ Torres-[place],  Small non-bleeding internal hemorrhoids w/skin tags, serrated polyp     COLONOSCOPY N/A 10/21/2019    Dr Klein-Normal, 5 yr recall    COLONOSCOPY N/A 06/03/2021    Dr Klein, [facility], (-)Micro Colitis,  Int hemorrhoids-Grade 1, 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, 20 year recall    COLONOSCOPY N/A 07/24/2024    Dr Klein, int hem Gr 1, 3 year recall    EP DEVICE PROCEDURE N/A 1/16/2025    Loop recorder insert performed by Piotr Sellers MD at [facility] CARDIAC CATH LAB    HERNIA REPAIR      KNEE SURGERY Left     LAPAROSCOPY N/A 01/31/2020    DIAGNOSTIC LAPAROSCOPY AND CHROMOPERTUBATION performed by Kathy Aquino MD at [facility] OR    POLYPECTOMY      TONSILLECTOMY AND ADENOIDECTOMY      UMBILICAL HERNIA REPAIR N/A 08/01/2024    OPEN UMBILICAL HERNIA REPAIR performed by Taylor Prado DO at [facility] OR    UPPER GASTROINTESTINAL ENDOSCOPY  11/03/2014    Dr Champ Hdez, Gastritis, h pylori neg    UPPER GASTROINTESTINAL ENDOSCOPY N/A 10/21/2019    Dr Klein-Normal    UPPER GASTROINTESTINAL ENDOSCOPY N/A 06/03/2021    Dr Klein, (-) Sprue    UPPER GASTROINTESTINAL ENDOSCOPY N/A 07/24/2024    Dr Klein, (-) sonal, (-) h pylori, Patchy mucosal changes in body and antrum of stomach, sugg of mild patchy gastritis, no obvious lesions to explain symptoms,       Family History   Problem Relation Age of Onset

## 2025-04-17 RX ORDER — SODIUM CHLORIDE 0.9 % (FLUSH) 0.9 %
5-40 SYRINGE (ML) INJECTION PRN
Status: CANCELLED | OUTPATIENT
Start: 2025-04-17

## 2025-04-17 RX ORDER — SODIUM CHLORIDE, SODIUM LACTATE, POTASSIUM CHLORIDE, CALCIUM CHLORIDE 600; 310; 30; 20 MG/100ML; MG/100ML; MG/100ML; MG/100ML
INJECTION, SOLUTION INTRAVENOUS CONTINUOUS
Status: CANCELLED | OUTPATIENT
Start: 2025-04-17

## 2025-04-17 RX ORDER — SODIUM CHLORIDE 9 MG/ML
INJECTION, SOLUTION INTRAVENOUS PRN
Status: CANCELLED | OUTPATIENT
Start: 2025-04-17

## 2025-04-17 RX ORDER — SODIUM CHLORIDE 0.9 % (FLUSH) 0.9 %
5-40 SYRINGE (ML) INJECTION EVERY 12 HOURS SCHEDULED
Status: CANCELLED | OUTPATIENT
Start: 2025-04-17

## 2025-04-25 ENCOUNTER — HOSPITAL ENCOUNTER (OUTPATIENT)
Dept: PREADMISSION TESTING | Age: 30
Discharge: HOME | End: 2025-04-29

## 2025-04-28 DIAGNOSIS — R55 NEAR SYNCOPE: ICD-10-CM

## 2025-04-28 DIAGNOSIS — Z95.818 STATUS POST PLACEMENT OF IMPLANTABLE LOOP RECORDER: Primary | ICD-10-CM

## 2025-04-28 DIAGNOSIS — R42 DIZZINESS: ICD-10-CM

## 2025-04-28 DIAGNOSIS — R00.1 BRADYCARDIA: ICD-10-CM

## 2025-04-28 PROCEDURE — 93298 REM INTERROG DEV EVAL SCRMS: CPT | Performed by: INTERNAL MEDICINE

## 2025-05-01 ENCOUNTER — OFFICE VISIT (OUTPATIENT)
Dept: CARDIOLOGY CLINIC | Age: 30
End: 2025-05-01
Payer: COMMERCIAL

## 2025-05-01 VITALS
HEIGHT: 68 IN | WEIGHT: 179 LBS | SYSTOLIC BLOOD PRESSURE: 118 MMHG | BODY MASS INDEX: 27.13 KG/M2 | HEART RATE: 86 BPM | DIASTOLIC BLOOD PRESSURE: 60 MMHG

## 2025-05-01 DIAGNOSIS — R00.1 BRADYCARDIA: Primary | ICD-10-CM

## 2025-05-01 PROCEDURE — 99213 OFFICE O/P EST LOW 20 MIN: CPT | Performed by: INTERNAL MEDICINE

## 2025-05-01 NOTE — PROGRESS NOTES
[facility] Card[facility]  [address]  [address]  [facility] [address]  [phone]    Lawrence Guerrero is a 29 y.o. female presents with history of bradycardia.  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  They are looking into a cochlear implant.  This has been causing her dizziness as well.      Review of Systems   Constitutional: Negative for fever, chills, diaphoresis, activity change, appetite change, fatigue and unexpected weight change.   Eyes: Negative for photophobia, pain, redness and visual disturbance.   Respiratory: Negative for apnea, cough, chest tightness, shortness of breath, wheezing and stridor.    Cardiovascular: Negative for chest pain, palpitations and leg swelling.   Gastrointestinal: Negative for abdominal distention.   Genitourinary: Negative for dysuria, urgency and frequency.   Musculoskeletal: Negative for myalgias, arthralgias and gait problem.   Skin: Negative for color change, pallor, rash and wound.   Neurological: Negative for dizziness, tremors, speech difficulty, weakness and numbness.   Hematological: Does not bruise/bleed easily.   Psychiatric/Behavioral: Negative.          Social History     Socioeconomic History    Marital status:      Spouse name: Not on file    Number of children: Not on file    Years of education: Not on file    Highest education level: Not on file   Occupational History    Not on file   Tobacco Use    Smoking status: Never    Smokeless tobacco: Never   Vaping Use    Vaping status: Never Used   Substance and Sexual Activity    Alcohol use: Yes     Comment: occ 1-3 once a month    Drug use: No    Sexual activity: Yes     Partners: Female   Other Topics Concern    Not on file   Social History Narrative    Not on file     Social Drivers of Health     Financial Resource Strain: Low Risk  (3/8/2024)    Overall Financial Resource Strain (CARDIA)     Difficulty of Paying Living Expenses: Not hard at all   Food

## 2025-05-05 ENCOUNTER — OFFICE VISIT (OUTPATIENT)
Dept: OBGYN CLINIC | Age: 30
End: 2025-05-05
Payer: COMMERCIAL

## 2025-05-05 VITALS
HEART RATE: 73 BPM | WEIGHT: 184 LBS | BODY MASS INDEX: 27.98 KG/M2 | SYSTOLIC BLOOD PRESSURE: 109 MMHG | DIASTOLIC BLOOD PRESSURE: 76 MMHG

## 2025-05-05 DIAGNOSIS — Z12.39 ENCOUNTER FOR SCREENING BREAST EXAMINATION: ICD-10-CM

## 2025-05-05 DIAGNOSIS — E04.1 THYROID NODULE: ICD-10-CM

## 2025-05-05 DIAGNOSIS — Z12.4 SCREENING FOR CERVICAL CANCER: ICD-10-CM

## 2025-05-05 DIAGNOSIS — Z01.419 WELL WOMAN EXAM WITH ROUTINE GYNECOLOGICAL EXAM: Primary | ICD-10-CM

## 2025-05-05 PROCEDURE — 99395 PREV VISIT EST AGE 18-39: CPT | Performed by: NURSE PRACTITIONER

## 2025-05-05 ASSESSMENT — ENCOUNTER SYMPTOMS
GASTROINTESTINAL NEGATIVE: 1
CONSTIPATION: 0
EYES NEGATIVE: 1
RESPIRATORY NEGATIVE: 1
DIARRHEA: 0
ALLERGIC/IMMUNOLOGIC NEGATIVE: 1

## 2025-05-05 NOTE — PROGRESS NOTES
Lawrence Guerrero is a 29 y.o. female who presents today for her medical conditions/ complaints as noted below. Lawrence Guerrero is c/o of Annual Exam        HPI  Pt presents for well woman exam and pap smear. Has upcoming hysteroscopy, D&C, laparoscopy tomorrow with Dr Dominguez. History of thyroid nodule and needs order for 1 year follow up ultrasound.    Mammo:NA  Pap smear:  Contraception: NA    P:0  Ab:0   Bone density:NA  Colonoscopy:  Patient's last menstrual period was 2025.      Past Medical History:   Diagnosis Date   • Concussion     approximately 10 years ago   • Depression     Major depressive disorder    • Food allergy    • Hypothyroidism    • Irregular menses    • Metabolic disorder    • PCOS (polycystic ovarian syndrome)      Past Surgical History:   Procedure Laterality Date   • CHOLECYSTECTOMY     • COLONOSCOPY  2014    Dr Champ Hdez,  Small non-bleeding internal hemorrhoids w/skin tags, serrated polyp    • COLONOSCOPY N/A 10/21/2019    Dr Klein-Normal, 5 yr recall   • COLONOSCOPY N/A 2021    Dr Klein, [facility], (-)Micro Colitis,  Int hemorrhoids-Grade 1, 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, 20 year recall   • COLONOSCOPY N/A 2024    Dr Klein, int hem Gr 1, 3 year recall   • EP DEVICE PROCEDURE N/A 2025    Loop recorder insert performed by Piotr Sellers MD at [facility] CARDIAC CATH LAB   • HERNIA REPAIR     • KNEE SURGERY Left    • LAPAROSCOPY N/A 2020    DIAGNOSTIC LAPAROSCOPY AND CHROMOPERTUBATION performed by Kathy Aquino MD at [facility] OR   • POLYPECTOMY     • TONSILLECTOMY AND ADENOIDECTOMY     • UMBILICAL HERNIA REPAIR N/A 2024    OPEN UMBILICAL HERNIA REPAIR performed by Taylor Prado DO at [facility] OR   • UPPER GASTROINTESTINAL ENDOSCOPY  2014    Dr Champ Hdez, Gastritis, h pylori neg   • UPPER GASTROINTESTINAL ENDOSCOPY N/A 10/21/2019    Dr Klein-Normal   • UPPER GASTROINTESTINAL

## 2025-05-05 NOTE — PROGRESS NOTES
Pt presents today for pap smear and breast exam.      Mammo:NA  Pap smear:  Contraception:    P:0  Ab:0   Bone density:NA  Colonoscopy:

## 2025-05-06 ENCOUNTER — ANESTHESIA (OUTPATIENT)
Dept: OPERATING ROOM | Age: 30
End: 2025-05-06
Payer: COMMERCIAL

## 2025-05-06 ENCOUNTER — HOSPITAL ENCOUNTER (OUTPATIENT)
Age: 30
Setting detail: OUTPATIENT SURGERY
Discharge: HOME | End: 2025-05-06
Attending: OBSTETRICS & GYNECOLOGY | Admitting: OBSTETRICS & GYNECOLOGY
Payer: COMMERCIAL

## 2025-05-06 ENCOUNTER — ANESTHESIA EVENT (OUTPATIENT)
Dept: OPERATING ROOM | Age: 30
End: 2025-05-06
Payer: COMMERCIAL

## 2025-05-06 VITALS
HEIGHT: 68 IN | BODY MASS INDEX: 26.98 KG/M2 | TEMPERATURE: 97.4 F | WEIGHT: 178 LBS | SYSTOLIC BLOOD PRESSURE: 110 MMHG | RESPIRATION RATE: 18 BRPM | OXYGEN SATURATION: 97 % | HEART RATE: 52 BPM | DIASTOLIC BLOOD PRESSURE: 74 MMHG

## 2025-05-06 DIAGNOSIS — N85.8 CYST, UTERUS: ICD-10-CM

## 2025-05-06 DIAGNOSIS — R10.2 PELVIC PAIN SYNDROME: ICD-10-CM

## 2025-05-06 DIAGNOSIS — R79.89 ELEVATED DEHYDROEPIANDROSTERONE (DHEA) LEVEL: ICD-10-CM

## 2025-05-06 DIAGNOSIS — N92.1 METRORRHAGIA: ICD-10-CM

## 2025-05-06 DIAGNOSIS — E28.2 POLYCYSTIC OVARIES: ICD-10-CM

## 2025-05-06 DIAGNOSIS — Z98.890 S/P LAPAROSCOPY: Primary | ICD-10-CM

## 2025-05-06 LAB
HCG, URINE, POC: NEGATIVE
Lab: NORMAL
NEGATIVE QC PASS/FAIL: NORMAL
POSITIVE QC PASS/FAIL: NORMAL

## 2025-05-06 PROCEDURE — 6360000002 HC RX W HCPCS: Performed by: ANESTHESIOLOGY

## 2025-05-06 PROCEDURE — 88305 TISSUE EXAM BY PATHOLOGIST: CPT

## 2025-05-06 PROCEDURE — 3600000004 HC SURGERY LEVEL 4 BASE: Performed by: OBSTETRICS & GYNECOLOGY

## 2025-05-06 PROCEDURE — 2580000003 HC RX 258: Performed by: OBSTETRICS & GYNECOLOGY

## 2025-05-06 PROCEDURE — 88305 TISSUE EXAM BY PATHOLOGIST: CPT | Performed by: PATHOLOGY

## 2025-05-06 PROCEDURE — 3600000014 HC SURGERY LEVEL 4 ADDTL 15MIN: Performed by: OBSTETRICS & GYNECOLOGY

## 2025-05-06 PROCEDURE — 6360000002 HC RX W HCPCS: Performed by: NURSE ANESTHETIST, CERTIFIED REGISTERED

## 2025-05-06 PROCEDURE — 2500000003 HC RX 250 WO HCPCS: Performed by: NURSE ANESTHETIST, CERTIFIED REGISTERED

## 2025-05-06 PROCEDURE — 7100000001 HC PACU RECOVERY - ADDTL 15 MIN: Performed by: OBSTETRICS & GYNECOLOGY

## 2025-05-06 PROCEDURE — 3700000000 HC ANESTHESIA ATTENDED CARE: Performed by: OBSTETRICS & GYNECOLOGY

## 2025-05-06 PROCEDURE — 88304 TISSUE EXAM BY PATHOLOGIST: CPT

## 2025-05-06 PROCEDURE — 2720000010 HC SURG SUPPLY STERILE: Performed by: OBSTETRICS & GYNECOLOGY

## 2025-05-06 PROCEDURE — 7100000010 HC PHASE II RECOVERY - FIRST 15 MIN: Performed by: OBSTETRICS & GYNECOLOGY

## 2025-05-06 PROCEDURE — 88304 TISSUE EXAM BY PATHOLOGIST: CPT | Performed by: PATHOLOGY

## 2025-05-06 PROCEDURE — 2500000003 HC RX 250 WO HCPCS: Performed by: OBSTETRICS & GYNECOLOGY

## 2025-05-06 PROCEDURE — 2709999900 HC NON-CHARGEABLE SUPPLY: Performed by: OBSTETRICS & GYNECOLOGY

## 2025-05-06 PROCEDURE — 7100000011 HC PHASE II RECOVERY - ADDTL 15 MIN: Performed by: OBSTETRICS & GYNECOLOGY

## 2025-05-06 PROCEDURE — 6360000002 HC RX W HCPCS: Performed by: OBSTETRICS & GYNECOLOGY

## 2025-05-06 PROCEDURE — 7100000000 HC PACU RECOVERY - FIRST 15 MIN: Performed by: OBSTETRICS & GYNECOLOGY

## 2025-05-06 PROCEDURE — 2580000003 HC RX 258: Performed by: ANESTHESIOLOGY

## 2025-05-06 PROCEDURE — 3700000001 HC ADD 15 MINUTES (ANESTHESIA): Performed by: OBSTETRICS & GYNECOLOGY

## 2025-05-06 RX ORDER — SODIUM CHLORIDE 0.9 % (FLUSH) 0.9 %
5-40 SYRINGE (ML) INJECTION PRN
Status: DISCONTINUED | OUTPATIENT
Start: 2025-05-06 | End: 2025-05-06 | Stop reason: HOSPADM

## 2025-05-06 RX ORDER — SODIUM CHLORIDE 0.9 % (FLUSH) 0.9 %
5-40 SYRINGE (ML) INJECTION EVERY 12 HOURS SCHEDULED
Status: DISCONTINUED | OUTPATIENT
Start: 2025-05-06 | End: 2025-05-06 | Stop reason: HOSPADM

## 2025-05-06 RX ORDER — ONDANSETRON 2 MG/ML
4 INJECTION INTRAMUSCULAR; INTRAVENOUS
Status: DISCONTINUED | OUTPATIENT
Start: 2025-05-06 | End: 2025-05-06 | Stop reason: HOSPADM

## 2025-05-06 RX ORDER — MIDAZOLAM HYDROCHLORIDE 1 MG/ML
INJECTION, SOLUTION INTRAMUSCULAR; INTRAVENOUS
Status: DISCONTINUED | OUTPATIENT
Start: 2025-05-06 | End: 2025-05-06 | Stop reason: SDUPTHER

## 2025-05-06 RX ORDER — DEXAMETHASONE SODIUM PHOSPHATE 4 MG/ML
4 INJECTION, SOLUTION INTRA-ARTICULAR; INTRALESIONAL; INTRAMUSCULAR; INTRAVENOUS; SOFT TISSUE ONCE
Status: COMPLETED | OUTPATIENT
Start: 2025-05-06 | End: 2025-05-06

## 2025-05-06 RX ORDER — ONDANSETRON 2 MG/ML
INJECTION INTRAMUSCULAR; INTRAVENOUS
Status: DISCONTINUED | OUTPATIENT
Start: 2025-05-06 | End: 2025-05-06 | Stop reason: SDUPTHER

## 2025-05-06 RX ORDER — NALOXONE HYDROCHLORIDE 0.4 MG/ML
INJECTION, SOLUTION INTRAMUSCULAR; INTRAVENOUS; SUBCUTANEOUS PRN
Status: DISCONTINUED | OUTPATIENT
Start: 2025-05-06 | End: 2025-05-06 | Stop reason: HOSPADM

## 2025-05-06 RX ORDER — PROPOFOL 10 MG/ML
INJECTION, EMULSION INTRAVENOUS
Status: DISCONTINUED | OUTPATIENT
Start: 2025-05-06 | End: 2025-05-06 | Stop reason: SDUPTHER

## 2025-05-06 RX ORDER — OXYCODONE AND ACETAMINOPHEN 5; 325 MG/1; MG/1
1 TABLET ORAL EVERY 6 HOURS PRN
Qty: 12 TABLET | Refills: 0 | Status: SHIPPED | OUTPATIENT
Start: 2025-05-06 | End: 2025-05-09

## 2025-05-06 RX ORDER — HYDROMORPHONE HYDROCHLORIDE 1 MG/ML
0.25 INJECTION, SOLUTION INTRAMUSCULAR; INTRAVENOUS; SUBCUTANEOUS EVERY 5 MIN PRN
Status: DISCONTINUED | OUTPATIENT
Start: 2025-05-06 | End: 2025-05-06 | Stop reason: HOSPADM

## 2025-05-06 RX ORDER — SODIUM CHLORIDE 9 MG/ML
INJECTION, SOLUTION INTRAVENOUS PRN
Status: DISCONTINUED | OUTPATIENT
Start: 2025-05-06 | End: 2025-05-06 | Stop reason: HOSPADM

## 2025-05-06 RX ORDER — SODIUM CHLORIDE, SODIUM LACTATE, POTASSIUM CHLORIDE, CALCIUM CHLORIDE 600; 310; 30; 20 MG/100ML; MG/100ML; MG/100ML; MG/100ML
INJECTION, SOLUTION INTRAVENOUS CONTINUOUS
Status: DISCONTINUED | OUTPATIENT
Start: 2025-05-06 | End: 2025-05-06 | Stop reason: HOSPADM

## 2025-05-06 RX ORDER — HYDROMORPHONE HYDROCHLORIDE 1 MG/ML
0.5 INJECTION, SOLUTION INTRAMUSCULAR; INTRAVENOUS; SUBCUTANEOUS EVERY 5 MIN PRN
Status: DISCONTINUED | OUTPATIENT
Start: 2025-05-06 | End: 2025-05-06 | Stop reason: HOSPADM

## 2025-05-06 RX ORDER — IBUPROFEN 800 MG/1
800 TABLET, FILM COATED ORAL EVERY 8 HOURS PRN
Qty: 90 TABLET | Refills: 0 | Status: SHIPPED | OUTPATIENT
Start: 2025-05-06

## 2025-05-06 RX ORDER — CEFAZOLIN SODIUM 1 G/3ML
INJECTION, POWDER, FOR SOLUTION INTRAMUSCULAR; INTRAVENOUS
Status: DISCONTINUED | OUTPATIENT
Start: 2025-05-06 | End: 2025-05-06 | Stop reason: SDUPTHER

## 2025-05-06 RX ORDER — FENTANYL CITRATE 50 UG/ML
INJECTION, SOLUTION INTRAMUSCULAR; INTRAVENOUS
Status: DISCONTINUED | OUTPATIENT
Start: 2025-05-06 | End: 2025-05-06 | Stop reason: SDUPTHER

## 2025-05-06 RX ORDER — LIDOCAINE HYDROCHLORIDE 10 MG/ML
INJECTION, SOLUTION INFILTRATION; PERINEURAL
Status: DISCONTINUED | OUTPATIENT
Start: 2025-05-06 | End: 2025-05-06 | Stop reason: SDUPTHER

## 2025-05-06 RX ORDER — EPHEDRINE SULFATE/0.9% NACL/PF 25 MG/5 ML
SYRINGE (ML) INTRAVENOUS
Status: DISCONTINUED | OUTPATIENT
Start: 2025-05-06 | End: 2025-05-06 | Stop reason: SDUPTHER

## 2025-05-06 RX ORDER — APREPITANT 40 MG/1
40 CAPSULE ORAL ONCE
Status: COMPLETED | OUTPATIENT
Start: 2025-05-06 | End: 2025-05-06

## 2025-05-06 RX ORDER — ROCURONIUM BROMIDE 10 MG/ML
INJECTION, SOLUTION INTRAVENOUS
Status: DISCONTINUED | OUTPATIENT
Start: 2025-05-06 | End: 2025-05-06 | Stop reason: SDUPTHER

## 2025-05-06 RX ORDER — KETOROLAC TROMETHAMINE 30 MG/ML
INJECTION, SOLUTION INTRAMUSCULAR; INTRAVENOUS
Status: DISCONTINUED | OUTPATIENT
Start: 2025-05-06 | End: 2025-05-06 | Stop reason: SDUPTHER

## 2025-05-06 RX ORDER — BUPIVACAINE HYDROCHLORIDE AND EPINEPHRINE 2.5; 5 MG/ML; UG/ML
INJECTION, SOLUTION INFILTRATION; PERINEURAL PRN
Status: DISCONTINUED | OUTPATIENT
Start: 2025-05-06 | End: 2025-05-06 | Stop reason: ALTCHOICE

## 2025-05-06 RX ADMIN — CEFAZOLIN 2 G: 1 INJECTION, POWDER, FOR SOLUTION INTRAMUSCULAR; INTRAVENOUS at 11:37

## 2025-05-06 RX ADMIN — APREPITANT 40 MG: 40 CAPSULE ORAL at 09:19

## 2025-05-06 RX ADMIN — EPHEDRINE SULFATE 10 MG: 5 INJECTION INTRAVENOUS at 11:53

## 2025-05-06 RX ADMIN — PROPOFOL 150 MG: 10 INJECTION, EMULSION INTRAVENOUS at 11:28

## 2025-05-06 RX ADMIN — ROCURONIUM BROMIDE 50 MG: 10 INJECTION, SOLUTION INTRAVENOUS at 11:28

## 2025-05-06 RX ADMIN — FENTANYL CITRATE 50 MCG: 0.05 INJECTION, SOLUTION INTRAMUSCULAR; INTRAVENOUS at 12:14

## 2025-05-06 RX ADMIN — FENTANYL CITRATE 50 MCG: 0.05 INJECTION, SOLUTION INTRAMUSCULAR; INTRAVENOUS at 11:28

## 2025-05-06 RX ADMIN — HYDROMORPHONE HYDROCHLORIDE 0.5 MG: 1 INJECTION, SOLUTION INTRAMUSCULAR; INTRAVENOUS; SUBCUTANEOUS at 13:42

## 2025-05-06 RX ADMIN — EPHEDRINE SULFATE 5 MG: 5 INJECTION INTRAVENOUS at 12:20

## 2025-05-06 RX ADMIN — DEXAMETHASONE SODIUM PHOSPHATE 4 MG: 4 INJECTION INTRA-ARTICULAR; INTRALESIONAL; INTRAMUSCULAR; INTRAVENOUS; SOFT TISSUE at 09:19

## 2025-05-06 RX ADMIN — LIDOCAINE HYDROCHLORIDE 50 MG: 10 INJECTION, SOLUTION INFILTRATION; PERINEURAL at 11:28

## 2025-05-06 RX ADMIN — FAMOTIDINE 20 MG: 10 INJECTION, SOLUTION INTRAVENOUS at 09:19

## 2025-05-06 RX ADMIN — EPHEDRINE SULFATE 5 MG: 5 INJECTION INTRAVENOUS at 12:50

## 2025-05-06 RX ADMIN — HYDROMORPHONE HYDROCHLORIDE 0.5 MG: 1 INJECTION, SOLUTION INTRAMUSCULAR; INTRAVENOUS; SUBCUTANEOUS at 13:34

## 2025-05-06 RX ADMIN — ONDANSETRON 4 MG: 2 INJECTION INTRAMUSCULAR; INTRAVENOUS at 12:06

## 2025-05-06 RX ADMIN — SODIUM CHLORIDE, SODIUM LACTATE, POTASSIUM CHLORIDE, AND CALCIUM CHLORIDE: 600; 310; 30; 20 INJECTION, SOLUTION INTRAVENOUS at 09:11

## 2025-05-06 RX ADMIN — SODIUM CHLORIDE, SODIUM LACTATE, POTASSIUM CHLORIDE, AND CALCIUM CHLORIDE: 600; 310; 30; 20 INJECTION, SOLUTION INTRAVENOUS at 12:15

## 2025-05-06 RX ADMIN — MIDAZOLAM 2 MG: 1 INJECTION INTRAMUSCULAR; INTRAVENOUS at 11:20

## 2025-05-06 RX ADMIN — KETOROLAC TROMETHAMINE 15 MG: 30 INJECTION, SOLUTION INTRAMUSCULAR; INTRAVENOUS at 12:57

## 2025-05-06 RX ADMIN — SUGAMMADEX 200 MG: 100 INJECTION, SOLUTION INTRAVENOUS at 12:55

## 2025-05-06 RX ADMIN — ROCURONIUM BROMIDE 10 MG: 10 INJECTION, SOLUTION INTRAVENOUS at 12:09

## 2025-05-06 ASSESSMENT — PAIN - FUNCTIONAL ASSESSMENT
PAIN_FUNCTIONAL_ASSESSMENT: NONE - DENIES PAIN
PAIN_FUNCTIONAL_ASSESSMENT: 0-10

## 2025-05-06 ASSESSMENT — PAIN SCALES - GENERAL
PAINLEVEL_OUTOF10: 2
PAINLEVEL_OUTOF10: 7
PAINLEVEL_OUTOF10: 7

## 2025-05-06 ASSESSMENT — PAIN DESCRIPTION - DESCRIPTORS
DESCRIPTORS: ACHING
DESCRIPTORS: ACHING

## 2025-05-06 ASSESSMENT — PAIN DESCRIPTION - LOCATION
LOCATION: ABDOMEN
LOCATION: ABDOMEN

## 2025-05-06 ASSESSMENT — LIFESTYLE VARIABLES: SMOKING_STATUS: 0

## 2025-05-06 NOTE — H&P
HPI  Lawrence Guerrero is a 29 y.o. female with h/o pelvic pain and complex left adnexal cyst who presents for diagnostic hysteroscopy and laparoscopy.      Review of Systems   Constitutional: Negative.    HENT: Negative.     Eyes: Negative.    Respiratory: Negative.     Cardiovascular: Negative.    Gastrointestinal: Negative.    Endocrine: Negative.    Genitourinary:  Positive for menstrual problem and pelvic pain.   Musculoskeletal: Negative.    Skin: Negative.    Allergic/Immunologic: Negative.    Neurological: Negative.    Hematological: Negative.    Psychiatric/Behavioral: Negative.         Past Medical History:   Diagnosis Date    Concussion     approximately 10 years ago    Depression     Major depressive disorder     Food allergy     Hypothyroidism     Irregular menses     Metabolic disorder     PCOS (polycystic ovarian syndrome)      Past Surgical History:   Procedure Laterality Date    CHOLECYSTECTOMY      COLONOSCOPY  11/03/2014    Dr Champ [place],  Small non-bleeding internal hemorrhoids w/skin tags, serrated polyp     COLONOSCOPY N/A 10/21/2019    Dr Klein-Normal, 5 yr recall    COLONOSCOPY N/A 06/03/2021    Dr Klein, [facility], (-)Micro Colitis,  Int hemorrhoids-Grade 1, 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, 20 year recall    COLONOSCOPY N/A 07/24/2024    Dr Klein, int hem Gr 1, 3 year recall    EP DEVICE PROCEDURE N/A 1/16/2025    Loop recorder insert performed by Piotr Sellers MD at [facility] CARDIAC CATH LAB    HERNIA REPAIR      KNEE SURGERY Left     LAPAROSCOPY N/A 01/31/2020    DIAGNOSTIC LAPAROSCOPY AND CHROMOPERTUBATION performed by Kathy Aquino MD at [facility] OR    POLYPECTOMY      TONSILLECTOMY AND ADENOIDECTOMY      UMBILICAL HERNIA REPAIR N/A 08/01/2024    OPEN UMBILICAL HERNIA REPAIR performed by Taylor Prado DO at [facility] OR    UPPER GASTROINTESTINAL ENDOSCOPY  11/03/2014    Dr Champ Hdez, Gastritis, h pylori neg    UPPER GASTROINTESTINAL ENDOSCOPY

## 2025-05-06 NOTE — ANESTHESIA PRE PROCEDURE
Department of Anesthesiology  Preprocedure Note       Name:  Lawrence Guerrero   Age:  29 y.o.  :  1995                                          MRN:  [id number]         Date:  2025      Surgeon: Surgeon(s):  Earlene Rodriguez MD Slatter Hall, Miriam D, MD    Procedure: Procedure(s):  ROBOTIC DIAGNOSTIC ABDOMINAL LAPAROSCOPY  DILATATION AND CURETTAGE HYSTEROSCOPY    Medications prior to admission:   Prior to Admission medications    Medication Sig Start Date End Date Taking? Authorizing Provider   miSOPROStol (CYTOTEC) 200 MCG tablet Place 1 tablet vaginally once for 1 dose The night before procedure 25 Yes Earlene Rodriguez MD   metFORMIN (GLUCOPHAGE-XR) 500 MG extended release tablet Take 1 tablet by mouth in the morning and at bedtime 3/21/25  Yes Selma Cleary APRN - CNP   ondansetron (ZOFRAN-ODT) 4 MG disintegrating tablet Take 1 tablet by mouth 3 times daily as needed for Nausea or Vomiting 25  Yes Eliseo Waters Jr., MD   furosemide (LASIX) 40 MG tablet Take 1 tablet by mouth daily 1/15/25  Yes Piotr Sellers MD   meloxicam (MOBIC) 15 MG tablet Take 1 tablet by mouth daily as needed for Pain 25  Yes Antonia Jimenez APRN - CNP   NONFORMULARY Take 1 capsule by mouth daily COD LIVER DAILY   Yes Lawrence Garcia MD   vitamin C (ASCORBIC ACID) 500 MG tablet Take 1 tablet by mouth daily   Yes Lawrence Garcia MD   magnesium 200 MG TABS tablet Take 1 tablet by mouth daily   Yes Lawrence Garcia MD   zinc 50 MG TABS tablet Take 1 tablet by mouth daily   Yes Lawrence Garcia MD   Probiotic Product (PROBIOTIC DAILY PO) Take 1 capsule by mouth daily   Yes Lawrence Garcia MD   Biotin 5000 MCG CAPS Take 1 capsule by mouth daily   Yes Lawrence Garcia MD   vitamin B-12 (CYANOCOBALAMIN) 500 MCG tablet Take 1 tablet by mouth daily   Yes Lawrence Garcia MD   ondansetron (ZOFRAN) 4 MG tablet Take 1 tablet by mouth every 6 hours as needed for

## 2025-05-06 NOTE — ANESTHESIA POSTPROCEDURE EVALUATION
Department of Anesthesiology  Postprocedure Note    Patient: Lawrence Guerrero  MRN: [id number]  YOB: 1995  Date of evaluation: 5/6/2025    Procedure Summary       Date: 05/06/25 Room / Location: [address]    Anesthesia Start: 1122 Anesthesia Stop: 1324    Procedures:       ROBOTIC DIAGNOSTIC ABDOMINAL LAPAROSCOPY      DILATATION AND CURETTAGE HYSTEROSCOPY Diagnosis:       Pelvic pain syndrome      Metrorrhagia      Polycystic ovaries      Cyst, uterus      Elevated dehydroepiandrosterone (DHEA) level      (Pelvic pain syndrome [R10.2])      (Metrorrhagia [N92.1])      (Polycystic ovaries [E28.2])      (Cyst, uterus [N85.8])      (Elevated dehydroepiandrosterone (DHEA) level [R79.89])    Surgeons: Earlene Rodriguez MD Responsible Provider: Gabriella Bernard APRN - CRNA    Anesthesia Type: General ASA Status: 2            Anesthesia Type: General    Leona Phase I: Leona Score: 6    Leona Phase II:      Anesthesia Post Evaluation    Patient location during evaluation: PACU  Patient participation: waiting for patient participation  Level of consciousness: responsive to painful stimuli  Pain score: 0  Airway patency: patent  Nausea & Vomiting: no nausea and no vomiting  Cardiovascular status: blood pressure returned to baseline and hemodynamically stable  Respiratory status: acceptable, nonlabored ventilation, spontaneous ventilation and face mask  Hydration status: stable  Comments: /82   Pulse 58   Temp 97 °F (36.1 °C)   Resp 12   Ht 1.727 m (5' 8\")   Wt 80.7 kg (178 lb)   LMP 04/25/2025 Comment: neg hcg  SpO2 100%   BMI 27.06 kg/m²     Pain management: adequate    No notable events documented.

## 2025-05-06 NOTE — PROGRESS NOTES
Dr. Dominguez her speaking with patient and friend. VSS.   Cellcept Pregnancy And Lactation Text: This medication is Pregnancy Category D and isn't considered safe during pregnancy. It is unknown if this medication is excreted in breast milk.

## 2025-05-06 NOTE — PROGRESS NOTES
All discharge instructions discussed with patient and friend. Both state understanding. VSS. She denies pain. Friend to help her dress.

## 2025-05-07 ENCOUNTER — HOSPITAL ENCOUNTER (OUTPATIENT)
Dept: ULTRASOUND IMAGING | Age: 30
Discharge: HOME | End: 2025-05-07
Payer: COMMERCIAL

## 2025-05-07 DIAGNOSIS — E04.1 THYROID NODULE: ICD-10-CM

## 2025-05-07 PROCEDURE — 76536 US EXAM OF HEAD AND NECK: CPT

## 2025-05-08 ENCOUNTER — RESULTS FOLLOW-UP (OUTPATIENT)
Dept: OBGYN CLINIC | Age: 30
End: 2025-05-08

## 2025-05-22 ENCOUNTER — OFFICE VISIT (OUTPATIENT)
Dept: OBGYN CLINIC | Age: 30
End: 2025-05-22

## 2025-05-22 VITALS
WEIGHT: 183.8 LBS | HEART RATE: 54 BPM | BODY MASS INDEX: 27.86 KG/M2 | HEIGHT: 68 IN | DIASTOLIC BLOOD PRESSURE: 70 MMHG | SYSTOLIC BLOOD PRESSURE: 105 MMHG

## 2025-05-22 DIAGNOSIS — Z48.89 POSTOPERATIVE VISIT: ICD-10-CM

## 2025-05-22 DIAGNOSIS — Z98.890 S/P D&C (STATUS POST DILATION AND CURETTAGE): Primary | ICD-10-CM

## 2025-05-22 DIAGNOSIS — Z98.890 S/P LAPAROSCOPIC SURGERY: ICD-10-CM

## 2025-05-22 DIAGNOSIS — E28.2 POLYCYSTIC OVARIES: ICD-10-CM

## 2025-05-22 DIAGNOSIS — R10.2 PELVIC PAIN SYNDROME: ICD-10-CM

## 2025-05-22 PROCEDURE — 99024 POSTOP FOLLOW-UP VISIT: CPT | Performed by: OBSTETRICS & GYNECOLOGY

## 2025-05-22 NOTE — PROGRESS NOTES
CT scans are performed using dose optimization techniques as appropriate to the performed exam and include  at least one of the following: Automated exposure control, adjustment of the mA and/or kV according to size, and the use of iterative reconstruction technique.    ______________________________________  Electronically signed by: GASPER GARCIA M.D.  Date:     11/08/2024  Time:    20:57      Ultrasound Result (most recent):  US THYROID 05/07/2025    Narrative  EXAMINATION: THYROID ULTRASOUND    HISTORY: Thyroid nodule.    TECHNIQUE: Sonography and Doppler imaging of the thyroid gland was performed.  Images were obtained and stored in a permanent archive.    COMPARISON: None.    FINDINGS:  The thyroid is homogeneous in echotexture.  Vascular flow is normal.  Right lobe: 4.4 x 1.6 x 1.3 cm.  Left lobe: 4.2 x 1 x 1.5 cm.  Isthmus: 0.2 cm AP.    Bilateral sub-centimeter cysts with hyperechoic inspissated colloid and ring-down artifact. Largest on the right measuring 0.6 cm.  Largest on the left measuring 0.5 cm.  No suspicious thyroid nodule.    Lymph Nodes: None visualized.  Other Findings: None.    Impression  Bilateral sub-centimeter colloid cysts.  No suspicious thyroid nodules.          ______________________________________  Electronically signed by: GABRIELA JEFF M.D.  Date:     05/15/2025  Time:    13:02            1. S/P D&C (status post dilation and curettage)  2. S/P laparoscopic surgery  3. Postoperative visit  4. Pelvic pain syndrome  5. Polycystic ovaries         Assessment & Plan  1. Polycystic Ovary Syndrome (PCOS).  Testosterone levels were within normal range during the last assessment in 03/2025. The primary objective is to manage symptoms rather than normalize laboratory results. Discontinue metformin for a period of 30 days, after which testosterone levels will be reassessed. If testosterone levels remain elevated, spironolactone may be considered as a potential treatment option. If

## 2025-05-23 NOTE — OP NOTE
accommodate the hysteroscope which was placed without complication and the above findings were noted.  Directed biopsies were taken using a Myosure Lite under direct visualization.  At this time, the hysteroscope was removed.  A Uterine Manipulator, which was placed without complication.      Attention was then turned to the abdomen where a supraumbilical incision was made.  Veress needle was placed.  Placement was confirmed with saline drop test and aspiration of air.  Pneumoperitoneum was created.  Two 8-mm ports were placed, 9 cm on each side of the umbilicus.  An 8-mm assist port was placed in the LLQ.    The robot was docked and the above findings noted.     The fluid was suctioned from the pelvis.  The remaining tissue was grasped and removed.  An incision was made in the right posterior broad ligament using the spatula.  The implanted tissue was excised.  The specimen was handed off.  Hemostasis was ensured.  All the instruments were removed.     The pneumoperitoneum was deflated.  All incisions were repaired with 4-0 monocryl.  At this time, the procedure was concluded. The patient was awakened in the operating room, taken to recovery in stable condition.    Electronically signed by Earlene Cross MD

## 2025-05-27 ENCOUNTER — OFFICE VISIT (OUTPATIENT)
Age: 30
End: 2025-05-27
Payer: COMMERCIAL

## 2025-05-27 VITALS
WEIGHT: 188.8 LBS | DIASTOLIC BLOOD PRESSURE: 72 MMHG | BODY MASS INDEX: 28.71 KG/M2 | HEART RATE: 62 BPM | TEMPERATURE: 97.9 F | OXYGEN SATURATION: 99 % | SYSTOLIC BLOOD PRESSURE: 105 MMHG

## 2025-05-27 DIAGNOSIS — H91.91 HEARING LOSS OF RIGHT EAR, UNSPECIFIED HEARING LOSS TYPE: ICD-10-CM

## 2025-05-27 DIAGNOSIS — K59.04 CHRONIC IDIOPATHIC CONSTIPATION: ICD-10-CM

## 2025-05-27 DIAGNOSIS — Z00.00 ANNUAL PHYSICAL EXAM: Primary | ICD-10-CM

## 2025-05-27 DIAGNOSIS — F45.22 BODY DYSMORPHIC DISORDER: ICD-10-CM

## 2025-05-27 PROCEDURE — 99395 PREV VISIT EST AGE 18-39: CPT | Performed by: NURSE PRACTITIONER

## 2025-05-27 RX ORDER — PLECANATIDE 3 MG/1
3 TABLET ORAL DAILY
Qty: 30 TABLET | Refills: 11 | Status: SHIPPED | OUTPATIENT
Start: 2025-05-27

## 2025-05-27 ASSESSMENT — ENCOUNTER SYMPTOMS
GASTROINTESTINAL NEGATIVE: 1
RESPIRATORY NEGATIVE: 1
EYES NEGATIVE: 1

## 2025-05-27 NOTE — PROGRESS NOTES
Lawrence Guerrero (:  1995) is a 29 y.o. female, Established patient, here for evaluation of the following chief complaint(s):  Follow-up (Pt would like to catch up and touch base with heart, reproductive system, and hearing. )         Assessment & Plan  1. Postoperative status following dilation and curettage: Significant improvement in symptoms following the removal of free-floating tissue and mild endometriosis.  - No new medications prescribed post-surgery.  - Taken off metformin to allow hormonal regulation naturally.  - Monitoring for any new symptoms or issues post-surgery.    2. Urinary urgency with associated gas: Experiences significant gas when the bladder is full, noted postoperatively.  - Monitoring for any changes or persistence of symptoms.    3. Hormonal fluctuations: Testosterone levels previously normal but recently measured at 266.  - Adrenal function confirmed normal via MRI.  - Reports mood changes correlating with high testosterone levels.  - Endocrinology will conduct further tests to investigate the cause of hormonal fluctuations.    4. Constipation: Continues to struggle with bowel movements despite using MiraLAX, suppositories, prunes, and prune juice.  - Prescription for Trulance will be sent to pharmacy. If insurance does not cover it, a prior authorization for Movantik will be pursued.  - Monitoring response to prescribed medications and insurance coverage.    5. Hearing loss: Almost completely deaf in the right ear and being evaluated for a cochlear implant at [place].  - Vestibular study and cochlear implant study are scheduled.  - Monitoring progress with evaluations and potential treatments.    6. Cardiac monitoring: Reports episodes of low heart rate, with a drop to 28 bpm observed during surgery.  - Loop recorder shows instances of under sensing, possibly due to positional factors.  - Monitoring heart rate and loop recorder data for further insights.    7. Body

## 2025-05-28 ENCOUNTER — APPOINTMENT (OUTPATIENT)
Dept: CT IMAGING | Age: 30
End: 2025-05-28
Payer: COMMERCIAL

## 2025-05-28 ENCOUNTER — HOSPITAL ENCOUNTER (EMERGENCY)
Age: 30
Discharge: HOME | End: 2025-05-29
Payer: COMMERCIAL

## 2025-05-28 VITALS
RESPIRATION RATE: 18 BRPM | DIASTOLIC BLOOD PRESSURE: 72 MMHG | SYSTOLIC BLOOD PRESSURE: 105 MMHG | OXYGEN SATURATION: 99 % | TEMPERATURE: 98.8 F | HEART RATE: 62 BPM

## 2025-05-28 DIAGNOSIS — S32.130A: Primary | ICD-10-CM

## 2025-05-28 LAB — HCG UR QL: NEGATIVE

## 2025-05-28 PROCEDURE — 72131 CT LUMBAR SPINE W/O DYE: CPT

## 2025-05-28 PROCEDURE — 99284 EMERGENCY DEPT VISIT MOD MDM: CPT

## 2025-05-28 ASSESSMENT — PAIN - FUNCTIONAL ASSESSMENT: PAIN_FUNCTIONAL_ASSESSMENT: 0-10

## 2025-05-28 ASSESSMENT — PAIN SCALES - GENERAL: PAINLEVEL_OUTOF10: 7

## 2025-05-29 PROCEDURE — 84703 CHORIONIC GONADOTROPIN ASSAY: CPT

## 2025-05-29 PROCEDURE — 6370000000 HC RX 637 (ALT 250 FOR IP): Performed by: NURSE PRACTITIONER

## 2025-05-29 RX ORDER — OXYCODONE AND ACETAMINOPHEN 5; 325 MG/1; MG/1
1 TABLET ORAL ONCE
Refills: 0 | Status: COMPLETED | OUTPATIENT
Start: 2025-05-29 | End: 2025-05-29

## 2025-05-29 RX ORDER — ONDANSETRON 4 MG/1
4 TABLET, ORALLY DISINTEGRATING ORAL ONCE
Status: COMPLETED | OUTPATIENT
Start: 2025-05-29 | End: 2025-05-29

## 2025-05-29 RX ORDER — ONDANSETRON 4 MG/1
4 TABLET, FILM COATED ORAL 3 TIMES DAILY PRN
Qty: 15 TABLET | Refills: 0 | Status: SHIPPED | OUTPATIENT
Start: 2025-05-29

## 2025-05-29 RX ORDER — OXYCODONE AND ACETAMINOPHEN 5; 325 MG/1; MG/1
1 TABLET ORAL EVERY 6 HOURS PRN
Qty: 12 TABLET | Refills: 0 | Status: SHIPPED | OUTPATIENT
Start: 2025-05-29 | End: 2025-06-01

## 2025-05-29 RX ADMIN — OXYCODONE HYDROCHLORIDE AND ACETAMINOPHEN 1 TABLET: 5; 325 TABLET ORAL at 00:34

## 2025-05-29 RX ADMIN — ONDANSETRON 4 MG: 4 TABLET, ORALLY DISINTEGRATING ORAL at 00:34

## 2025-05-29 ASSESSMENT — PAIN SCALES - GENERAL
PAINLEVEL_OUTOF10: 7
PAINLEVEL_OUTOF10: 9

## 2025-05-29 ASSESSMENT — PAIN - FUNCTIONAL ASSESSMENT: PAIN_FUNCTIONAL_ASSESSMENT: 0-10

## 2025-05-29 ASSESSMENT — ENCOUNTER SYMPTOMS: BACK PAIN: 1

## 2025-05-29 NOTE — DISCHARGE INSTRUCTIONS
Toe touch weight bearing with crutches. For video demo see: https://www.[employer].com/watch?v=[email or web address]  Ibuprofen 600- 800 mg every 8 hours for pain.  Percocet for pain not relieved by ibuprofen.  Ice packs to your low back.   Seat cushion may help.  Follow up with Ortho or Neuro for recheck.  Return to ER for any new, worsening, or change in condition.

## 2025-05-29 NOTE — ED TRIAGE NOTES
THE PATIENT PRESENTS TO THE ER FOR C/C OF BACK PAIN AND LEFT LOWER EXTREMITY PAIN AFTER FALLING WHILE ROLLER BLADING.  THE PATIENT ALSO ENDORSES SOME MILD DIZZINESS BUT NO BLURRED VISION, LOSS OF CONSCIOUSNESS, OR HEAD PAIN.

## 2025-05-29 NOTE — ED PROVIDER NOTES
every 8 hours as needed for Pain  Qty: 90 tablet, Refills: 0      ondansetron (ZOFRAN-ODT) 4 MG disintegrating tablet Take 1 tablet by mouth 3 times daily as needed for Nausea or Vomiting  Qty: 21 tablet, Refills: 0      furosemide (LASIX) 40 MG tablet Take 1 tablet by mouth daily  Qty: 30 tablet, Refills: 5      meloxicam (MOBIC) 15 MG tablet Take 1 tablet by mouth daily as needed for Pain  Qty: 90 tablet, Refills: 1    Associated Diagnoses: TMJ dysfunction      NONFORMULARY Take 1 capsule by mouth daily COD LIVER DAILY      vitamin C (ASCORBIC ACID) 500 MG tablet Take 1 tablet by mouth daily      magnesium 200 MG TABS tablet Take 1 tablet by mouth daily      zinc 50 MG TABS tablet Take 1 tablet by mouth daily      Probiotic Product (PROBIOTIC DAILY PO) Take 1 capsule by mouth daily      Biotin 5000 MCG CAPS Take 1 capsule by mouth daily      vitamin B-12 (CYANOCOBALAMIN) 500 MCG tablet Take 1 tablet by mouth daily      !! ondansetron (ZOFRAN) 4 MG tablet Take 1 tablet by mouth every 6 hours as needed for Nausea  Qty: 20 tablet, Refills: 1       !! - Potential duplicate medications found. Please discuss with provider.          ALLERGIES     Sertraline, Shrimp (diagnostic), Shrimp extract, Corn-containing products, Soybean-containing drug products, Wheat, and Wheat extract    FAMILY HISTORY       Family History   Problem Relation Age of Onset    Polycystic Ovary Syndrome Mother     Cancer Father     Kidney Disease Father     Heart Attack Father     Thyroid Disease Maternal Grandmother     Cancer Maternal Grandmother     Colon Cancer Paternal Grandfather 55    Cancer Maternal Aunt     Colon Polyps Neg Hx     Esophageal Cancer Neg Hx     Liver Cancer Neg Hx     Rectal Cancer Neg Hx     Stomach Cancer Neg Hx           SOCIAL HISTORY       Social History     Socioeconomic History    Marital status:    Tobacco Use    Smoking status: Never    Smokeless tobacco: Never   Vaping Use    Vaping status: Never Used          General: Normal range of motion.      Comments: Range of motion painful but within normal limits, no positive leg raise right or left.   Neurological:      Mental Status: She is alert and oriented to person, place, and time.         DIAGNOSTIC RESULTS     EKG: All EKG's are interpreted by the Emergency Department Physician who either signs or Co-signs this chart in the absence of a cardiologist.        RADIOLOGY:   Non-plain film images such as CT, Ultrasound and MRI are read by the radiologist. Plainradiographic images are visualized and preliminarily interpreted by the emergency physician with the below findings:        Interpretation per the Radiologist below, if available at the time of this note:    CT LUMBAR SPINE WO CONTRAST   Final Result   Impression:       Normal lumbar spine       Nondisplaced transverse S5 fracture        All CT scans are performed using dose optimization techniques as appropriate to the performed exam and include    at least one of the following: Automated exposure control, adjustment of the mA and/or kV according to size, and the use of iterative reconstruction technique.        ______________________________________    Electronically signed by: GASPER GARCIA M.D.   Date:     05/29/2025   Time:    00:15             ED BEDSIDE ULTRASOUND:   Performed by ED Physician - none    LABS:  Labs Reviewed   PREGNANCY, URINE       All other labs were within normal range or not returned as of this dictation.    Medications   oxyCODONE-acetaminophen (PERCOCET) 5-325 MG per tablet 1 tablet (1 tablet Oral Given 5/29/25 0034)   ondansetron (ZOFRAN-ODT) disintegrating tablet 4 mg (4 mg Oral Given 5/29/25 0034)       EMERGENCY DEPARTMENT COURSE and DIFFERENTIALDIAGNOSIS/MDM:   Vitals:    Vitals:    05/28/25 2217   BP: 105/72   Pulse: 62   Resp: 18   Temp: 98.8 °F (37.1 °C)   TempSrc: Oral   SpO2: 99%       MDM  Number of Diagnoses or Management Options  Nondisplaced zone iii fracture of

## 2025-06-02 PROCEDURE — 93298 REM INTERROG DEV EVAL SCRMS: CPT | Performed by: INTERNAL MEDICINE

## 2025-06-03 DIAGNOSIS — Z95.818 STATUS POST PLACEMENT OF IMPLANTABLE LOOP RECORDER: Primary | ICD-10-CM

## 2025-06-03 DIAGNOSIS — R55 NEAR SYNCOPE: ICD-10-CM

## 2025-06-03 DIAGNOSIS — R42 DIZZINESS: ICD-10-CM

## 2025-06-03 DIAGNOSIS — R00.1 SYMPTOMATIC BRADYCARDIA: ICD-10-CM

## 2025-06-18 ENCOUNTER — OFFICE VISIT (OUTPATIENT)
Dept: NEUROSURGERY | Age: 30
End: 2025-06-18
Payer: COMMERCIAL

## 2025-06-18 VITALS — SYSTOLIC BLOOD PRESSURE: 96 MMHG | DIASTOLIC BLOOD PRESSURE: 62 MMHG | HEART RATE: 67 BPM

## 2025-06-18 DIAGNOSIS — S32.110A CLOSED NONDISPLACED ZONE I FRACTURE OF SACRUM, INITIAL ENCOUNTER (HCC): Primary | ICD-10-CM

## 2025-06-18 DIAGNOSIS — M51.360 DEGENERATION OF INTERVERTEBRAL DISC OF LUMBAR REGION WITH DISCOGENIC BACK PAIN: ICD-10-CM

## 2025-06-18 DIAGNOSIS — R10.2 PELVIC PRESSURE IN FEMALE: ICD-10-CM

## 2025-06-18 PROCEDURE — 99214 OFFICE O/P EST MOD 30 MIN: CPT | Performed by: NURSE PRACTITIONER

## 2025-06-18 ASSESSMENT — ENCOUNTER SYMPTOMS
BACK PAIN: 1
GASTROINTESTINAL NEGATIVE: 1
EYES NEGATIVE: 1
RESPIRATORY NEGATIVE: 1

## 2025-06-18 NOTE — PROGRESS NOTES
[facility]  Office Visit      Chief Complaint   Patient presents with    New Patient           Back Pain     Patient states she has occasional pressure but not pain in her lower back, buttocks. Patient states she has intermittent tingling, and numbness in her left hip and leg. Patient states that she is a little stiff first thing in the morning but it usually resolves herself. Patient states that she is having difficulty holding her urine when she feels the urge to go.     History of Present Illness  Lawrence Guerrero is a 29 y.o. female with PCOS, hypothyroidism and depression who was evaluated in our ED on 5/29/2025 with low back and left lower extremity pain after a fall, CT revealed some degenerative changes and a potential S5 fracture in which she was referred to our team for an evaluation.      Since the incident, she has been experiencing discomfort in her lower back, which she describes as awkward, particularly when sitting upright. The pain extends into her pelvis and is most pronounced in her LEFT hip, which is tender to touch. The intensity of the pain has decreased over time, but she still experiences tenderness that radiates from her lower back to her left hip, down the side of her thigh, and occasionally past her knee into her foot. This radiation is particularly noticeable when she is seated, such as on the toilet, even for short periods. She also reports difficulty in holding her bladder, a symptom that has been present since the fall. She has a history of frequent urination and a distended bladder. She reports no numbness or tingling sensations in her leg or vaginal area.  She experiences stiffness upon waking, particularly when sleeping on her left side. She has not been prescribed muscle relaxers or steroids, and has not undergone chiropractic treatment or used a TENS unit. She was advised to use crutches until cleared by our team. She has previously attended physical therapy for her

## 2025-06-23 ENCOUNTER — OFFICE VISIT (OUTPATIENT)
Dept: OBGYN CLINIC | Age: 30
End: 2025-06-23
Payer: COMMERCIAL

## 2025-06-23 VITALS
WEIGHT: 179 LBS | SYSTOLIC BLOOD PRESSURE: 103 MMHG | HEIGHT: 68 IN | BODY MASS INDEX: 27.13 KG/M2 | HEART RATE: 53 BPM | DIASTOLIC BLOOD PRESSURE: 71 MMHG

## 2025-06-23 DIAGNOSIS — E28.2 PCOS (POLYCYSTIC OVARIAN SYNDROME): ICD-10-CM

## 2025-06-23 DIAGNOSIS — Z98.890 S/P D&C (STATUS POST DILATION AND CURETTAGE): ICD-10-CM

## 2025-06-23 DIAGNOSIS — Z98.890 S/P LAPAROSCOPY: Primary | ICD-10-CM

## 2025-06-23 PROCEDURE — 99213 OFFICE O/P EST LOW 20 MIN: CPT | Performed by: OBSTETRICS & GYNECOLOGY

## 2025-06-23 NOTE — PROGRESS NOTES
Pt is here today for a 6 wk post op visit following s/p laparoscopy, d&c, hysteroscopy 5/6/25, still having some abdominal pain, still hasn't had a period, would like to discuss hormones    normal

## 2025-06-23 NOTE — PROGRESS NOTES
Lawrence Guerrero (:  1995) is a 29 y.o. female, Established patient, here for evaluation of the following chief complaint(s):  Post-Op Check        Subjective   History of Present Illness  The patient presents for evaluation of polycystic ovarian syndrome.    She reports experiencing mild abdominal pain localized to two specific areas and mentions an unusual skin condition. Her menstrual cycle was not regular prior to her procedure. Approximately a week ago, she experienced spotting for three days, characterized by brown discharge. She expresses concern about the irregularity of her menstrual cycle, particularly in relation to her fertility. She recalls a period of 200 days without menstruation, during which she noticed significant hormonal changes. She has been on birth control for an extended period but does not recall the specifics of her experience with it. She experiences both daily and cyclic pelvic pain, which she describes as discomfort rather than severe pain. This discomfort has improved over the years. She experienced right-sided pain prior to the recent spotting episode and believes her right ovary is the primary source of her cyst-related issues, as she typically develops large cysts on this side. She is currently monitoring her cycles and is not actively trying to conceive. She has observed mood changes when she does not menstruate, which she attributes to elevated testosterone levels. She was unable to complete the dexamethasone test due to a requirement to discontinue medication for two weeks post-procedure. However, she sustained a back injury during this period, necessitating the resumption of medication. She was previously on metformin for polycystic ovarian syndrome but discontinued it.    CONTRACEPTION: Birth control pill, specifics not recalled. Currently monitoring cycles.    Review of Systems       Past Medical History:   Diagnosis Date    Concussion     approximately 10 years

## 2025-06-24 DIAGNOSIS — E28.2 PCOS (POLYCYSTIC OVARIAN SYNDROME): ICD-10-CM

## 2025-06-24 LAB
ESTRADIOL SERPL-SCNC: 12.5 PG/ML
FSH SERPL-SCNC: 3.4 MIU/ML
PROGEST SERPL-MCNC: 0.31 NG/ML

## 2025-06-25 LAB — DHEA-S SERPL-MCNC: 311 UG/DL (ref 99–340)

## 2025-06-28 LAB
SHBG SERPL-SCNC: 38 NMOL/L (ref 25–122)
TESTOST FREE SERPL-MCNC: 2.7 PG/ML (ref 0.8–7.4)
TESTOST SERPL-MCNC: 18 NG/DL (ref 9–55)

## 2025-07-08 DIAGNOSIS — R00.1 BRADYCARDIA: ICD-10-CM

## 2025-07-08 DIAGNOSIS — Z95.818 STATUS POST PLACEMENT OF IMPLANTABLE LOOP RECORDER: Primary | ICD-10-CM

## 2025-07-08 DIAGNOSIS — R42 DIZZINESS: ICD-10-CM

## 2025-07-08 DIAGNOSIS — R55 NEAR SYNCOPE: ICD-10-CM

## 2025-07-08 DIAGNOSIS — R00.1 SYMPTOMATIC BRADYCARDIA: ICD-10-CM

## 2025-07-08 PROCEDURE — 93298 REM INTERROG DEV EVAL SCRMS: CPT | Performed by: INTERNAL MEDICINE

## 2025-07-15 ENCOUNTER — OFFICE VISIT (OUTPATIENT)
Dept: ENT CLINIC | Age: 30
End: 2025-07-15
Payer: COMMERCIAL

## 2025-07-15 VITALS
BODY MASS INDEX: 27.58 KG/M2 | WEIGHT: 182 LBS | DIASTOLIC BLOOD PRESSURE: 72 MMHG | HEIGHT: 68 IN | SYSTOLIC BLOOD PRESSURE: 102 MMHG

## 2025-07-15 DIAGNOSIS — H91.21 SUDDEN HEARING LOSS, RIGHT: ICD-10-CM

## 2025-07-15 DIAGNOSIS — H93.11 TINNITUS, RIGHT: ICD-10-CM

## 2025-07-15 DIAGNOSIS — R42 DIZZINESS: Primary | ICD-10-CM

## 2025-07-15 PROCEDURE — 99213 OFFICE O/P EST LOW 20 MIN: CPT | Performed by: NURSE PRACTITIONER

## 2025-07-15 ASSESSMENT — ENCOUNTER SYMPTOMS
GASTROINTESTINAL NEGATIVE: 1
RESPIRATORY NEGATIVE: 1
ALLERGIC/IMMUNOLOGIC NEGATIVE: 1

## 2025-07-15 NOTE — PROGRESS NOTES
7/15/2025    Lawrence Guerrero (:  1995) is a 29 y.o. female, Established patient, here for evaluation of the following chief complaint(s):  Follow-up (ears)      Vitals:    07/15/25 0859   BP: 102/72   Weight: 82.6 kg (182 lb)   Height: 1.727 m (5' 8\")       Wt Readings from Last 3 Encounters:   07/15/25 82.6 kg (182 lb)   25 81.2 kg (179 lb)   25 85.6 kg (188 lb 12.8 oz)       BP Readings from Last 3 Encounters:   07/15/25 102/72   25 103/71   25 96/62         SUBJECTIVE/OBJECTIVE:    Patient seen today for follow up of her ears. She was referred to [facility] at her last visit. She was seen by Dr. Jamil who recommended work up for cochlear implants. He also did a temporal CT that showed right posterior semicircular canal dehiscence and he recommended right posterior semicircular canal plugging. She reports that she is waiting for them to call her to schedule surgery. She reports that she is the same since her last visit. She still has dizziness, right sided hearing loss, tinnitus, and fullness. She reports that she has been experiencing floaters with her dizziness recently. She reports she does have an ophthalmologist.         Review of Systems   Constitutional: Negative.    HENT:  Positive for ear pain (fullness, right), hearing loss (right) and tinnitus (right).    Eyes:  Positive for visual disturbance (floaters with dizziness).   Respiratory: Negative.     Cardiovascular: Negative.    Gastrointestinal: Negative.    Endocrine: Negative.    Musculoskeletal: Negative.    Skin: Negative.    Allergic/Immunologic: Negative.    Neurological:  Positive for dizziness.   Hematological: Negative.    Psychiatric/Behavioral: Negative.          Physical Exam  Vitals reviewed.   Constitutional:       Appearance: Normal appearance. She is normal weight.   HENT:      Head: Normocephalic and atraumatic.      Right Ear: Tympanic membrane, ear canal and external ear normal.

## 2025-07-17 RX ORDER — FUROSEMIDE 40 MG/1
40 TABLET ORAL DAILY
Qty: 30 TABLET | Refills: 11 | Status: SHIPPED | OUTPATIENT
Start: 2025-07-17

## 2025-07-24 NOTE — TELEPHONE ENCOUNTER
Check Implants. I have pout in an order for additional xray. After checking this we maybe able to get MRI approved if needed.

## 2025-08-15 ENCOUNTER — OFFICE VISIT (OUTPATIENT)
Age: 30
End: 2025-08-15

## 2025-08-15 VITALS
DIASTOLIC BLOOD PRESSURE: 74 MMHG | HEIGHT: 68 IN | SYSTOLIC BLOOD PRESSURE: 110 MMHG | WEIGHT: 175 LBS | BODY MASS INDEX: 26.52 KG/M2 | OXYGEN SATURATION: 98 % | TEMPERATURE: 97.9 F | RESPIRATION RATE: 18 BRPM | HEART RATE: 84 BPM

## 2025-08-15 DIAGNOSIS — M79.671 RIGHT FOOT PAIN: Primary | ICD-10-CM

## (undated) DEVICE — HYPODERMIC SAFETY NEEDLE: Brand: MAGELLAN

## (undated) DEVICE — GLOVE SURG SZ 75 CRM LTX FREE POLYISOPRENE POLYMER BEAD ANTI

## (undated) DEVICE — MINOR CDS: Brand: MEDLINE INDUSTRIES, INC.

## (undated) DEVICE — CLEANING BRUSH - SINGLE USE: Brand: SAFEGUIDE®

## (undated) DEVICE — SOLUTION IV 250ML 0.9% SOD CHL PH 5 INJ USP VIAFLX PLAS

## (undated) DEVICE — DRAPE SLUSH DISC W44XL66IN ST FOR RND BSIN HUSH SLUSH SYS

## (undated) DEVICE — TUBE ET 7.5MM NSL ORAL BASIC CUF INTMED MURPHY EYE RADPQ

## (undated) DEVICE — YANKAUER,TAPERED BULBOUS TIP,W/O VENT: Brand: MEDLINE

## (undated) DEVICE — LIQUIBAND RAPID ADHESIVE 36/CS 0.8ML: Brand: MEDLINE

## (undated) DEVICE — COVER LT HNDL BLU PLAS

## (undated) DEVICE — TIBURON LAPAROTOMY DRAPE: Brand: CONVERTORS

## (undated) DEVICE — 40595 XL TRENDELENBURG POSITIONING KIT: Brand: 40595 XL TRENDELENBURG POSITIONING KIT

## (undated) DEVICE — PAD,ARMBOARD,CONV,FOAM,2X8X20",12PR/CS: Brand: MEDLINE

## (undated) DEVICE — TOTAL TRAY, 16FR 10ML SIL FOLEY, URN: Brand: MEDLINE

## (undated) DEVICE — TROCAR: Brand: KII® OPTICAL ACCESS SYSTEM

## (undated) DEVICE — LEGGINGS, PAIR, CLEAR, STERILE: Brand: MEDLINE

## (undated) DEVICE — CHLORAPREP 26ML ORANGE

## (undated) DEVICE — PUMP SUC IRR TBNG L10FT W/ HNDPC ASSEMB STRYKEFLOW 2

## (undated) DEVICE — AIRSEAL 8 MM ACCESS PORT AND LOW PROFILE OBTURATOR WITH BLADELESS OPTICAL TIP, 120 MM LENGTH: Brand: AIRSEAL

## (undated) DEVICE — TUBE ET 7MM NSL ORAL BASIC CUF INTMED MURPHY EYE RADPQ MRK

## (undated) DEVICE — NEEDLE INSUF L150MM DIA2MM DISP FOR PNEUMOPERI ENDOPATH

## (undated) DEVICE — MINI ENDOCUT SCISSOR TIP, DISPOSABLE: Brand: RENEW

## (undated) DEVICE — TROCAR: Brand: KII FIOS FIRST ENTRY

## (undated) DEVICE — SUTURE MCRYL SZ 4-0 L18IN ABSRB UD L19MM PS-2 3/8 CIR PRIM Y496G

## (undated) DEVICE — FORCEPS BX L240CM JAW DIA2.4MM ORNG L CAP W/ NDL DISP RAD

## (undated) DEVICE — ADHESIVE SKIN CLSR 0.7ML TOP DERMBND ADV

## (undated) DEVICE — NEPTUNE E-SEP SMOKE EVACUATION PENCIL, COATED, 70MM BLADE, ROCKER SWITCH: Brand: NEPTUNE E-SEP

## (undated) DEVICE — ADAPTER CLEANING PORPOISE CLEANING

## (undated) DEVICE — Y-TYPE TUR/BLADDER IRRIGATION SET, REGULATING CLAMP

## (undated) DEVICE — DEVICE TISS REM DIA3MM L25.25IN ENDOSCP F/ IU POLYPS

## (undated) DEVICE — SINGLE PORT MANIFOLD: Brand: NEPTUNE 2

## (undated) DEVICE — PASSER SUT TRANSOSSEOUS MALL SHFT DURABLE NIT CAPTURE LOOP

## (undated) DEVICE — ENDO KIT,LOURDES HOSPITAL: Brand: MEDLINE INDUSTRIES, INC.

## (undated) DEVICE — ARM DRAPE

## (undated) DEVICE — SET ENDOSCP SEAL HYSTEROSCOPE RIG OUTFLO CHN DISP MYOSURE

## (undated) DEVICE — SUTURE VICRYL + SZ 3-0 L27IN ABSRB UD L26MM SH 1/2 CIR VCP416H

## (undated) DEVICE — CURAVIEW LED LARYNGOSCOPE BLADE & HANDLE,DISPOSABLE,MAC 2: Brand: CURAPLEX

## (undated) DEVICE — FLUID MGMT SYS FLUENT KIT 6/PK

## (undated) DEVICE — STRIP SKIN CLSR W0.25XL4IN WHT SPUNBOUND FBR NYL HI ADH

## (undated) DEVICE — BLADELESS OBTURATOR: Brand: WECK VISTA

## (undated) DEVICE — LARYNGOSCOPE BLDE MAC HNDL M SZ 35 ST CURAPLEX CURAVIEW LED

## (undated) DEVICE — SEAL

## (undated) DEVICE — CANNULA NSL AD L7FT DIV O2 CO2 W/ M LUERLOCK TRMPT CONN

## (undated) DEVICE — Device

## (undated) DEVICE — SOLUTION IV 1000ML 0.9% SOD CHL FOR IRRIG PLAS CONT

## (undated) DEVICE — COLUMN DRAPE

## (undated) DEVICE — INSUFFLATION NEEDLE TO ESTABLISH PNEUMOPERITONEUM.: Brand: INSUFFLATION NEEDLE

## (undated) DEVICE — GLOVE SURG SZ 7 CRM LTX FREE POLYISOPRENE POLYMER BEAD ANTI

## (undated) DEVICE — CLEANING SPONGE: Brand: KOALA™

## (undated) DEVICE — SOLUTION ANTIFOG VIS SYS CLEARIFY LAPSCP

## (undated) DEVICE — SUPPLEMENT DIGESTIVE H2O SOL GI-EASE

## (undated) DEVICE — GOWN,PREVENTION PLUS,XLN/XL,ST,24/CS: Brand: MEDLINE

## (undated) DEVICE — VESSEL SEALER EXTEND: Brand: ENDOWRIST

## (undated) DEVICE — BRUSH ENDOSCP 2 END CHN HEDGEHOG

## (undated) DEVICE — DRAPE,UTILITY,XL,4/PK,STERILE: Brand: MEDLINE

## (undated) DEVICE — 3M™ TEGADERM™ TRANSPARENT FILM DRESSING FRAME STYLE, 1626W, 4 IN X 4-3/4 IN (10 CM X 12 CM), 50/CT 4CT/CASE: Brand: 3M™ TEGADERM™

## (undated) DEVICE — CURAVIEW LED LARYNGOSCOPE BLADE & HANDLE,DISPOSABLE,MILLER 2: Brand: CURAPLEX

## (undated) DEVICE — SUTURE PDS + SZ 0 L27IN ABSRB VLT L36MM CT 1 1 2 CIR PDP340H

## (undated) DEVICE — DECANTER FLD 9IN ST BG FOR ASEP TRNSF OF FLD

## (undated) DEVICE — VCARE LARGE UTERINE MANIPULATOR: Brand: VCARE LARGE

## (undated) DEVICE — SUTURE ABSORBABLE MONOFILAMENT 0 CT-2 12 IN STRATAFIX SPRL SXPP1B405

## (undated) DEVICE — SUTURE VICRYL + SZ 0 L27IN ABSRB UD CT-1 L36MM 1/2 CIR TAPR VCP260H

## (undated) DEVICE — SEALANT TISS 10 ML FIBRIN VISTASEAL

## (undated) DEVICE — SUCTION IRRIGATOR: Brand: ENDOWRIST

## (undated) DEVICE — GOWN, ORBIS, XLONG/XLARGE, STERILE: Brand: MEDLINE

## (undated) DEVICE — FORCEPS BX 240CM 2.4MM L NDL RAD JAW 4 M00513334

## (undated) DEVICE — TIP COVER ACCESSORY

## (undated) DEVICE — GLOVE SURG SZ 7 L12IN FNGR THK79MIL GRN LTX FREE

## (undated) DEVICE — SOLUTION IV IRRIG POUR BRL 0.9% SODIUM CHL 2F7124

## (undated) DEVICE — TOWEL,OR,DSP,ST,BLUE,DLX,4/PK,20PK/CS: Brand: MEDLINE

## (undated) DEVICE — SUTURE VICRYL SZ 0 L36IN ABSRB UD L36MM CT-1 1/2 CIR J946H

## (undated) DEVICE — SUTURE MONOCRYL SZ 4-0 L18IN ABSRB UD L19MM PS-2 3/8 CIR PRIM Y496G

## (undated) DEVICE — DAVINCI: Brand: MEDLINE INDUSTRIES, INC.

## (undated) DEVICE — TRI-LUMEN FILTERED TUBE SET WITH ACTIVATED CHARCOAL FILTER: Brand: AIRSEAL

## (undated) DEVICE — GYN LAP CDS

## (undated) DEVICE — SOLUTION IRRIG 3000ML 0.9% SOD CHL USP UROMATIC PLAS CONT

## (undated) DEVICE — SUTURE SZ 0 27IN 5/8 CIR UR-6  TAPER PT VIOLET ABSRB VICRYL J603H

## (undated) DEVICE — SUTURE VCRL SZ 0 L27IN ABSRB VLT L36MM UR-5 5/8 CIR J376H

## (undated) DEVICE — CLEARVIEW UTERINE MANIPULATOR, 7CM: Brand: CLEARVIEW UTERINE MANIPULATOR

## (undated) DEVICE — GOWN, ORBIS, XLARGE, STERILE: Brand: MEDLINE

## (undated) DEVICE — SYRINGE MED 10ML TRNSLUC BRL PLUNG BLK MRK POLYPR CTRL

## (undated) DEVICE — APPLICATOR LAP 35 CM 2 RIGID VISTASEAL

## (undated) DEVICE — BITE BLOCK ENDOSCP AD 60 FR W/ ADJ STRP PLAS GRN BLOX

## (undated) DEVICE — SEAL ENDO INSTR SELF SEAL UROLOGY

## (undated) DEVICE — SYRINGE, LUER LOCK, 10ML: Brand: MEDLINE

## (undated) DEVICE — GLOVE SURG SZ 65 CRM LTX FREE POLYISOPRENE POLYMER BEAD ANTI